# Patient Record
Sex: FEMALE | Race: AMERICAN INDIAN OR ALASKA NATIVE | NOT HISPANIC OR LATINO | URBAN - METROPOLITAN AREA
[De-identification: names, ages, dates, MRNs, and addresses within clinical notes are randomized per-mention and may not be internally consistent; named-entity substitution may affect disease eponyms.]

---

## 2017-01-03 ENCOUNTER — INPATIENT (INPATIENT)
Facility: HOSPITAL | Age: 52
LOS: 7 days | Discharge: HOME | End: 2017-01-11
Attending: SURGERY | Admitting: SURGERY
Payer: COMMERCIAL

## 2017-01-03 VITALS
TEMPERATURE: 98 F | HEIGHT: 62 IN | OXYGEN SATURATION: 100 % | DIASTOLIC BLOOD PRESSURE: 79 MMHG | HEART RATE: 102 BPM | WEIGHT: 134.92 LBS | SYSTOLIC BLOOD PRESSURE: 116 MMHG

## 2017-01-03 LAB
ALBUMIN SERPL ELPH-MCNC: 3.4 G/DL — SIGNIFICANT CHANGE UP (ref 3.3–5)
ALP SERPL-CCNC: 175 U/L — HIGH (ref 40–120)
ALT FLD-CCNC: 24 U/L — SIGNIFICANT CHANGE UP (ref 12–78)
ANION GAP SERPL CALC-SCNC: 10 MMOL/L — SIGNIFICANT CHANGE UP (ref 5–17)
APTT BLD: 36.5 SEC — SIGNIFICANT CHANGE UP (ref 27.5–37.4)
AST SERPL-CCNC: 24 U/L — SIGNIFICANT CHANGE UP (ref 15–37)
BASOPHILS # BLD AUTO: 0.1 K/UL — SIGNIFICANT CHANGE UP (ref 0–0.2)
BASOPHILS NFR BLD AUTO: 0.6 % — SIGNIFICANT CHANGE UP (ref 0–2)
BILIRUB SERPL-MCNC: 0.2 MG/DL — SIGNIFICANT CHANGE UP (ref 0.2–1.2)
BLD GP AB SCN SERPL QL: SIGNIFICANT CHANGE UP
BUN SERPL-MCNC: 19 MG/DL — SIGNIFICANT CHANGE UP (ref 7–23)
CALCIUM SERPL-MCNC: 8.7 MG/DL — SIGNIFICANT CHANGE UP (ref 8.5–10.1)
CHLORIDE SERPL-SCNC: 103 MMOL/L — SIGNIFICANT CHANGE UP (ref 96–108)
CO2 SERPL-SCNC: 27 MMOL/L — SIGNIFICANT CHANGE UP (ref 22–31)
CREAT SERPL-MCNC: 0.69 MG/DL — SIGNIFICANT CHANGE UP (ref 0.5–1.3)
EOSINOPHIL # BLD AUTO: 0.3 K/UL — SIGNIFICANT CHANGE UP (ref 0–0.5)
EOSINOPHIL NFR BLD AUTO: 2.2 % — SIGNIFICANT CHANGE UP (ref 0–6)
GLUCOSE SERPL-MCNC: 112 MG/DL — HIGH (ref 70–99)
HCT VFR BLD CALC: 42.6 % — SIGNIFICANT CHANGE UP (ref 34.5–45)
HGB BLD-MCNC: 14.4 G/DL — SIGNIFICANT CHANGE UP (ref 11.5–15.5)
INR BLD: 1.02 RATIO — SIGNIFICANT CHANGE UP (ref 0.88–1.16)
LYMPHOCYTES # BLD AUTO: 32.5 % — SIGNIFICANT CHANGE UP (ref 13–44)
LYMPHOCYTES # BLD AUTO: 4.4 K/UL — HIGH (ref 1–3.3)
MCHC RBC-ENTMCNC: 27.8 PG — SIGNIFICANT CHANGE UP (ref 27–34)
MCHC RBC-ENTMCNC: 33.9 GM/DL — SIGNIFICANT CHANGE UP (ref 32–36)
MCV RBC AUTO: 82 FL — SIGNIFICANT CHANGE UP (ref 80–100)
MONOCYTES # BLD AUTO: 0.6 K/UL — SIGNIFICANT CHANGE UP (ref 0–0.9)
MONOCYTES NFR BLD AUTO: 4.4 % — SIGNIFICANT CHANGE UP (ref 2–14)
NEUTROPHILS # BLD AUTO: 8.1 K/UL — HIGH (ref 1.8–7.4)
NEUTROPHILS NFR BLD AUTO: 60.3 % — SIGNIFICANT CHANGE UP (ref 43–77)
PLATELET # BLD AUTO: 351 K/UL — SIGNIFICANT CHANGE UP (ref 150–400)
POTASSIUM SERPL-MCNC: 3.4 MMOL/L — LOW (ref 3.5–5.3)
POTASSIUM SERPL-SCNC: 3.4 MMOL/L — LOW (ref 3.5–5.3)
PROT SERPL-MCNC: 8 GM/DL — SIGNIFICANT CHANGE UP (ref 6–8.3)
PROTHROM AB SERPL-ACNC: 11.4 SEC — SIGNIFICANT CHANGE UP (ref 10–13.1)
RBC # BLD: 5.19 M/UL — SIGNIFICANT CHANGE UP (ref 3.8–5.2)
RBC # FLD: 11.4 % — SIGNIFICANT CHANGE UP (ref 11–15)
SODIUM SERPL-SCNC: 140 MMOL/L — SIGNIFICANT CHANGE UP (ref 135–145)
WBC # BLD: 13.5 K/UL — HIGH (ref 3.8–10.5)
WBC # FLD AUTO: 13.5 K/UL — HIGH (ref 3.8–10.5)

## 2017-01-03 PROCEDURE — 99285 EMERGENCY DEPT VISIT HI MDM: CPT

## 2017-01-03 PROCEDURE — 71010: CPT | Mod: 26

## 2017-01-03 RX ORDER — SODIUM CHLORIDE 9 MG/ML
1000 INJECTION INTRAMUSCULAR; INTRAVENOUS; SUBCUTANEOUS ONCE
Qty: 0 | Refills: 0 | Status: COMPLETED | OUTPATIENT
Start: 2017-01-03 | End: 2017-01-03

## 2017-01-03 RX ADMIN — SODIUM CHLORIDE 1000 MILLILITER(S): 9 INJECTION INTRAMUSCULAR; INTRAVENOUS; SUBCUTANEOUS at 17:53

## 2017-01-03 NOTE — ED PROVIDER NOTE - MEDICAL DECISION MAKING DETAILS
Heri Lees: pt was a signout pending CT and admission to dr pedroza's service. d/w dr pedroza and agree pt does not need another CT scan. pt admitted under his service

## 2017-01-03 NOTE — ED ADULT TRIAGE NOTE - CHIEF COMPLAINT QUOTE
lower abdominal pain times 2 weeks. pt also complaining of nausea vomiting and constipation. pt has history of colon cancer states she was sent in by PMD for admission

## 2017-01-03 NOTE — ED PROVIDER NOTE - FAMILY HISTORY
Mother  Still living? No  Family history of leukemia, Age at diagnosis: Age Unknown     Father  Still living? Yes, Estimated age: Age Unknown  Family history of diabetes mellitus, Age at diagnosis: Age Unknown

## 2017-01-04 DIAGNOSIS — E05.90 THYROTOXICOSIS, UNSPECIFIED WITHOUT THYROTOXIC CRISIS OR STORM: ICD-10-CM

## 2017-01-04 DIAGNOSIS — D49.0 NEOPLASM OF UNSPECIFIED BEHAVIOR OF DIGESTIVE SYSTEM: ICD-10-CM

## 2017-01-04 LAB
ALBUMIN SERPL ELPH-MCNC: 2.9 G/DL — LOW (ref 3.3–5)
ALP SERPL-CCNC: 146 U/L — HIGH (ref 40–120)
ALT FLD-CCNC: 24 U/L — SIGNIFICANT CHANGE UP (ref 12–78)
AMYLASE P1 CFR SERPL: 62 U/L — SIGNIFICANT CHANGE UP (ref 25–115)
ANION GAP SERPL CALC-SCNC: 8 MMOL/L — SIGNIFICANT CHANGE UP (ref 5–17)
APPEARANCE UR: CLEAR — SIGNIFICANT CHANGE UP
APTT BLD: 31.9 SEC — SIGNIFICANT CHANGE UP (ref 27.5–37.4)
AST SERPL-CCNC: 20 U/L — SIGNIFICANT CHANGE UP (ref 15–37)
BACTERIA # UR AUTO: ABNORMAL
BASOPHILS # BLD AUTO: 0.1 K/UL — SIGNIFICANT CHANGE UP (ref 0–0.2)
BASOPHILS NFR BLD AUTO: 0.7 % — SIGNIFICANT CHANGE UP (ref 0–2)
BILIRUB SERPL-MCNC: 0.4 MG/DL — SIGNIFICANT CHANGE UP (ref 0.2–1.2)
BILIRUB UR-MCNC: NEGATIVE — SIGNIFICANT CHANGE UP
BLD GP AB SCN SERPL QL: SIGNIFICANT CHANGE UP
BUN SERPL-MCNC: 12 MG/DL — SIGNIFICANT CHANGE UP (ref 7–23)
CALCIUM SERPL-MCNC: 8.2 MG/DL — LOW (ref 8.5–10.1)
CHLORIDE SERPL-SCNC: 108 MMOL/L — SIGNIFICANT CHANGE UP (ref 96–108)
CO2 SERPL-SCNC: 26 MMOL/L — SIGNIFICANT CHANGE UP (ref 22–31)
COLOR SPEC: YELLOW — SIGNIFICANT CHANGE UP
COMMENT - URINE: SIGNIFICANT CHANGE UP
CREAT SERPL-MCNC: 0.58 MG/DL — SIGNIFICANT CHANGE UP (ref 0.5–1.3)
DIFF PNL FLD: NEGATIVE — SIGNIFICANT CHANGE UP
EOSINOPHIL # BLD AUTO: 0.3 K/UL — SIGNIFICANT CHANGE UP (ref 0–0.5)
EOSINOPHIL NFR BLD AUTO: 2.6 % — SIGNIFICANT CHANGE UP (ref 0–6)
EPI CELLS # UR: SIGNIFICANT CHANGE UP
GLUCOSE SERPL-MCNC: 94 MG/DL — SIGNIFICANT CHANGE UP (ref 70–99)
GLUCOSE UR QL: NEGATIVE MG/DL — SIGNIFICANT CHANGE UP
HCG SERPL-ACNC: 5 MIU/ML — SIGNIFICANT CHANGE UP
HCT VFR BLD CALC: 36.3 % — SIGNIFICANT CHANGE UP (ref 34.5–45)
HGB BLD-MCNC: 12.6 G/DL — SIGNIFICANT CHANGE UP (ref 11.5–15.5)
INR BLD: 1.05 RATIO — SIGNIFICANT CHANGE UP (ref 0.88–1.16)
KETONES UR-MCNC: NEGATIVE — SIGNIFICANT CHANGE UP
LEUKOCYTE ESTERASE UR-ACNC: ABNORMAL
LIDOCAIN IGE QN: 204 U/L — SIGNIFICANT CHANGE UP (ref 73–393)
LYMPHOCYTES # BLD AUTO: 29.1 % — SIGNIFICANT CHANGE UP (ref 13–44)
LYMPHOCYTES # BLD AUTO: 3.3 K/UL — SIGNIFICANT CHANGE UP (ref 1–3.3)
MCHC RBC-ENTMCNC: 28.4 PG — SIGNIFICANT CHANGE UP (ref 27–34)
MCHC RBC-ENTMCNC: 34.8 GM/DL — SIGNIFICANT CHANGE UP (ref 32–36)
MCV RBC AUTO: 81.6 FL — SIGNIFICANT CHANGE UP (ref 80–100)
MONOCYTES # BLD AUTO: 0.8 K/UL — SIGNIFICANT CHANGE UP (ref 0–0.9)
MONOCYTES NFR BLD AUTO: 6.8 % — SIGNIFICANT CHANGE UP (ref 2–14)
NEUTROPHILS # BLD AUTO: 6.9 K/UL — SIGNIFICANT CHANGE UP (ref 1.8–7.4)
NEUTROPHILS NFR BLD AUTO: 60.8 % — SIGNIFICANT CHANGE UP (ref 43–77)
NITRITE UR-MCNC: NEGATIVE — SIGNIFICANT CHANGE UP
PH UR: 6 — SIGNIFICANT CHANGE UP (ref 4.8–8)
PLATELET # BLD AUTO: 288 K/UL — SIGNIFICANT CHANGE UP (ref 150–400)
POTASSIUM SERPL-MCNC: 4.2 MMOL/L — SIGNIFICANT CHANGE UP (ref 3.5–5.3)
POTASSIUM SERPL-SCNC: 4.2 MMOL/L — SIGNIFICANT CHANGE UP (ref 3.5–5.3)
PROT SERPL-MCNC: 6.9 GM/DL — SIGNIFICANT CHANGE UP (ref 6–8.3)
PROT UR-MCNC: NEGATIVE MG/DL — SIGNIFICANT CHANGE UP
PROTHROM AB SERPL-ACNC: 11.8 SEC — SIGNIFICANT CHANGE UP (ref 10–13.1)
RBC # BLD: 4.44 M/UL — SIGNIFICANT CHANGE UP (ref 3.8–5.2)
RBC # FLD: 11.3 % — SIGNIFICANT CHANGE UP (ref 11–15)
RBC CASTS # UR COMP ASSIST: SIGNIFICANT CHANGE UP /HPF (ref 0–4)
SODIUM SERPL-SCNC: 142 MMOL/L — SIGNIFICANT CHANGE UP (ref 135–145)
SP GR SPEC: 1.01 — SIGNIFICANT CHANGE UP (ref 1.01–1.02)
T3 SERPL-MCNC: 116 NG/DL — SIGNIFICANT CHANGE UP (ref 80–200)
T4 AB SER-ACNC: 10.6 UG/DL — SIGNIFICANT CHANGE UP (ref 4.6–12)
TSH SERPL-MCNC: 1.82 UU/ML — SIGNIFICANT CHANGE UP (ref 0.36–3.74)
UROBILINOGEN FLD QL: NEGATIVE MG/DL — SIGNIFICANT CHANGE UP
WBC # BLD: 11.4 K/UL — HIGH (ref 3.8–10.5)
WBC # FLD AUTO: 11.4 K/UL — HIGH (ref 3.8–10.5)
WBC UR QL: ABNORMAL

## 2017-01-04 RX ORDER — ONDANSETRON 8 MG/1
4 TABLET, FILM COATED ORAL EVERY 6 HOURS
Qty: 0 | Refills: 0 | Status: DISCONTINUED | OUTPATIENT
Start: 2017-01-04 | End: 2017-01-06

## 2017-01-04 RX ORDER — POTASSIUM CHLORIDE 20 MEQ
40 PACKET (EA) ORAL EVERY 4 HOURS
Qty: 0 | Refills: 0 | Status: COMPLETED | OUTPATIENT
Start: 2017-01-04 | End: 2017-01-04

## 2017-01-04 RX ORDER — HEPARIN SODIUM 5000 [USP'U]/ML
5000 INJECTION INTRAVENOUS; SUBCUTANEOUS EVERY 12 HOURS
Qty: 0 | Refills: 0 | Status: DISCONTINUED | OUTPATIENT
Start: 2017-01-04 | End: 2017-01-06

## 2017-01-04 RX ORDER — PANTOPRAZOLE SODIUM 20 MG/1
40 TABLET, DELAYED RELEASE ORAL
Qty: 0 | Refills: 0 | Status: DISCONTINUED | OUTPATIENT
Start: 2017-01-04 | End: 2017-01-06

## 2017-01-04 RX ORDER — ACETAMINOPHEN 500 MG
650 TABLET ORAL EVERY 6 HOURS
Qty: 0 | Refills: 0 | Status: DISCONTINUED | OUTPATIENT
Start: 2017-01-04 | End: 2017-01-06

## 2017-01-04 RX ORDER — SODIUM CHLORIDE 9 MG/ML
1000 INJECTION, SOLUTION INTRAVENOUS
Qty: 0 | Refills: 0 | Status: DISCONTINUED | OUTPATIENT
Start: 2017-01-04 | End: 2017-01-06

## 2017-01-04 RX ADMIN — Medication 40 MILLIEQUIVALENT(S): at 01:58

## 2017-01-04 RX ADMIN — SODIUM CHLORIDE 75 MILLILITER(S): 9 INJECTION, SOLUTION INTRAVENOUS at 17:13

## 2017-01-04 RX ADMIN — Medication 650 MILLIGRAM(S): at 16:30

## 2017-01-04 RX ADMIN — Medication 650 MILLIGRAM(S): at 17:30

## 2017-01-04 RX ADMIN — SODIUM CHLORIDE 75 MILLILITER(S): 9 INJECTION, SOLUTION INTRAVENOUS at 01:22

## 2017-01-04 RX ADMIN — HEPARIN SODIUM 5000 UNIT(S): 5000 INJECTION INTRAVENOUS; SUBCUTANEOUS at 17:13

## 2017-01-04 RX ADMIN — PANTOPRAZOLE SODIUM 40 MILLIGRAM(S): 20 TABLET, DELAYED RELEASE ORAL at 07:40

## 2017-01-04 RX ADMIN — HEPARIN SODIUM 5000 UNIT(S): 5000 INJECTION INTRAVENOUS; SUBCUTANEOUS at 06:04

## 2017-01-04 RX ADMIN — Medication 40 MILLIEQUIVALENT(S): at 06:03

## 2017-01-04 NOTE — PROGRESS NOTE ADULT - SUBJECTIVE AND OBJECTIVE BOX
Pt seen and examined at bedside for f/u exam. Reports small, soft BM this AM. Tolerated clears. Admits to slight nausea with no associated vomiting. Pt denies abdominal pain, fever, chills, cp, sob.     Vital Signs Last 24 Hrs  T(F): 98.6, Max: 98.6 (01-04 @ 06:00)  HR: 72  BP: 113/69  RR: 16  SpO2: 99%    GENERAL: Alert, NAD  CHEST/LUNG: respirations nonlabored  HEART: S1S2, Regular rate and rhythm   ABDOMEN: + Bowel sounds, soft, Nontender, Nondistended  EXTREMITIES:  no calf tenderness, No edema b/l    LABS:                        12.6   11.4  )-----------( 288      ( 04 Jan 2017 07:19 )             36.3     04 Jan 2017 07:19    142    |  108    |  12     ----------------------------<  94     4.2     |  26     |  0.58     Ca    8.2        04 Jan 2017 07:19    TPro  6.9    /  Alb  2.9    /  TBili  0.4    /  DBili  x      /  AST  20     /  ALT  24     /  AlkPhos  146    04 Jan 2017 07:19    PT/INR - ( 04 Jan 2017 07:19 )   PT: 11.8 sec;   INR: 1.05 ratio         PTT - ( 04 Jan 2017 07:19 )  PTT:31.9 sec    Impression: 51 year old female with pmhx of hyperthyroidism a/w abdominal pain, outpatient CT findings of sigmoid mass r/o neoplasm, improving leukocytosis    Plan:   - continue with clear liquid diet as tolerated  - antiemetics PRN  - continue with pain management PRN  - Methimazole 5mg daily, monitor labs  - DVT ppx  - GI ppx  - f/u AM labs  - Obtain colonoscopy report . F/u pathology for Biopsy.  - will d/w Dr. Armstrong Pt seen and examined at bedside for f/u exam. Reports small, soft BM this AM. Tolerated clears. Admits to slight nausea with no associated vomiting. Pt denies abdominal pain, fever, chills, cp, sob.     Vital Signs Last 24 Hrs  T(F): 98.6, Max: 98.6 (01-04 @ 06:00)  HR: 72  BP: 113/69  RR: 16  SpO2: 99%    GENERAL: Alert, NAD  CHEST/LUNG: respirations nonlabored  HEART: S1S2, Regular rate and rhythm   ABDOMEN: + Bowel sounds, soft, Nontender, Nondistended  EXTREMITIES:  no calf tenderness, No edema b/l    LABS:                        12.6   11.4  )-----------( 288      ( 04 Jan 2017 07:19 )             36.3     04 Jan 2017 07:19    142    |  108    |  12     ----------------------------<  94     4.2     |  26     |  0.58     Ca    8.2        04 Jan 2017 07:19    TPro  6.9    /  Alb  2.9    /  TBili  0.4    /  DBili  x      /  AST  20     /  ALT  24     /  AlkPhos  146    04 Jan 2017 07:19    PT/INR - ( 04 Jan 2017 07:19 )   PT: 11.8 sec;   INR: 1.05 ratio         PTT - ( 04 Jan 2017 07:19 )  PTT:31.9 sec    Impression: 51 year old female with pmhx of hyperthyroidism a/w abdominal pain, outpatient CT findings of sigmoid mass r/o neoplasm, improving leukocytosis    Plan:   - continue with clear liquid diet as tolerated  - antiemetics PRN  - continue with pain management PRN  - Methimazole 5mg daily, monitor labs  - DVT ppx  - GI ppx  - f/u AM labs  - OR planning for Friday  - will d/w Dr. Armstrong

## 2017-01-04 NOTE — H&P ADULT. - ASSESSMENT
51Female with abdominal pain, CT findings of 51Female with abdominal pain, CT findings of sigmoid  mass  r/o neoplasm  Admit patient  to Medical /Surgery unit as per Dr. Armstrong  Continue on IV fluids  NS at 75 ml /hr  Start on Clear diet   Zofran every 6hrs as needed.   Pain meds as prescribed.  Resume Methimazole 5mg daily  GI prophylaxis with Protonix daily , and DVT prophylaxis with Heparin 5000units SQ every 12  F/U GI recommendations - Dr. Rajan  Obtain colonoscopy report . F/u pathology for Biopsy.  Oncology consult if needed  Case to be discussed with Dr. Armstrong- for Surgical intervention and  OR schedule

## 2017-01-04 NOTE — H&P ADULT. - ATTENDING COMMENTS
Lesion sigmoid colon   for colon resection today .  Noted elevation of HCG    pt menopausal for > 1 yr.  tumor related.

## 2017-01-04 NOTE — H&P ADULT. - HISTORY OF PRESENT ILLNESS
51 year old female  c/o abdominal pain associated with nausea. Pt reports chronic constipation and lower abdominal pain especially when having a bowel movement.  Pt recently discharged from [facility]  after GI colonoscopy with biopsy after CT findings 12/22  of Sigmoid masslike thickening concerning for neoplasm, with perisigmoid lymphadenopathy and 2 hepatic lesions concerning for hepatic metastases.  Pt repots  history of  hyperthyroidism on methimazole, denies Hx of HTN, DM, Asthma, PUD, GERD, Diverticulosis or Diverticulitis. No recent fevers, chills,  weigth loss or signs of GI bleeding, anemia or transfusion.   LMP 1 yr ago. ( early menopause), 51 year old female c/o abdominal pain associated with nausea. Pt reports chronic constipation and lower abdominal pain especially when having a bowel movement. Pt recently discharged from [facility]  for outpatient GI-  colonoscopy with biopsy after CT findings 12/22  of Sigmoid masslike thickening concerning for neoplasm, with perisigmoid lymphadenopathy and 2 hepatic lesions concerning for hepatic metastases.   Pt reports history of  hyperthyroidism on methimazole, denies Hx of HTN, DM, Asthma, PUD, GERD, Diverticulosis or Diverticulitis. No recent fevers, chills,  weigth loss or signs of GI bleeding, anemia or transfusion.   LMP 1 yr ago. ( early menopause) Non smoker

## 2017-01-05 LAB
ALBUMIN SERPL ELPH-MCNC: 3.2 G/DL — LOW (ref 3.3–5)
ALP SERPL-CCNC: 158 U/L — HIGH (ref 40–120)
ALT FLD-CCNC: 20 U/L — SIGNIFICANT CHANGE UP (ref 12–78)
AMYLASE P1 CFR SERPL: 53 U/L — SIGNIFICANT CHANGE UP (ref 25–115)
ANION GAP SERPL CALC-SCNC: 9 MMOL/L — SIGNIFICANT CHANGE UP (ref 5–17)
AST SERPL-CCNC: 18 U/L — SIGNIFICANT CHANGE UP (ref 15–37)
BILIRUB DIRECT SERPL-MCNC: 0.09 MG/DL — SIGNIFICANT CHANGE UP (ref 0.05–0.2)
BILIRUB INDIRECT FLD-MCNC: 0.4 MG/DL — SIGNIFICANT CHANGE UP (ref 0.2–1)
BILIRUB SERPL-MCNC: 0.5 MG/DL — SIGNIFICANT CHANGE UP (ref 0.2–1.2)
BUN SERPL-MCNC: 8 MG/DL — SIGNIFICANT CHANGE UP (ref 7–23)
CALCIUM SERPL-MCNC: 8.7 MG/DL — SIGNIFICANT CHANGE UP (ref 8.5–10.1)
CHLORIDE SERPL-SCNC: 105 MMOL/L — SIGNIFICANT CHANGE UP (ref 96–108)
CO2 SERPL-SCNC: 27 MMOL/L — SIGNIFICANT CHANGE UP (ref 22–31)
CREAT SERPL-MCNC: 0.63 MG/DL — SIGNIFICANT CHANGE UP (ref 0.5–1.3)
GLUCOSE SERPL-MCNC: 101 MG/DL — HIGH (ref 70–99)
HCG SERPL-ACNC: 6 MIU/ML — SIGNIFICANT CHANGE UP
HCT VFR BLD CALC: 40.5 % — SIGNIFICANT CHANGE UP (ref 34.5–45)
HGB BLD-MCNC: 14 G/DL — SIGNIFICANT CHANGE UP (ref 11.5–15.5)
LIDOCAIN IGE QN: 166 U/L — SIGNIFICANT CHANGE UP (ref 73–393)
MCHC RBC-ENTMCNC: 27.9 PG — SIGNIFICANT CHANGE UP (ref 27–34)
MCHC RBC-ENTMCNC: 34.6 GM/DL — SIGNIFICANT CHANGE UP (ref 32–36)
MCV RBC AUTO: 80.6 FL — SIGNIFICANT CHANGE UP (ref 80–100)
PLATELET # BLD AUTO: 354 K/UL — SIGNIFICANT CHANGE UP (ref 150–400)
POTASSIUM SERPL-MCNC: 3.6 MMOL/L — SIGNIFICANT CHANGE UP (ref 3.5–5.3)
POTASSIUM SERPL-SCNC: 3.6 MMOL/L — SIGNIFICANT CHANGE UP (ref 3.5–5.3)
PROT SERPL-MCNC: 7.4 GM/DL — SIGNIFICANT CHANGE UP (ref 6–8.3)
RBC # BLD: 5.03 M/UL — SIGNIFICANT CHANGE UP (ref 3.8–5.2)
RBC # FLD: 11.2 % — SIGNIFICANT CHANGE UP (ref 11–15)
SODIUM SERPL-SCNC: 141 MMOL/L — SIGNIFICANT CHANGE UP (ref 135–145)
WBC # BLD: 11.6 K/UL — HIGH (ref 3.8–10.5)
WBC # FLD AUTO: 11.6 K/UL — HIGH (ref 3.8–10.5)

## 2017-01-05 RX ORDER — ERYTHROMYCIN ETHYLSUCCINATE 400 MG
1000 TABLET ORAL ONCE
Qty: 0 | Refills: 0 | Status: COMPLETED | OUTPATIENT
Start: 2017-01-05 | End: 2017-01-05

## 2017-01-05 RX ORDER — NEOMYCIN SULFATE 500 MG/1
1 TABLET ORAL ONCE
Qty: 0 | Refills: 0 | Status: COMPLETED | OUTPATIENT
Start: 2017-01-05 | End: 2017-01-05

## 2017-01-05 RX ORDER — ERYTHROMYCIN ETHYLSUCCINATE 400 MG
1000 TABLET ORAL ONCE
Qty: 0 | Refills: 0 | Status: COMPLETED | OUTPATIENT
Start: 2017-01-06 | End: 2017-01-06

## 2017-01-05 RX ORDER — NEOMYCIN SULFATE 500 MG/1
1 TABLET ORAL ONCE
Qty: 0 | Refills: 0 | Status: COMPLETED | OUTPATIENT
Start: 2017-01-06 | End: 2017-01-06

## 2017-01-05 RX ADMIN — Medication 1000 MILLIGRAM(S): at 17:47

## 2017-01-05 RX ADMIN — HEPARIN SODIUM 5000 UNIT(S): 5000 INJECTION INTRAVENOUS; SUBCUTANEOUS at 06:16

## 2017-01-05 RX ADMIN — Medication 1000 MILLIGRAM(S): at 23:18

## 2017-01-05 RX ADMIN — NEOMYCIN SULFATE 1 GRAM(S): 500 TABLET ORAL at 23:18

## 2017-01-05 RX ADMIN — NEOMYCIN SULFATE 1 GRAM(S): 500 TABLET ORAL at 17:47

## 2017-01-05 RX ADMIN — HEPARIN SODIUM 5000 UNIT(S): 5000 INJECTION INTRAVENOUS; SUBCUTANEOUS at 17:51

## 2017-01-05 RX ADMIN — SODIUM CHLORIDE 75 MILLILITER(S): 9 INJECTION, SOLUTION INTRAVENOUS at 20:20

## 2017-01-05 RX ADMIN — PANTOPRAZOLE SODIUM 40 MILLIGRAM(S): 20 TABLET, DELAYED RELEASE ORAL at 07:23

## 2017-01-05 NOTE — PROGRESS NOTE ADULT - SUBJECTIVE AND OBJECTIVE BOX
Patient seen and examined at bedside in no acute distress. Patient without complaints denies; denies abdominal pain, nausea, vomiting. Admits to flatus/BM. Denies fever, chills, chest pain, sob.     Vital Signs Last 24 Hrs  T(F): 97.6, Max: 98.8 ( @ 12:11)  HR: 74  BP: 108/77  RR: 16  SpO2: 99%    GENERAL: Alert, oriented, NAD  CHEST/LUNG: Clear to auscultation bilaterally, respirations nonlabored  HEART: S1S2, Regular rate and rhythm   ABDOMEN: + Bowel sounds, soft, Nontender, Nondistended  EXTREMITIES:  no calf tenderness, No edema b/l    LABS:                        14.0   11.6  )-----------( 354      ( 2017 07:47 )             40.5     2017 07:47    141    |  105    |  8      ----------------------------<  101    3.6     |  27     |  0.63     Ca    8.7        2017 07:47    TPro  7.4    /  Alb  3.2    /  TBili  0.5    /  DBili  .09    /  AST  18     /  ALT  20     /  AlkPhos  158    2017 07:47    PT/INR - ( 2017 07:19 )   PT: 11.8 sec;   INR: 1.05 ratio         PTT - ( 2017 07:19 )  PTT:31.9 sec    HC    Impression: 51 year old female with pmhx of hyperthyroidism a/w abdominal pain, outpatient CT findings of sigmoid mass r/o neoplasm, improving leukocytosis    Plan:   - OR planning for tomorrow, NPO after midnight, f/u AM labs  - HCG elevated, will d/w with attending  - continue with clear liquid diet as tolerated  - antiemetics PRN  - continue with pain management PRN  - Methimazole 5mg daily, monitor labs  - DVT ppx  - GI ppx  - will d/w Dr. Armstrong

## 2017-01-06 LAB
ANION GAP SERPL CALC-SCNC: 10 MMOL/L — SIGNIFICANT CHANGE UP (ref 5–17)
ANION GAP SERPL CALC-SCNC: 7 MMOL/L — SIGNIFICANT CHANGE UP (ref 5–17)
ANISOCYTOSIS BLD QL: SLIGHT — SIGNIFICANT CHANGE UP
BUN SERPL-MCNC: 11 MG/DL — SIGNIFICANT CHANGE UP (ref 7–23)
BUN SERPL-MCNC: 7 MG/DL — SIGNIFICANT CHANGE UP (ref 7–23)
CALCIUM SERPL-MCNC: 8.5 MG/DL — SIGNIFICANT CHANGE UP (ref 8.5–10.1)
CALCIUM SERPL-MCNC: 8.9 MG/DL — SIGNIFICANT CHANGE UP (ref 8.5–10.1)
CHLORIDE SERPL-SCNC: 103 MMOL/L — SIGNIFICANT CHANGE UP (ref 96–108)
CHLORIDE SERPL-SCNC: 104 MMOL/L — SIGNIFICANT CHANGE UP (ref 96–108)
CO2 SERPL-SCNC: 28 MMOL/L — SIGNIFICANT CHANGE UP (ref 22–31)
CO2 SERPL-SCNC: 30 MMOL/L — SIGNIFICANT CHANGE UP (ref 22–31)
CREAT SERPL-MCNC: 0.64 MG/DL — SIGNIFICANT CHANGE UP (ref 0.5–1.3)
CREAT SERPL-MCNC: 0.74 MG/DL — SIGNIFICANT CHANGE UP (ref 0.5–1.3)
GLUCOSE SERPL-MCNC: 125 MG/DL — HIGH (ref 70–99)
GLUCOSE SERPL-MCNC: 96 MG/DL — SIGNIFICANT CHANGE UP (ref 70–99)
HCT VFR BLD CALC: 37.6 % — SIGNIFICANT CHANGE UP (ref 34.5–45)
HCT VFR BLD CALC: 39 % — SIGNIFICANT CHANGE UP (ref 34.5–45)
HGB BLD-MCNC: 13.2 G/DL — SIGNIFICANT CHANGE UP (ref 11.5–15.5)
HGB BLD-MCNC: 13.6 G/DL — SIGNIFICANT CHANGE UP (ref 11.5–15.5)
LYMPHOCYTES # BLD AUTO: 3 % — LOW (ref 13–44)
MCHC RBC-ENTMCNC: 28.3 PG — SIGNIFICANT CHANGE UP (ref 27–34)
MCHC RBC-ENTMCNC: 28.4 PG — SIGNIFICANT CHANGE UP (ref 27–34)
MCHC RBC-ENTMCNC: 34.8 GM/DL — SIGNIFICANT CHANGE UP (ref 32–36)
MCHC RBC-ENTMCNC: 35 GM/DL — SIGNIFICANT CHANGE UP (ref 32–36)
MCV RBC AUTO: 81.3 FL — SIGNIFICANT CHANGE UP (ref 80–100)
MCV RBC AUTO: 81.3 FL — SIGNIFICANT CHANGE UP (ref 80–100)
MICROCYTES BLD QL: SLIGHT — SIGNIFICANT CHANGE UP
MONOCYTES NFR BLD AUTO: 2 % — SIGNIFICANT CHANGE UP (ref 2–14)
NEUTROPHILS NFR BLD AUTO: 76 % — SIGNIFICANT CHANGE UP (ref 43–77)
NEUTS BAND # BLD: 16 % — HIGH (ref 0–8)
PLAT MORPH BLD: NORMAL — SIGNIFICANT CHANGE UP
PLATELET # BLD AUTO: 283 K/UL — SIGNIFICANT CHANGE UP (ref 150–400)
PLATELET # BLD AUTO: 328 K/UL — SIGNIFICANT CHANGE UP (ref 150–400)
POTASSIUM SERPL-MCNC: 3.8 MMOL/L — SIGNIFICANT CHANGE UP (ref 3.5–5.3)
POTASSIUM SERPL-MCNC: 3.9 MMOL/L — SIGNIFICANT CHANGE UP (ref 3.5–5.3)
POTASSIUM SERPL-SCNC: 3.8 MMOL/L — SIGNIFICANT CHANGE UP (ref 3.5–5.3)
POTASSIUM SERPL-SCNC: 3.9 MMOL/L — SIGNIFICANT CHANGE UP (ref 3.5–5.3)
RBC # BLD: 4.63 M/UL — SIGNIFICANT CHANGE UP (ref 3.8–5.2)
RBC # BLD: 4.79 M/UL — SIGNIFICANT CHANGE UP (ref 3.8–5.2)
RBC # FLD: 11.3 % — SIGNIFICANT CHANGE UP (ref 11–15)
RBC # FLD: 11.3 % — SIGNIFICANT CHANGE UP (ref 11–15)
RBC BLD AUTO: ABNORMAL
SODIUM SERPL-SCNC: 141 MMOL/L — SIGNIFICANT CHANGE UP (ref 135–145)
SODIUM SERPL-SCNC: 141 MMOL/L — SIGNIFICANT CHANGE UP (ref 135–145)
VARIANT LYMPHS # BLD: 3 % — SIGNIFICANT CHANGE UP (ref 0–6)
WBC # BLD: 10.8 K/UL — HIGH (ref 3.8–10.5)
WBC # BLD: 19.2 K/UL — HIGH (ref 3.8–10.5)
WBC # FLD AUTO: 10.8 K/UL — HIGH (ref 3.8–10.5)
WBC # FLD AUTO: 19.2 K/UL — HIGH (ref 3.8–10.5)

## 2017-01-06 PROCEDURE — 88309 TISSUE EXAM BY PATHOLOGIST: CPT | Mod: 26

## 2017-01-06 PROCEDURE — 44140 PARTIAL REMOVAL OF COLON: CPT | Mod: AS,59

## 2017-01-06 PROCEDURE — 88304 TISSUE EXAM BY PATHOLOGIST: CPT | Mod: 26

## 2017-01-06 PROCEDURE — 49320 DIAG LAPARO SEPARATE PROC: CPT | Mod: AS

## 2017-01-06 RX ORDER — FAMOTIDINE 10 MG/ML
20 INJECTION INTRAVENOUS EVERY 12 HOURS
Qty: 0 | Refills: 0 | Status: DISCONTINUED | OUTPATIENT
Start: 2017-01-06 | End: 2017-01-06

## 2017-01-06 RX ORDER — MORPHINE SULFATE 50 MG/1
2 CAPSULE, EXTENDED RELEASE ORAL EVERY 4 HOURS
Qty: 0 | Refills: 0 | Status: DISCONTINUED | OUTPATIENT
Start: 2017-01-06 | End: 2017-01-07

## 2017-01-06 RX ORDER — SODIUM CHLORIDE 9 MG/ML
1000 INJECTION, SOLUTION INTRAVENOUS
Qty: 0 | Refills: 0 | Status: DISCONTINUED | OUTPATIENT
Start: 2017-01-06 | End: 2017-01-06

## 2017-01-06 RX ORDER — ONDANSETRON 8 MG/1
4 TABLET, FILM COATED ORAL EVERY 6 HOURS
Qty: 0 | Refills: 0 | Status: DISCONTINUED | OUTPATIENT
Start: 2017-01-06 | End: 2017-01-11

## 2017-01-06 RX ORDER — SODIUM CHLORIDE 9 MG/ML
1000 INJECTION INTRAMUSCULAR; INTRAVENOUS; SUBCUTANEOUS
Qty: 0 | Refills: 0 | Status: DISCONTINUED | OUTPATIENT
Start: 2017-01-06 | End: 2017-01-09

## 2017-01-06 RX ORDER — FENTANYL CITRATE 50 UG/ML
50 INJECTION INTRAVENOUS
Qty: 0 | Refills: 0 | Status: DISCONTINUED | OUTPATIENT
Start: 2017-01-06 | End: 2017-01-06

## 2017-01-06 RX ORDER — CEFOTETAN DISODIUM 1 G
2 VIAL (EA) INJECTION EVERY 12 HOURS
Qty: 0 | Refills: 0 | Status: COMPLETED | OUTPATIENT
Start: 2017-01-06 | End: 2017-01-07

## 2017-01-06 RX ORDER — PANTOPRAZOLE SODIUM 20 MG/1
40 TABLET, DELAYED RELEASE ORAL
Qty: 0 | Refills: 0 | Status: DISCONTINUED | OUTPATIENT
Start: 2017-01-06 | End: 2017-01-11

## 2017-01-06 RX ORDER — FENTANYL CITRATE 50 UG/ML
25 INJECTION INTRAVENOUS
Qty: 0 | Refills: 0 | Status: DISCONTINUED | OUTPATIENT
Start: 2017-01-06 | End: 2017-01-06

## 2017-01-06 RX ORDER — ACETAMINOPHEN 500 MG
650 TABLET ORAL EVERY 6 HOURS
Qty: 0 | Refills: 0 | Status: DISCONTINUED | OUTPATIENT
Start: 2017-01-06 | End: 2017-01-11

## 2017-01-06 RX ORDER — HYDROMORPHONE HYDROCHLORIDE 2 MG/ML
1 INJECTION INTRAMUSCULAR; INTRAVENOUS; SUBCUTANEOUS ONCE
Qty: 0 | Refills: 0 | Status: DISCONTINUED | OUTPATIENT
Start: 2017-01-06 | End: 2017-01-06

## 2017-01-06 RX ORDER — SENNA PLUS 8.6 MG/1
2 TABLET ORAL AT BEDTIME
Qty: 0 | Refills: 0 | Status: DISCONTINUED | OUTPATIENT
Start: 2017-01-06 | End: 2017-01-11

## 2017-01-06 RX ORDER — HEPARIN SODIUM 5000 [USP'U]/ML
5000 INJECTION INTRAVENOUS; SUBCUTANEOUS EVERY 8 HOURS
Qty: 0 | Refills: 0 | Status: DISCONTINUED | OUTPATIENT
Start: 2017-01-06 | End: 2017-01-11

## 2017-01-06 RX ADMIN — HYDROMORPHONE HYDROCHLORIDE 1 MILLIGRAM(S): 2 INJECTION INTRAMUSCULAR; INTRAVENOUS; SUBCUTANEOUS at 15:20

## 2017-01-06 RX ADMIN — SODIUM CHLORIDE 100 MILLILITER(S): 9 INJECTION INTRAMUSCULAR; INTRAVENOUS; SUBCUTANEOUS at 21:57

## 2017-01-06 RX ADMIN — MORPHINE SULFATE 2 MILLIGRAM(S): 50 CAPSULE, EXTENDED RELEASE ORAL at 22:40

## 2017-01-06 RX ADMIN — SODIUM CHLORIDE 73 MILLILITER(S): 9 INJECTION, SOLUTION INTRAVENOUS at 16:27

## 2017-01-06 RX ADMIN — SENNA PLUS 2 TABLET(S): 8.6 TABLET ORAL at 21:38

## 2017-01-06 RX ADMIN — HYDROMORPHONE HYDROCHLORIDE 1 MILLIGRAM(S): 2 INJECTION INTRAMUSCULAR; INTRAVENOUS; SUBCUTANEOUS at 15:37

## 2017-01-06 RX ADMIN — HEPARIN SODIUM 5000 UNIT(S): 5000 INJECTION INTRAVENOUS; SUBCUTANEOUS at 22:53

## 2017-01-06 RX ADMIN — Medication 1000 MILLIGRAM(S): at 07:53

## 2017-01-06 RX ADMIN — HEPARIN SODIUM 5000 UNIT(S): 5000 INJECTION INTRAVENOUS; SUBCUTANEOUS at 06:48

## 2017-01-06 RX ADMIN — MORPHINE SULFATE 2 MILLIGRAM(S): 50 CAPSULE, EXTENDED RELEASE ORAL at 21:38

## 2017-01-06 RX ADMIN — NEOMYCIN SULFATE 1 GRAM(S): 500 TABLET ORAL at 07:53

## 2017-01-07 LAB
ALBUMIN SERPL ELPH-MCNC: 2.4 G/DL — LOW (ref 3.3–5)
ALP SERPL-CCNC: 124 U/L — HIGH (ref 40–120)
ALT FLD-CCNC: 16 U/L — SIGNIFICANT CHANGE UP (ref 12–78)
ANION GAP SERPL CALC-SCNC: 10 MMOL/L — SIGNIFICANT CHANGE UP (ref 5–17)
AST SERPL-CCNC: 18 U/L — SIGNIFICANT CHANGE UP (ref 15–37)
BASOPHILS # BLD AUTO: 0 K/UL — SIGNIFICANT CHANGE UP (ref 0–0.2)
BASOPHILS NFR BLD AUTO: 0.4 % — SIGNIFICANT CHANGE UP (ref 0–2)
BILIRUB SERPL-MCNC: 0.4 MG/DL — SIGNIFICANT CHANGE UP (ref 0.2–1.2)
BUN SERPL-MCNC: 8 MG/DL — SIGNIFICANT CHANGE UP (ref 7–23)
CALCIUM SERPL-MCNC: 8.1 MG/DL — LOW (ref 8.5–10.1)
CHLORIDE SERPL-SCNC: 104 MMOL/L — SIGNIFICANT CHANGE UP (ref 96–108)
CO2 SERPL-SCNC: 28 MMOL/L — SIGNIFICANT CHANGE UP (ref 22–31)
CREAT SERPL-MCNC: 0.87 MG/DL — SIGNIFICANT CHANGE UP (ref 0.5–1.3)
EOSINOPHIL # BLD AUTO: 0 K/UL — SIGNIFICANT CHANGE UP (ref 0–0.5)
EOSINOPHIL NFR BLD AUTO: 0.3 % — SIGNIFICANT CHANGE UP (ref 0–6)
GLUCOSE SERPL-MCNC: 111 MG/DL — HIGH (ref 70–99)
HCT VFR BLD CALC: 33.2 % — LOW (ref 34.5–45)
HGB BLD-MCNC: 11.8 G/DL — SIGNIFICANT CHANGE UP (ref 11.5–15.5)
LYMPHOCYTES # BLD AUTO: 2.8 K/UL — SIGNIFICANT CHANGE UP (ref 1–3.3)
LYMPHOCYTES # BLD AUTO: 23.9 % — SIGNIFICANT CHANGE UP (ref 13–44)
MAGNESIUM SERPL-MCNC: 2 MG/DL — SIGNIFICANT CHANGE UP (ref 1.8–2.4)
MCHC RBC-ENTMCNC: 28.4 PG — SIGNIFICANT CHANGE UP (ref 27–34)
MCHC RBC-ENTMCNC: 35.6 GM/DL — SIGNIFICANT CHANGE UP (ref 32–36)
MCV RBC AUTO: 79.7 FL — LOW (ref 80–100)
MONOCYTES # BLD AUTO: 0.5 K/UL — SIGNIFICANT CHANGE UP (ref 0–0.9)
MONOCYTES NFR BLD AUTO: 4.4 % — SIGNIFICANT CHANGE UP (ref 2–14)
NEUTROPHILS # BLD AUTO: 8.2 K/UL — HIGH (ref 1.8–7.4)
NEUTROPHILS NFR BLD AUTO: 71 % — SIGNIFICANT CHANGE UP (ref 43–77)
PHOSPHATE SERPL-MCNC: 4.4 MG/DL — SIGNIFICANT CHANGE UP (ref 2.5–4.5)
PLATELET # BLD AUTO: 304 K/UL — SIGNIFICANT CHANGE UP (ref 150–400)
POTASSIUM SERPL-MCNC: 3.8 MMOL/L — SIGNIFICANT CHANGE UP (ref 3.5–5.3)
POTASSIUM SERPL-SCNC: 3.8 MMOL/L — SIGNIFICANT CHANGE UP (ref 3.5–5.3)
PROT SERPL-MCNC: 5.8 GM/DL — LOW (ref 6–8.3)
RBC # BLD: 4.16 M/UL — SIGNIFICANT CHANGE UP (ref 3.8–5.2)
RBC # FLD: 11 % — SIGNIFICANT CHANGE UP (ref 11–15)
SODIUM SERPL-SCNC: 142 MMOL/L — SIGNIFICANT CHANGE UP (ref 135–145)
WBC # BLD: 11.6 K/UL — HIGH (ref 3.8–10.5)
WBC # FLD AUTO: 11.6 K/UL — HIGH (ref 3.8–10.5)

## 2017-01-07 RX ORDER — ACETAMINOPHEN 500 MG
1000 TABLET ORAL ONCE
Qty: 0 | Refills: 0 | Status: COMPLETED | OUTPATIENT
Start: 2017-01-07 | End: 2017-01-07

## 2017-01-07 RX ORDER — MORPHINE SULFATE 50 MG/1
4 CAPSULE, EXTENDED RELEASE ORAL EVERY 4 HOURS
Qty: 0 | Refills: 0 | Status: DISCONTINUED | OUTPATIENT
Start: 2017-01-07 | End: 2017-01-08

## 2017-01-07 RX ADMIN — MORPHINE SULFATE 4 MILLIGRAM(S): 50 CAPSULE, EXTENDED RELEASE ORAL at 16:33

## 2017-01-07 RX ADMIN — HEPARIN SODIUM 5000 UNIT(S): 5000 INJECTION INTRAVENOUS; SUBCUTANEOUS at 14:22

## 2017-01-07 RX ADMIN — Medication 1 TABLET(S): at 12:28

## 2017-01-07 RX ADMIN — PANTOPRAZOLE SODIUM 40 MILLIGRAM(S): 20 TABLET, DELAYED RELEASE ORAL at 07:52

## 2017-01-07 RX ADMIN — Medication 100 GRAM(S): at 01:03

## 2017-01-07 RX ADMIN — MORPHINE SULFATE 4 MILLIGRAM(S): 50 CAPSULE, EXTENDED RELEASE ORAL at 16:48

## 2017-01-07 RX ADMIN — HEPARIN SODIUM 5000 UNIT(S): 5000 INJECTION INTRAVENOUS; SUBCUTANEOUS at 21:55

## 2017-01-07 RX ADMIN — SODIUM CHLORIDE 100 MILLILITER(S): 9 INJECTION INTRAMUSCULAR; INTRAVENOUS; SUBCUTANEOUS at 07:59

## 2017-01-07 RX ADMIN — HEPARIN SODIUM 5000 UNIT(S): 5000 INJECTION INTRAVENOUS; SUBCUTANEOUS at 05:15

## 2017-01-07 RX ADMIN — MORPHINE SULFATE 4 MILLIGRAM(S): 50 CAPSULE, EXTENDED RELEASE ORAL at 22:24

## 2017-01-07 RX ADMIN — Medication 400 MILLIGRAM(S): at 11:01

## 2017-01-07 RX ADMIN — SODIUM CHLORIDE 100 MILLILITER(S): 9 INJECTION INTRAMUSCULAR; INTRAVENOUS; SUBCUTANEOUS at 17:19

## 2017-01-07 RX ADMIN — MORPHINE SULFATE 4 MILLIGRAM(S): 50 CAPSULE, EXTENDED RELEASE ORAL at 21:54

## 2017-01-07 RX ADMIN — SENNA PLUS 2 TABLET(S): 8.6 TABLET ORAL at 21:55

## 2017-01-07 RX ADMIN — Medication 1000 MILLIGRAM(S): at 11:16

## 2017-01-07 NOTE — DIETITIAN INITIAL EVALUATION ADULT. - NS AS NUTRI INTERV MEALS SNACK
Advance p.o. diet when medically feasible -> clear liquid c Ensure clear (200 kcal & 7 g pro/serving) 3x/d -> F Liqq -> Regular as Hayes/Other (specify)

## 2017-01-07 NOTE — DIETITIAN INITIAL EVALUATION ADULT. - PERTINENT LABORATORY DATA
01-07 Na142 mmol/L Glu 111 mg/dL<H> K+ 3.8 mmol/L Cr  0.87 mg/dL BUN 8 mg/dL Phos 4.4 mg/dL Alb 2.4 g/dL<L> PAB n/a

## 2017-01-07 NOTE — DIETITIAN INITIAL EVALUATION ADULT. - OTHER INFO
Pt seen today due to NPO x 4 days. s/p Exp Lap POD # 1. Pt lives home alone, does the food shopping/cooking for herself, denies food allergies. Reports last BM on 1/4.

## 2017-01-07 NOTE — PROGRESS NOTE ADULT - SUBJECTIVE AND OBJECTIVE BOX
Postoperative Day #:1  Patient seen and examined bedside, C/O severe abdominal pain at surgical site and that she has numbness in her right thigh. No flatus/BM.   Denies nausea and vomiting. Denies chest pain, dyspnea, cough.    T(F): 98.8, Max: 100 (01-06 @ 22:50)  HR: 86 (70 - 98)  BP: 93/61 (90/50 - 105/66)  RR: 16 (14 - 20)  SpO2: 97% (96% - 100%)  Wt(kg): --  CAPILLARY BLOOD GLUCOSE  Cha: 960cc/24hrs    PHYSICAL EXAM:  General: NAD, WDWN  Neuro:  Alert & oriented x 3  CV: +S1+S2 regular rate and rhythm  Lung: clear to ausculation bilaterally, respirations nonlabored, good inspiratory effort  Abdomen: ND, hypoactive BS, midline incision with dressing C/D/I, tenderness in LLQ and incision site  : cha indwelling with concentrated urine output  Extremities: no pedal edema or calf tenderness noted, +movement and sensation of RLE     LABS:                        11.8   11.6  )-----------( 304      ( 07 Jan 2017 06:43 )             33.2     07 Jan 2017 06:43    142    |  104    |  8      ----------------------------<  111    3.8     |  28     |  0.87     Ca    8.1        07 Jan 2017 06:43  Phos  4.4       07 Jan 2017 06:43  Mg     2.0       07 Jan 2017 06:43    TPro  5.8    /  Alb  2.4    /  TBili  0.4    /  DBili  x      /  AST  18     /  ALT  16     /  AlkPhos  124    07 Jan 2017 06:43      I&O's Detail        Impression: 51y Female admitted with COLON NEOPLASM  ABDOMINAL PAIN, S/P LAR -POD#1    PMH   Hyperthyroidism      Plan:  -Pain management  - Cont NPO/IVF/IV ABX  - cont cha to gravity  -continue VTE prophylaxis   -OOB to chair, Ambulate if possible  -Encourage incentive spirometry use  -Dressing change tomorrow  -will discuss with surgical attending Postoperative Day #:1  Patient seen and examined bedside, C/O severe abdominal pain at surgical site and that she has numbness in her right thigh. No flatus/BM.   Denies nausea and vomiting. Denies chest pain, dyspnea, cough.    T(F): 98.8, Max: 100 (01-06 @ 22:50)  HR: 86 (70 - 98)  BP: 93/61 (90/50 - 105/66)  RR: 16 (14 - 20)  SpO2: 97% (96% - 100%)  Wt(kg): --  CAPILLARY BLOOD GLUCOSE  Cha: 960cc/24hrs    PHYSICAL EXAM:  General: NAD, WDWN  Neuro:  Alert & oriented x 3  CV: +S1+S2 regular rate and rhythm  Lung: clear to ausculation bilaterally, respirations nonlabored, good inspiratory effort  Abdomen: ND, hypoactive BS, midline incision with dressing C/D/I, tenderness in LLQ and incision site  : cha indwelling with concentrated urine output  Extremities: no pedal edema or calf tenderness noted, +movement and sensation of RLE     LABS:                        11.8   11.6  )-----------( 304      ( 07 Jan 2017 06:43 )             33.2     07 Jan 2017 06:43    142    |  104    |  8      ----------------------------<  111    3.8     |  28     |  0.87     Ca    8.1        07 Jan 2017 06:43  Phos  4.4       07 Jan 2017 06:43  Mg     2.0       07 Jan 2017 06:43    TPro  5.8    /  Alb  2.4    /  TBili  0.4    /  DBili  x      /  AST  18     /  ALT  16     /  AlkPhos  124    07 Jan 2017 06:43      I&O's Detail        Impression: 51y Female admitted with COLON NEOPLASM  ABDOMINAL PAIN, S/P LAR -POD#1    PMH   Hyperthyroidism      Plan:  -Pain management  - Cont NPO/IVF  - cont cha to gravity  -continue VTE prophylaxis   -OOB to chair, Ambulate if possible  -Encourage incentive spirometry use  -Dressing change tomorrow  -will discuss with surgical attending

## 2017-01-08 LAB
ANION GAP SERPL CALC-SCNC: 13 MMOL/L — SIGNIFICANT CHANGE UP (ref 5–17)
BUN SERPL-MCNC: 7 MG/DL — SIGNIFICANT CHANGE UP (ref 7–23)
CALCIUM SERPL-MCNC: 8 MG/DL — LOW (ref 8.5–10.1)
CHLORIDE SERPL-SCNC: 104 MMOL/L — SIGNIFICANT CHANGE UP (ref 96–108)
CO2 SERPL-SCNC: 22 MMOL/L — SIGNIFICANT CHANGE UP (ref 22–31)
CREAT SERPL-MCNC: 0.55 MG/DL — SIGNIFICANT CHANGE UP (ref 0.5–1.3)
GLUCOSE SERPL-MCNC: 66 MG/DL — LOW (ref 70–99)
HCT VFR BLD CALC: 31.6 % — LOW (ref 34.5–45)
HGB BLD-MCNC: 11.2 G/DL — LOW (ref 11.5–15.5)
MAGNESIUM SERPL-MCNC: 2.2 MG/DL — SIGNIFICANT CHANGE UP (ref 1.8–2.4)
MCHC RBC-ENTMCNC: 28.7 PG — SIGNIFICANT CHANGE UP (ref 27–34)
MCHC RBC-ENTMCNC: 35.2 GM/DL — SIGNIFICANT CHANGE UP (ref 32–36)
MCV RBC AUTO: 81.5 FL — SIGNIFICANT CHANGE UP (ref 80–100)
PHOSPHATE SERPL-MCNC: 3.2 MG/DL — SIGNIFICANT CHANGE UP (ref 2.5–4.5)
PLATELET # BLD AUTO: 261 K/UL — SIGNIFICANT CHANGE UP (ref 150–400)
POTASSIUM SERPL-MCNC: 3.8 MMOL/L — SIGNIFICANT CHANGE UP (ref 3.5–5.3)
POTASSIUM SERPL-SCNC: 3.8 MMOL/L — SIGNIFICANT CHANGE UP (ref 3.5–5.3)
RBC # BLD: 3.88 M/UL — SIGNIFICANT CHANGE UP (ref 3.8–5.2)
RBC # FLD: 11.7 % — SIGNIFICANT CHANGE UP (ref 11–15)
SODIUM SERPL-SCNC: 139 MMOL/L — SIGNIFICANT CHANGE UP (ref 135–145)
WBC # BLD: 14.5 K/UL — HIGH (ref 3.8–10.5)
WBC # FLD AUTO: 14.5 K/UL — HIGH (ref 3.8–10.5)

## 2017-01-08 RX ORDER — MORPHINE SULFATE 50 MG/1
2 CAPSULE, EXTENDED RELEASE ORAL EVERY 4 HOURS
Qty: 0 | Refills: 0 | Status: DISCONTINUED | OUTPATIENT
Start: 2017-01-08 | End: 2017-01-09

## 2017-01-08 RX ORDER — ACETAMINOPHEN 500 MG
1000 TABLET ORAL ONCE
Qty: 0 | Refills: 0 | Status: COMPLETED | OUTPATIENT
Start: 2017-01-08 | End: 2017-01-08

## 2017-01-08 RX ADMIN — Medication 1000 MILLIGRAM(S): at 06:29

## 2017-01-08 RX ADMIN — Medication 1 TABLET(S): at 12:00

## 2017-01-08 RX ADMIN — MORPHINE SULFATE 2 MILLIGRAM(S): 50 CAPSULE, EXTENDED RELEASE ORAL at 15:30

## 2017-01-08 RX ADMIN — MORPHINE SULFATE 2 MILLIGRAM(S): 50 CAPSULE, EXTENDED RELEASE ORAL at 15:15

## 2017-01-08 RX ADMIN — Medication 400 MILLIGRAM(S): at 06:10

## 2017-01-08 RX ADMIN — Medication 400 MILLIGRAM(S): at 20:02

## 2017-01-08 RX ADMIN — HEPARIN SODIUM 5000 UNIT(S): 5000 INJECTION INTRAVENOUS; SUBCUTANEOUS at 21:27

## 2017-01-08 RX ADMIN — SENNA PLUS 2 TABLET(S): 8.6 TABLET ORAL at 21:27

## 2017-01-08 RX ADMIN — HEPARIN SODIUM 5000 UNIT(S): 5000 INJECTION INTRAVENOUS; SUBCUTANEOUS at 05:31

## 2017-01-08 RX ADMIN — PANTOPRAZOLE SODIUM 40 MILLIGRAM(S): 20 TABLET, DELAYED RELEASE ORAL at 09:08

## 2017-01-08 RX ADMIN — HEPARIN SODIUM 5000 UNIT(S): 5000 INJECTION INTRAVENOUS; SUBCUTANEOUS at 14:11

## 2017-01-08 RX ADMIN — Medication 1000 MILLIGRAM(S): at 20:20

## 2017-01-08 RX ADMIN — SODIUM CHLORIDE 100 MILLILITER(S): 9 INJECTION INTRAMUSCULAR; INTRAVENOUS; SUBCUTANEOUS at 05:31

## 2017-01-08 NOTE — PROGRESS NOTE ADULT - SUBJECTIVE AND OBJECTIVE BOX
Patient seen and examined at bedside in no distress. Reports significant improvement of abdominal pain; denies nausea/vomiting. Admits to flatus. Denies fever, chills, cp, sob. Pt is OOB to ambulate as tolerated.     Vital Signs Last 24 Hrs  T(F): 99.8, Max: 100 (01-07 @ 17:30)  HR: 101  BP: 110/66  RR: 15  SpO2: 97%    GENERAL: Alert, oriented, NAD  CHEST/LUNG: Clear to auscultation bilaterally, respirations nonlabored  HEART: S1S2, Regular rate and rhythm   ABDOMEN: + Bowel sounds, soft. nondistended. Midline incision site with appropriate tenderness, dressing c/d/i.   : cha in situ draining clear, yellow urine, output: 2200mL/24hrs  EXTREMITIES:  no calf tenderness, no edema b/l    LABS:                        11.8   11.6  )-----------( 304      ( 07 Jan 2017 06:43 )             33.2     07 Jan 2017 06:43    142    |  104    |  8      ----------------------------<  111    3.8     |  28     |  0.87     Ca    8.1        07 Jan 2017 06:43  Phos  4.4       07 Jan 2017 06:43  Mg     2.0       07 Jan 2017 06:43    TPro  5.8    /  Alb  2.4    /  TBili  0.4    /  DBili  x      /  AST  18     /  ALT  16     /  AlkPhos  124    07 Jan 2017 06:43    Impression: 51 year female with pmhx hyperthyroidism POD#2 s/p LAR 2/2 sigmoid mass    Plan:  -f/u AM labs  -possible advance diet to clears today  -pain management PRN  -continue with cha catheter, monitor output  -DVT ppx  -OOB to chair, ambulate as tolerated   -Incentive spirometry  -will discuss with surgical attending

## 2017-01-09 DIAGNOSIS — C78.7 SECONDARY MALIGNANT NEOPLASM OF LIVER AND INTRAHEPATIC BILE DUCT: ICD-10-CM

## 2017-01-09 LAB
ANION GAP SERPL CALC-SCNC: 12 MMOL/L — SIGNIFICANT CHANGE UP (ref 5–17)
APPEARANCE UR: CLEAR — SIGNIFICANT CHANGE UP
BILIRUB UR-MCNC: NEGATIVE — SIGNIFICANT CHANGE UP
BUN SERPL-MCNC: 5 MG/DL — LOW (ref 7–23)
CALCIUM SERPL-MCNC: 8.2 MG/DL — LOW (ref 8.5–10.1)
CHLORIDE SERPL-SCNC: 107 MMOL/L — SIGNIFICANT CHANGE UP (ref 96–108)
CO2 SERPL-SCNC: 25 MMOL/L — SIGNIFICANT CHANGE UP (ref 22–31)
COLOR SPEC: YELLOW — SIGNIFICANT CHANGE UP
CREAT SERPL-MCNC: 0.38 MG/DL — LOW (ref 0.5–1.3)
DIFF PNL FLD: NEGATIVE — SIGNIFICANT CHANGE UP
GLUCOSE SERPL-MCNC: 90 MG/DL — SIGNIFICANT CHANGE UP (ref 70–99)
GLUCOSE UR QL: NEGATIVE MG/DL — SIGNIFICANT CHANGE UP
HCT VFR BLD CALC: 30.3 % — LOW (ref 34.5–45)
HGB BLD-MCNC: 10.8 G/DL — LOW (ref 11.5–15.5)
KETONES UR-MCNC: ABNORMAL
LEUKOCYTE ESTERASE UR-ACNC: NEGATIVE — SIGNIFICANT CHANGE UP
MAGNESIUM SERPL-MCNC: 2.1 MG/DL — SIGNIFICANT CHANGE UP (ref 1.8–2.4)
MCHC RBC-ENTMCNC: 28.1 PG — SIGNIFICANT CHANGE UP (ref 27–34)
MCHC RBC-ENTMCNC: 35.5 GM/DL — SIGNIFICANT CHANGE UP (ref 32–36)
MCV RBC AUTO: 79.1 FL — LOW (ref 80–100)
NITRITE UR-MCNC: NEGATIVE — SIGNIFICANT CHANGE UP
PH UR: 6 — SIGNIFICANT CHANGE UP (ref 4.8–8)
PHOSPHATE SERPL-MCNC: 2.2 MG/DL — LOW (ref 2.5–4.5)
PLATELET # BLD AUTO: 278 K/UL — SIGNIFICANT CHANGE UP (ref 150–400)
POTASSIUM SERPL-MCNC: 3.6 MMOL/L — SIGNIFICANT CHANGE UP (ref 3.5–5.3)
POTASSIUM SERPL-SCNC: 3.6 MMOL/L — SIGNIFICANT CHANGE UP (ref 3.5–5.3)
PROT UR-MCNC: NEGATIVE MG/DL — SIGNIFICANT CHANGE UP
RBC # BLD: 3.83 M/UL — SIGNIFICANT CHANGE UP (ref 3.8–5.2)
RBC # FLD: 10.7 % — LOW (ref 11–15)
SODIUM SERPL-SCNC: 144 MMOL/L — SIGNIFICANT CHANGE UP (ref 135–145)
SP GR SPEC: 1.01 — SIGNIFICANT CHANGE UP (ref 1.01–1.02)
UROBILINOGEN FLD QL: NEGATIVE MG/DL — SIGNIFICANT CHANGE UP
WBC # BLD: 12.2 K/UL — HIGH (ref 3.8–10.5)
WBC # FLD AUTO: 12.2 K/UL — HIGH (ref 3.8–10.5)

## 2017-01-09 RX ORDER — POTASSIUM PHOSPHATE, MONOBASIC POTASSIUM PHOSPHATE, DIBASIC 236; 224 MG/ML; MG/ML
15 INJECTION, SOLUTION INTRAVENOUS ONCE
Qty: 0 | Refills: 0 | Status: COMPLETED | OUTPATIENT
Start: 2017-01-09 | End: 2017-01-09

## 2017-01-09 RX ORDER — DOCUSATE SODIUM 100 MG
100 CAPSULE ORAL THREE TIMES A DAY
Qty: 0 | Refills: 0 | Status: DISCONTINUED | OUTPATIENT
Start: 2017-01-09 | End: 2017-01-11

## 2017-01-09 RX ORDER — SODIUM CHLORIDE 9 MG/ML
1000 INJECTION, SOLUTION INTRAVENOUS
Qty: 0 | Refills: 0 | Status: DISCONTINUED | OUTPATIENT
Start: 2017-01-09 | End: 2017-01-10

## 2017-01-09 RX ORDER — KETOROLAC TROMETHAMINE 30 MG/ML
15 SYRINGE (ML) INJECTION EVERY 6 HOURS
Qty: 0 | Refills: 0 | Status: DISCONTINUED | OUTPATIENT
Start: 2017-01-09 | End: 2017-01-11

## 2017-01-09 RX ADMIN — SODIUM CHLORIDE 100 MILLILITER(S): 9 INJECTION INTRAMUSCULAR; INTRAVENOUS; SUBCUTANEOUS at 07:58

## 2017-01-09 RX ADMIN — Medication 15 MILLIGRAM(S): at 12:00

## 2017-01-09 RX ADMIN — HEPARIN SODIUM 5000 UNIT(S): 5000 INJECTION INTRAVENOUS; SUBCUTANEOUS at 13:37

## 2017-01-09 RX ADMIN — PANTOPRAZOLE SODIUM 40 MILLIGRAM(S): 20 TABLET, DELAYED RELEASE ORAL at 07:58

## 2017-01-09 RX ADMIN — HEPARIN SODIUM 5000 UNIT(S): 5000 INJECTION INTRAVENOUS; SUBCUTANEOUS at 22:13

## 2017-01-09 RX ADMIN — SODIUM CHLORIDE 100 MILLILITER(S): 9 INJECTION, SOLUTION INTRAVENOUS at 09:39

## 2017-01-09 RX ADMIN — SENNA PLUS 2 TABLET(S): 8.6 TABLET ORAL at 22:13

## 2017-01-09 RX ADMIN — Medication 100 MILLIGRAM(S): at 13:37

## 2017-01-09 RX ADMIN — HEPARIN SODIUM 5000 UNIT(S): 5000 INJECTION INTRAVENOUS; SUBCUTANEOUS at 05:47

## 2017-01-09 RX ADMIN — Medication 100 MILLIGRAM(S): at 22:13

## 2017-01-09 RX ADMIN — SODIUM CHLORIDE 100 MILLILITER(S): 9 INJECTION, SOLUTION INTRAVENOUS at 22:13

## 2017-01-09 RX ADMIN — Medication 1 TABLET(S): at 11:13

## 2017-01-09 RX ADMIN — POTASSIUM PHOSPHATE, MONOBASIC POTASSIUM PHOSPHATE, DIBASIC 63.75 MILLIMOLE(S): 236; 224 INJECTION, SOLUTION INTRAVENOUS at 12:39

## 2017-01-09 RX ADMIN — Medication 15 MILLIGRAM(S): at 11:36

## 2017-01-09 NOTE — CONSULT NOTE ADULT - SUBJECTIVE AND OBJECTIVE BOX
REASON FOR CONSULTATION:  Colon Cancer.  Constipation.  Abdominal Pain.  Rectal Bleeding.          Allergies    No Known Allergies    Intolerances        MEDICATIONS  (STANDING):  heparin  Injectable 5000Unit(s) SubCutaneous every 8 hours  multivitamin 1Tablet(s) Oral daily  methimazole 5milliGRAM(s) Oral after breakfast  pantoprazole    Tablet 40milliGRAM(s) Oral before breakfast  senna 2Tablet(s) Oral at bedtime  docusate sodium 100milliGRAM(s) Oral three times a day  dextrose 5% + sodium chloride 0.45%. 1000milliLiter(s) IV Continuous <Continuous>  potassium phosphate IVPB 15milliMole(s) IV Intermittent once    MEDICATIONS  (PRN):  acetaminophen   Tablet 650milliGRAM(s) Oral every 6 hours PRN For Temp greater than 38 C (100.4 F)  ondansetron Injectable 4milliGRAM(s) IV Push every 6 hours PRN Nausea  ketorolac   Injectable 15milliGRAM(s) IV Push every 6 hours PRN Severe Pain (7 - 10)  oxyCODONE  5 mG/acetaminophen 325 mG 2Tablet(s) Oral every 4 hours PRN Moderate Pain (4 - 6)      Vital Signs Last 24 Hrs  T(C): 37.7, Max: 37.7 (01-09 @ 06:05)  T(F): 99.8, Max: 99.8 (01-09 @ 06:05)  HR: 98 (84 - 98)  BP: 122/73 (108/71 - 124/74)  BP(mean): --  RR: 16 (15 - 16)  SpO2: 97% (97% - 99%)    PHYSICAL EXAM:        EYES: EOMI, PERRLA, conjunctiva and sclera clear    NECK: Supple, No JVD, Normal thyroid    CHEST/LUNG: Clear to percussion bilaterally;   HEART: Regular rate and rhythm;   ABDOMEN: Soft, scar of Lap.    LYMPH: No lymphadenopathy noted        LABS:                        10.8   12.2  )-----------( 278      ( 09 Jan 2017 05:49 )             30.3     09 Jan 2017 05:49    144    |  107    |  5      ----------------------------<  90     3.6     |  25     |  0.38     Ca    8.2        09 Jan 2017 05:49  Phos  2.2       09 Jan 2017 05:49  Mg     2.1       09 Jan 2017 05:49              RADIOLOGY & ADDITIONAL STUDIES:    PATHOLOGY:

## 2017-01-09 NOTE — PROGRESS NOTE ADULT - SUBJECTIVE AND OBJECTIVE BOX
INTERVAL HPI/OVERNIGHT EVENTS:    Patient lying comfortably.  Postop pain well tolerated with current pain regimen.  Patient had urinary retention overnight, cha replaced.  Tolerating clear liquid diet with no complaint of nausea/vomiting.  +Flatus/No BM.  No other acute event.        Vital Signs Last 24 Hrs  T(C): 37.7, Max: 37.7 (01-09 @ 06:05)  T(F): 99.8, Max: 99.8 (01-09 @ 06:05)  HR: 98 (84 - 98)  BP: 122/73 (108/71 - 124/74)  BP(mean): --  RR: 16 (15 - 16)  SpO2: 97% (97% - 99%)    MEDICATIONS  (STANDING):  heparin  Injectable 5000Unit(s) SubCutaneous every 8 hours  multivitamin 1Tablet(s) Oral daily  methimazole 5milliGRAM(s) Oral after breakfast  pantoprazole    Tablet 40milliGRAM(s) Oral before breakfast  senna 2Tablet(s) Oral at bedtime  docusate sodium 100milliGRAM(s) Oral three times a day  dextrose 5% + sodium chloride 0.45%. 1000milliLiter(s) IV Continuous <Continuous>  potassium phosphate IVPB 15milliMole(s) IV Intermittent once    MEDICATIONS  (PRN):  acetaminophen   Tablet 650milliGRAM(s) Oral every 6 hours PRN For Temp greater than 38 C (100.4 F)  ondansetron Injectable 4milliGRAM(s) IV Push every 6 hours PRN Nausea  morphine  - Injectable 2milliGRAM(s) IV Push every 4 hours PRN Moderate Pain (4 - 6)  ketorolac   Injectable 15milliGRAM(s) IV Push every 6 hours PRN Severe Pain (7 - 10)      PHYSICAL EXAM:    GENERAL: NAD  HEAD:  Atraumatic, Normocephalic  EYES: EOMI, PERRLA, conjunctiva and sclera clear  CHEST/LUNG: Clear to ausculation, bilaterally   HEART: S1S2  ABDOMEN: wound healing  well c/d/intact staples/sutures, non distended, +BS, soft, min  tenderness postop area, no guarding  EXTREMITIES:  calf soft, non tender       LABS:                        10.8   12.2  )-----------( 278      ( 09 Jan 2017 05:49 )             30.3     09 Jan 2017 05:49    144    |  107    |  5      ----------------------------<  90     3.6     |  25     |  0.38     Ca    8.2        09 Jan 2017 05:49  Phos  2.2       09 Jan 2017 05:49  Mg     2.1       09 Jan 2017 05:49        Impression:    51 F with hyperthyroid c/o sigmoid neoplasm s/p Low Anterior Resection, Urinary Retention, Hypophosphatemia       Plan:  advance to full liquid diet as tolerated   pain management - dc morphine, percocet and toradol prn   keep cha intact for now/cha care,  will dc once ambulation improved   replete lytes prn, will recheck labs in am   will send UA/UC.  trend WBC, monitor for fever   continue medical management/supportive care   PPX Measure:  incentive spirometry, venodynes, dvt/gi ppx, OOB to ambulate as tolerated   discuss with Dr. Armstrong

## 2017-01-10 LAB
ALBUMIN SERPL ELPH-MCNC: 2.2 G/DL — LOW (ref 3.3–5)
ALP SERPL-CCNC: 227 U/L — HIGH (ref 40–120)
ALT FLD-CCNC: 22 U/L — SIGNIFICANT CHANGE UP (ref 12–78)
ANION GAP SERPL CALC-SCNC: 8 MMOL/L — SIGNIFICANT CHANGE UP (ref 5–17)
AST SERPL-CCNC: 25 U/L — SIGNIFICANT CHANGE UP (ref 15–37)
BILIRUB SERPL-MCNC: 0.3 MG/DL — SIGNIFICANT CHANGE UP (ref 0.2–1.2)
BUN SERPL-MCNC: 3 MG/DL — LOW (ref 7–23)
CALCIUM SERPL-MCNC: 8.3 MG/DL — LOW (ref 8.5–10.1)
CHLORIDE SERPL-SCNC: 107 MMOL/L — SIGNIFICANT CHANGE UP (ref 96–108)
CO2 SERPL-SCNC: 30 MMOL/L — SIGNIFICANT CHANGE UP (ref 22–31)
CREAT SERPL-MCNC: 0.46 MG/DL — LOW (ref 0.5–1.3)
CULTURE RESULTS: NO GROWTH — SIGNIFICANT CHANGE UP
GLUCOSE SERPL-MCNC: 114 MG/DL — HIGH (ref 70–99)
HCT VFR BLD CALC: 31 % — LOW (ref 34.5–45)
HGB BLD-MCNC: 11 G/DL — LOW (ref 11.5–15.5)
MAGNESIUM SERPL-MCNC: 2.2 MG/DL — SIGNIFICANT CHANGE UP (ref 1.8–2.4)
MCHC RBC-ENTMCNC: 28.6 PG — SIGNIFICANT CHANGE UP (ref 27–34)
MCHC RBC-ENTMCNC: 35.4 GM/DL — SIGNIFICANT CHANGE UP (ref 32–36)
MCV RBC AUTO: 80.7 FL — SIGNIFICANT CHANGE UP (ref 80–100)
PHOSPHATE SERPL-MCNC: 2.4 MG/DL — LOW (ref 2.5–4.5)
PLATELET # BLD AUTO: 294 K/UL — SIGNIFICANT CHANGE UP (ref 150–400)
POTASSIUM SERPL-MCNC: 3.4 MMOL/L — LOW (ref 3.5–5.3)
POTASSIUM SERPL-SCNC: 3.4 MMOL/L — LOW (ref 3.5–5.3)
PROT SERPL-MCNC: 6.2 GM/DL — SIGNIFICANT CHANGE UP (ref 6–8.3)
RBC # BLD: 3.84 M/UL — SIGNIFICANT CHANGE UP (ref 3.8–5.2)
RBC # FLD: 11.3 % — SIGNIFICANT CHANGE UP (ref 11–15)
SODIUM SERPL-SCNC: 145 MMOL/L — SIGNIFICANT CHANGE UP (ref 135–145)
SPECIMEN SOURCE: SIGNIFICANT CHANGE UP
WBC # BLD: 9.1 K/UL — SIGNIFICANT CHANGE UP (ref 3.8–10.5)
WBC # FLD AUTO: 9.1 K/UL — SIGNIFICANT CHANGE UP (ref 3.8–10.5)

## 2017-01-10 RX ORDER — SODIUM CHLORIDE 9 MG/ML
1000 INJECTION, SOLUTION INTRAVENOUS
Qty: 0 | Refills: 0 | Status: DISCONTINUED | OUTPATIENT
Start: 2017-01-10 | End: 2017-01-11

## 2017-01-10 RX ORDER — POTASSIUM PHOSPHATE, MONOBASIC POTASSIUM PHOSPHATE, DIBASIC 236; 224 MG/ML; MG/ML
15 INJECTION, SOLUTION INTRAVENOUS ONCE
Qty: 0 | Refills: 0 | Status: COMPLETED | OUTPATIENT
Start: 2017-01-10 | End: 2017-01-10

## 2017-01-10 RX ADMIN — PANTOPRAZOLE SODIUM 40 MILLIGRAM(S): 20 TABLET, DELAYED RELEASE ORAL at 07:56

## 2017-01-10 RX ADMIN — Medication 100 MILLIGRAM(S): at 14:47

## 2017-01-10 RX ADMIN — Medication 1 TABLET(S): at 12:06

## 2017-01-10 RX ADMIN — HEPARIN SODIUM 5000 UNIT(S): 5000 INJECTION INTRAVENOUS; SUBCUTANEOUS at 14:47

## 2017-01-10 RX ADMIN — HEPARIN SODIUM 5000 UNIT(S): 5000 INJECTION INTRAVENOUS; SUBCUTANEOUS at 21:28

## 2017-01-10 RX ADMIN — Medication 100 MILLIGRAM(S): at 21:28

## 2017-01-10 RX ADMIN — Medication 100 MILLIGRAM(S): at 06:46

## 2017-01-10 RX ADMIN — SENNA PLUS 2 TABLET(S): 8.6 TABLET ORAL at 21:28

## 2017-01-10 RX ADMIN — POTASSIUM PHOSPHATE, MONOBASIC POTASSIUM PHOSPHATE, DIBASIC 63.75 MILLIMOLE(S): 236; 224 INJECTION, SOLUTION INTRAVENOUS at 09:31

## 2017-01-10 RX ADMIN — SODIUM CHLORIDE 60 MILLILITER(S): 9 INJECTION, SOLUTION INTRAVENOUS at 10:16

## 2017-01-10 RX ADMIN — SODIUM CHLORIDE 100 MILLILITER(S): 9 INJECTION, SOLUTION INTRAVENOUS at 07:57

## 2017-01-10 RX ADMIN — HEPARIN SODIUM 5000 UNIT(S): 5000 INJECTION INTRAVENOUS; SUBCUTANEOUS at 06:46

## 2017-01-10 RX ADMIN — SODIUM CHLORIDE 60 MILLILITER(S): 9 INJECTION, SOLUTION INTRAVENOUS at 21:28

## 2017-01-10 NOTE — PROGRESS NOTE ADULT - SUBJECTIVE AND OBJECTIVE BOX
INTERVAL HISTORY:    Colon Cancer      Allergies    No Known Allergies    Intolerances        MEDICATIONS  (STANDING):  heparin  Injectable 5000Unit(s) SubCutaneous every 8 hours  multivitamin 1Tablet(s) Oral daily  methimazole 5milliGRAM(s) Oral after breakfast  pantoprazole    Tablet 40milliGRAM(s) Oral before breakfast  senna 2Tablet(s) Oral at bedtime  docusate sodium 100milliGRAM(s) Oral three times a day  dextrose 5% + sodium chloride 0.45%. 1000milliLiter(s) IV Continuous <Continuous>    MEDICATIONS  (PRN):  acetaminophen   Tablet 650milliGRAM(s) Oral every 6 hours PRN For Temp greater than 38 C (100.4 F)  ondansetron Injectable 4milliGRAM(s) IV Push every 6 hours PRN Nausea  ketorolac   Injectable 15milliGRAM(s) IV Push every 6 hours PRN Severe Pain (7 - 10)  oxyCODONE  5 mG/acetaminophen 325 mG 2Tablet(s) Oral every 4 hours PRN Moderate Pain (4 - 6)      Vital Signs Last 24 Hrs  T(C): 36.8, Max: 37.2 (01-10 @ 00:05)  T(F): 98.2, Max: 99 (01-10 @ 00:05)  HR: 88 (86 - 98)  BP: 105/68 (105/68 - 115/73)  BP(mean): --  RR: 16 (15 - 16)  SpO2: 94% (94% - 99%)    PHYSICAL EXAM:    GENERAL: NAD,   HEAD:  Atraumatic, Normocephalic  EYES: EOMI, PERRLA, conjunctiva and sclera clear    NECK: Supple, No JVD, Normal thyroid      CHEST/LUNG: Clear to percussion bilaterally; No rales, rhonchi,   HEART: Regular rate and rhythm;   ABDOMEN: Soft, Non tender. scar of surgery  EXTREMITIES:   edema:-  LYMPH: No lymphadenopathy noted        LABS:                        11.0   9.1   )-----------( 294      ( 10 Amrit 2017 07:01 )             31.0     10 Amrit 2017 07:01    145    |  107    |  3      ----------------------------<  114    3.4     |  30     |  0.46     Ca    8.3        10 Amrit 2017 07:01  Phos  2.4       10 Amrit 2017 07:01  Mg     2.2       10 Amrit 2017 07:01    TPro  6.2    /  Alb  2.2    /  TBili  0.3    /  DBili  x      /  AST  25     /  ALT  22     /  AlkPhos  227    10 Amrit 2017 07:01      Urinalysis Basic - ( 2017 11:08 )    Color: Yellow / Appearance: Clear / S.010 / pH: x  Gluc: x / Ketone: Large  / Bili: Negative / Urobili: Negative mg/dL   Blood: x / Protein: Negative mg/dL / Nitrite: Negative   Leuk Esterase: Negative / RBC: x / WBC x   Sq Epi: x / Non Sq Epi: x / Bacteria: x          RADIOLOGY & ADDITIONAL STUDIES:    PATHOLOGY:

## 2017-01-10 NOTE — PROGRESS NOTE ADULT - SUBJECTIVE AND OBJECTIVE BOX
INTERVAL HPI/OVERNIGHT EVENTS:    Patient continues to do well.  Postop pain continues to improve.  Tolerating full diet with no complaint of nausea/vomiting.  +Flatus/No BM.  Cha intact and will DC this am for TOV.  No other acute event overnight.        Vital Signs Last 24 Hrs  T(C): 36.8, Max: 37.2 (-10 @ 00:05)  T(F): 98.2, Max: 99 (01-10 @ 00:05)  HR: 88 (86 - 98)  BP: 105/68 (105/68 - 115/73)  BP(mean): --  RR: 16 (15 - 16)  SpO2: 94% (94% - 99%)    MEDICATIONS  (STANDING):  heparin  Injectable 5000Unit(s) SubCutaneous every 8 hours  multivitamin 1Tablet(s) Oral daily  methimazole 5milliGRAM(s) Oral after breakfast  pantoprazole    Tablet 40milliGRAM(s) Oral before breakfast  senna 2Tablet(s) Oral at bedtime  docusate sodium 100milliGRAM(s) Oral three times a day  dextrose 5% + sodium chloride 0.45%. 1000milliLiter(s) IV Continuous <Continuous>    MEDICATIONS  (PRN):  acetaminophen   Tablet 650milliGRAM(s) Oral every 6 hours PRN For Temp greater than 38 C (100.4 F)  ondansetron Injectable 4milliGRAM(s) IV Push every 6 hours PRN Nausea  ketorolac   Injectable 15milliGRAM(s) IV Push every 6 hours PRN Severe Pain (7 - 10)  oxyCODONE  5 mG/acetaminophen 325 mG 2Tablet(s) Oral every 4 hours PRN Moderate Pain (4 - 6)      PHYSICAL EXAM:    GENERAL: NAD  HEAD:  Atraumatic, Normocephalic  EYES: EOMI, PERRLA, conjunctiva and sclera clear  CHEST/LUNG: Clear to ausculation, bilaterally   HEART: S1S2  ABDOMEN: wound healing well, non distended, +BS, soft, +tenderness on postop area improving,  no guarding  EXTREMITIES:  calf soft, non tender       LABS:                        11.0   9.1   )-----------( 294      ( 10 Amrit 2017 07:01 )             31.0     10 Amrit 2017 07:01    145    |  107    |  3      ----------------------------<  114    3.4     |  30     |  0.46     Ca    8.3        10 Amrit 2017 07:01  Phos  2.4       10 Amrit 2017 07:01  Mg     2.2       10 Amrit 2017 07:01    TPro  6.2    /  Alb  2.2    /  TBili  0.3    /  DBili  x      /  AST  25     /  ALT  22     /  AlkPhos  227    10 Amrit 2017 07:01      Urinalysis Basic - ( 2017 11:08 )    Color: Yellow / Appearance: Clear / S.010 / pH: x  Gluc: x / Ketone: Large  / Bili: Negative / Urobili: Negative mg/dL   Blood: x / Protein: Negative mg/dL / Nitrite: Negative   Leuk Esterase: Negative / RBC: x / WBC x   Sq Epi: x / Non Sq Epi: x / Bacteria: x      Impression:    51 F with hyperthyroid c/o sigmoid neoplasm s/p Low Anterior Resection, Urinary Retention, HypoKalemia, Hypophosphatemia, protein malnutrition      Plan:  advance to LRD as tolerated, supplemented with ensure    will DC PATRICIA cha.   Replete lytes will recheck in am   pain management   continue medical management/supportive care   Hem/Onc follow up, Pathology Pending.    PPX Measure:  incentive spirometry, venodynes, dvt/gi ppx, OOB to ambulate as tolerated   discuss with Dr. Armstrong :  ELLY Planning in am

## 2017-01-11 VITALS
HEART RATE: 60 BPM | SYSTOLIC BLOOD PRESSURE: 105 MMHG | OXYGEN SATURATION: 95 % | TEMPERATURE: 99 F | RESPIRATION RATE: 16 BRPM | DIASTOLIC BLOOD PRESSURE: 70 MMHG

## 2017-01-11 LAB
ALBUMIN SERPL ELPH-MCNC: 2.4 G/DL — LOW (ref 3.3–5)
ALP SERPL-CCNC: 297 U/L — HIGH (ref 40–120)
ALT FLD-CCNC: 44 U/L — SIGNIFICANT CHANGE UP (ref 12–78)
ANION GAP SERPL CALC-SCNC: 10 MMOL/L — SIGNIFICANT CHANGE UP (ref 5–17)
AST SERPL-CCNC: 54 U/L — HIGH (ref 15–37)
BILIRUB DIRECT SERPL-MCNC: 0.12 MG/DL — SIGNIFICANT CHANGE UP (ref 0.05–0.2)
BILIRUB INDIRECT FLD-MCNC: 0.2 MG/DL — SIGNIFICANT CHANGE UP (ref 0.2–1)
BILIRUB SERPL-MCNC: 0.3 MG/DL — SIGNIFICANT CHANGE UP (ref 0.2–1.2)
BUN SERPL-MCNC: 8 MG/DL — SIGNIFICANT CHANGE UP (ref 7–23)
CALCIUM SERPL-MCNC: 8.5 MG/DL — SIGNIFICANT CHANGE UP (ref 8.5–10.1)
CHLORIDE SERPL-SCNC: 105 MMOL/L — SIGNIFICANT CHANGE UP (ref 96–108)
CO2 SERPL-SCNC: 28 MMOL/L — SIGNIFICANT CHANGE UP (ref 22–31)
CREAT SERPL-MCNC: 0.49 MG/DL — LOW (ref 0.5–1.3)
GLUCOSE SERPL-MCNC: 94 MG/DL — SIGNIFICANT CHANGE UP (ref 70–99)
HCT VFR BLD CALC: 32.6 % — LOW (ref 34.5–45)
HGB BLD-MCNC: 11.2 G/DL — LOW (ref 11.5–15.5)
MAGNESIUM SERPL-MCNC: 2.2 MG/DL — SIGNIFICANT CHANGE UP (ref 1.8–2.4)
MCHC RBC-ENTMCNC: 27.9 PG — SIGNIFICANT CHANGE UP (ref 27–34)
MCHC RBC-ENTMCNC: 34.3 GM/DL — SIGNIFICANT CHANGE UP (ref 32–36)
MCV RBC AUTO: 81.1 FL — SIGNIFICANT CHANGE UP (ref 80–100)
PHOSPHATE SERPL-MCNC: 3.8 MG/DL — SIGNIFICANT CHANGE UP (ref 2.5–4.5)
PLATELET # BLD AUTO: 333 K/UL — SIGNIFICANT CHANGE UP (ref 150–400)
POTASSIUM SERPL-MCNC: 3.6 MMOL/L — SIGNIFICANT CHANGE UP (ref 3.5–5.3)
POTASSIUM SERPL-SCNC: 3.6 MMOL/L — SIGNIFICANT CHANGE UP (ref 3.5–5.3)
PROT SERPL-MCNC: 6.7 GM/DL — SIGNIFICANT CHANGE UP (ref 6–8.3)
RBC # BLD: 4.02 M/UL — SIGNIFICANT CHANGE UP (ref 3.8–5.2)
RBC # FLD: 11.2 % — SIGNIFICANT CHANGE UP (ref 11–15)
SODIUM SERPL-SCNC: 143 MMOL/L — SIGNIFICANT CHANGE UP (ref 135–145)
WBC # BLD: 9.7 K/UL — SIGNIFICANT CHANGE UP (ref 3.8–10.5)
WBC # FLD AUTO: 9.7 K/UL — SIGNIFICANT CHANGE UP (ref 3.8–10.5)

## 2017-01-11 RX ADMIN — PANTOPRAZOLE SODIUM 40 MILLIGRAM(S): 20 TABLET, DELAYED RELEASE ORAL at 07:59

## 2017-01-11 RX ADMIN — HEPARIN SODIUM 5000 UNIT(S): 5000 INJECTION INTRAVENOUS; SUBCUTANEOUS at 14:02

## 2017-01-11 RX ADMIN — Medication 1 TABLET(S): at 12:41

## 2017-01-11 RX ADMIN — Medication 100 MILLIGRAM(S): at 14:02

## 2017-01-11 RX ADMIN — HEPARIN SODIUM 5000 UNIT(S): 5000 INJECTION INTRAVENOUS; SUBCUTANEOUS at 05:19

## 2017-01-11 RX ADMIN — Medication 100 MILLIGRAM(S): at 05:20

## 2017-01-11 NOTE — DISCHARGE NOTE ADULT - INSTRUCTIONS
Low residual diet  Ensure 1 can by mouth 3 times a day.  Please drink plenty of fluids to prevent constipation.

## 2017-01-11 NOTE — DISCHARGE NOTE ADULT - HOSPITAL COURSE
51 year old female with PMH hyperthyroidism presented c/o abdominal pain associated with nausea. Pt reports chronic constipation and lower abdominal pain especially when having a bowel movement. Pt recently discharged from [facility]  for outpatient GI- colonoscopy with biopsy after CT findings 12/22 of Sigmoid masslike thickening concerning for neoplasm, with perisigmoid lymphadenopathy and 2 hepatic lesions concerning for hepatic metastases. Patient was taken to the OR for a Lower anterior resection on 1/6. Patients post-op was complicated by urinary retention.  Patient was cleared for discharge on POD5 once tolerating diet, voiding freely, and having bowel movements. Patient was also following by heme/onc.

## 2017-01-11 NOTE — DISCHARGE NOTE ADULT - PLAN OF CARE
management Drink plenty of fluids to prevent constipation and fruit juices without pulp that are high in vitamin C. Rest as needed. Do not lift anything heavier than 10 pounds. You may take motrin or advil for mild pain as needed, in addition to prescribed narcotic medication. Call for any fever over 101, nausea, vomiting, severe pain, no passing of gas or bowel movement. control of thyroid function continue to take medication as prescribed by your PMD

## 2017-01-11 NOTE — DISCHARGE NOTE ADULT - CONDITION (STATED IN TERMS THAT PERMIT A SPECIFIC MEASURABLE COMPARISON WITH CONDITION ON ADMISSION):
At the time of discharge, the patient was hemodynamically stable, was tolerating PO diet, was voiding urine and passing stool, was ambulating, and was comfortable with adequate pain control.

## 2017-01-11 NOTE — DISCHARGE NOTE ADULT - CARE PROVIDER_API CALL
Norma Armstrong), Surgery  [address]  [place] [address]  Phone: [phone]  Fax: [phone]    Aleksey Mcpherson), Medical Oncology  [address]  [place] [address]  Phone: [phone]  Fax: [phone]

## 2017-01-11 NOTE — DISCHARGE NOTE ADULT - NS AS ACTIVITY OBS
Showering allowed/No Heavy lifting/straining/Do not make important decisions/Do not drive or operate machinery

## 2017-01-11 NOTE — PROGRESS NOTE ADULT - SUBJECTIVE AND OBJECTIVE BOX
INTERVAL HISTORY:    S/p Lap and Colon resection.      Allergies    No Known Allergies    Intolerances        MEDICATIONS  (STANDING):  heparin  Injectable 5000Unit(s) SubCutaneous every 8 hours  multivitamin 1Tablet(s) Oral daily  methimazole 5milliGRAM(s) Oral after breakfast  pantoprazole    Tablet 40milliGRAM(s) Oral before breakfast  senna 2Tablet(s) Oral at bedtime  docusate sodium 100milliGRAM(s) Oral three times a day  dextrose 5% + sodium chloride 0.45%. 1000milliLiter(s) IV Continuous <Continuous>    MEDICATIONS  (PRN):  acetaminophen   Tablet 650milliGRAM(s) Oral every 6 hours PRN For Temp greater than 38 C (100.4 F)  ondansetron Injectable 4milliGRAM(s) IV Push every 6 hours PRN Nausea  ketorolac   Injectable 15milliGRAM(s) IV Push every 6 hours PRN Severe Pain (7 - 10)  oxyCODONE  5 mG/acetaminophen 325 mG 2Tablet(s) Oral every 4 hours PRN Moderate Pain (4 - 6)      Vital Signs Last 24 Hrs  T(C): 37.1, Max: 37.3 (01-10 @ 23:44)  T(F): 98.8, Max: 99.2 (01-10 @ 23:44)  HR: 90 (86 - 98)  BP: 115/74 (102/62 - 118/80)  BP(mean): --  RR: 16 (15 - 16)  SpO2: 98% (97% - 98%)      GENERAL: NAD,   HEAD:  Atraumatic, Normocephalic  EYES: EOMI, PERRLA, conjunctiva and sclera clear    NECK: Supple, No JVD, Normal thyroid    CHEST/LUNG: Clear to percussion bilaterally; No rales, rhonchi,   HEART: Regular rate and rhythm;   ABDOMEN: Soft, Nontender. scar of surgery.  EXTREMITIES:   edema:- nil  LYMPH: No lymphadenopathy noted        LABS:                        11.2   9.7   )-----------( 333      ( 2017 07:46 )             32.6     2017 07:46    143    |  105    |  8      ----------------------------<  94     3.6     |  28     |  0.49     Ca    8.5        2017 07:46  Phos  3.8       2017 07:46  Mg     2.2       2017 07:46    TPro  6.7    /  Alb  2.4    /  TBili  0.3    /  DBili  .12    /  AST  54     /  ALT  44     /  AlkPhos  297    2017 07:46      Urinalysis Basic - ( 2017 11:08 )    Color: Yellow / Appearance: Clear / S.010 / pH: x  Gluc: x / Ketone: Large  / Bili: Negative / Urobili: Negative mg/dL   Blood: x / Protein: Negative mg/dL / Nitrite: Negative   Leuk Esterase: Negative / RBC: x / WBC x   Sq Epi: x / Non Sq Epi: x / Bacteria: x          RADIOLOGY & ADDITIONAL STUDIES:    PATHOLOGY:

## 2017-01-11 NOTE — DISCHARGE NOTE ADULT - CARE PROVIDERS DIRECT ADDRESSES
,DirectAddress_Unknown,DirectAddress_Unknown,[email or web address]

## 2017-01-11 NOTE — DISCHARGE NOTE ADULT - MEDICATION SUMMARY - MEDICATIONS TO TAKE
I will START or STAY ON the medications listed below when I get home from the hospital:    Motrin 400 mg oral tablet  -- 1 tab(s) by mouth every 6 hours, As Needed - for mild pain, take with food.  -- Indication: For mild pain    Percocet 5/325 325 mg-5 mg oral tablet  -- 1 tab(s) by mouth every 6 hours MDD:4  -- Caution federal law prohibits the transfer of this drug to any person other  than the person for whom it was prescribed.  May cause drowsiness.  Alcohol may intensify this effect.  Use care when operating dangerous machinery.  This prescription cannot be refilled.  This product contains acetaminophen.  Do not use  with any other product containing acetaminophen to prevent possible liver damage.  Using more of this medication than prescribed may cause serious breathing problems.    -- Indication: For moderate pain    methIMAzole 5 mg oral tablet  -- 1 tab(s) by mouth once a day (after a meal)  -- Indication: For antithyroid medicine    Multiple Vitamins oral tablet  -- 1 tab(s) by mouth once a day  -- Indication: For vitamin

## 2017-01-11 NOTE — DISCHARGE NOTE ADULT - CARE PLAN
Principal Discharge DX:	Colon neoplasm  Goal:	management  Instructions for follow-up, activity and diet:	Drink plenty of fluids to prevent constipation and fruit juices without pulp that are high in vitamin C. Rest as needed. Do not lift anything heavier than 10 pounds. You may take motrin or advil for mild pain as needed, in addition to prescribed narcotic medication. Call for any fever over 101, nausea, vomiting, severe pain, no passing of gas or bowel movement.  Secondary Diagnosis:	Hyperthyroidism  Goal:	control of thyroid function  Instructions for follow-up, activity and diet:	continue to take medication as prescribed by your PMD

## 2017-01-11 NOTE — PROGRESS NOTE ADULT - SUBJECTIVE AND OBJECTIVE BOX
Postoperative Day #: 5 s/p Low Anterior resection    Patient seen and examined bedside resting comfortably.  No complaints offered.   Abdominal pain is well controlled.  Denies nausea and vomiting. Tolerating diet.  +  flatus/BM.   Denies chest pain, dyspnea, cough.    T(F): 98.8, Max: 99.2 (01-10 @ 23:44)  HR: 90 (86 - 98)  BP: 115/74 (102/62 - 118/80)  RR: 16 (15 - 16)  SpO2: 98% (97% - 98%)  Wt(kg): --  CAPILLARY BLOOD GLUCOSE      PHYSICAL EXAM:  General: NAD, WDWN  Neuro:  Alert & oriented x 3  Abdomen: soft, NTND. Normactive BS Staple line clean & dry. No signs of infection.      LABS:                        11.2   9.7   )-----------( 333      ( 11 Jan 2017 07:46 )             32.6     11 Jan 2017 07:46    143    |  105    |  8      ----------------------------<  94     3.6     |  28     |  0.49     Ca    8.5        11 Jan 2017 07:46  Phos  3.8       11 Jan 2017 07:46  Mg     2.2       11 Jan 2017 07:46    TPro  6.7    /  Alb  2.4    /  TBili  0.3    /  DBili  .12    /  AST  54     /  ALT  44     /  AlkPhos  297    11 Jan 2017 07:46        Impression: 51y Female 5 dys s/p LAR.    PMH Hyperthyroidism      Plan:  Per discussion with Dr. Armstrong, via phone, the pt is to be discharged to home today.  To folllow up in Nathaniel Kaiser's office next week.  She will need OP placement of [id number]-q-qarl for chemotherapy.  To also f/u with Dr. Mcpherson as an outpt. {Pt was also discussed with Dr. Mcpherson}    All the patients questions answered.

## 2017-01-11 NOTE — DISCHARGE NOTE ADULT - ADDITIONAL INSTRUCTIONS
Please follow up with Dr. Armstrong on Tuesday of next week. Please call the office to schedule an appointment.   Please follow up with Dr. Mcpherson. Please call the office to schedule an appointment.

## 2017-01-11 NOTE — DISCHARGE NOTE ADULT - NS MD DC FALL RISK RISK
For information on Fall & Injury Prevention, visit [email or web address]

## 2017-01-11 NOTE — DISCHARGE NOTE ADULT - PATIENT PORTAL LINK FT
“You can access the Follow[employer] Patient Portal, offered by [facility], by registering with the following website: [email or web address]”

## 2017-01-13 DIAGNOSIS — D49.0 NEOPLASM OF UNSPECIFIED BEHAVIOR OF DIGESTIVE SYSTEM: ICD-10-CM

## 2017-01-13 DIAGNOSIS — R33.9 RETENTION OF URINE, UNSPECIFIED: ICD-10-CM

## 2017-01-13 DIAGNOSIS — Z80.6 FAMILY HISTORY OF LEUKEMIA: ICD-10-CM

## 2017-01-13 DIAGNOSIS — R59.0 LOCALIZED ENLARGED LYMPH NODES: ICD-10-CM

## 2017-01-13 DIAGNOSIS — E83.39 OTHER DISORDERS OF PHOSPHORUS METABOLISM: ICD-10-CM

## 2017-01-13 DIAGNOSIS — K76.9 LIVER DISEASE, UNSPECIFIED: ICD-10-CM

## 2017-01-13 DIAGNOSIS — E05.90 THYROTOXICOSIS, UNSPECIFIED WITHOUT THYROTOXIC CRISIS OR STORM: ICD-10-CM

## 2017-01-13 DIAGNOSIS — D72.829 ELEVATED WHITE BLOOD CELL COUNT, UNSPECIFIED: ICD-10-CM

## 2017-01-13 DIAGNOSIS — C78.7 SECONDARY MALIGNANT NEOPLASM OF LIVER AND INTRAHEPATIC BILE DUCT: ICD-10-CM

## 2017-01-13 LAB — SURGICAL PATHOLOGY FINAL REPORT - CH: SIGNIFICANT CHANGE UP

## 2017-01-20 PROBLEM — Z00.00 ENCOUNTER FOR PREVENTIVE HEALTH EXAMINATION: Status: ACTIVE | Noted: 2017-01-20

## 2017-01-24 ENCOUNTER — OUTPATIENT (OUTPATIENT)
Dept: OUTPATIENT SERVICES | Facility: HOSPITAL | Age: 52
LOS: 1 days | Discharge: HOME | End: 2017-01-24

## 2017-01-26 ENCOUNTER — OUTPATIENT (OUTPATIENT)
Dept: OUTPATIENT SERVICES | Facility: HOSPITAL | Age: 52
LOS: 1 days | End: 2017-01-26
Payer: MEDICAID

## 2017-01-26 ENCOUNTER — APPOINTMENT (OUTPATIENT)
Dept: CT IMAGING | Facility: IMAGING CENTER | Age: 52
End: 2017-01-26

## 2017-01-26 ENCOUNTER — APPOINTMENT (OUTPATIENT)
Dept: RADIATION ONCOLOGY | Facility: CLINIC | Age: 52
End: 2017-01-26

## 2017-01-26 VITALS
DIASTOLIC BLOOD PRESSURE: 77 MMHG | WEIGHT: 133.71 LBS | SYSTOLIC BLOOD PRESSURE: 112 MMHG | OXYGEN SATURATION: 99 % | HEART RATE: 82 BPM | RESPIRATION RATE: 16 BRPM | BODY MASS INDEX: 22.83 KG/M2 | HEIGHT: 64 IN

## 2017-01-26 DIAGNOSIS — Z86.39 PERSONAL HISTORY OF OTHER ENDOCRINE, NUTRITIONAL AND METABOLIC DISEASE: ICD-10-CM

## 2017-01-26 DIAGNOSIS — C18.7 MALIGNANT NEOPLASM OF SIGMOID COLON: ICD-10-CM

## 2017-01-26 DIAGNOSIS — Z80.6 FAMILY HISTORY OF LEUKEMIA: ICD-10-CM

## 2017-01-26 DIAGNOSIS — C78.7 SECONDARY MALIGNANT NEOPLASM OF LIVER AND INTRAHEPATIC BILE DUCT: ICD-10-CM

## 2017-01-26 PROCEDURE — 71250 CT THORAX DX C-: CPT

## 2017-01-26 RX ORDER — ASCORBIC ACID 500 MG
TABLET ORAL
Refills: 0 | Status: ACTIVE | COMMUNITY

## 2017-01-26 RX ORDER — METHIMAZOLE 5 MG/1
5 TABLET ORAL
Refills: 0 | Status: ACTIVE | COMMUNITY

## 2017-01-26 RX ORDER — MULTIVITAMIN
TABLET ORAL
Refills: 0 | Status: ACTIVE | COMMUNITY

## 2017-01-30 ENCOUNTER — OUTPATIENT (OUTPATIENT)
Dept: OUTPATIENT SERVICES | Facility: HOSPITAL | Age: 52
LOS: 1 days | Discharge: HOME | End: 2017-01-30
Payer: COMMERCIAL

## 2017-01-30 VITALS
WEIGHT: 134.92 LBS | TEMPERATURE: 98 F | SYSTOLIC BLOOD PRESSURE: 102 MMHG | HEART RATE: 87 BPM | DIASTOLIC BLOOD PRESSURE: 67 MMHG | HEIGHT: 64 IN | RESPIRATION RATE: 18 BRPM | OXYGEN SATURATION: 99 %

## 2017-01-30 VITALS — SYSTOLIC BLOOD PRESSURE: 112 MMHG | DIASTOLIC BLOOD PRESSURE: 63 MMHG

## 2017-01-30 DIAGNOSIS — D49.0 NEOPLASM OF UNSPECIFIED BEHAVIOR OF DIGESTIVE SYSTEM: ICD-10-CM

## 2017-01-30 DIAGNOSIS — E05.90 THYROTOXICOSIS, UNSPECIFIED WITHOUT THYROTOXIC CRISIS OR STORM: ICD-10-CM

## 2017-01-30 DIAGNOSIS — Z98.890 OTHER SPECIFIED POSTPROCEDURAL STATES: Chronic | ICD-10-CM

## 2017-01-30 PROCEDURE — 71010: CPT | Mod: 26

## 2017-01-30 RX ORDER — ONDANSETRON 8 MG/1
4 TABLET, FILM COATED ORAL ONCE
Qty: 0 | Refills: 0 | Status: DISCONTINUED | OUTPATIENT
Start: 2017-01-30 | End: 2017-01-30

## 2017-01-30 RX ORDER — FENTANYL CITRATE 50 UG/ML
50 INJECTION INTRAVENOUS
Qty: 0 | Refills: 0 | Status: DISCONTINUED | OUTPATIENT
Start: 2017-01-30 | End: 2017-01-30

## 2017-01-30 RX ORDER — SODIUM CHLORIDE 9 MG/ML
1000 INJECTION, SOLUTION INTRAVENOUS
Qty: 0 | Refills: 0 | Status: DISCONTINUED | OUTPATIENT
Start: 2017-01-30 | End: 2017-01-30

## 2017-01-30 RX ORDER — IBUPROFEN 200 MG
1 TABLET ORAL
Qty: 0 | Refills: 0 | COMMUNITY

## 2017-01-30 RX ADMIN — SODIUM CHLORIDE 75 MILLILITER(S): 9 INJECTION, SOLUTION INTRAVENOUS at 14:34

## 2017-01-30 NOTE — H&P PST ADULT - HISTORY OF PRESENT ILLNESS
Patient is a 51 year old female with PMH hyperthyroidism on methimazole and colon neoplasm s/p exp-lap and lower anterior resection on 1/6/17 who presents today for scheduled insertion of infusaport. Pt is scheduled to begin chemotherapy treatment this week with Dr. Kiran. Pt is without complaints today; denies abdominal pain, n/v, change in bowel habits, fever/chills, cp/sob.

## 2017-01-30 NOTE — H&P PST ADULT - ASSESSMENT
51 year old female PMH colon ca s/p LAR, hyperthyroidism presents today for insertion of infusaport for chemotherapy treatment

## 2017-01-30 NOTE — H&P PST ADULT - RS GEN PE MLT RESP DETAILS PC
good air movement/respirations non-labored/breath sounds equal/no rhonchi/clear to auscultation bilaterally/no wheezes/no rales

## 2017-01-30 NOTE — BRIEF OPERATIVE NOTE - PRE-OP DX
Malignant neoplasm of colon, unspecified part of colon  01/30/2017  rectosgmoid  Active  Norma Armstrong

## 2017-02-01 DIAGNOSIS — C18.9 MALIGNANT NEOPLASM OF COLON, UNSPECIFIED: ICD-10-CM

## 2017-02-01 DIAGNOSIS — E05.90 THYROTOXICOSIS, UNSPECIFIED WITHOUT THYROTOXIC CRISIS OR STORM: ICD-10-CM

## 2017-02-21 ENCOUNTER — TRANSCRIPTION ENCOUNTER (OUTPATIENT)
Age: 52
End: 2017-02-21

## 2017-03-11 ENCOUNTER — EMERGENCY (EMERGENCY)
Facility: HOSPITAL | Age: 52
LOS: 0 days | Discharge: HOME | End: 2017-03-11
Attending: EMERGENCY MEDICINE
Payer: COMMERCIAL

## 2017-03-11 VITALS
TEMPERATURE: 98 F | HEIGHT: 64 IN | DIASTOLIC BLOOD PRESSURE: 73 MMHG | SYSTOLIC BLOOD PRESSURE: 114 MMHG | HEART RATE: 111 BPM | WEIGHT: 141.98 LBS | OXYGEN SATURATION: 98 % | RESPIRATION RATE: 16 BRPM

## 2017-03-11 DIAGNOSIS — K59.00 CONSTIPATION, UNSPECIFIED: ICD-10-CM

## 2017-03-11 DIAGNOSIS — K56.49 OTHER IMPACTION OF INTESTINE: ICD-10-CM

## 2017-03-11 DIAGNOSIS — Z98.890 OTHER SPECIFIED POSTPROCEDURAL STATES: Chronic | ICD-10-CM

## 2017-03-11 PROCEDURE — 99283 EMERGENCY DEPT VISIT LOW MDM: CPT

## 2017-03-11 NOTE — ED PROVIDER NOTE - PHYSICAL EXAMINATION
Gen: Alert, NAD Head: NC, AT, PERRL, EOMI, normal lids/conjunctiva ENT:normal hearing, patent oropharynx without erythema/exudate, uvula midline  kacey:  supple, no tenderness/meningismus/JVD, +Trachea midline Pulm: Bilateral BS, normal resp effort, no wheeze/stridor/retractions CV: RRR, no M/R/G, +dist pulses Abd: soft, NT/ND, +BS, no hepatosplenomegaly Mskel: no edema/erythema/cyanosis Skin: no rash Neuro: AAOx3, no sensory/motor deficits, CN 2-12 intact Rectal: normal tone with hard stool

## 2017-03-11 NOTE — ED ADULT NURSE NOTE - OBJECTIVE STATEMENT
pt states  she have not had a bowel movement in five days . history of colon cancer and currently on chemo.. She is alert and oriented respirations unlabored. Able to move all extremities. Appears to be uncomfortable, facial expression and body position

## 2017-03-11 NOTE — ED PROVIDER NOTE - MEDICAL DECISION MAKING DETAILS
patient presnt with fecal impaction.  VSS.  succesful bedside disimpaction.  subsequent enema with large BM.  patient relived.  watns to d/c.  will f/u.  surgeon is Dr. Armstrong.  Discussed results and outcome of testing with the patient.  Patient advised to please follow up with their primary care doctor within the next 24 hours and return to the Emergency Department for worsening symptoms or any other concerns.  Patient advised that their doctor may call  to follow up on the specific results of the tests performed today in the emergency department.

## 2017-03-11 NOTE — ED ADULT NURSE REASSESSMENT NOTE - NS ED NURSE REASSESS COMMENT FT1
Odilon disimpaction done by Dr Engel states she feels better and looks more comfortable  . Enema explained to pt and Enema given

## 2017-03-11 NOTE — ED ADULT TRIAGE NOTE - CHIEF COMPLAINT QUOTE
pt states  she have not had a bowel movement in five days . history of colon cancer and currently on chemo

## 2017-03-11 NOTE — ED PROVIDER NOTE - OBJECTIVE STATEMENT
Pertinent PMH/PSH/FHx/SHx and Review of Systems contained within:  Patient presents to the ED for contipation and rectal pain.  VSS.  PMH of colon CA.  gets constipation during chemo.  no other complaitns.  Non toxic.  Well appearing. No aggravating or relieving factors. No other pertinent PMH.  No other pertinent PSH.  No other pertinent FHx.  Patient denies EtOH/tobacco/illicit substance use. No fever/chills, No photophobia/eye pain/changes in vision, No ear pain/sore throat/dysphagia, No chest pain/palpitations, no SOB/cough/wheeze/stridor, No abdominal pain, No N/V/D, no dysuria/frequency/discharge, No neck/back pain, no rash, no changes in neurological status/function.

## 2017-04-06 ENCOUNTER — OUTPATIENT (OUTPATIENT)
Dept: OUTPATIENT SERVICES | Facility: HOSPITAL | Age: 52
LOS: 1 days | End: 2017-04-06
Payer: COMMERCIAL

## 2017-04-06 ENCOUNTER — APPOINTMENT (OUTPATIENT)
Dept: CT IMAGING | Facility: IMAGING CENTER | Age: 52
End: 2017-04-06

## 2017-04-06 DIAGNOSIS — E05.90 THYROTOXICOSIS, UNSPECIFIED WITHOUT THYROTOXIC CRISIS OR STORM: ICD-10-CM

## 2017-04-06 DIAGNOSIS — Z98.890 OTHER SPECIFIED POSTPROCEDURAL STATES: Chronic | ICD-10-CM

## 2017-04-06 DIAGNOSIS — C18.7 MALIGNANT NEOPLASM OF SIGMOID COLON: ICD-10-CM

## 2017-04-06 PROCEDURE — 74177 CT ABD & PELVIS W/CONTRAST: CPT

## 2017-04-06 PROCEDURE — 71260 CT THORAX DX C+: CPT

## 2017-05-02 ENCOUNTER — OUTPATIENT (OUTPATIENT)
Dept: OUTPATIENT SERVICES | Facility: HOSPITAL | Age: 52
LOS: 1 days | End: 2017-05-02
Payer: MEDICAID

## 2017-05-02 DIAGNOSIS — C18.7 MALIGNANT NEOPLASM OF SIGMOID COLON: ICD-10-CM

## 2017-05-02 DIAGNOSIS — Z98.890 OTHER SPECIFIED POSTPROCEDURAL STATES: Chronic | ICD-10-CM

## 2017-05-02 DIAGNOSIS — E05.90 THYROTOXICOSIS, UNSPECIFIED WITHOUT THYROTOXIC CRISIS OR STORM: ICD-10-CM

## 2017-05-02 PROCEDURE — 36598 INJ W/FLUOR EVAL CV DEVICE: CPT

## 2017-05-02 PROCEDURE — 36593 DECLOT VASCULAR DEVICE: CPT

## 2017-05-05 DIAGNOSIS — T82.868A THROMBOSIS DUE TO VASCULAR PROSTHETIC DEVICES, IMPLANTS AND GRAFTS, INITIAL ENCOUNTER: ICD-10-CM

## 2017-07-07 ENCOUNTER — EMERGENCY (EMERGENCY)
Facility: HOSPITAL | Age: 52
LOS: 0 days | Discharge: HOME | End: 2017-07-07
Attending: EMERGENCY MEDICINE
Payer: COMMERCIAL

## 2017-07-07 VITALS
RESPIRATION RATE: 19 BRPM | HEART RATE: 76 BPM | TEMPERATURE: 98 F | DIASTOLIC BLOOD PRESSURE: 78 MMHG | OXYGEN SATURATION: 100 % | WEIGHT: 160.06 LBS | SYSTOLIC BLOOD PRESSURE: 108 MMHG | HEIGHT: 64 IN

## 2017-07-07 DIAGNOSIS — Z92.21 PERSONAL HISTORY OF ANTINEOPLASTIC CHEMOTHERAPY: ICD-10-CM

## 2017-07-07 DIAGNOSIS — Z85.038 PERSONAL HISTORY OF OTHER MALIGNANT NEOPLASM OF LARGE INTESTINE: ICD-10-CM

## 2017-07-07 DIAGNOSIS — E05.90 THYROTOXICOSIS, UNSPECIFIED WITHOUT THYROTOXIC CRISIS OR STORM: ICD-10-CM

## 2017-07-07 DIAGNOSIS — Z98.890 OTHER SPECIFIED POSTPROCEDURAL STATES: Chronic | ICD-10-CM

## 2017-07-07 DIAGNOSIS — R58 HEMORRHAGE, NOT ELSEWHERE CLASSIFIED: ICD-10-CM

## 2017-07-07 LAB
HCT VFR BLD CALC: 37.4 % — SIGNIFICANT CHANGE UP (ref 34.5–45)
HGB BLD-MCNC: 12.9 G/DL — SIGNIFICANT CHANGE UP (ref 11.5–15.5)
LYMPHOCYTES # BLD AUTO: 62 % — HIGH (ref 13–44)
MCHC RBC-ENTMCNC: 30.8 PG — SIGNIFICANT CHANGE UP (ref 27–34)
MCHC RBC-ENTMCNC: 34.4 GM/DL — SIGNIFICANT CHANGE UP (ref 32–36)
MCV RBC AUTO: 89.3 FL — SIGNIFICANT CHANGE UP (ref 80–100)
MONOCYTES NFR BLD AUTO: 3 % — SIGNIFICANT CHANGE UP (ref 2–14)
NEUTROPHILS NFR BLD AUTO: 35 % — LOW (ref 43–77)
PLAT MORPH BLD: NORMAL — SIGNIFICANT CHANGE UP
PLATELET # BLD AUTO: 204 K/UL — SIGNIFICANT CHANGE UP (ref 150–400)
RBC # BLD: 4.19 M/UL — SIGNIFICANT CHANGE UP (ref 3.8–5.2)
RBC # FLD: 14.8 % — SIGNIFICANT CHANGE UP (ref 11–15)
RBC BLD AUTO: NORMAL — SIGNIFICANT CHANGE UP
WBC # BLD: 6 K/UL — SIGNIFICANT CHANGE UP (ref 3.8–10.5)
WBC # FLD AUTO: 6 K/UL — SIGNIFICANT CHANGE UP (ref 3.8–10.5)

## 2017-07-07 PROCEDURE — 99284 EMERGENCY DEPT VISIT MOD MDM: CPT | Mod: 25

## 2017-07-07 RX ADMIN — Medication 500 UNIT(S): at 08:31

## 2017-07-07 NOTE — ED ADULT NURSE NOTE - OBJECTIVE STATEMENT
patient received, alert and oriented x4, came here because her port came out and was bleeding, seen and evaluated by md. denies any pain.

## 2017-07-07 NOTE — ED PROVIDER NOTE - MEDICAL DECISION MAKING DETAILS
52 y/o F w bleeding from IV tubing after it disconnected while sleeping; bleeding resolved after tubing was reconnected; asymptomatic; will check cbc 52 y/o F w bleeding from IV tubing after it disconnected while sleeping; bleeding resolved after tubing was reconnected; asymptomatic; will check cbc    hg 12.9; plts 206; tubing flushed w heparin per standard protocol; stable for discharge

## 2017-07-07 NOTE — ED ADULT TRIAGE NOTE - CHIEF COMPLAINT QUOTE
Pt hx of colon ca c/o of bleeding from infusion port ( placed feb/2017).  Pt received chemotherapy infusion at home and is schedule to be disconnected from infusion today at 11am.

## 2017-07-07 NOTE — ED PROVIDER NOTE - OBJECTIVE STATEMENT
Pertinent PMH/PSH/FHx/SHx and Review of Systems contained within:    50 y/o F w hx of colon ca, on chemo, presents w bleeding from infusion site; patient has been receiving chemo agent (unknown name) via port on right side of chest; she woke up today and the noticed bleeding from the IV line after it was disconnected so she came to ED; no fainting/CP/SOB/lightheadedness; reports small amount of bleeding (soaked about 6x6cm area of her sheet); no fever, chills or other complaints     PMH: as above  meds: methimazole  allergies; nkda  sH; denies cig or drug use

## 2017-07-07 NOTE — ED PROVIDER NOTE - PHYSICAL EXAMINATION
Gen: no pallor; well appearing  HEENT: clear oropharynx; EOMI; PERRL ~ 2mm  neck: full cervical ROM  CV: RRR; no m/g/r  chest wall: R port in place, no surrounding erytthema,induration; no bleeding  lungs; CTAB; no w/r/r  abd: soft, nd, nt  ext: wwp; full ROM  neuro: no focal deficits

## 2017-07-17 ENCOUNTER — APPOINTMENT (OUTPATIENT)
Dept: CT IMAGING | Facility: IMAGING CENTER | Age: 52
End: 2017-07-17

## 2017-07-17 ENCOUNTER — OUTPATIENT (OUTPATIENT)
Dept: OUTPATIENT SERVICES | Facility: HOSPITAL | Age: 52
LOS: 1 days | End: 2017-07-17
Payer: MEDICAID

## 2017-07-17 DIAGNOSIS — Z00.8 ENCOUNTER FOR OTHER GENERAL EXAMINATION: ICD-10-CM

## 2017-07-17 DIAGNOSIS — Z98.890 OTHER SPECIFIED POSTPROCEDURAL STATES: Chronic | ICD-10-CM

## 2017-07-17 PROCEDURE — 71260 CT THORAX DX C+: CPT

## 2017-07-17 PROCEDURE — 74178 CT ABD&PLV WO CNTR FLWD CNTR: CPT

## 2017-09-19 ENCOUNTER — APPOINTMENT (OUTPATIENT)
Dept: CT IMAGING | Facility: IMAGING CENTER | Age: 52
End: 2017-09-19
Payer: COMMERCIAL

## 2017-09-19 ENCOUNTER — OUTPATIENT (OUTPATIENT)
Dept: OUTPATIENT SERVICES | Facility: HOSPITAL | Age: 52
LOS: 1 days | End: 2017-09-19
Payer: MEDICAID

## 2017-09-19 DIAGNOSIS — Z00.8 ENCOUNTER FOR OTHER GENERAL EXAMINATION: ICD-10-CM

## 2017-09-19 DIAGNOSIS — Z98.890 OTHER SPECIFIED POSTPROCEDURAL STATES: Chronic | ICD-10-CM

## 2017-09-19 PROCEDURE — 74177 CT ABD & PELVIS W/CONTRAST: CPT | Mod: 26

## 2017-09-19 PROCEDURE — 74177 CT ABD & PELVIS W/CONTRAST: CPT

## 2017-09-19 PROCEDURE — 71260 CT THORAX DX C+: CPT

## 2017-09-19 PROCEDURE — 71260 CT THORAX DX C+: CPT | Mod: 26

## 2017-10-05 ENCOUNTER — APPOINTMENT (OUTPATIENT)
Dept: RADIATION ONCOLOGY | Facility: CLINIC | Age: 52
End: 2017-10-05
Payer: COMMERCIAL

## 2017-10-05 ENCOUNTER — OUTPATIENT (OUTPATIENT)
Dept: OUTPATIENT SERVICES | Facility: HOSPITAL | Age: 52
LOS: 1 days | Discharge: HOME | End: 2017-10-05

## 2017-10-05 DIAGNOSIS — Z98.890 OTHER SPECIFIED POSTPROCEDURAL STATES: Chronic | ICD-10-CM

## 2017-10-05 PROCEDURE — 99215 OFFICE O/P EST HI 40 MIN: CPT | Mod: 25

## 2017-10-05 PROCEDURE — 77262 THER RADIOLOGY TX PLNG INTRM: CPT

## 2017-10-10 PROCEDURE — 77334 RADIATION TREATMENT AID(S): CPT | Mod: 26

## 2017-10-10 PROCEDURE — 77470 SPECIAL RADIATION TREATMENT: CPT | Mod: 26

## 2017-10-10 PROCEDURE — 77290 THER RAD SIMULAJ FIELD CPLX: CPT | Mod: 26

## 2017-10-15 ENCOUNTER — FORM ENCOUNTER (OUTPATIENT)
Age: 52
End: 2017-10-15

## 2017-10-16 ENCOUNTER — APPOINTMENT (OUTPATIENT)
Dept: NUCLEAR MEDICINE | Facility: IMAGING CENTER | Age: 52
End: 2017-10-16
Payer: COMMERCIAL

## 2017-10-16 ENCOUNTER — OUTPATIENT (OUTPATIENT)
Dept: OUTPATIENT SERVICES | Facility: HOSPITAL | Age: 52
LOS: 1 days | End: 2017-10-16
Payer: MEDICAID

## 2017-10-16 DIAGNOSIS — C78.7 SECONDARY MALIGNANT NEOPLASM OF LIVER AND INTRAHEPATIC BILE DUCT: ICD-10-CM

## 2017-10-16 DIAGNOSIS — Z98.890 OTHER SPECIFIED POSTPROCEDURAL STATES: Chronic | ICD-10-CM

## 2017-10-16 PROCEDURE — 78815 PET IMAGE W/CT SKULL-THIGH: CPT | Mod: 26,PS

## 2017-10-16 PROCEDURE — 78815 PET IMAGE W/CT SKULL-THIGH: CPT

## 2017-10-16 PROCEDURE — A9552: CPT

## 2017-10-25 ENCOUNTER — OTHER (OUTPATIENT)
Age: 52
End: 2017-10-25

## 2017-10-26 PROCEDURE — 77334 RADIATION TREATMENT AID(S): CPT | Mod: 26

## 2017-10-26 PROCEDURE — 77300 RADIATION THERAPY DOSE PLAN: CPT | Mod: 26

## 2017-10-26 PROCEDURE — 77295 3-D RADIOTHERAPY PLAN: CPT | Mod: 26

## 2017-10-31 PROCEDURE — 77280 THER RAD SIMULAJ FIELD SMPL: CPT | Mod: 26

## 2017-10-31 PROCEDURE — 77470 SPECIAL RADIATION TREATMENT: CPT | Mod: 26

## 2017-11-01 PROCEDURE — 77331 SPECIAL RADIATION DOSIMETRY: CPT | Mod: 26

## 2017-11-05 ENCOUNTER — FORM ENCOUNTER (OUTPATIENT)
Age: 52
End: 2017-11-05

## 2017-11-06 ENCOUNTER — OUTPATIENT (OUTPATIENT)
Dept: OUTPATIENT SERVICES | Facility: HOSPITAL | Age: 52
LOS: 1 days | End: 2017-11-06
Payer: MEDICAID

## 2017-11-06 ENCOUNTER — APPOINTMENT (OUTPATIENT)
Dept: MRI IMAGING | Facility: IMAGING CENTER | Age: 52
End: 2017-11-06
Payer: MEDICAID

## 2017-11-06 VITALS
DIASTOLIC BLOOD PRESSURE: 89 MMHG | HEART RATE: 93 BPM | RESPIRATION RATE: 16 BRPM | SYSTOLIC BLOOD PRESSURE: 126 MMHG | OXYGEN SATURATION: 99 % | TEMPERATURE: 97.6 F | HEIGHT: 64 IN

## 2017-11-06 DIAGNOSIS — C18.9 MALIGNANT NEOPLASM OF COLON, UNSPECIFIED: ICD-10-CM

## 2017-11-06 DIAGNOSIS — Z98.890 OTHER SPECIFIED POSTPROCEDURAL STATES: Chronic | ICD-10-CM

## 2017-11-06 PROCEDURE — 74183 MRI ABD W/O CNTR FLWD CNTR: CPT

## 2017-11-06 PROCEDURE — 72197 MRI PELVIS W/O & W/DYE: CPT

## 2017-11-06 PROCEDURE — 72197 MRI PELVIS W/O & W/DYE: CPT | Mod: 26

## 2017-11-06 PROCEDURE — 74183 MRI ABD W/O CNTR FLWD CNTR: CPT | Mod: 26

## 2017-11-06 PROCEDURE — A9585: CPT

## 2017-11-07 PROCEDURE — 77427 RADIATION TX MANAGEMENT X5: CPT

## 2017-11-13 VITALS
SYSTOLIC BLOOD PRESSURE: 112 MMHG | WEIGHT: 167.66 LBS | OXYGEN SATURATION: 98 % | HEART RATE: 102 BPM | TEMPERATURE: 98.1 F | RESPIRATION RATE: 16 BRPM | BODY MASS INDEX: 28.62 KG/M2 | HEIGHT: 64 IN | DIASTOLIC BLOOD PRESSURE: 80 MMHG

## 2017-11-14 PROCEDURE — 77427 RADIATION TX MANAGEMENT X5: CPT

## 2017-11-20 VITALS
HEIGHT: 64 IN | SYSTOLIC BLOOD PRESSURE: 137 MMHG | DIASTOLIC BLOOD PRESSURE: 85 MMHG | HEART RATE: 91 BPM | RESPIRATION RATE: 16 BRPM | TEMPERATURE: 98.1 F | WEIGHT: 167.33 LBS | OXYGEN SATURATION: 100 % | BODY MASS INDEX: 28.57 KG/M2

## 2017-11-21 PROCEDURE — 77427 RADIATION TX MANAGEMENT X5: CPT

## 2017-11-27 VITALS
SYSTOLIC BLOOD PRESSURE: 137 MMHG | RESPIRATION RATE: 16 BRPM | WEIGHT: 168.32 LBS | BODY MASS INDEX: 28.89 KG/M2 | DIASTOLIC BLOOD PRESSURE: 83 MMHG | HEART RATE: 94 BPM | OXYGEN SATURATION: 99 %

## 2017-11-29 ENCOUNTER — OTHER (OUTPATIENT)
Age: 52
End: 2017-11-29

## 2017-11-29 ENCOUNTER — LABORATORY RESULT (OUTPATIENT)
Age: 52
End: 2017-11-29

## 2017-11-29 PROCEDURE — 77427 RADIATION TX MANAGEMENT X5: CPT

## 2017-11-30 LAB
APPEARANCE: ABNORMAL
BILIRUBIN URINE: NEGATIVE
BLOOD URINE: NEGATIVE
COLOR: YELLOW
GLUCOSE QUALITATIVE U: NEGATIVE MG/DL
KETONES URINE: NEGATIVE
LEUKOCYTE ESTERASE URINE: NEGATIVE
NITRITE URINE: NEGATIVE
PH URINE: 5
PROTEIN URINE: NEGATIVE MG/DL
SPECIFIC GRAVITY URINE: 1.02
UROBILINOGEN URINE: NEGATIVE MG/DL

## 2017-12-06 PROCEDURE — 77427 RADIATION TX MANAGEMENT X5: CPT

## 2017-12-11 VITALS
BODY MASS INDEX: 28.23 KG/M2 | HEART RATE: 96 BPM | RESPIRATION RATE: 16 BRPM | DIASTOLIC BLOOD PRESSURE: 81 MMHG | HEIGHT: 64 IN | TEMPERATURE: 98.1 F | OXYGEN SATURATION: 100 % | WEIGHT: 165.34 LBS | SYSTOLIC BLOOD PRESSURE: 126 MMHG

## 2017-12-11 PROCEDURE — 77427 RADIATION TX MANAGEMENT X5: CPT

## 2018-01-04 ENCOUNTER — APPOINTMENT (OUTPATIENT)
Dept: CT IMAGING | Facility: IMAGING CENTER | Age: 53
End: 2018-01-04

## 2018-01-22 ENCOUNTER — APPOINTMENT (OUTPATIENT)
Dept: CT IMAGING | Facility: IMAGING CENTER | Age: 53
End: 2018-01-22
Payer: MEDICAID

## 2018-01-22 ENCOUNTER — OUTPATIENT (OUTPATIENT)
Dept: OUTPATIENT SERVICES | Facility: HOSPITAL | Age: 53
LOS: 1 days | End: 2018-01-22
Payer: MEDICAID

## 2018-01-22 DIAGNOSIS — Z00.8 ENCOUNTER FOR OTHER GENERAL EXAMINATION: ICD-10-CM

## 2018-01-22 DIAGNOSIS — Z98.890 OTHER SPECIFIED POSTPROCEDURAL STATES: Chronic | ICD-10-CM

## 2018-01-22 PROCEDURE — 74177 CT ABD & PELVIS W/CONTRAST: CPT

## 2018-01-22 PROCEDURE — 74177 CT ABD & PELVIS W/CONTRAST: CPT | Mod: 26

## 2018-01-22 PROCEDURE — 71260 CT THORAX DX C+: CPT

## 2018-01-22 PROCEDURE — 71260 CT THORAX DX C+: CPT | Mod: 26

## 2018-01-29 ENCOUNTER — APPOINTMENT (OUTPATIENT)
Dept: RADIATION ONCOLOGY | Facility: CLINIC | Age: 53
End: 2018-01-29
Payer: SELF-PAY

## 2018-01-29 VITALS
SYSTOLIC BLOOD PRESSURE: 117 MMHG | HEART RATE: 72 BPM | TEMPERATURE: 98.1 F | WEIGHT: 167.33 LBS | RESPIRATION RATE: 15 BRPM | DIASTOLIC BLOOD PRESSURE: 77 MMHG | OXYGEN SATURATION: 98 % | BODY MASS INDEX: 28.72 KG/M2

## 2018-01-29 DIAGNOSIS — C18.9 MALIGNANT NEOPLASM OF COLON, UNSPECIFIED: ICD-10-CM

## 2018-01-29 PROCEDURE — 99024 POSTOP FOLLOW-UP VISIT: CPT

## 2018-02-26 ENCOUNTER — APPOINTMENT (OUTPATIENT)
Age: 53
End: 2018-02-26
Payer: MEDICAID

## 2018-02-26 PROCEDURE — 36590 REMOVAL TUNNELED CV CATH: CPT

## 2018-02-26 PROCEDURE — 77001 FLUOROGUIDE FOR VEIN DEVICE: CPT

## 2018-03-20 NOTE — ED ADULT NURSE NOTE - TEMPLATE
After Visit Summary   3/20/2018    Shelly Cerda    MRN: [id number]           Patient Information     Date Of Birth          1993        Visit Information        Provider Department      3/20/2018 11:30 AM Jayy Koehler MD [facility]        Today's Diagnoses     Cystine stones (H)    -  1      Care Instructions    Labs today.    Return in two months with a renal US and a completed litholink.    It was a pleasure meeting with you today.  Thank you for allowing me and my team the privilege of caring for you today.  YOU are the reason we are here, and I truly hope we provided you with the excellent service you deserve.  Please let us know if there is anything else we can do for you so that we can be sure you are leaving completely satisfied with your care experience.                  Follow-ups after your visit        Your next 10 appointments already scheduled     May 29, 2018  2:30 PM CDT   US RENAL COMPLETE with UCUS1   [facility] [place] [facility] ([facility])    [address] [place] [address]  [address] [facility] [address] [phone]           Please bring a list of your medicines (including vitamins, minerals and over-the-counter drugs). Also, tell your doctor about any allergies you may have. Wear comfortable clothes and leave your valuables at home.  You do not need to do anything special to prepare for your exam.  Please call the Imaging Department at your exam site with any questions.            May 29, 2018  3:40 PM CDT   (Arrive by 3:25 PM)   Return Visit with Jayy Koehler MD   [facility] ([facility])    [address] [place] [address]  [address] [facility] [address] [phone]              Future tests that were ordered for you today     Open Future Orders        Priority Expected Expires Ordered    Routine UA with micro reflex to  "culture Routine  3/20/2019 3/20/2018    Basic metabolic panel Routine  3/20/2019 3/20/2018    Renal US Routine  3/20/2019 3/20/2018            Who to contact     Please call your clinic at [phone] to:    Ask questions about your health    Make or cancel appointments    Discuss your medicines    Learn about your test results    Speak to your doctor            Additional Information About Your Visit        [employer] Information     [employer] gives you secure access to your electronic health record. If you see a primary care provider, you can also send messages to your care team and make appointments. If you have questions, please call your primary care clinic.  If you do not have a primary care provider, please call [phone] and they will assist you.      [employer] is an electronic gateway that provides easy, online access to your medical records. With [employer], you can request a clinic appointment, read your test results, renew a prescription or communicate with your care team.     To access your existing account, please contact your [facility] or call [phone] for assistance.        Care EveryWhere ID     This is your Care EveryWhere ID. This could be used by other organizations to access your [facility] medical records  [id number]        Your Vitals Were     Pulse Height BMI (Body Mass Index)             104 1.6 m (5' 2.99\") 32.52 kg/m2          Blood Pressure from Last 3 Encounters:   03/20/18 129/77   03/10/18 119/76   03/09/18 101/51    Weight from Last 3 Encounters:   03/20/18 83.2 kg (183 lb 8 oz)   03/08/18 83.8 kg (184 lb 11.9 oz)   02/13/18 83.5 kg (184 lb 1.6 oz)              We Performed the Following     Litholink: Clinic Lab Order        Primary Care Provider Fax #    [facility] [phone] [address]  [facility] [address]        Equal Access to Services     [place] : Festus Davison, toby jacobson, rebekah buitrago " carly montañoroseanna michelelilia ageeaahilton ah. Janet [facility] [phone].    ATENCIÓN: Si mosesla miriam, tiene a mckeon disposición servicios gratuitos de asistencia lingüística. Mi al [phone].    We comply with applicable federal civil rights laws and [place] laws. We do not discriminate on the basis of race, color, national origin, age, disability, sex, sexual orientation, or gender identity.            Thank you!     Thank you for choosing [facility] AND [facility]  for your care. Our goal is always to provide you with excellent care. Hearing back from our patients is one way we can continue to improve our services. Please take a few minutes to complete the written survey that you may receive in the mail after your visit with us. Thank you!             Your Updated Medication List - Protect others around you: Learn how to safely use, store and throw away your medicines at www.disposemymeds.org.          This list is accurate as of 3/20/18 12:03 PM.  Always use your most recent med list.                   Brand Name Dispense Instructions for use Diagnosis    acetaminophen 325 MG tablet    TYLENOL    100 tablet    Take 2 tablets (650 mg) by mouth every 4 hours    Ureterolithiasis       brimonidine 0.2 % ophthalmic solution    ALPHAGAN          JUNEL FE 1.5/30 1.5-30 MG-MCG per tablet   Generic drug:  norethindrone-ethinyl estradiol-iron      TAKE 1 TABLET BY ORAL ROUTE ONCE DAILY        LORazepam 1 MG tablet    ATIVAN     TAKE 2.5 TABLETS BY MOUTH 2 HOURS BEFORE DENTAL APPOINTMENT. BRING 1.5 TABLETS TO APPOINTMENT        potassium citrate 10 MEQ (1080 MG) CR tablet    UROCIT-K    90 tablet    Take 1 tablet (10 mEq) by mouth 3 times daily (with meals)    Ureterolithiasis       Tiopronin 100 MG Tabs tablet    THIOLA    300 tablet    Take 3 tablets (300 mg) by mouth 3 times daily    Calculus of kidney          General

## 2018-04-23 ENCOUNTER — OUTPATIENT (OUTPATIENT)
Dept: OUTPATIENT SERVICES | Facility: HOSPITAL | Age: 53
LOS: 1 days | End: 2018-04-23
Payer: MEDICAID

## 2018-04-23 ENCOUNTER — APPOINTMENT (OUTPATIENT)
Dept: CT IMAGING | Facility: IMAGING CENTER | Age: 53
End: 2018-04-23
Payer: MEDICAID

## 2018-04-23 DIAGNOSIS — C20 MALIGNANT NEOPLASM OF RECTUM: ICD-10-CM

## 2018-04-23 DIAGNOSIS — Z98.890 OTHER SPECIFIED POSTPROCEDURAL STATES: Chronic | ICD-10-CM

## 2018-04-23 DIAGNOSIS — Z00.8 ENCOUNTER FOR OTHER GENERAL EXAMINATION: ICD-10-CM

## 2018-04-23 PROCEDURE — 71260 CT THORAX DX C+: CPT | Mod: 26

## 2018-04-23 PROCEDURE — 74177 CT ABD & PELVIS W/CONTRAST: CPT | Mod: 26

## 2018-04-23 PROCEDURE — 74177 CT ABD & PELVIS W/CONTRAST: CPT

## 2018-04-23 PROCEDURE — 71260 CT THORAX DX C+: CPT

## 2018-05-14 NOTE — ED ADULT NURSE NOTE - LOCATION
[place] ambulatory encounter  URGENT CARE OFFICE VISIT    SUBJECTIVE:  Zulay Hendrix is a 34 year old female who presented requesting evaluation for reevaluation over the last 3-4 days of right chest wall intermittent \"popping\". States that when she abducts her arm and reaches forward sometimes and is not on a consistent basis will feel a \"pop\" in the midaxillary area of the right chest wall does come and go and does seem to go away for periods of time. No injury no rashes no fevers no cough no sneezing. Denies any other issues complaints no numbness tingling or paresthesias to the right upper extremity..    Review of systems:    A review of systems was performed and findings relevant to these symptoms are included in the HPI.     PAST HISTORIES:    ALLERGIES:   Allergen Reactions   • Ibuprofen HIVES and PRURITUS       MEDICATIONS:  Current Outpatient Prescriptions   Medication Sig   • Acetaminophen (TYLENOL PO) Take 4 tablets by mouth as needed. Indications: Pain   • acetaminophen-codeine (TYLENOL/CODEINE #3) 300-30 MG per tablet 1 to 2 tabs every 4 to 6 hours as needed for pain     No current facility-administered medications for this visit.            I have reviewed the past medical history, family history, social history, medications and allergies listed in the medical record as obtained by my nursing staff and support staff and agree with their documentation    OBJECTIVE:  Physical Exam:    Visit Vitals  /86   Pulse 87   Temp 98.1 °F (36.7 °C) (Temporal Artery)   Resp 16   Ht 5' 3\" (1.6 m)   Wt 80.6 kg   SpO2 99%   BMI 31.48 kg/m²        CONSTITUTIONAL: Well-hydrated, well-nourished female who appears to be in no acute distress. Awake, alert and cooperative.  HEENT: TMs are clear bilaterally. External ears without deformities. Hearing is grossly normal. Nose without deformities. There is moist nasal mucosa. Throat is clear with moist mucous membranes.   NECK: No lymphadenopathyThere is full range of  motion. There is no tenderness noted.  LUNGS: Clear to auscultation bilaterally. No wheezes, rales or rhonchi noted.   MUSCULOSKELETAL: Chest wall is nontender to pop patient and for most of the exam patient did abduct her arm focal little bit of a \"pop\" with some slight tenderness to the right chest wall in this area very localized does not interrupt rate breathing  SKIN: Warm, dry, intact without rash or lesion.  NEUROLOGIC: Alert and oriented x3.  PSYCHIATRIC:  Speech and behavior appropriate. Normal mood and affect.        Assessment:  1. Right-sided chest wall pain            PLAN:  No orders of the defined types were placed in this encounter.          FOLLOW-UP:  Primary care as needed    PATIENT INSTRUCTIONS:  Discussed with patient that this is consistent with a right chest wall inflammation or costochondritis. No evidence of acute process at this time physical exam is reassuring. Ice or heat as tolerated stretching and says his were discussed discharge instructions were given recommend Tylenol.    The patient was advised to follow up with primary physician or to recheck with the urgent care clinic sooner if symptoms get worse or if new symptoms appear.    The patient indicated understanding of the diagnosis and agreed with the plan of care.     abdomen

## 2018-05-23 ENCOUNTER — APPOINTMENT (OUTPATIENT)
Dept: RADIATION ONCOLOGY | Facility: CLINIC | Age: 53
End: 2018-05-23
Payer: MEDICAID

## 2018-05-23 VITALS
HEART RATE: 85 BPM | RESPIRATION RATE: 16 BRPM | BODY MASS INDEX: 27.49 KG/M2 | SYSTOLIC BLOOD PRESSURE: 120 MMHG | WEIGHT: 161 LBS | DIASTOLIC BLOOD PRESSURE: 80 MMHG | HEIGHT: 64 IN

## 2018-05-23 PROCEDURE — 99213 OFFICE O/P EST LOW 20 MIN: CPT

## 2018-07-06 ENCOUNTER — APPOINTMENT (OUTPATIENT)
Dept: NUCLEAR MEDICINE | Facility: IMAGING CENTER | Age: 53
End: 2018-07-06
Payer: MEDICAID

## 2018-07-06 ENCOUNTER — OUTPATIENT (OUTPATIENT)
Dept: OUTPATIENT SERVICES | Facility: HOSPITAL | Age: 53
LOS: 1 days | End: 2018-07-06
Payer: MEDICAID

## 2018-07-06 DIAGNOSIS — Z98.890 OTHER SPECIFIED POSTPROCEDURAL STATES: Chronic | ICD-10-CM

## 2018-07-06 DIAGNOSIS — Z00.8 ENCOUNTER FOR OTHER GENERAL EXAMINATION: ICD-10-CM

## 2018-07-06 PROCEDURE — 78815 PET IMAGE W/CT SKULL-THIGH: CPT

## 2018-07-06 PROCEDURE — 78815 PET IMAGE W/CT SKULL-THIGH: CPT | Mod: 26,PS

## 2018-07-06 PROCEDURE — A9552: CPT

## 2018-07-17 PROBLEM — D49.0 NEOPLASM OF UNSPECIFIED BEHAVIOR OF DIGESTIVE SYSTEM: Chronic | Status: ACTIVE | Noted: 2017-01-30

## 2018-07-20 ENCOUNTER — OUTPATIENT (OUTPATIENT)
Dept: OUTPATIENT SERVICES | Facility: HOSPITAL | Age: 53
LOS: 1 days | End: 2018-07-20
Payer: MEDICAID

## 2018-07-20 DIAGNOSIS — Z98.890 OTHER SPECIFIED POSTPROCEDURAL STATES: Chronic | ICD-10-CM

## 2018-07-20 DIAGNOSIS — C20 MALIGNANT NEOPLASM OF RECTUM: ICD-10-CM

## 2018-07-20 PROCEDURE — 77001 FLUOROGUIDE FOR VEIN DEVICE: CPT | Mod: 26

## 2018-07-20 PROCEDURE — 76937 US GUIDE VASCULAR ACCESS: CPT

## 2018-07-20 PROCEDURE — C1788: CPT

## 2018-07-20 PROCEDURE — 36561 INSERT TUNNELED CV CATH: CPT

## 2018-07-20 PROCEDURE — 76937 US GUIDE VASCULAR ACCESS: CPT | Mod: 26

## 2018-07-20 PROCEDURE — 77001 FLUOROGUIDE FOR VEIN DEVICE: CPT

## 2018-07-20 PROCEDURE — C1769: CPT

## 2018-07-25 DIAGNOSIS — C19 MALIGNANT NEOPLASM OF RECTOSIGMOID JUNCTION: ICD-10-CM

## 2018-07-25 DIAGNOSIS — Z45.2 ENCOUNTER FOR ADJUSTMENT AND MANAGEMENT OF VASCULAR ACCESS DEVICE: ICD-10-CM

## 2018-07-27 ENCOUNTER — APPOINTMENT (OUTPATIENT)
Dept: GYNECOLOGIC ONCOLOGY | Facility: HOSPITAL | Age: 53
End: 2018-07-27

## 2018-08-25 ENCOUNTER — INPATIENT (INPATIENT)
Facility: HOSPITAL | Age: 53
LOS: 2 days | Discharge: HOME | End: 2018-08-28
Attending: SURGERY | Admitting: SURGERY
Payer: MEDICAID

## 2018-08-25 VITALS
DIASTOLIC BLOOD PRESSURE: 81 MMHG | HEART RATE: 93 BPM | RESPIRATION RATE: 18 BRPM | OXYGEN SATURATION: 100 % | SYSTOLIC BLOOD PRESSURE: 136 MMHG | TEMPERATURE: 99 F

## 2018-08-25 DIAGNOSIS — K56.609 UNSPECIFIED INTESTINAL OBSTRUCTION, UNSPECIFIED AS TO PARTIAL VERSUS COMPLETE OBSTRUCTION: ICD-10-CM

## 2018-08-25 DIAGNOSIS — Z98.890 OTHER SPECIFIED POSTPROCEDURAL STATES: Chronic | ICD-10-CM

## 2018-08-25 LAB
APPEARANCE UR: SIGNIFICANT CHANGE UP
BACTERIA # UR AUTO: NEGATIVE — SIGNIFICANT CHANGE UP
BASE EXCESS BLDV CALC-SCNC: -0.9 MMOL/L — SIGNIFICANT CHANGE UP
BASE EXCESS BLDV CALC-SCNC: -1.3 MMOL/L — SIGNIFICANT CHANGE UP
BILIRUB UR-MCNC: NEGATIVE — SIGNIFICANT CHANGE UP
BLOOD GAS VENOUS - CREATININE: 0.38 MG/DL — LOW (ref 0.5–1.3)
BLOOD GAS VENOUS - CREATININE: 0.49 MG/DL — LOW (ref 0.5–1.3)
BLOOD UR QL VISUAL: NEGATIVE — SIGNIFICANT CHANGE UP
CHLORIDE BLDV-SCNC: 103 MMOL/L — SIGNIFICANT CHANGE UP (ref 96–108)
CHLORIDE BLDV-SCNC: 104 MMOL/L — SIGNIFICANT CHANGE UP (ref 96–108)
COLOR SPEC: SIGNIFICANT CHANGE UP
GAS PNL BLDV: 134 MMOL/L — LOW (ref 136–146)
GAS PNL BLDV: 135 MMOL/L — LOW (ref 136–146)
GLUCOSE BLDV-MCNC: 101 — HIGH (ref 70–99)
GLUCOSE BLDV-MCNC: 96 — SIGNIFICANT CHANGE UP (ref 70–99)
GLUCOSE UR-MCNC: NEGATIVE — SIGNIFICANT CHANGE UP
HCO3 BLDV-SCNC: 23 MMOL/L — SIGNIFICANT CHANGE UP (ref 20–27)
HCO3 BLDV-SCNC: 23 MMOL/L — SIGNIFICANT CHANGE UP (ref 20–27)
HCT VFR BLDV CALC: 38.5 % — SIGNIFICANT CHANGE UP (ref 34.5–45)
HCT VFR BLDV CALC: 39.7 % — SIGNIFICANT CHANGE UP (ref 34.5–45)
HGB BLDV-MCNC: 12.5 G/DL — SIGNIFICANT CHANGE UP (ref 11.5–15.5)
HGB BLDV-MCNC: 12.9 G/DL — SIGNIFICANT CHANGE UP (ref 11.5–15.5)
KETONES UR-MCNC: NEGATIVE — SIGNIFICANT CHANGE UP
LACTATE BLDV-MCNC: 3.7 MMOL/L — HIGH (ref 0.5–2)
LACTATE BLDV-MCNC: 4 MMOL/L — CRITICAL HIGH (ref 0.5–2)
LEUKOCYTE ESTERASE UR-ACNC: SIGNIFICANT CHANGE UP
NITRITE UR-MCNC: NEGATIVE — SIGNIFICANT CHANGE UP
PCO2 BLDV: 40 MMHG — LOW (ref 41–51)
PCO2 BLDV: 46 MMHG — SIGNIFICANT CHANGE UP (ref 41–51)
PH BLDV: 7.34 PH — SIGNIFICANT CHANGE UP (ref 7.32–7.43)
PH BLDV: 7.38 PH — SIGNIFICANT CHANGE UP (ref 7.32–7.43)
PH UR: 8.5 — HIGH (ref 5–8)
PO2 BLDV: 42 MMHG — HIGH (ref 35–40)
PO2 BLDV: 45 MMHG — HIGH (ref 35–40)
POTASSIUM BLDV-SCNC: 3.9 MMOL/L — SIGNIFICANT CHANGE UP (ref 3.4–4.5)
POTASSIUM BLDV-SCNC: 4 MMOL/L — SIGNIFICANT CHANGE UP (ref 3.4–4.5)
PROT UR-MCNC: SIGNIFICANT CHANGE UP
RBC CASTS # UR COMP ASSIST: SIGNIFICANT CHANGE UP (ref 0–?)
SAO2 % BLDV: 71 % — SIGNIFICANT CHANGE UP (ref 60–85)
SAO2 % BLDV: 77.7 % — SIGNIFICANT CHANGE UP (ref 60–85)
SP GR SPEC: 1.01 — SIGNIFICANT CHANGE UP (ref 1–1.04)
SQUAMOUS # UR AUTO: SIGNIFICANT CHANGE UP
UROBILINOGEN FLD QL: NORMAL — SIGNIFICANT CHANGE UP
WBC UR QL: >50 — HIGH (ref 0–?)

## 2018-08-25 PROCEDURE — 71045 X-RAY EXAM CHEST 1 VIEW: CPT | Mod: 26

## 2018-08-25 PROCEDURE — 74174 CTA ABD&PLVS W/CONTRAST: CPT | Mod: 26

## 2018-08-25 RX ORDER — ONDANSETRON 8 MG/1
4 TABLET, FILM COATED ORAL ONCE
Qty: 0 | Refills: 0 | Status: COMPLETED | OUTPATIENT
Start: 2018-08-25 | End: 2018-08-25

## 2018-08-25 RX ORDER — CEFTRIAXONE 500 MG/1
1 INJECTION, POWDER, FOR SOLUTION INTRAMUSCULAR; INTRAVENOUS ONCE
Qty: 0 | Refills: 0 | Status: COMPLETED | OUTPATIENT
Start: 2018-08-25 | End: 2018-08-25

## 2018-08-25 RX ORDER — SODIUM CHLORIDE 9 MG/ML
1000 INJECTION INTRAMUSCULAR; INTRAVENOUS; SUBCUTANEOUS ONCE
Qty: 0 | Refills: 0 | Status: COMPLETED | OUTPATIENT
Start: 2018-08-25 | End: 2018-08-25

## 2018-08-25 RX ORDER — MORPHINE SULFATE 50 MG/1
4 CAPSULE, EXTENDED RELEASE ORAL ONCE
Qty: 0 | Refills: 0 | Status: DISCONTINUED | OUTPATIENT
Start: 2018-08-25 | End: 2018-08-25

## 2018-08-25 RX ORDER — SODIUM CHLORIDE 9 MG/ML
1000 INJECTION, SOLUTION INTRAVENOUS
Qty: 0 | Refills: 0 | Status: DISCONTINUED | OUTPATIENT
Start: 2018-08-25 | End: 2018-08-27

## 2018-08-25 RX ORDER — HYDROMORPHONE HYDROCHLORIDE 2 MG/ML
1 INJECTION INTRAMUSCULAR; INTRAVENOUS; SUBCUTANEOUS ONCE
Qty: 0 | Refills: 0 | Status: DISCONTINUED | OUTPATIENT
Start: 2018-08-25 | End: 2018-08-25

## 2018-08-25 RX ORDER — TETRACAINE/BENZOCAINE/BUTAMBEN 2%-14%-2%
1 OINTMENT (GRAM) TOPICAL ONCE
Qty: 0 | Refills: 0 | Status: COMPLETED | OUTPATIENT
Start: 2018-08-25 | End: 2018-08-25

## 2018-08-25 RX ORDER — ENOXAPARIN SODIUM 100 MG/ML
40 INJECTION SUBCUTANEOUS DAILY
Qty: 0 | Refills: 0 | Status: DISCONTINUED | OUTPATIENT
Start: 2018-08-25 | End: 2018-08-28

## 2018-08-25 RX ADMIN — Medication 1 SPRAY(S): at 15:39

## 2018-08-25 RX ADMIN — SODIUM CHLORIDE 1000 MILLILITER(S): 9 INJECTION INTRAMUSCULAR; INTRAVENOUS; SUBCUTANEOUS at 14:31

## 2018-08-25 RX ADMIN — SODIUM CHLORIDE 150 MILLILITER(S): 9 INJECTION, SOLUTION INTRAVENOUS at 17:27

## 2018-08-25 RX ADMIN — SODIUM CHLORIDE 1000 MILLILITER(S): 9 INJECTION INTRAMUSCULAR; INTRAVENOUS; SUBCUTANEOUS at 12:03

## 2018-08-25 RX ADMIN — CEFTRIAXONE 100 GRAM(S): 500 INJECTION, POWDER, FOR SOLUTION INTRAMUSCULAR; INTRAVENOUS at 15:36

## 2018-08-25 RX ADMIN — MORPHINE SULFATE 4 MILLIGRAM(S): 50 CAPSULE, EXTENDED RELEASE ORAL at 11:34

## 2018-08-25 RX ADMIN — ONDANSETRON 4 MILLIGRAM(S): 8 TABLET, FILM COATED ORAL at 08:28

## 2018-08-25 RX ADMIN — ENOXAPARIN SODIUM 40 MILLIGRAM(S): 100 INJECTION SUBCUTANEOUS at 17:47

## 2018-08-25 RX ADMIN — MORPHINE SULFATE 4 MILLIGRAM(S): 50 CAPSULE, EXTENDED RELEASE ORAL at 08:27

## 2018-08-25 RX ADMIN — MORPHINE SULFATE 4 MILLIGRAM(S): 50 CAPSULE, EXTENDED RELEASE ORAL at 11:17

## 2018-08-25 RX ADMIN — SODIUM CHLORIDE 1000 MILLILITER(S): 9 INJECTION INTRAMUSCULAR; INTRAVENOUS; SUBCUTANEOUS at 08:28

## 2018-08-25 RX ADMIN — HYDROMORPHONE HYDROCHLORIDE 1 MILLIGRAM(S): 2 INJECTION INTRAMUSCULAR; INTRAVENOUS; SUBCUTANEOUS at 17:48

## 2018-08-25 RX ADMIN — HYDROMORPHONE HYDROCHLORIDE 1 MILLIGRAM(S): 2 INJECTION INTRAMUSCULAR; INTRAVENOUS; SUBCUTANEOUS at 13:50

## 2018-08-25 RX ADMIN — SODIUM CHLORIDE 1000 MILLILITER(S): 9 INJECTION INTRAMUSCULAR; INTRAVENOUS; SUBCUTANEOUS at 09:49

## 2018-08-25 NOTE — CONSULT NOTE ADULT - ASSESSMENT
Patient with h/o colon cancer since 2017, had surgery, FOLFOX Avastin, with very good response, switched to FOLFIRI for allergic reaction, last cycle finished 2 days back, admitted with intense abdominal pain. Had BM yesterday and is passing gas. She has cervical mets now as of PET/CT July 2018.   Presently with obstruction. Has NG tube. Getting meds for nausea vomiting, hydration etc. To be followed by surgery.  Management of progressive metastatic colon cancer will be decided after management of obstruction Patient with h/o colon cancer since early 2017, had surgery, FOLFOX Avastin, with very good response, switched to FOLFIRI for allergic reaction followed by RT. Disease recurrence with mets to cervix, restarted chemo and finished C2, last cycle 2 days back, admitted with intense abdominal pain. Had BM yesterday and is passing gas.   Presently with obstruction. Has NG tube. Getting meds for nausea vomiting, hydration etc. To be followed by surgery.  Management of progressive metastatic colon cancer will be decided after management of obstruction

## 2018-08-25 NOTE — ED PROVIDER NOTE - MEDICAL DECISION MAKING DETAILS
52F cancer on chemo, p/w abd pain, bilious emesis. pt uncomfortable appearing on exam but not peritoneal. concern for obstruction vs less likely appendicitis vs diverticulitis vs other surgical pathology. for labs, ctap, pain and nausea control. reassess.

## 2018-08-25 NOTE — ED ADULT NURSE REASSESSMENT NOTE - NS ED NURSE REASSESS COMMENT FT1
Er pt with Hx. cervical Ca coming with diffuse ABD pain rad. to pelvic area + nausea started over the night, pt AOX 3 last chemo Tx. last wk, pt afebrile, right chest med. port accessed IV fluid and meds for pain given, fluids in progress, pending dispo.                  Bibiana Slater RN

## 2018-08-25 NOTE — ED ADULT TRIAGE NOTE - CHIEF COMPLAINT QUOTE
Pt arrives via EMS c/o abdominal pain (periumbilical) w/ dark bilious nausea/vomiting since yesterday. Pt rpts saw blood in emesis but EMS did not see any when vomiting pta.  Hx Colon and Cervical CA on active Chemo, last Tuesday.  Pt appearing uncomfortable, still nauseous. Took own Zofran RX yesterday w/o improvement.  Vitally stable.

## 2018-08-25 NOTE — ED PROVIDER NOTE - OBJECTIVE STATEMENT
Bianka Santana M.D: 52yoF hx colon ca s/p resection, cervical ca on chemo (last chemo on tuesday) p/w periumbilical abdominal pain constant sharp 8/10 since last night. associated with multiple episodes of emesis initially white and now dark green. +chills. no fevers no diarrhea. passing flatus. last BM yesterday. never had pain like this before

## 2018-08-25 NOTE — CONSULT NOTE ADULT - SUBJECTIVE AND OBJECTIVE BOX
HPI: < from: CT Angio Abdomen and Pelvis w/ Oral Cont and w/ IV Cont (08.25.18 @ 13:19) >   Periumbilical pain with emesis.   Elevated lactate. Metastatic colorectal cancer to the liver, status post   chemoradiation. Cervical cancer (last chemotherapy this week).    < end of copied text >      PAST MEDICAL & SURGICAL HISTORY:  Colon neoplasm: s/p LAR  Hyperthyroidism  H/O exploratory laparotomy: 1/6/17, with LAR    Meds:  enoxaparin Injectable 40 milliGRAM(s) SubCutaneous daily  lactated ringers. 1000 milliLiter(s) IV Continuous <Continuous>  methimazole 5 milliGRAM(s) Oral daily    Labs:  CBC Full  -  ( 25 Aug 2018 08:50 )  WBC Count : 4.05 K/uL  Hemoglobin : 13.1 g/dL  Hematocrit : 40.1 %  Platelet Count - Automated : 233 K/uL  Mean Cell Volume : 83.0 fL  Mean Cell Hemoglobin : 27.1 pg  Mean Cell Hemoglobin Concentration : 32.7 %  Auto Neutrophil # : 3.04 K/uL  Auto Lymphocyte # : 0.86 K/uL  Auto Monocyte # : 0.10 K/uL  Auto Eosinophil # : 0.02 K/uL  Auto Basophil # : 0.01 K/uL  Auto Neutrophil % : 75.1 %  Auto Lymphocyte % : 21.2 %  Auto Monocyte % : 2.5 %  Auto Eosinophil % : 0.5 %  Auto Basophil % : 0.2 %    08-25    137  |  95<L>  |  15  ----------------------------<  105<H>  4.1   |  24  |  0.50    Ca    9.1      25 Aug 2018 08:50    TPro  6.8  /  Alb  3.9  /  TBili  0.4  /  DBili  x   /  AST  15  /  ALT  27  /  AlkPhos  215<H>  08-25      Radiology:     < from: CT Angio Abdomen and Pelvis w/ Oral Cont and w/ IV Cont (08.25.18 @ 13:19) >  Small bowel obstruction with transition point in the right lower   quadrant, likely due to a peritoneal implant, lymph node, or mass in the   region of previously seen hypermetabolism on PET/CT 7/6/2018.     Mild tomoderate right hydroureteronephrosis, increased since PET/CT   7/6/2018, with dilatation of the right ureter to the mid pelvis.    Retroperitoneal and inguinal lymphadenopathy has improved since 7/6/2018.      < end of copied text >          ROS:  No pain, no fever  No lumps in neck or pain  No Sob or chest pain  No palpitations   No abdominal pain. No change in bowel habit. No blood in stools  No weakness in extremities  No leg swelling      Vital Signs Last 24 Hrs  T(C): 36.1 (25 Aug 2018 16:26), Max: 37.1 (25 Aug 2018 07:14)  T(F): 97 (25 Aug 2018 16:26), Max: 98.8 (25 Aug 2018 07:14)  HR: 95 (25 Aug 2018 16:26) (82 - 95)  BP: 123/82 (25 Aug 2018 16:26) (96/54 - 136/81)  BP(mean): --  RR: 18 (25 Aug 2018 16:26) (18 - 18)  SpO2: 98% (25 Aug 2018 16:26) (98% - 100%)    Physical Exam:  Patient comfortable  AXOX3  Neck supple, no LN's  Chest: bilateral breath sounds, no wheeze or rales  CVS: regular heart rate without murmur  Abdomen: soft, BS+, no massess or organomegaly  CNS: no gross deficit  Ext: no edema HPI: Patient with h/o colon cancer, on chemotherapy with FOLFIRI, was disconnected from pump 2 days back, admitted with intense abdominal pain. Had BM yesterday and is passing gas. She has cervical mets now.     PAST MEDICAL & SURGICAL HISTORY:  Colon neoplasm: s/p LAR  Hyperthyroidism  H/O exploratory laparotomy: 1/6/17, with LAR    Meds:  enoxaparin Injectable 40 milliGRAM(s) SubCutaneous daily  lactated ringers. 1000 milliLiter(s) IV Continuous <Continuous>  methimazole 5 milliGRAM(s) Oral daily    Labs:  CBC Full  -  ( 25 Aug 2018 08:50 )  WBC Count : 4.05 K/uL  Hemoglobin : 13.1 g/dL  Hematocrit : 40.1 %  Platelet Count - Automated : 233 K/uL  Mean Cell Volume : 83.0 fL  Mean Cell Hemoglobin : 27.1 pg  Mean Cell Hemoglobin Concentration : 32.7 %  Auto Neutrophil # : 3.04 K/uL  Auto Lymphocyte # : 0.86 K/uL  Auto Monocyte # : 0.10 K/uL  Auto Eosinophil # : 0.02 K/uL  Auto Basophil # : 0.01 K/uL  Auto Neutrophil % : 75.1 %  Auto Lymphocyte % : 21.2 %  Auto Monocyte % : 2.5 %  Auto Eosinophil % : 0.5 %  Auto Basophil % : 0.2 %    08-25    137  |  95<L>  |  15  ----------------------------<  105<H>  4.1   |  24  |  0.50    Ca    9.1      25 Aug 2018 08:50    TPro  6.8  /  Alb  3.9  /  TBili  0.4  /  DBili  x   /  AST  15  /  ALT  27  /  AlkPhos  215<H>  08-25      Radiology:     < from: CT Angio Abdomen and Pelvis w/ Oral Cont and w/ IV Cont (08.25.18 @ 13:19) >  Small bowel obstruction with transition point in the right lower   quadrant, likely due to a peritoneal implant, lymph node, or mass in the   region of previously seen hypermetabolism on PET/CT 7/6/2018.     Mild tomoderate right hydroureteronephrosis, increased since PET/CT   7/6/2018, with dilatation of the right ureter to the mid pelvis.    Retroperitoneal and inguinal lymphadenopathy has improved since 7/6/2018.      < end of copied text >          ROS:  No pain, no fever  No lumps in neck or pain  No Sob or chest pain  No palpitations   Abdominal pain +  No weakness in extremities  No leg swelling      Vital Signs Last 24 Hrs  T(C): 36.1 (25 Aug 2018 16:26), Max: 37.1 (25 Aug 2018 07:14)  T(F): 97 (25 Aug 2018 16:26), Max: 98.8 (25 Aug 2018 07:14)  HR: 95 (25 Aug 2018 16:26) (82 - 95)  BP: 123/82 (25 Aug 2018 16:26) (96/54 - 136/81)  BP(mean): --  RR: 18 (25 Aug 2018 16:26) (18 - 18)  SpO2: 98% (25 Aug 2018 16:26) (98% - 100%)    Physical Exam:    AXOX3  Neck supple, no LN's  Chest: bilateral breath sounds, no wheeze or rales  CVS: regular heart rate without murmur  Abdomen: soft, , no massess or organomegaly  CNS: no gross deficit  Ext: no edema HPI: Patient with h/o colon cancer, on chemotherapy with FOLFIRI, was disconnected from pump 2 days back, admitted with intense abdominal pain and emesis. Had BM yesterday and is passing gas. She has cervical mets now.     PAST MEDICAL & SURGICAL HISTORY:  Colon neoplasm: s/p LAR  Hyperthyroidism  H/O exploratory laparotomy: 1/6/17, with LAR    Meds:  enoxaparin Injectable 40 milliGRAM(s) SubCutaneous daily  lactated ringers. 1000 milliLiter(s) IV Continuous <Continuous>  methimazole 5 milliGRAM(s) Oral daily    Labs:  CBC Full  -  ( 25 Aug 2018 08:50 )  WBC Count : 4.05 K/uL  Hemoglobin : 13.1 g/dL  Hematocrit : 40.1 %  Platelet Count - Automated : 233 K/uL  Mean Cell Volume : 83.0 fL  Mean Cell Hemoglobin : 27.1 pg  Mean Cell Hemoglobin Concentration : 32.7 %  Auto Neutrophil # : 3.04 K/uL  Auto Lymphocyte # : 0.86 K/uL  Auto Monocyte # : 0.10 K/uL  Auto Eosinophil # : 0.02 K/uL  Auto Basophil # : 0.01 K/uL  Auto Neutrophil % : 75.1 %  Auto Lymphocyte % : 21.2 %  Auto Monocyte % : 2.5 %  Auto Eosinophil % : 0.5 %  Auto Basophil % : 0.2 %    08-25    137  |  95<L>  |  15  ----------------------------<  105<H>  4.1   |  24  |  0.50    Ca    9.1      25 Aug 2018 08:50    TPro  6.8  /  Alb  3.9  /  TBili  0.4  /  DBili  x   /  AST  15  /  ALT  27  /  AlkPhos  215<H>  08-25      Radiology:     < from: CT Angio Abdomen and Pelvis w/ Oral Cont and w/ IV Cont (08.25.18 @ 13:19) >  Small bowel obstruction with transition point in the right lower   quadrant, likely due to a peritoneal implant, lymph node, or mass in the   region of previously seen hypermetabolism on PET/CT 7/6/2018.     Mild tomoderate right hydroureteronephrosis, increased since PET/CT   7/6/2018, with dilatation of the right ureter to the mid pelvis.    Retroperitoneal and inguinal lymphadenopathy has improved since 7/6/2018.      < end of copied text >          ROS:  No pain, no fever  No lumps in neck or pain  No Sob or chest pain  No palpitations   Abdominal pain +  No weakness in extremities  No leg swelling      Vital Signs Last 24 Hrs  T(C): 36.1 (25 Aug 2018 16:26), Max: 37.1 (25 Aug 2018 07:14)  T(F): 97 (25 Aug 2018 16:26), Max: 98.8 (25 Aug 2018 07:14)  HR: 95 (25 Aug 2018 16:26) (82 - 95)  BP: 123/82 (25 Aug 2018 16:26) (96/54 - 136/81)  BP(mean): --  RR: 18 (25 Aug 2018 16:26) (18 - 18)  SpO2: 98% (25 Aug 2018 16:26) (98% - 100%)    Physical Exam:    AXOX3  Neck supple, no LN's  Has NG tube  Chest: bilateral breath sounds, no wheeze or rales  CVS: regular heart rate without murmur  Abdomen: soft, , no massess or organomegaly  CNS: no gross deficit  Ext: no edema

## 2018-08-25 NOTE — ED PROVIDER NOTE - PROGRESS NOTE DETAILS
Bianka Santana M.D: pain returned. morphine redosed. lactate 4.0 AFTER fluids. CT changed to CTA to look for ischemia. discussed with radiology to expedite. Bianka Santana M.D: pt with continuing pain, requiring multiple doses of narcotics. CT shows SBO w/ transition point in right pelvis. surgery paged awaiting callback. repeat VG pending. Bianka Santana M.D: case discussed with surgery - will come see pt. NGT placed without significant drainage. awaiting XRAY for tube confirmation. repeat lactate 3.7. surgery updated and coming now to see pt. NGT too deep. will pull out. to be admitted to dr edward.

## 2018-08-25 NOTE — ED ADULT NURSE REASSESSMENT NOTE - NS ED NURSE REASSESS COMMENT FT1
Er pt coming with mid sternal pain and some palpitation started during the night, denies dizziness/SOB at present time, on CM with NSR hr. 44-66b/min lungs clear, resp. equal 18-20b/min IV to right cephalic V. 20g angio cath place, labs sent.  AMD non tender soft to palpation, denies tenderness to epigastric area.    Bibiana Slater  RN

## 2018-08-25 NOTE — ED PROVIDER NOTE - PHYSICAL EXAMINATION
Bianka Santana M.D.:   patient awake alert seen lying on stretcher uncomfortable appearing 2/2 abd pain .   LUNGS CTAB no wheeze no crackle.   CARD RRR no m/r/g.    Abdomen soft diffuely ttp in lower abdomen, worst in LLQ w/ voluntary rebound and guarding ND no CVA tenderness.   EXT WWP no edema no calf tenderness CV 2+DP/PT bilaterally.   neuro A&Ox3 gait normal.    skin warm and dry no rash  HEENT: drymucous membranes, PERRL, EOMI

## 2018-08-26 LAB
BUN SERPL-MCNC: 9 MG/DL — SIGNIFICANT CHANGE UP (ref 7–23)
CALCIUM SERPL-MCNC: 8.5 MG/DL — SIGNIFICANT CHANGE UP (ref 8.4–10.5)
CHLORIDE SERPL-SCNC: 98 MMOL/L — SIGNIFICANT CHANGE UP (ref 98–107)
CO2 SERPL-SCNC: 26 MMOL/L — SIGNIFICANT CHANGE UP (ref 22–31)
CREAT SERPL-MCNC: 0.6 MG/DL — SIGNIFICANT CHANGE UP (ref 0.5–1.3)
GLUCOSE SERPL-MCNC: 91 MG/DL — SIGNIFICANT CHANGE UP (ref 70–99)
HCT VFR BLD CALC: 34.1 % — LOW (ref 34.5–45)
HGB BLD-MCNC: 10.9 G/DL — LOW (ref 11.5–15.5)
LACTATE BLDV-MCNC: 1.7 MMOL/L — SIGNIFICANT CHANGE UP (ref 0.5–2)
MAGNESIUM SERPL-MCNC: 2.2 MG/DL — SIGNIFICANT CHANGE UP (ref 1.6–2.6)
MCHC RBC-ENTMCNC: 26.7 PG — LOW (ref 27–34)
MCHC RBC-ENTMCNC: 32 % — SIGNIFICANT CHANGE UP (ref 32–36)
MCV RBC AUTO: 83.6 FL — SIGNIFICANT CHANGE UP (ref 80–100)
NRBC # FLD: 0 — SIGNIFICANT CHANGE UP
PLATELET # BLD AUTO: 201 K/UL — SIGNIFICANT CHANGE UP (ref 150–400)
PMV BLD: 9.7 FL — SIGNIFICANT CHANGE UP (ref 7–13)
POTASSIUM SERPL-MCNC: 3.7 MMOL/L — SIGNIFICANT CHANGE UP (ref 3.5–5.3)
POTASSIUM SERPL-SCNC: 3.7 MMOL/L — SIGNIFICANT CHANGE UP (ref 3.5–5.3)
RBC # BLD: 4.08 M/UL — SIGNIFICANT CHANGE UP (ref 3.8–5.2)
RBC # FLD: 14.6 % — HIGH (ref 10.3–14.5)
SODIUM SERPL-SCNC: 137 MMOL/L — SIGNIFICANT CHANGE UP (ref 135–145)
WBC # BLD: 2.31 K/UL — LOW (ref 3.8–10.5)
WBC # FLD AUTO: 2.31 K/UL — LOW (ref 3.8–10.5)

## 2018-08-26 PROCEDURE — 71045 X-RAY EXAM CHEST 1 VIEW: CPT | Mod: 26

## 2018-08-26 RX ADMIN — SODIUM CHLORIDE 150 MILLILITER(S): 9 INJECTION, SOLUTION INTRAVENOUS at 11:11

## 2018-08-26 RX ADMIN — ENOXAPARIN SODIUM 40 MILLIGRAM(S): 100 INJECTION SUBCUTANEOUS at 12:24

## 2018-08-26 NOTE — PROGRESS NOTE ADULT - SUBJECTIVE AND OBJECTIVE BOX
INTERVAL/SUBJECTIVE: Pt seen + examined  admitted to surgery, transferred to floor  no acute events overnight  ---------------------------------------------------------------------------------------------   VITALS  T(C): 37.3 (08-26-18 @ 06:00), Max: 37.3 (08-26-18 @ 06:00)  HR: 89 (08-26-18 @ 06:00) (87 - 100)  BP: 114/82 (08-26-18 @ 06:00) (114/82 - 123/85)  RR: 18 (08-26-18 @ 06:00) (18 - 18)  SpO2: 100% (08-26-18 @ 06:00) (98% - 100%)  CAPILLARY BLOOD GLUCOSE          Is/Os    08-25 @ 07:01  -  08-26 @ 07:00  --------------------------------------------------------  IN:    lactated ringers.: 1800 mL  Total IN: 1800 mL    OUT:    Nasoenteral Tube: 700 mL    Voided: 1000 mL  Total OUT: 1700 mL    Total NET: 100 mL          ---------------------------------------------------------------------------------------------   PHYSICAL EXAM: ***  General: NAD  HEENT: NC/AT, EOMI  Resp: Good effort  GI/Abd: Soft, NT/ND, no rebound/guarding, no masses palpated, NGT to suction  ---------------------------------------------------------------------------------------------   MEDICATIONS (STANDING): enoxaparin Injectable 40 milliGRAM(s) SubCutaneous daily  lactated ringers. 1000 milliLiter(s) IV Continuous <Continuous>  methimazole 5 milliGRAM(s) Oral daily  ---------------------------------------------------------------------------------------------   LABS  CBC (08-25 @ 08:50)                              13.1                           4.05    )----------------(  233        75.1  % Neutrophils, 21.2  % Lymphocytes, ANC: 3.04                                40.1      BMP (08-25 @ 08:50)             137     |  95<L>   |  15    		Ca++ --      Ca 9.1                ---------------------------------( 105<H>		Mg --                 4.1     |  24      |  0.50  			Ph --        LFTs (08-25 @ 08:50)      TPro 6.8 / Alb 3.9 / TBili 0.4 / DBili -- / AST 15 / ALT 27 / AlkPhos 215<H>    Coags (08-25 @ 08:50)  aPTT 26.3<L> / INR 0.93 / PT 10.3        VBG (08-25 @ 15:09)     7.34 / 46 / 42<H> / 23 / -0.9 / 71.0%     Lactate: 3.7<H>  VBG (08-25 @ 11:00)     7.38 / 40<L> / 45<H> / 23 / -1.3 / 77.7%     Lactate: 4.0<HH>  ---------------------------------------------------------------------------------------------   ASSESSMENT  52y female h/o colon ca w/ previous colon resection, now w/ sbo    PLAN  - clamp ngt to gravity and monitor residual output  - pain control as appropriate  - monitor gi fxn  - oob, ambulate as tolerated

## 2018-08-26 NOTE — PROGRESS NOTE ADULT - SUBJECTIVE AND OBJECTIVE BOX
HPI: Patient with h/o colon cancer, on chemotherapy with FOLFIRI, was disconnected from pump 2 days back, admitted 8/25/18 with intense abdominal pain and emesis. Had BM yesterday and is passing gas. She has cervical mets now.   Has SBO and is being managed for it        PAST MEDICAL & SURGICAL HISTORY:  Colon neoplasm: s/p LAR  Hyperthyroidism  H/O exploratory laparotomy: 1/6/17, with LAR    Medications:  enoxaparin Injectable 40 milliGRAM(s) SubCutaneous daily  lactated ringers. 1000 milliLiter(s) IV Continuous <Continuous>  methimazole 5 milliGRAM(s) Oral daily    Labs:  CBC Full  -  ( 25 Aug 2018 08:50 )  WBC Count : 4.05 K/uL  Hemoglobin : 13.1 g/dL  Hematocrit : 40.1 %  Platelet Count - Automated : 233 K/uL  Mean Cell Volume : 83.0 fL  Mean Cell Hemoglobin : 27.1 pg  Mean Cell Hemoglobin Concentration : 32.7 %  Auto Neutrophil # : 3.04 K/uL  Auto Lymphocyte # : 0.86 K/uL  Auto Monocyte # : 0.10 K/uL  Auto Eosinophil # : 0.02 K/uL  Auto Basophil # : 0.01 K/uL  Auto Neutrophil % : 75.1 %  Auto Lymphocyte % : 21.2 %  Auto Monocyte % : 2.5 %  Auto Eosinophil % : 0.5 %  Auto Basophil % : 0.2 %    08-25    137  |  95<L>  |  15  ----------------------------<  105<H>  4.1   |  24  |  0.50    Ca    9.1      25 Aug 2018 08:50    TPro  6.8  /  Alb  3.9  /  TBili  0.4  /  DBili  x   /  AST  15  /  ALT  27  /  AlkPhos  215<H>  08-25      Radiology:             ROS:  Patient comfortable without distress  No SOB or chest pain  No palpitation  No abdominal pain, diarrhaea or constipation  No weakness of extremities  No skin changes or swelling of legs    Vital Signs Last 24 Hrs  T(C): 36.8 (26 Aug 2018 10:54), Max: 37.3 (26 Aug 2018 06:00)  T(F): 98.2 (26 Aug 2018 10:54), Max: 99.2 (26 Aug 2018 06:00)  HR: 83 (26 Aug 2018 10:54) (83 - 100)  BP: 118/80 (26 Aug 2018 10:54) (114/82 - 123/85)  BP(mean): --  RR: 18 (26 Aug 2018 10:54) (18 - 18)  SpO2: 100% (26 Aug 2018 10:54) (98% - 100%)    Physical exam:  Patient alert and oriented  No distress  CVS: S1, S2 regular or murmur  Chest: bilateral breath sound without rales  Abdomen: soft, not tender, no organomegaly or masses  No focal neuro deficit  No edema      Assessment and Plan:Height (cm): 162.56 (08-25 @ 21:14)  Weight (kg): 71.7 (08-25 @ 21:14)  BMI (kg/m2): 27.1 (08-25 @ 21:14)  BSA (m2): 1.77 (08-25 @ 21:14) HPI: Patient with h/o colon cancer, on chemotherapy with FOLFIRI, was disconnected from pump 2 days back, admitted 8/25/18 with intense abdominal pain and emesis. Had BM yesterday and is passing gas. She has cervical mets now.   Has SBO and is being managed for it. Says pain is better        PAST MEDICAL & SURGICAL HISTORY:  Colon neoplasm: s/p LAR  Hyperthyroidism  H/O exploratory laparotomy: 1/6/17, with LAR    Medications:  enoxaparin Injectable 40 milliGRAM(s) SubCutaneous daily  lactated ringers. 1000 milliLiter(s) IV Continuous <Continuous>  methimazole 5 milliGRAM(s) Oral daily    Labs:  CBC Full  -  ( 25 Aug 2018 08:50 )  WBC Count : 4.05 K/uL  Hemoglobin : 13.1 g/dL  Hematocrit : 40.1 %  Platelet Count - Automated : 233 K/uL  Mean Cell Volume : 83.0 fL  Mean Cell Hemoglobin : 27.1 pg  Mean Cell Hemoglobin Concentration : 32.7 %  Auto Neutrophil # : 3.04 K/uL  Auto Lymphocyte # : 0.86 K/uL  Auto Monocyte # : 0.10 K/uL  Auto Eosinophil # : 0.02 K/uL  Auto Basophil # : 0.01 K/uL  Auto Neutrophil % : 75.1 %  Auto Lymphocyte % : 21.2 %  Auto Monocyte % : 2.5 %  Auto Eosinophil % : 0.5 %  Auto Basophil % : 0.2 %    08-25    137  |  95<L>  |  15  ----------------------------<  105<H>  4.1   |  24  |  0.50    Ca    9.1      25 Aug 2018 08:50    TPro  6.8  /  Alb  3.9  /  TBili  0.4  /  DBili  x   /  AST  15  /  ALT  27  /  AlkPhos  215<H>  08-25      Radiology:             ROS:  Patient comfortable without distress  No SOB or chest pain  No palpitation  Abdominal pain much less, no vomiting, has NG tube  No weakness of extremities  No skin changes or swelling of legs    Vital Signs Last 24 Hrs  T(C): 36.8 (26 Aug 2018 10:54), Max: 37.3 (26 Aug 2018 06:00)  T(F): 98.2 (26 Aug 2018 10:54), Max: 99.2 (26 Aug 2018 06:00)  HR: 83 (26 Aug 2018 10:54) (83 - 100)  BP: 118/80 (26 Aug 2018 10:54) (114/82 - 123/85)  BP(mean): --  RR: 18 (26 Aug 2018 10:54) (18 - 18)  SpO2: 100% (26 Aug 2018 10:54) (98% - 100%)    Physical exam:  Patient alert and oriented  No distress  CVS: S1, S2 regular or murmur  Chest: bilateral breath sound without rales  Abdomen: soft, no sginificant tenderness  No focal neuro deficit  No edema      Assessment and Plan:Height (cm): 162.56 (08-25 @ 21:14)  Weight (kg): 71.7 (08-25 @ 21:14)  BMI (kg/m2): 27.1 (08-25 @ 21:14)  BSA (m2): 1.77 (08-25 @ 21:14)

## 2018-08-27 ENCOUNTER — APPOINTMENT (OUTPATIENT)
Dept: RADIATION ONCOLOGY | Facility: CLINIC | Age: 53
End: 2018-08-27

## 2018-08-27 LAB
BUN SERPL-MCNC: 10 MG/DL — SIGNIFICANT CHANGE UP (ref 7–23)
CALCIUM SERPL-MCNC: 8.3 MG/DL — LOW (ref 8.4–10.5)
CHLORIDE SERPL-SCNC: 101 MMOL/L — SIGNIFICANT CHANGE UP (ref 98–107)
CO2 SERPL-SCNC: 26 MMOL/L — SIGNIFICANT CHANGE UP (ref 22–31)
CREAT SERPL-MCNC: 0.6 MG/DL — SIGNIFICANT CHANGE UP (ref 0.5–1.3)
GLUCOSE SERPL-MCNC: 89 MG/DL — SIGNIFICANT CHANGE UP (ref 70–99)
HCT VFR BLD CALC: 31.7 % — LOW (ref 34.5–45)
HGB BLD-MCNC: 10.2 G/DL — LOW (ref 11.5–15.5)
MAGNESIUM SERPL-MCNC: 2.3 MG/DL — SIGNIFICANT CHANGE UP (ref 1.6–2.6)
MCHC RBC-ENTMCNC: 26.7 PG — LOW (ref 27–34)
MCHC RBC-ENTMCNC: 32.2 % — SIGNIFICANT CHANGE UP (ref 32–36)
MCV RBC AUTO: 83 FL — SIGNIFICANT CHANGE UP (ref 80–100)
NRBC # FLD: 0 — SIGNIFICANT CHANGE UP
PHOSPHATE SERPL-MCNC: 3.1 MG/DL — SIGNIFICANT CHANGE UP (ref 2.5–4.5)
PLATELET # BLD AUTO: 186 K/UL — SIGNIFICANT CHANGE UP (ref 150–400)
PMV BLD: 9.6 FL — SIGNIFICANT CHANGE UP (ref 7–13)
POTASSIUM SERPL-MCNC: 3.6 MMOL/L — SIGNIFICANT CHANGE UP (ref 3.5–5.3)
POTASSIUM SERPL-SCNC: 3.6 MMOL/L — SIGNIFICANT CHANGE UP (ref 3.5–5.3)
RBC # BLD: 3.82 M/UL — SIGNIFICANT CHANGE UP (ref 3.8–5.2)
RBC # FLD: 14.6 % — HIGH (ref 10.3–14.5)
SODIUM SERPL-SCNC: 139 MMOL/L — SIGNIFICANT CHANGE UP (ref 135–145)
WBC # BLD: 2.86 K/UL — LOW (ref 3.8–10.5)
WBC # FLD AUTO: 2.86 K/UL — LOW (ref 3.8–10.5)

## 2018-08-27 RX ORDER — CHLORHEXIDINE GLUCONATE 213 G/1000ML
1 SOLUTION TOPICAL
Qty: 0 | Refills: 0 | Status: DISCONTINUED | OUTPATIENT
Start: 2018-08-27 | End: 2018-08-28

## 2018-08-27 RX ORDER — DEXTROSE MONOHYDRATE, SODIUM CHLORIDE, AND POTASSIUM CHLORIDE 50; .745; 4.5 G/1000ML; G/1000ML; G/1000ML
1000 INJECTION, SOLUTION INTRAVENOUS
Qty: 0 | Refills: 0 | Status: DISCONTINUED | OUTPATIENT
Start: 2018-08-27 | End: 2018-08-27

## 2018-08-27 RX ORDER — POTASSIUM CHLORIDE 20 MEQ
40 PACKET (EA) ORAL ONCE
Qty: 0 | Refills: 0 | Status: COMPLETED | OUTPATIENT
Start: 2018-08-27 | End: 2018-08-27

## 2018-08-27 RX ADMIN — ENOXAPARIN SODIUM 40 MILLIGRAM(S): 100 INJECTION SUBCUTANEOUS at 11:39

## 2018-08-27 RX ADMIN — Medication 40 MILLIEQUIVALENT(S): at 17:56

## 2018-08-27 RX ADMIN — CHLORHEXIDINE GLUCONATE 1 APPLICATION(S): 213 SOLUTION TOPICAL at 09:16

## 2018-08-27 NOTE — PROGRESS NOTE ADULT - SUBJECTIVE AND OBJECTIVE BOX
GENERAL SURGERY DAILY PROGRESS NOTE:       Subjective:  Pain controlled. Denies N/V. Tolerating diet. Passing flatus, (-)BM.        Objective:    PE:  Gen: resting comfortably in bed, NAD  Resp: breathing comfortably  Abd: soft, NT, ND. No rebound/guarding     Vital Signs Last 24 Hrs  T(C): 36.4 (27 Aug 2018 09:50), Max: 37.5 (26 Aug 2018 22:03)  T(F): 97.6 (27 Aug 2018 09:50), Max: 99.5 (26 Aug 2018 22:03)  HR: 75 (27 Aug 2018 09:50) (72 - 88)  BP: 101/70 (27 Aug 2018 09:50) (101/70 - 128/91)  BP(mean): --  RR: 18 (27 Aug 2018 09:50) (16 - 18)  SpO2: 100% (27 Aug 2018 09:50) (96% - 100%)    I&O's Detail    26 Aug 2018 07:01  -  27 Aug 2018 07:00  --------------------------------------------------------  IN:    lactated ringers.: 3300 mL  Total IN: 3300 mL    OUT:    Nasoenteral Tube: 75 mL    Voided: 1600 mL  Total OUT: 1675 mL    Total NET: 1625 mL      27 Aug 2018 07:01  -  27 Aug 2018 12:57  --------------------------------------------------------  IN:    lactated ringers.: 300 mL    Oral Fluid: 360 mL  Total IN: 660 mL    OUT:    Voided: 400 mL  Total OUT: 400 mL    Total NET: 260 mL          Daily     Daily     MEDICATIONS  (STANDING):  chlorhexidine 4% Liquid 1 Application(s) Topical <User Schedule>  dextrose 5% + sodium chloride 0.45% with potassium chloride 20 mEq/L 1000 milliLiter(s) (50 mL/Hr) IV Continuous <Continuous>  enoxaparin Injectable 40 milliGRAM(s) SubCutaneous daily  methimazole 5 milliGRAM(s) Oral daily    MEDICATIONS  (PRN):      LABS:                        10.2   2.86  )-----------( 186      ( 27 Aug 2018 06:45 )             31.7     08-27    139  |  101  |  10  ----------------------------<  89  3.6   |  26  |  0.60    Ca    8.3<L>      27 Aug 2018 06:45  Phos  3.1     08-27  Mg     2.3     08-27            RADIOLOGY & ADDITIONAL STUDIES: GENERAL SURGERY DAILY PROGRESS NOTE:       Subjective:  Pain controlled. Denies N/V. Tolerating diet. Passing flatus, (-)BM.        Objective:    PE:  Gen: resting comfortably in bed, NAD  Resp: breathing comfortably  Abd: soft, mildly tender in RLQ, ND. No rebound/guarding     Vital Signs Last 24 Hrs  T(C): 36.4 (27 Aug 2018 09:50), Max: 37.5 (26 Aug 2018 22:03)  T(F): 97.6 (27 Aug 2018 09:50), Max: 99.5 (26 Aug 2018 22:03)  HR: 75 (27 Aug 2018 09:50) (72 - 88)  BP: 101/70 (27 Aug 2018 09:50) (101/70 - 128/91)  BP(mean): --  RR: 18 (27 Aug 2018 09:50) (16 - 18)  SpO2: 100% (27 Aug 2018 09:50) (96% - 100%)    I&O's Detail    26 Aug 2018 07:01  -  27 Aug 2018 07:00  --------------------------------------------------------  IN:    lactated ringers.: 3300 mL  Total IN: 3300 mL    OUT:    Nasoenteral Tube: 75 mL    Voided: 1600 mL  Total OUT: 1675 mL    Total NET: 1625 mL      27 Aug 2018 07:01  -  27 Aug 2018 12:57  --------------------------------------------------------  IN:    lactated ringers.: 300 mL    Oral Fluid: 360 mL  Total IN: 660 mL    OUT:    Voided: 400 mL  Total OUT: 400 mL    Total NET: 260 mL          Daily     Daily     MEDICATIONS  (STANDING):  chlorhexidine 4% Liquid 1 Application(s) Topical <User Schedule>  dextrose 5% + sodium chloride 0.45% with potassium chloride 20 mEq/L 1000 milliLiter(s) (50 mL/Hr) IV Continuous <Continuous>  enoxaparin Injectable 40 milliGRAM(s) SubCutaneous daily  methimazole 5 milliGRAM(s) Oral daily    MEDICATIONS  (PRN):      LABS:                        10.2   2.86  )-----------( 186      ( 27 Aug 2018 06:45 )             31.7     08-27    139  |  101  |  10  ----------------------------<  89  3.6   |  26  |  0.60    Ca    8.3<L>      27 Aug 2018 06:45  Phos  3.1     08-27  Mg     2.3     08-27            RADIOLOGY & ADDITIONAL STUDIES:

## 2018-08-28 ENCOUNTER — TRANSCRIPTION ENCOUNTER (OUTPATIENT)
Age: 53
End: 2018-08-28

## 2018-08-28 VITALS
OXYGEN SATURATION: 100 % | RESPIRATION RATE: 18 BRPM | DIASTOLIC BLOOD PRESSURE: 72 MMHG | HEART RATE: 86 BPM | SYSTOLIC BLOOD PRESSURE: 104 MMHG | TEMPERATURE: 98 F

## 2018-08-28 LAB
BUN SERPL-MCNC: 14 MG/DL — SIGNIFICANT CHANGE UP (ref 7–23)
CALCIUM SERPL-MCNC: 8.4 MG/DL — SIGNIFICANT CHANGE UP (ref 8.4–10.5)
CHLORIDE SERPL-SCNC: 102 MMOL/L — SIGNIFICANT CHANGE UP (ref 98–107)
CO2 SERPL-SCNC: 22 MMOL/L — SIGNIFICANT CHANGE UP (ref 22–31)
CREAT SERPL-MCNC: 0.61 MG/DL — SIGNIFICANT CHANGE UP (ref 0.5–1.3)
GLUCOSE SERPL-MCNC: 106 MG/DL — HIGH (ref 70–99)
HCT VFR BLD CALC: 34.6 % — SIGNIFICANT CHANGE UP (ref 34.5–45)
HGB BLD-MCNC: 11.2 G/DL — LOW (ref 11.5–15.5)
MAGNESIUM SERPL-MCNC: 2.1 MG/DL — SIGNIFICANT CHANGE UP (ref 1.6–2.6)
MCHC RBC-ENTMCNC: 27.6 PG — SIGNIFICANT CHANGE UP (ref 27–34)
MCHC RBC-ENTMCNC: 32.4 % — SIGNIFICANT CHANGE UP (ref 32–36)
MCV RBC AUTO: 85.2 FL — SIGNIFICANT CHANGE UP (ref 80–100)
NRBC # FLD: 0 — SIGNIFICANT CHANGE UP
PHOSPHATE SERPL-MCNC: 3.5 MG/DL — SIGNIFICANT CHANGE UP (ref 2.5–4.5)
PLATELET # BLD AUTO: 219 K/UL — SIGNIFICANT CHANGE UP (ref 150–400)
PMV BLD: 9.9 FL — SIGNIFICANT CHANGE UP (ref 7–13)
POTASSIUM SERPL-MCNC: 4.1 MMOL/L — SIGNIFICANT CHANGE UP (ref 3.5–5.3)
POTASSIUM SERPL-SCNC: 4.1 MMOL/L — SIGNIFICANT CHANGE UP (ref 3.5–5.3)
RBC # BLD: 4.06 M/UL — SIGNIFICANT CHANGE UP (ref 3.8–5.2)
RBC # FLD: 14.6 % — HIGH (ref 10.3–14.5)
SODIUM SERPL-SCNC: 139 MMOL/L — SIGNIFICANT CHANGE UP (ref 135–145)
WBC # BLD: 4.29 K/UL — SIGNIFICANT CHANGE UP (ref 3.8–10.5)
WBC # FLD AUTO: 4.29 K/UL — SIGNIFICANT CHANGE UP (ref 3.8–10.5)

## 2018-08-28 RX ADMIN — CHLORHEXIDINE GLUCONATE 1 APPLICATION(S): 213 SOLUTION TOPICAL at 06:25

## 2018-08-28 NOTE — DISCHARGE NOTE ADULT - CARE PLAN
Principal Discharge DX:	SBO (small bowel obstruction)  Goal:	Resolution of symptoms  Assessment and plan of treatment:	You were admitted to [facility] for small bowel obstruction.  When you left the hospital you were pain free and without any signs obstruction (passing gas and having bowel movements).  If you have any abdominal pain, fever, chills, nausea or vomiting, please call Dr. Celeste's office.  further symptoms).  You may slowly resume your pre-hospital physical activity as long as it doesn't cause you pain or dizziness.

## 2018-08-28 NOTE — DISCHARGE NOTE ADULT - CONDITIONS AT DISCHARGE
Alert & oriented. Out of bed ambulating. Tolerating diet without nausea or vomiting. Voiding without difficulty. Vitals stable, afebrile. Understands all discharge instruction & will follow up with the MD. Time allowed for questions. Alert & oriented. Out of bed ambulating. Tolerating diet without nausea or vomiting. Voiding without difficulty. + BM yesterday, + flatus. Vitals stable, afebrile. Understands all discharge instruction & will follow up with the MD. Time allowed for questions. Alert & oriented. Out of bed ambulating. Tolerating diet without nausea or vomiting. Voiding without difficulty. + BM yesterday, + flatus. Vitals stable, afebrile. Right chest mediport flushed as per protocol using sterile technique. Understands all discharge instruction & will follow up with the MD. Time allowed for questions.

## 2018-08-28 NOTE — DISCHARGE NOTE ADULT - HOSPITAL COURSE
52yoF hx colon ca s/p resection, cervical ca on chemo (last chemo on tuesday) p/w periumbilical abdominal pain constant sharp 8/10 since last night. Associated with multiple episodes of emesis initially white and now dark green. +chills. no fevers, no diarrhea. passing flatus. last BM yesterday.    Admission CT abd/pelvis (8/25): Small bowel obstruction with transition point in the right lower quadrant, likely due to a peritoneal implant, lymph node, or mass in the   region of previously seen hypermetabolism on PET/CT 7/6/2018.  Mild to moderate right hydroureteronephrosis, increased since PET/CT 7/6/2018, with dilatation of the right ureter to the mid pelvis. Retroperitoneal and inguinal lymphadenopathy has improved since 7/6/2018.    Patient was admitted to the surgical service, made NPO and started on IV. NGT was placed.     8/26: NGT was clamped due to return of GI function. NGT removed in evening  8/27: Diet advanced as tolerated     Pt currently ambulating, voiding, tolerating a regular diet, without pain. Patient is stable for discharge home to follow up as an outpatient, instructed to call to schedule appointment.

## 2018-08-28 NOTE — PROGRESS NOTE ADULT - SUBJECTIVE AND OBJECTIVE BOX
39 GENERAL SURGERY DAILY PROGRESS NOTE:       Subjective:  Pain controlled. Denies N/V. Tolerating diet. Passing flatus and BM.        Objective:    PE:  Gen: NAD  Resp: airway patent, respirations unlabored, no increased WoB  CVS: RRR  Abd: soft, ND, NT, no rebound or guarding  Ext: no edema, WWP  Neuro: AAOx3, no focal deficits    Vital Signs Last 24 Hrs  T(C): 36.4 (28 Aug 2018 10:28), Max: 36.9 (28 Aug 2018 06:24)  T(F): 97.6 (28 Aug 2018 10:28), Max: 98.5 (28 Aug 2018 06:24)  HR: 86 (28 Aug 2018 10:28) (74 - 86)  BP: 104/72 (28 Aug 2018 10:28) (98/69 - 110/75)  BP(mean): --  RR: 18 (28 Aug 2018 10:28) (16 - 18)  SpO2: 100% (28 Aug 2018 10:28) (97% - 100%)    I&O's Detail    27 Aug 2018 07:01  -  28 Aug 2018 07:00  --------------------------------------------------------  IN:    dextrose 5% + sodium chloride 0.45% with potassium chloride 20 mEq/L: 200 mL    lactated ringers.: 300 mL    Oral Fluid: 960 mL  Total IN: 1460 mL    OUT:    Voided: 750 mL  Total OUT: 750 mL    Total NET: 710 mL      28 Aug 2018 07:01  -  28 Aug 2018 12:21  --------------------------------------------------------  IN:    Oral Fluid: 350 mL  Total IN: 350 mL    OUT:    Voided: 1 mL  Total OUT: 1 mL    Total NET: 349 mL          Daily     Daily     MEDICATIONS  (STANDING):  chlorhexidine 4% Liquid 1 Application(s) Topical <User Schedule>  enoxaparin Injectable 40 milliGRAM(s) SubCutaneous daily  methimazole 5 milliGRAM(s) Oral daily    MEDICATIONS  (PRN):      LABS:                        11.2   4.29  )-----------( 219      ( 28 Aug 2018 07:20 )             34.6     08-28    139  |  102  |  14  ----------------------------<  106<H>  4.1   |  22  |  0.61    Ca    8.4      28 Aug 2018 07:20  Phos  3.5     08-28  Mg     2.1     08-28            RADIOLOGY & ADDITIONAL STUDIES:

## 2018-08-28 NOTE — DISCHARGE NOTE ADULT - PLAN OF CARE
Resolution of symptoms You were admitted to [facility] for small bowel obstruction.  When you left the hospital you were pain free and without any signs obstruction (passing gas and having bowel movements).  If you have any abdominal pain, fever, chills, nausea or vomiting, please call Dr. Celeste's office.  further symptoms).  You may slowly resume your pre-hospital physical activity as long as it doesn't cause you pain or dizziness.

## 2018-08-28 NOTE — DISCHARGE NOTE ADULT - INSTRUCTIONS
Watch for signs of abdominal pain, nausea or vomiting, inability to tolerate food, no signs of flatus (rectal gas) or bowel movement report such symptoms to the MD. Drink 6-8 glasses of fluids daily to promote hydration. No heavy lifting, pulling or pushing heavy objects. Follow up with primary & surgical MD. Watch for signs of abdominal pain, nausea or vomiting, inability to tolerate food, no signs of flatus (rectal gas) or bowel movement report such symptoms to the MD. Drink 6-8 glasses of fluids daily to promote hydration. No heavy lifting, pulling or pushing heavy objects. Follow up with primary & surgical MD. Sarath flushed as per hospital protocol, follow up with oncologist for further chemo treatment & port care.

## 2018-08-28 NOTE — DISCHARGE NOTE ADULT - MEDICATION SUMMARY - MEDICATIONS TO TAKE
I will START or STAY ON the medications listed below when I get home from the hospital:    methIMAzole 5 mg oral tablet  --  by mouth once a day  -- Indication: For home med

## 2018-08-28 NOTE — PROGRESS NOTE ADULT - SUBJECTIVE AND OBJECTIVE BOX
HPI:    PAST MEDICAL & SURGICAL HISTORY:  Colon neoplasm: s/p LAR  Hyperthyroidism  H/O exploratory laparotomy: 1/6/17, with LAR    Medications:  chlorhexidine 4% Liquid 1 Application(s) Topical <User Schedule>  enoxaparin Injectable 40 milliGRAM(s) SubCutaneous daily  methimazole 5 milliGRAM(s) Oral daily    Labs:  CBC Full  -  ( 28 Aug 2018 07:20 )  WBC Count : 4.29 K/uL  Hemoglobin : 11.2 g/dL  Hematocrit : 34.6 %  Platelet Count - Automated : 219 K/uL  Mean Cell Volume : 85.2 fL  Mean Cell Hemoglobin : 27.6 pg  Mean Cell Hemoglobin Concentration : 32.4 %  Auto Neutrophil # : x  Auto Lymphocyte # : x  Auto Monocyte # : x  Auto Eosinophil # : x  Auto Basophil # : x  Auto Neutrophil % : x  Auto Lymphocyte % : x  Auto Monocyte % : x  Auto Eosinophil % : x  Auto Basophil % : x    08-28    139  |  102  |  14  ----------------------------<  106<H>  4.1   |  22  |  0.61    Ca    8.4      28 Aug 2018 07:20  Phos  3.5     08-28  Mg     2.1     08-28        Radiology:             ROS:  Patient comfortable without distress  No SOB or chest pain  No palpitation  No abdominal pain, diarrhaea or constipation  No weakness of extremities  No skin changes or swelling of legs    Vital Signs Last 24 Hrs  T(C): 36.4 (28 Aug 2018 10:28), Max: 36.9 (28 Aug 2018 06:24)  T(F): 97.6 (28 Aug 2018 10:28), Max: 98.5 (28 Aug 2018 06:24)  HR: 86 (28 Aug 2018 10:28) (74 - 86)  BP: 104/72 (28 Aug 2018 10:28) (98/69 - 104/72)  BP(mean): --  RR: 18 (28 Aug 2018 10:28) (16 - 18)  SpO2: 100% (28 Aug 2018 10:28) (97% - 100%)    Physical exam:  Patient alert and oriented  No distress  CVS: S1, S2 regular or murmur  Chest: bilateral breath sound without rales  Abdomen: soft, not tender, no organomegaly or masses  No focal neuro deficit  No edema      Assessment and Plan:

## 2018-08-28 NOTE — DISCHARGE NOTE ADULT - CARE PROVIDER_API CALL
Leonardo Celeste), DieticianNutrition; Surgery; Surgical Critical Care  [address]  [address], [place] [address]  Phone: [phone]  Fax: [phone]

## 2018-08-28 NOTE — PROGRESS NOTE ADULT - ASSESSMENT
52F h/o colon ca w/ previous colon resection, now w/ sbo    PLAN  - pain control  - Regular diet  - monitor GI fxn  - oob, ambulate as tolerated  - dispo: DC home if tolerating diet
52y female h/o colon ca w/ previous colon resection, now w/ sbo    PLAN  - pain control  - monitor GI fxn  - oob, ambulate as tolerated  - CLD -> advance to regular as tolerated
Patient with h/o colon cancer since early 2017, had surgery, FOLFOX Avastin, with very good response, switched to FOLFIRI for allergic reaction followed by RT. Disease recurrence with mets to cervix, restarted chemo and finished C2, last cycle 2 days back, admitted with intense abdominal pain. Had BM yesterday and is passing gas.   Presently with obstruction. Has NG tube. Getting meds for nausea vomiting, hydration etc. To be followed by surgery.  Management of progressive metastatic colon cancer will be decided after management of obstruction  Patient is overall feeling better and pain abdomen has decreased significantly.

## 2018-10-01 ENCOUNTER — OUTPATIENT (OUTPATIENT)
Dept: OUTPATIENT SERVICES | Facility: HOSPITAL | Age: 53
LOS: 1 days | End: 2018-10-01
Payer: MEDICAID

## 2018-10-01 DIAGNOSIS — Z98.890 OTHER SPECIFIED POSTPROCEDURAL STATES: Chronic | ICD-10-CM

## 2018-10-07 ENCOUNTER — EMERGENCY (EMERGENCY)
Facility: HOSPITAL | Age: 53
LOS: 1 days | Discharge: HOME | End: 2018-10-07
Attending: EMERGENCY MEDICINE | Admitting: EMERGENCY MEDICINE
Payer: MEDICAID

## 2018-10-07 VITALS
RESPIRATION RATE: 20 BRPM | OXYGEN SATURATION: 100 % | HEART RATE: 123 BPM | TEMPERATURE: 98 F | SYSTOLIC BLOOD PRESSURE: 112 MMHG | DIASTOLIC BLOOD PRESSURE: 78 MMHG

## 2018-10-07 DIAGNOSIS — Z98.890 OTHER SPECIFIED POSTPROCEDURAL STATES: Chronic | ICD-10-CM

## 2018-10-07 DIAGNOSIS — Z90.49 ACQUIRED ABSENCE OF OTHER SPECIFIED PARTS OF DIGESTIVE TRACT: Chronic | ICD-10-CM

## 2018-10-07 LAB
ALBUMIN SERPL ELPH-MCNC: 3.8 G/DL — SIGNIFICANT CHANGE UP (ref 3.3–5)
ALP SERPL-CCNC: 273 U/L — HIGH (ref 40–120)
ALT FLD-CCNC: 38 U/L — HIGH (ref 4–33)
APPEARANCE UR: CLEAR — SIGNIFICANT CHANGE UP
AST SERPL-CCNC: 19 U/L — SIGNIFICANT CHANGE UP (ref 4–32)
BASE EXCESS BLDV CALC-SCNC: -0.2 MMOL/L — SIGNIFICANT CHANGE UP
BASE EXCESS BLDV CALC-SCNC: 0 MMOL/L — SIGNIFICANT CHANGE UP
BASOPHILS # BLD AUTO: 0.01 K/UL — SIGNIFICANT CHANGE UP (ref 0–0.2)
BASOPHILS NFR BLD AUTO: 0.4 % — SIGNIFICANT CHANGE UP (ref 0–2)
BASOPHILS NFR SPEC: 1.9 % — SIGNIFICANT CHANGE UP (ref 0–2)
BILIRUB SERPL-MCNC: 0.3 MG/DL — SIGNIFICANT CHANGE UP (ref 0.2–1.2)
BILIRUB UR-MCNC: NEGATIVE — SIGNIFICANT CHANGE UP
BLASTS # FLD: 0 % — SIGNIFICANT CHANGE UP (ref 0–0)
BLOOD GAS VENOUS - CREATININE: 0.56 MG/DL — SIGNIFICANT CHANGE UP (ref 0.5–1.3)
BLOOD GAS VENOUS - CREATININE: SIGNIFICANT CHANGE UP MG/DL (ref 0.5–1.3)
BLOOD UR QL VISUAL: NEGATIVE — SIGNIFICANT CHANGE UP
BUN SERPL-MCNC: 19 MG/DL — SIGNIFICANT CHANGE UP (ref 7–23)
CALCIUM SERPL-MCNC: 8.6 MG/DL — SIGNIFICANT CHANGE UP (ref 8.4–10.5)
CHLORIDE BLDV-SCNC: 104 MMOL/L — SIGNIFICANT CHANGE UP (ref 96–108)
CHLORIDE BLDV-SCNC: 107 MMOL/L — SIGNIFICANT CHANGE UP (ref 96–108)
CHLORIDE SERPL-SCNC: 99 MMOL/L — SIGNIFICANT CHANGE UP (ref 98–107)
CO2 SERPL-SCNC: 20 MMOL/L — LOW (ref 22–31)
COLOR SPEC: YELLOW — SIGNIFICANT CHANGE UP
CREAT SERPL-MCNC: 0.66 MG/DL — SIGNIFICANT CHANGE UP (ref 0.5–1.3)
EOSINOPHIL # BLD AUTO: 0.03 K/UL — SIGNIFICANT CHANGE UP (ref 0–0.5)
EOSINOPHIL NFR BLD AUTO: 1.2 % — SIGNIFICANT CHANGE UP (ref 0–6)
EOSINOPHIL NFR FLD: 0 % — SIGNIFICANT CHANGE UP (ref 0–6)
GAS PNL BLDV: 133 MMOL/L — LOW (ref 136–146)
GAS PNL BLDV: 136 MMOL/L — SIGNIFICANT CHANGE UP (ref 136–146)
GIANT PLATELETS BLD QL SMEAR: PRESENT — SIGNIFICANT CHANGE UP
GLUCOSE BLDV-MCNC: 138 — HIGH (ref 70–99)
GLUCOSE BLDV-MCNC: 169 — HIGH (ref 70–99)
GLUCOSE SERPL-MCNC: 165 MG/DL — HIGH (ref 70–99)
GLUCOSE UR-MCNC: NEGATIVE — SIGNIFICANT CHANGE UP
HCO3 BLDV-SCNC: 23 MMOL/L — SIGNIFICANT CHANGE UP (ref 20–27)
HCO3 BLDV-SCNC: 23 MMOL/L — SIGNIFICANT CHANGE UP (ref 20–27)
HCT VFR BLD CALC: 37.5 % — SIGNIFICANT CHANGE UP (ref 34.5–45)
HCT VFR BLDV CALC: 35.5 % — SIGNIFICANT CHANGE UP (ref 34.5–45)
HCT VFR BLDV CALC: 39.3 % — SIGNIFICANT CHANGE UP (ref 34.5–45)
HGB BLD-MCNC: 12.5 G/DL — SIGNIFICANT CHANGE UP (ref 11.5–15.5)
HGB BLDV-MCNC: 11.5 G/DL — SIGNIFICANT CHANGE UP (ref 11.5–15.5)
HGB BLDV-MCNC: 12.8 G/DL — SIGNIFICANT CHANGE UP (ref 11.5–15.5)
IMM GRANULOCYTES # BLD AUTO: 0.02 # — SIGNIFICANT CHANGE UP
IMM GRANULOCYTES NFR BLD AUTO: 0.8 % — SIGNIFICANT CHANGE UP (ref 0–1.5)
KETONES UR-MCNC: NEGATIVE — SIGNIFICANT CHANGE UP
LACTATE BLDV-MCNC: 2.4 MMOL/L — HIGH (ref 0.5–2)
LACTATE BLDV-MCNC: 4.1 MMOL/L — CRITICAL HIGH (ref 0.5–2)
LEUKOCYTE ESTERASE UR-ACNC: SIGNIFICANT CHANGE UP
LYMPHOCYTES # BLD AUTO: 1.27 K/UL — SIGNIFICANT CHANGE UP (ref 1–3.3)
LYMPHOCYTES # BLD AUTO: 51.4 % — HIGH (ref 13–44)
LYMPHOCYTES NFR SPEC AUTO: 35 % — SIGNIFICANT CHANGE UP (ref 13–44)
MACROCYTES BLD QL: SLIGHT — SIGNIFICANT CHANGE UP
MCHC RBC-ENTMCNC: 28.7 PG — SIGNIFICANT CHANGE UP (ref 27–34)
MCHC RBC-ENTMCNC: 33.3 % — SIGNIFICANT CHANGE UP (ref 32–36)
MCV RBC AUTO: 86.2 FL — SIGNIFICANT CHANGE UP (ref 80–100)
METAMYELOCYTES # FLD: 0 % — SIGNIFICANT CHANGE UP (ref 0–1)
MICROCYTES BLD QL: SIGNIFICANT CHANGE UP
MONOCYTES # BLD AUTO: 0.11 K/UL — SIGNIFICANT CHANGE UP (ref 0–0.9)
MONOCYTES NFR BLD AUTO: 4.5 % — SIGNIFICANT CHANGE UP (ref 2–14)
MONOCYTES NFR BLD: 1 % — LOW (ref 2–9)
MYELOCYTES NFR BLD: 0 % — SIGNIFICANT CHANGE UP (ref 0–0)
NEUTROPHIL AB SER-ACNC: 56.3 % — SIGNIFICANT CHANGE UP (ref 43–77)
NEUTROPHILS # BLD AUTO: 1.03 K/UL — LOW (ref 1.8–7.4)
NEUTROPHILS NFR BLD AUTO: 41.7 % — LOW (ref 43–77)
NEUTS BAND # BLD: 0 % — SIGNIFICANT CHANGE UP (ref 0–6)
NITRITE UR-MCNC: NEGATIVE — SIGNIFICANT CHANGE UP
NRBC # BLD: 1 /100WBC — SIGNIFICANT CHANGE UP
NRBC # FLD: 0 — SIGNIFICANT CHANGE UP
OB PNL STL: POSITIVE — SIGNIFICANT CHANGE UP
OTHER - HEMATOLOGY %: 0 — SIGNIFICANT CHANGE UP
PCO2 BLDV: 42 MMHG — SIGNIFICANT CHANGE UP (ref 41–51)
PCO2 BLDV: 44 MMHG — SIGNIFICANT CHANGE UP (ref 41–51)
PH BLDV: 7.37 PH — SIGNIFICANT CHANGE UP (ref 7.32–7.43)
PH BLDV: 7.38 PH — SIGNIFICANT CHANGE UP (ref 7.32–7.43)
PH UR: 6.5 — SIGNIFICANT CHANGE UP (ref 5–8)
PLATELET # BLD AUTO: 176 K/UL — SIGNIFICANT CHANGE UP (ref 150–400)
PLATELET COUNT - ESTIMATE: NORMAL — SIGNIFICANT CHANGE UP
PMV BLD: 9.5 FL — SIGNIFICANT CHANGE UP (ref 7–13)
PO2 BLDV: 29 MMHG — LOW (ref 35–40)
PO2 BLDV: 36 MMHG — SIGNIFICANT CHANGE UP (ref 35–40)
POIKILOCYTOSIS BLD QL AUTO: SLIGHT — SIGNIFICANT CHANGE UP
POTASSIUM BLDV-SCNC: 3.9 MMOL/L — SIGNIFICANT CHANGE UP (ref 3.4–4.5)
POTASSIUM BLDV-SCNC: 4.1 MMOL/L — SIGNIFICANT CHANGE UP (ref 3.4–4.5)
POTASSIUM SERPL-MCNC: 4.2 MMOL/L — SIGNIFICANT CHANGE UP (ref 3.5–5.3)
POTASSIUM SERPL-SCNC: 4.2 MMOL/L — SIGNIFICANT CHANGE UP (ref 3.5–5.3)
PROMYELOCYTES # FLD: 0 % — SIGNIFICANT CHANGE UP (ref 0–0)
PROT SERPL-MCNC: 6.7 G/DL — SIGNIFICANT CHANGE UP (ref 6–8.3)
PROT UR-MCNC: 10 — SIGNIFICANT CHANGE UP
RBC # BLD: 4.35 M/UL — SIGNIFICANT CHANGE UP (ref 3.8–5.2)
RBC # FLD: 17.2 % — HIGH (ref 10.3–14.5)
SAO2 % BLDV: 51.2 % — LOW (ref 60–85)
SAO2 % BLDV: 61.1 % — SIGNIFICANT CHANGE UP (ref 60–85)
SMUDGE CELLS # BLD: PRESENT — SIGNIFICANT CHANGE UP
SODIUM SERPL-SCNC: 137 MMOL/L — SIGNIFICANT CHANGE UP (ref 135–145)
SP GR SPEC: 1.02 — SIGNIFICANT CHANGE UP (ref 1–1.04)
UROBILINOGEN FLD QL: NORMAL — SIGNIFICANT CHANGE UP
VARIANT LYMPHS # BLD: 5.8 % — SIGNIFICANT CHANGE UP
WBC # BLD: 2.47 K/UL — LOW (ref 3.8–10.5)
WBC # FLD AUTO: 2.47 K/UL — LOW (ref 3.8–10.5)
WBC UR QL: SIGNIFICANT CHANGE UP (ref 0–?)

## 2018-10-07 PROCEDURE — 99291 CRITICAL CARE FIRST HOUR: CPT

## 2018-10-07 RX ORDER — MORPHINE SULFATE 50 MG/1
4 CAPSULE, EXTENDED RELEASE ORAL ONCE
Qty: 0 | Refills: 0 | Status: DISCONTINUED | OUTPATIENT
Start: 2018-10-07 | End: 2018-10-07

## 2018-10-07 RX ORDER — VANCOMYCIN HCL 1 G
1000 VIAL (EA) INTRAVENOUS ONCE
Qty: 0 | Refills: 0 | Status: COMPLETED | OUTPATIENT
Start: 2018-10-07 | End: 2018-10-07

## 2018-10-07 RX ORDER — SODIUM CHLORIDE 9 MG/ML
1000 INJECTION INTRAMUSCULAR; INTRAVENOUS; SUBCUTANEOUS ONCE
Qty: 0 | Refills: 0 | Status: COMPLETED | OUTPATIENT
Start: 2018-10-07 | End: 2018-10-07

## 2018-10-07 RX ORDER — PIPERACILLIN AND TAZOBACTAM 4; .5 G/20ML; G/20ML
3.38 INJECTION, POWDER, LYOPHILIZED, FOR SOLUTION INTRAVENOUS ONCE
Qty: 0 | Refills: 0 | Status: COMPLETED | OUTPATIENT
Start: 2018-10-07 | End: 2018-10-07

## 2018-10-07 RX ADMIN — MORPHINE SULFATE 4 MILLIGRAM(S): 50 CAPSULE, EXTENDED RELEASE ORAL at 20:24

## 2018-10-07 RX ADMIN — PIPERACILLIN AND TAZOBACTAM 3.38 GRAM(S): 4; .5 INJECTION, POWDER, LYOPHILIZED, FOR SOLUTION INTRAVENOUS at 22:50

## 2018-10-07 RX ADMIN — PIPERACILLIN AND TAZOBACTAM 200 GRAM(S): 4; .5 INJECTION, POWDER, LYOPHILIZED, FOR SOLUTION INTRAVENOUS at 22:02

## 2018-10-07 RX ADMIN — SODIUM CHLORIDE 1000 MILLILITER(S): 9 INJECTION INTRAMUSCULAR; INTRAVENOUS; SUBCUTANEOUS at 20:27

## 2018-10-07 RX ADMIN — SODIUM CHLORIDE 1000 MILLILITER(S): 9 INJECTION INTRAMUSCULAR; INTRAVENOUS; SUBCUTANEOUS at 21:20

## 2018-10-07 RX ADMIN — Medication 250 MILLIGRAM(S): at 23:01

## 2018-10-07 RX ADMIN — MORPHINE SULFATE 4 MILLIGRAM(S): 50 CAPSULE, EXTENDED RELEASE ORAL at 20:54

## 2018-10-07 NOTE — ED PROVIDER NOTE - PROGRESS NOTE DETAILS
Feels well, pain well controlled, abd soft NTND. CT negative for intraabdominal pathology. UA negative. Aside from mild neutropenia (appears chronic from prior visits), labs normal. Patient pain free, would like to go home. WIll f/u with her PMD/oncologist in 24-48 hrs for rpt eval. Pain likely 2/2 hemorrhoids vs constipation, advised topical lidocaine, sitz bath, bowel regimen. Given strict return precautions. Patient and family at bedside verbalize understanding, comfortable with plan and understands signs/symptoms to prompt immediate return to ED. -Angela, PGY4.

## 2018-10-07 NOTE — ED PROVIDER NOTE - MEDICAL DECISION MAKING DETAILS
EM PGY1 Екатерина Mendoza MD: 54yo female with PMH of colon CA s/p resection, cervical CA on chemo only on Tuesdays, hx of SBO, hyperthyroidism, hemorrhoids who presents with rectal bleeding and pain for 2 days. Pt hemodynamically stable, afebrile. Will obtain rectal temp. On chemo, so immunocompromised with TTP on lower abd and no recent BM-suspicious for SBO, sepsis. Will obtain CBC, CMP, VBG, cultures, CT abd/pelvis. Will start on IVF, abx and pain meds. 54yo c complex PMHx presents with rectal bleeding and pain for 2 days. Pt hemodynamically stable, afebrile. Will obtain rectal temp. On chemo, so immunocompromised with TTP on lower abd and no recent BM-suspicious for SBO, sepsis. Will obtain CBC, CMP, VBG, cultures, CT abd/pelvis. Will start on IVF, abx and pain meds.  Jose admit.

## 2018-10-07 NOTE — ED PROVIDER NOTE - PMH
Cervical cancer    Colon neoplasm  s/p LAR  Hemorrhoid    Hyperthyroidism    SBO (small bowel obstruction)

## 2018-10-07 NOTE — ED ADULT NURSE NOTE - OBJECTIVE STATEMENT
Pt rec'd in 13, PMH of rectal and cervical cancer, on chemo (last chemo last tuesday). Pt c/o bleeding and pain from her hemorrhoid. Pt states the symptoms are only when she strains to have a BM, and pain usually stops after, but now it's persistent. Pt denies fevers/chills. Denies passing clots.

## 2018-10-07 NOTE — ED PROVIDER NOTE - PHYSICAL EXAMINATION
EM PGY1 Екатерина Mendoza MD:   CONSTITUTIONAL: older than stated age female, resting comfortably in no acute distress  HEAD: Normocephalic; atraumatic  EYES: Normal inspection, EOMI  ENMT: External appears normal; normal oropharynx  NECK: Supple; non-tender; no cervical lymphadenopathy  CARD: RRR; no audible murmurs, rubs, or gallops  RESP: No respiratory distress, lungs ctab/l  ABD: Soft, non-distended; TTP in lower abdomen; no rebound or guarding  EXT: No LE pitting edema or calf tenderness; distal pulses intact with good capillary refill  SKIN: Warm, dry, intact  : external hemorrhoids with no fissures or bleeding noted, rectal exam produced brown feces with no nereida blood or masses palpated in rectum  NEURO: aaox3, no gross motor or sensory defects noted

## 2018-10-07 NOTE — ED PROVIDER NOTE - OBJECTIVE STATEMENT
EM PGY1 Екатерина Mendoza MD: 54yo female with PMH of colon CA s/p resection, cervical CA on chemo only on Tuesdays, hx of SBO, hyperthyroidism, hemorrhoids who presents with rectal bleeding and pain for 2 days. Pt states she has burning and pain in her rectum with blood that has not gone away. Pt gets hemorrhoid bleeding and pain intermittently, but is usually resolved. Took Tylenol with no relief (pt cannot take ibuprofen or aspirin due to chemo). No BM since yesterday, normally goes everyday. Hx of SBO after 1 day of no BM in the past. Admits to flatus and lightheadedness. Denies syncope, HA, CP, SOB, abdominal pain, N/V/D, dysuria, rash. 52yo female c colon CA s/p resection, cervical CA on chemo only (Tuesdays), hx of SBO, hyperthyroidism, hemorrhoids who presents with rectal bleeding and pain for 2 days. Pt states she has burning and pain in her rectum with blood that has not gone away. Pt gets hemorrhoid bleeding and pain intermittently, but is usually self resolving. Took Tylenol with no relief (pt cannot take ibuprofen or aspirin due to chemo). No BM since yesterday, normally goes everyday. Hx of SBO after 1 day of no BM in the past. Admits to flatus and lightheadedness. Denies syncope, HA, CP, SOB, abdominal pain, N/V/D, dysuria, rash. Uncomfortable appearing.  No trauma.

## 2018-10-07 NOTE — ED PROVIDER NOTE - NS ED ROS FT
EM PGY1 Екатерина Mendoza MD:   General: denies fever, chills  HENT: denies nasal congestion, sore throat, rhinorrhea  Eyes: denies vision changes  CV: denies chest pain  Resp: denies difficulty breathing, cough  Abdominal: denies nausea, vomiting, diarrhea, abdominal pain, +blood in stool  : denies pain with urination  MSK: denies recent trauma  Neuro: denies headaches, numbness, tingling, dizziness, +lightheadedness.  Skin: denies new rashes

## 2018-10-07 NOTE — ED PROVIDER NOTE - ATTENDING CONTRIBUTION TO CARE
Attending Attestation: Dr. Dick  I have personally performed a history and physical examination of the patient and discussed management with the resident as well as the patient.  I reviewed the resident's note and agree with the documented findings and plan of care.  I have authored and modified critical sections of the Provider Note, including but not limited to HPI, Physical Exam and MDM. 52yo c complex PMHx presents with rectal bleeding and pain for 2 days. Pt hemodynamically stable, afebrile. Will obtain rectal temp. On chemo, so immunocompromised with TTP on lower abd and no recent BM-suspicious for SBO, sepsis. Will obtain CBC, CMP, VBG, cultures, CT abd/pelvis. Will start on IVF, abx and pain meds.  Jose admit.

## 2018-10-08 ENCOUNTER — INPATIENT (INPATIENT)
Facility: HOSPITAL | Age: 53
LOS: 1 days | Discharge: HOME | End: 2018-10-10
Attending: INTERNAL MEDICINE | Admitting: INTERNAL MEDICINE
Payer: MEDICAID

## 2018-10-08 VITALS
HEART RATE: 89 BPM | DIASTOLIC BLOOD PRESSURE: 81 MMHG | RESPIRATION RATE: 19 BRPM | OXYGEN SATURATION: 100 % | TEMPERATURE: 98 F | SYSTOLIC BLOOD PRESSURE: 118 MMHG

## 2018-10-08 VITALS
TEMPERATURE: 98 F | SYSTOLIC BLOOD PRESSURE: 115 MMHG | OXYGEN SATURATION: 100 % | RESPIRATION RATE: 18 BRPM | DIASTOLIC BLOOD PRESSURE: 79 MMHG | HEART RATE: 91 BPM

## 2018-10-08 DIAGNOSIS — Z98.890 OTHER SPECIFIED POSTPROCEDURAL STATES: Chronic | ICD-10-CM

## 2018-10-08 DIAGNOSIS — C18.9 MALIGNANT NEOPLASM OF COLON, UNSPECIFIED: ICD-10-CM

## 2018-10-08 DIAGNOSIS — C53.9 MALIGNANT NEOPLASM OF CERVIX UTERI, UNSPECIFIED: ICD-10-CM

## 2018-10-08 DIAGNOSIS — Z90.49 ACQUIRED ABSENCE OF OTHER SPECIFIED PARTS OF DIGESTIVE TRACT: Chronic | ICD-10-CM

## 2018-10-08 DIAGNOSIS — K62.89 OTHER SPECIFIED DISEASES OF ANUS AND RECTUM: ICD-10-CM

## 2018-10-08 DIAGNOSIS — D70.1 AGRANULOCYTOSIS SECONDARY TO CANCER CHEMOTHERAPY: ICD-10-CM

## 2018-10-08 DIAGNOSIS — Z29.9 ENCOUNTER FOR PROPHYLACTIC MEASURES, UNSPECIFIED: ICD-10-CM

## 2018-10-08 LAB
ALBUMIN SERPL ELPH-MCNC: 3.5 G/DL — SIGNIFICANT CHANGE UP (ref 3.3–5)
ALP SERPL-CCNC: 240 U/L — HIGH (ref 40–120)
ALT FLD-CCNC: 29 U/L — SIGNIFICANT CHANGE UP (ref 4–33)
APTT BLD: 27.8 SEC — SIGNIFICANT CHANGE UP (ref 27.5–37.4)
AST SERPL-CCNC: 13 U/L — SIGNIFICANT CHANGE UP (ref 4–32)
BASOPHILS # BLD AUTO: 0.02 K/UL — SIGNIFICANT CHANGE UP (ref 0–0.2)
BASOPHILS NFR BLD AUTO: 0.6 % — SIGNIFICANT CHANGE UP (ref 0–2)
BILIRUB SERPL-MCNC: 0.3 MG/DL — SIGNIFICANT CHANGE UP (ref 0.2–1.2)
BLD GP AB SCN SERPL QL: NEGATIVE — SIGNIFICANT CHANGE UP
BUN SERPL-MCNC: 12 MG/DL — SIGNIFICANT CHANGE UP (ref 7–23)
CALCIUM SERPL-MCNC: 8.1 MG/DL — LOW (ref 8.4–10.5)
CHLORIDE SERPL-SCNC: 103 MMOL/L — SIGNIFICANT CHANGE UP (ref 98–107)
CO2 SERPL-SCNC: 23 MMOL/L — SIGNIFICANT CHANGE UP (ref 22–31)
CREAT SERPL-MCNC: 0.63 MG/DL — SIGNIFICANT CHANGE UP (ref 0.5–1.3)
EOSINOPHIL # BLD AUTO: 0.04 K/UL — SIGNIFICANT CHANGE UP (ref 0–0.5)
EOSINOPHIL NFR BLD AUTO: 1.2 % — SIGNIFICANT CHANGE UP (ref 0–6)
GLUCOSE SERPL-MCNC: 117 MG/DL — HIGH (ref 70–99)
HCT VFR BLD CALC: 33.3 % — LOW (ref 34.5–45)
HGB BLD-MCNC: 10.9 G/DL — LOW (ref 11.5–15.5)
IMM GRANULOCYTES # BLD AUTO: 0.05 # — SIGNIFICANT CHANGE UP
IMM GRANULOCYTES NFR BLD AUTO: 1.5 % — SIGNIFICANT CHANGE UP (ref 0–1.5)
INR BLD: 1.02 — SIGNIFICANT CHANGE UP (ref 0.88–1.17)
LYMPHOCYTES # BLD AUTO: 1.33 K/UL — SIGNIFICANT CHANGE UP (ref 1–3.3)
LYMPHOCYTES # BLD AUTO: 38.8 % — SIGNIFICANT CHANGE UP (ref 13–44)
MCHC RBC-ENTMCNC: 28.5 PG — SIGNIFICANT CHANGE UP (ref 27–34)
MCHC RBC-ENTMCNC: 32.7 % — SIGNIFICANT CHANGE UP (ref 32–36)
MCV RBC AUTO: 87.2 FL — SIGNIFICANT CHANGE UP (ref 80–100)
MONOCYTES # BLD AUTO: 0.23 K/UL — SIGNIFICANT CHANGE UP (ref 0–0.9)
MONOCYTES NFR BLD AUTO: 6.7 % — SIGNIFICANT CHANGE UP (ref 2–14)
NEUTROPHILS # BLD AUTO: 1.76 K/UL — LOW (ref 1.8–7.4)
NEUTROPHILS NFR BLD AUTO: 51.2 % — SIGNIFICANT CHANGE UP (ref 43–77)
NRBC # FLD: 0 — SIGNIFICANT CHANGE UP
OB PNL STL: POSITIVE — SIGNIFICANT CHANGE UP
PLATELET # BLD AUTO: 142 K/UL — LOW (ref 150–400)
PMV BLD: 9.6 FL — SIGNIFICANT CHANGE UP (ref 7–13)
POTASSIUM SERPL-MCNC: 3.9 MMOL/L — SIGNIFICANT CHANGE UP (ref 3.5–5.3)
POTASSIUM SERPL-SCNC: 3.9 MMOL/L — SIGNIFICANT CHANGE UP (ref 3.5–5.3)
PROT SERPL-MCNC: 6 G/DL — SIGNIFICANT CHANGE UP (ref 6–8.3)
PROTHROM AB SERPL-ACNC: 11.3 SEC — SIGNIFICANT CHANGE UP (ref 9.8–13.1)
RBC # BLD: 3.82 M/UL — SIGNIFICANT CHANGE UP (ref 3.8–5.2)
RBC # FLD: 17.4 % — HIGH (ref 10.3–14.5)
RH IG SCN BLD-IMP: POSITIVE — SIGNIFICANT CHANGE UP
SODIUM SERPL-SCNC: 138 MMOL/L — SIGNIFICANT CHANGE UP (ref 135–145)
SPECIMEN SOURCE: SIGNIFICANT CHANGE UP
SPECIMEN SOURCE: SIGNIFICANT CHANGE UP
WBC # BLD: 3.43 K/UL — LOW (ref 3.8–10.5)
WBC # FLD AUTO: 3.43 K/UL — LOW (ref 3.8–10.5)

## 2018-10-08 PROCEDURE — 74177 CT ABD & PELVIS W/CONTRAST: CPT | Mod: 26

## 2018-10-08 PROCEDURE — 99223 1ST HOSP IP/OBS HIGH 75: CPT

## 2018-10-08 RX ORDER — ONDANSETRON 8 MG/1
4 TABLET, FILM COATED ORAL ONCE
Qty: 0 | Refills: 0 | Status: COMPLETED | OUTPATIENT
Start: 2018-10-08 | End: 2018-10-08

## 2018-10-08 RX ORDER — HYDROCORTISONE 1 %
1 OINTMENT (GRAM) TOPICAL
Qty: 0 | Refills: 0 | Status: DISCONTINUED | OUTPATIENT
Start: 2018-10-08 | End: 2018-10-10

## 2018-10-08 RX ORDER — SODIUM CHLORIDE 9 MG/ML
500 INJECTION INTRAMUSCULAR; INTRAVENOUS; SUBCUTANEOUS ONCE
Qty: 0 | Refills: 0 | Status: COMPLETED | OUTPATIENT
Start: 2018-10-08 | End: 2018-10-08

## 2018-10-08 RX ORDER — DOCUSATE SODIUM 100 MG
100 CAPSULE ORAL THREE TIMES A DAY
Qty: 0 | Refills: 0 | Status: DISCONTINUED | OUTPATIENT
Start: 2018-10-08 | End: 2018-10-10

## 2018-10-08 RX ORDER — LIDOCAINE 4 G/100G
1 CREAM TOPICAL ONCE
Qty: 0 | Refills: 0 | Status: COMPLETED | OUTPATIENT
Start: 2018-10-08 | End: 2018-10-08

## 2018-10-08 RX ORDER — ENOXAPARIN SODIUM 100 MG/ML
40 INJECTION SUBCUTANEOUS DAILY
Qty: 0 | Refills: 0 | Status: DISCONTINUED | OUTPATIENT
Start: 2018-10-08 | End: 2018-10-08

## 2018-10-08 RX ORDER — ACETAMINOPHEN 500 MG
650 TABLET ORAL EVERY 6 HOURS
Qty: 0 | Refills: 0 | Status: DISCONTINUED | OUTPATIENT
Start: 2018-10-08 | End: 2018-10-10

## 2018-10-08 RX ORDER — SODIUM CHLORIDE 9 MG/ML
1000 INJECTION, SOLUTION INTRAVENOUS ONCE
Qty: 0 | Refills: 0 | Status: COMPLETED | OUTPATIENT
Start: 2018-10-08 | End: 2018-10-08

## 2018-10-08 RX ORDER — MORPHINE SULFATE 50 MG/1
4 CAPSULE, EXTENDED RELEASE ORAL EVERY 6 HOURS
Qty: 0 | Refills: 0 | Status: DISCONTINUED | OUTPATIENT
Start: 2018-10-08 | End: 2018-10-10

## 2018-10-08 RX ORDER — MORPHINE SULFATE 50 MG/1
4 CAPSULE, EXTENDED RELEASE ORAL ONCE
Qty: 0 | Refills: 0 | Status: DISCONTINUED | OUTPATIENT
Start: 2018-10-08 | End: 2018-10-08

## 2018-10-08 RX ORDER — POLYETHYLENE GLYCOL 3350 17 G/17G
17 POWDER, FOR SOLUTION ORAL DAILY
Qty: 0 | Refills: 0 | Status: DISCONTINUED | OUTPATIENT
Start: 2018-10-08 | End: 2018-10-10

## 2018-10-08 RX ORDER — INFLUENZA VIRUS VACCINE 15; 15; 15; 15 UG/.5ML; UG/.5ML; UG/.5ML; UG/.5ML
0.5 SUSPENSION INTRAMUSCULAR ONCE
Qty: 0 | Refills: 0 | Status: DISCONTINUED | OUTPATIENT
Start: 2018-10-08 | End: 2018-10-10

## 2018-10-08 RX ORDER — MORPHINE SULFATE 50 MG/1
2 CAPSULE, EXTENDED RELEASE ORAL EVERY 6 HOURS
Qty: 0 | Refills: 0 | Status: DISCONTINUED | OUTPATIENT
Start: 2018-10-08 | End: 2018-10-08

## 2018-10-08 RX ORDER — SENNA PLUS 8.6 MG/1
2 TABLET ORAL AT BEDTIME
Qty: 0 | Refills: 0 | Status: DISCONTINUED | OUTPATIENT
Start: 2018-10-08 | End: 2018-10-10

## 2018-10-08 RX ADMIN — SODIUM CHLORIDE 1000 MILLILITER(S): 9 INJECTION, SOLUTION INTRAVENOUS at 00:41

## 2018-10-08 RX ADMIN — MORPHINE SULFATE 4 MILLIGRAM(S): 50 CAPSULE, EXTENDED RELEASE ORAL at 12:27

## 2018-10-08 RX ADMIN — SODIUM CHLORIDE 1000 MILLILITER(S): 9 INJECTION, SOLUTION INTRAVENOUS at 01:58

## 2018-10-08 RX ADMIN — SENNA PLUS 2 TABLET(S): 8.6 TABLET ORAL at 21:26

## 2018-10-08 RX ADMIN — SODIUM CHLORIDE 500 MILLILITER(S): 9 INJECTION INTRAMUSCULAR; INTRAVENOUS; SUBCUTANEOUS at 16:22

## 2018-10-08 RX ADMIN — Medication 100 MILLIGRAM(S): at 21:27

## 2018-10-08 RX ADMIN — Medication 1000 MILLIGRAM(S): at 00:16

## 2018-10-08 RX ADMIN — MORPHINE SULFATE 4 MILLIGRAM(S): 50 CAPSULE, EXTENDED RELEASE ORAL at 01:38

## 2018-10-08 RX ADMIN — Medication 1 APPLICATION(S): at 21:27

## 2018-10-08 RX ADMIN — ONDANSETRON 4 MILLIGRAM(S): 8 TABLET, FILM COATED ORAL at 12:27

## 2018-10-08 RX ADMIN — MORPHINE SULFATE 4 MILLIGRAM(S): 50 CAPSULE, EXTENDED RELEASE ORAL at 01:06

## 2018-10-08 RX ADMIN — MORPHINE SULFATE 4 MILLIGRAM(S): 50 CAPSULE, EXTENDED RELEASE ORAL at 16:21

## 2018-10-08 RX ADMIN — LIDOCAINE 1 APPLICATION(S): 4 CREAM TOPICAL at 03:00

## 2018-10-08 RX ADMIN — SODIUM CHLORIDE 500 MILLILITER(S): 9 INJECTION INTRAMUSCULAR; INTRAVENOUS; SUBCUTANEOUS at 13:27

## 2018-10-08 NOTE — ED ADULT TRIAGE NOTE - CHIEF COMPLAINT QUOTE
Pt with PMH of cervical cancer, c/o bleeding and painful hemorrhoid. Was seen here yesterday for same and discharged early this morning. Pt returns because the medication she was given is making the pain worse.

## 2018-10-08 NOTE — ED PROVIDER NOTE - FAMILY HISTORY
Father  Still living? Unknown  Family history of leukemia, Age at diagnosis: Age Unknown  Family history of diabetes mellitus, Age at diagnosis: Age Unknown

## 2018-10-08 NOTE — H&P ADULT - ATTENDING COMMENTS
Patient seen and examined, agree with above.  54 y/o female with h/o colorectal cancer s/p resection and radiation therapy in 2017, cervical cancer currently on chemotherapy every other week, hyperthyroidism, admitted for severe rectal pain with scant BRBPR.  Patient was here yesterday for the same complaint, discharged on lidocaine gel, but reports rectal pain worse with the gel. She denies fever/abd pain/chest pain/sob.  In ED, she was given iv morphine with good relief.   PE: nontoxic, lung clear, heart regular, abdomen soft, nt/nd; extrem: no edema  Labs/CT reviewed: CT no acute pathology, Hgb 10.9    Assessment/plan:  # rectal pain: iv morphine prn, hydrocortisone topical, bowel regimen  # H/o cervical cancer: outpt f/u oncology for chemo  # pancytopenia due to cancer/chemo: monitor CBC, neutropenic precaution  # Hyperthyroidism: c/w tapazole  # H/o colorectal cancer: s/p resection/RT, no e/o recurrence on CT  # Hematochezia: h/o hemorrhoids, monitor CBC, no indication for transfusion, keep Hgb>7  # DVT prophylaxis: venodynes

## 2018-10-08 NOTE — H&P ADULT - FAMILY HISTORY
Family history of diabetes mellitus     Mother  Still living? Unknown  Family history of leukemia, Age at diagnosis: Age Unknown

## 2018-10-08 NOTE — H&P ADULT - PROBLEM SELECTOR PLAN 1
Pt with known hemorrhoids. Pt with known hemorrhoids.  pain greatly improved with IV morphine 4 mg x 1.  Continue IV morphine PRN for moderate to severe pain, with tylenol PRN for mild pain, and hydrocortisone cream BID x 7 days. Pt with known hemorrhoids.  pain greatly improved with IV morphine 4 mg x 1.  Continue IV morphine PRN for moderate to severe pain, with tylenol PRN for mild pain, and hydrocortisone cream BID x 7 days.  Upon discharge, can change to oral oxycodone limited supply. Pt with known hemorrhoids.  pain greatly improved with IV morphine 4 mg x 1.  Continue IV morphine PRN for moderate to severe pain, with tylenol PRN for mild pain, and hydrocortisone cream BID x 7 days.  Upon discharge, can change to oral oxycodone limited supply.  Senna, colace, miralax PRN for bowel regimen (pt with chronic constipation).

## 2018-10-08 NOTE — ED PROVIDER NOTE - OBJECTIVE STATEMENT
pmh colon ca s/p resection, cervical ca on ctx, with worsening rectal pain/bleeding. seen last night for same improved after morphine/topical lido however pain returned and worsened after dc from ed, no fever, +flatus, no vomiting

## 2018-10-08 NOTE — ED PROVIDER NOTE - ATTENDING CONTRIBUTION TO CARE
Maria A TARANGO- 54 Y/O F with h/o colon ca last year, resected and treated and now diagnosed with cervical ca on chemo p/w rectal pain and was seen earlier today in the ED and still has intractable l pain despite applying lidocaine. Pt had brown stool with no active bleeding. No nausea, vomiting, vag bleeding. Pt could not tolerate pain and returned to the ED. No fever, chills, dysuria, hematuria    pt is alert, well appearing female, s1s2 normal reg, b/l clear breath sounds, abd soft, nt, nd, normal bowel sounds, no hernia , neuro exam aox3, cn 2-12 intact, no focal deficits, rectal exam - showed brown stool , no active bleeding, skin warm, dry, good turgor    plan to admit for intractable pain, will repeat labs for delta in h/h and possible GI and surgical consult post admission

## 2018-10-08 NOTE — H&P ADULT - PROBLEM SELECTOR PLAN 5
DVT: SubQ lovenox  Diet: Regular    Yaneth Gibbons MD PGY2  Medicine Day Admit  Pager [phone] DVT: SCD's (given guiac positive stool)  Diet: Regular    Yaneth Gibbons MD PGY2  Medicine Day Admit  Pager [phone]

## 2018-10-08 NOTE — H&P ADULT - NSHPPHYSICALEXAM_GEN_ALL_CORE
Constitutional: No acute distress  Eyes: PERRL, EOMI, clear conjunctiva  ENMT: moist mucous membranes, no pharyngeal erythema   Respiratory: lungs CTAB; no wheezing, rales or rhonchi   Cardiovascular: Regular rate and rhythm; no murmurs. No LE edema b/l   Gastrointestinal: Abdomen soft, nontender, nondistened, normal bowel sounds   Rectal: Pt refused exam due to pain   Extremities: unremarkable- no clubbing, cyanosis or edema   Vascular: 2+ peripheral pulses  Skin: No rashes or lesions noted  Psychiatric: A&O x 4, calm

## 2018-10-08 NOTE — H&P ADULT - NSHPLABSRESULTS_GEN_ALL_CORE
10-08    138  |  103  |  12  ----------------------------<  117<H>  3.9   |  23  |  0.63    Ca    8.1<L>      08 Oct 2018 12:40    TPro  6.0  /  Alb  3.5  /  TBili  0.3  /  DBili  x   /  AST  13  /  ALT  29  /  AlkPhos  240<H>  1008    CBC Full  -  ( 08 Oct 2018 12:40 )  WBC Count : 3.43 K/uL  Hemoglobin : 10.9 g/dL  Hematocrit : 33.3 %  Platelet Count - Automated : 142 K/uL  Mean Cell Volume : 87.2 fL  Mean Cell Hemoglobin : 28.5 pg  Mean Cell Hemoglobin Concentration : 32.7 %  Auto Neutrophil # : 1.76 K/uL  Auto Lymphocyte # : 1.33 K/uL  Auto Monocyte # : 0.23 K/uL  Auto Eosinophil # : 0.04 K/uL  Auto Basophil # : 0.02 K/uL  Auto Neutrophil % : 51.2 %  Auto Lymphocyte % : 38.8 %  Auto Monocyte % : 6.7 %  Auto Eosinophil % : 1.2 %  Auto Basophil % : 0.6 %    Urinalysis Basic - ( 07 Oct 2018 22:00 )    Color: YELLOW / Appearance: CLEAR / S.019 / pH: 6.5  Gluc: NEGATIVE / Ketone: NEGATIVE  / Bili: NEGATIVE / Urobili: NORMAL   Blood: NEGATIVE / Protein: 10 / Nitrite: NEGATIVE   Leuk Esterase: TRACE / RBC: x / WBC 5-10   Sq Epi: x / Non Sq Epi: x / Bacteria: x    CT abdomen 10/8/18  PROCEDURE:   CT of the Abdomen and Pelvis was performed with intravenous contrast.   Intravenous contrast: 90 ml Omnipaque 350. 10 ml discarded.  Oral contrast: positive contrast was administered.  Sagittal and coronal reformats were performed.    FINDINGS:    LOWER CHEST: Within normal limits.    LIVER: Within normal limits.  BILE DUCTS: Normal caliber.  GALLBLADDER: Within normal limits.  SPLEEN: Within normal limits.  PANCREAS: Within normal limits.  ADRENALS: Within normal limits.  KIDNEYS/URETERS: Stable mild-to-moderate right hydroureter to the level   of the mid right ureter, unchanged..    BLADDER: Trace stable posterior urinary bladder wall thickening.  REPRODUCTIVE ORGANS: Known cervical mass is not well delineated on this   exam . No adnexal mass..     BOWEL: Rectosigmoid anastomosis.  Amount of stool throughout the colon. No bowel obstruction. Appendix   normal.  PERITONEUM: No ascites.  VESSELS:  Within normal limits.  RETROPERITONEUM: Stable presacral soft tissue infiltration. No   lymphadenopathy.    ABDOMINAL WALL: Within normal limits.  BONES: Within normal limits.    IMPRESSION: No acute abdominal pathology.

## 2018-10-08 NOTE — ED PROVIDER NOTE - MEDICAL DECISION MAKING DETAILS
uncontrolled rectal pain in setting of cancer, no acute findings on workup, will resend labs and admit for further management

## 2018-10-08 NOTE — H&P ADULT - NSHPREVIEWOFSYSTEMS_GEN_ALL_CORE
General:	No fever or chills. +Weight loss.   Skin/Breast: No rashes or lesions	  Ophthalmologic: No blurry vision or vision changes  ENMT: No sore throat or nasal congestion   Respiratory and Thorax: No cough or dyspnea  Cardiovascular: No chest pain, palpitations, or lower extremity swelling  Gastrointestinal: No nausea, vomiting, abdominal pain, or diarrhea. +Chronic constipation. +Bright red blood per rectum, scant.   Genitourinary: No dysuria, hematuria or other complaints. 	  Musculoskeletal: No joint or muscle pains  Neurological: No focal weaknesses

## 2018-10-08 NOTE — H&P ADULT - ASSESSMENT
53 year old female with colorectal cancer s/p resection and radiation therapy, cervical cancer currently on chemotherapy every other week, hyperthyroidism, admitted for severe rectal pain with scant BRBPR.

## 2018-10-08 NOTE — H&P ADULT - PROBLEM SELECTOR PLAN 2
2/2 her ongoing chemotherapy.   Pt not neutropenic at this time.  Continue to monitor.   No signs/symptoms of infection at this time.

## 2018-10-08 NOTE — H&P ADULT - HISTORY OF PRESENT ILLNESS
Patient gaby  53 year old female with rectosigmoid colon cancer s/p resection and radiation therapy last year, cervical cancer on chemotherapy (every other Tuesday), and hyperthyroidism, with recent history of SBO on previous admission in 8/2018, presents with severe rectal pain associated with scant BRBPR. Notably, patient came to ED last night for same symptoms. She had tried tylenol at home yesterday with no relief. She had symptom relief after moprhine and topical lidocaine in the ED and was discharged, however her pain returned at home. Pt states that topical lidocaine made her pain worse. Pt denies fever, chills, nausea, vomiting, chest or abdominal pain. No rectal trauma. Pt has chronic constipation and her PMD gave her stool softeners for this (she cannot remember the name). Her last BM was this morning in the ED. Pt states rectal bleeding is not frequent, only when she strains to have a BM. This morning during her BM, she had approximately 1 teaspoon quantity of bright red blood.    Pt states she has had rectal pain from hemorrhoids for the past year, since she was started on radiation therapy for her colon cancer.     In the ED, pt was afebrile T 98.2, HR 91, /79, satting 100% on room air. CT abdomen/pelvis yesterday evening showed no acute pathology and known rectosigmoid anastomosis. Fecal occult blood positive. Pt received 4 mg IV morphine.

## 2018-10-08 NOTE — H&P ADULT - PROBLEM SELECTOR PLAN 3
Pt actively on chemo, next due for chemotherapy next Tuesday. Pt actively on chemotherapy, next due for chemotherapy next Tuesday.

## 2018-10-09 ENCOUNTER — TRANSCRIPTION ENCOUNTER (OUTPATIENT)
Age: 53
End: 2018-10-09

## 2018-10-09 DIAGNOSIS — E05.90 THYROTOXICOSIS, UNSPECIFIED WITHOUT THYROTOXIC CRISIS OR STORM: ICD-10-CM

## 2018-10-09 LAB
ALBUMIN SERPL ELPH-MCNC: 3.4 G/DL — SIGNIFICANT CHANGE UP (ref 3.3–5)
ALP SERPL-CCNC: 232 U/L — HIGH (ref 40–120)
ALT FLD-CCNC: 27 U/L — SIGNIFICANT CHANGE UP (ref 4–33)
AST SERPL-CCNC: 15 U/L — SIGNIFICANT CHANGE UP (ref 4–32)
BASOPHILS # BLD AUTO: 0.01 K/UL — SIGNIFICANT CHANGE UP (ref 0–0.2)
BASOPHILS NFR BLD AUTO: 0.3 % — SIGNIFICANT CHANGE UP (ref 0–2)
BILIRUB SERPL-MCNC: 0.5 MG/DL — SIGNIFICANT CHANGE UP (ref 0.2–1.2)
BUN SERPL-MCNC: 12 MG/DL — SIGNIFICANT CHANGE UP (ref 7–23)
CALCIUM SERPL-MCNC: 8.3 MG/DL — LOW (ref 8.4–10.5)
CHLORIDE SERPL-SCNC: 103 MMOL/L — SIGNIFICANT CHANGE UP (ref 98–107)
CO2 SERPL-SCNC: 21 MMOL/L — LOW (ref 22–31)
CREAT SERPL-MCNC: 0.7 MG/DL — SIGNIFICANT CHANGE UP (ref 0.5–1.3)
EOSINOPHIL # BLD AUTO: 0.09 K/UL — SIGNIFICANT CHANGE UP (ref 0–0.5)
EOSINOPHIL NFR BLD AUTO: 2.6 % — SIGNIFICANT CHANGE UP (ref 0–6)
GLUCOSE SERPL-MCNC: 107 MG/DL — HIGH (ref 70–99)
HCT VFR BLD CALC: 32.6 % — LOW (ref 34.5–45)
HGB BLD-MCNC: 10.6 G/DL — LOW (ref 11.5–15.5)
IMM GRANULOCYTES # BLD AUTO: 0.01 # — SIGNIFICANT CHANGE UP
IMM GRANULOCYTES NFR BLD AUTO: 0.3 % — SIGNIFICANT CHANGE UP (ref 0–1.5)
LYMPHOCYTES # BLD AUTO: 1.84 K/UL — SIGNIFICANT CHANGE UP (ref 1–3.3)
LYMPHOCYTES # BLD AUTO: 54 % — HIGH (ref 13–44)
MANUAL SMEAR VERIFICATION: SIGNIFICANT CHANGE UP
MCHC RBC-ENTMCNC: 28.1 PG — SIGNIFICANT CHANGE UP (ref 27–34)
MCHC RBC-ENTMCNC: 32.5 % — SIGNIFICANT CHANGE UP (ref 32–36)
MCV RBC AUTO: 86.5 FL — SIGNIFICANT CHANGE UP (ref 80–100)
MONOCYTES # BLD AUTO: 0.15 K/UL — SIGNIFICANT CHANGE UP (ref 0–0.9)
MONOCYTES NFR BLD AUTO: 4.4 % — SIGNIFICANT CHANGE UP (ref 2–14)
NEUTROPHILS # BLD AUTO: 1.31 K/UL — LOW (ref 1.8–7.4)
NEUTROPHILS NFR BLD AUTO: 38.4 % — LOW (ref 43–77)
NRBC # FLD: 0 — SIGNIFICANT CHANGE UP
PLATELET # BLD AUTO: 142 K/UL — LOW (ref 150–400)
PMV BLD: 10 FL — SIGNIFICANT CHANGE UP (ref 7–13)
POTASSIUM SERPL-MCNC: 3.9 MMOL/L — SIGNIFICANT CHANGE UP (ref 3.5–5.3)
POTASSIUM SERPL-SCNC: 3.9 MMOL/L — SIGNIFICANT CHANGE UP (ref 3.5–5.3)
PROT SERPL-MCNC: 5.9 G/DL — LOW (ref 6–8.3)
RBC # BLD: 3.77 M/UL — LOW (ref 3.8–5.2)
RBC # FLD: 17.4 % — HIGH (ref 10.3–14.5)
SODIUM SERPL-SCNC: 140 MMOL/L — SIGNIFICANT CHANGE UP (ref 135–145)
SPECIMEN SOURCE: SIGNIFICANT CHANGE UP
WBC # BLD: 3.41 K/UL — LOW (ref 3.8–10.5)
WBC # FLD AUTO: 3.41 K/UL — LOW (ref 3.8–10.5)

## 2018-10-09 PROCEDURE — 99232 SBSQ HOSP IP/OBS MODERATE 35: CPT

## 2018-10-09 RX ORDER — SENNA PLUS 8.6 MG/1
2 TABLET ORAL
Qty: 0 | Refills: 0 | COMMUNITY
Start: 2018-10-09

## 2018-10-09 RX ORDER — DOCUSATE SODIUM 100 MG
1 CAPSULE ORAL
Qty: 0 | Refills: 0 | COMMUNITY
Start: 2018-10-09

## 2018-10-09 RX ORDER — HYDROCORTISONE 1 %
1 OINTMENT (GRAM) TOPICAL
Qty: 0 | Refills: 0 | COMMUNITY
Start: 2018-10-09

## 2018-10-09 RX ADMIN — Medication 100 MILLIGRAM(S): at 05:48

## 2018-10-09 RX ADMIN — Medication 1 APPLICATION(S): at 11:05

## 2018-10-09 RX ADMIN — Medication 1 APPLICATION(S): at 21:26

## 2018-10-09 RX ADMIN — MORPHINE SULFATE 4 MILLIGRAM(S): 50 CAPSULE, EXTENDED RELEASE ORAL at 11:08

## 2018-10-09 RX ADMIN — SENNA PLUS 2 TABLET(S): 8.6 TABLET ORAL at 21:25

## 2018-10-09 RX ADMIN — Medication 100 MILLIGRAM(S): at 21:25

## 2018-10-09 RX ADMIN — MORPHINE SULFATE 4 MILLIGRAM(S): 50 CAPSULE, EXTENDED RELEASE ORAL at 11:20

## 2018-10-09 RX ADMIN — Medication 100 MILLIGRAM(S): at 13:00

## 2018-10-09 NOTE — DISCHARGE NOTE ADULT - HOSPITAL COURSE
53 year old female with colorectal cancer s/p resection and radiation therapy, cervical cancer currently on chemotherapy every other week, hyperthyroidism, admitted for severe rectal pain with scant BRBPR.     Hospital Course: 53 year old female with colorectal cancer s/p resection and radiation therapy, cervical cancer currently on chemotherapy every other week, hyperthyroidism, admitted for severe rectal pain with scant BRBPR.   CT A/P scan negative for acute pathology.  Hx of known hemorrhoids, has pain mostly when has BM.  Pain control with morphine, tolerated.  Oncology consulted, for outpt PET scan and chemo.  Optimized for discharge.      dispo: to home

## 2018-10-09 NOTE — DISCHARGE NOTE ADULT - PLAN OF CARE
Decreased pain You had a cat scan of abdomen and pelvis during hospital admission, negative scan.  Use stool softeners to avoid constipation. Continue Hydrocortisone cream 2 times daily. Follow up with your oncologist Dr. Kiran for further medical management. You had a CT scan of abdomen and pelvis during hospital admission, negative scan. No infections.  Use stool softeners to avoid constipation. Continue Hydrocortisone cream 2 times daily.  Tucks pads as needed for hemorrhoid pain.  Pain medications as needed - please only take if you have pain.  Please follow up with oncology for further pain management. Follow up with your oncologist Dr. Kiran for further medical management.  Chemotherapy due again next Tuesday.

## 2018-10-09 NOTE — DISCHARGE NOTE ADULT - MEDICATION SUMMARY - MEDICATIONS TO TAKE
I will START or STAY ON the medications listed below when I get home from the hospital:    acetaminophen 325 mg oral tablet  -- 2 tab(s) by mouth every 6 hours, As needed, Mild Pain (1 - 3)  -- Indication: For Rectal pain    oxyCODONE 5 mg oral capsule  -- 1-2 cap(s) by mouth every 4 hours, As Needed MDD:12 tabs  -- Caution federal law prohibits the transfer of this drug to any person other  than the person for whom it was prescribed.  It is very important that you take or use this exactly as directed.  Do not skip doses or discontinue unless directed by your doctor.  May cause drowsiness.  Alcohol may intensify this effect.  Use care when operating dangerous machinery.  This prescription cannot be refilled.  Using more of this medication than prescribed may cause serious breathing problems.    -- Indication: For Rectal pain    methIMAzole 5 mg oral tablet  --  by mouth once a day  -- Indication: For Hyperthyroidism    hydrocortisone 0.5% topical ointment  -- 1 application on skin 2 times a day  -- Indication: For Rectal pain    Tucks 50% topical pad  -- Apply on skin to affected area 4 times a day, As Needed for hemorrhoid pain  -- Check with your doctor before becoming pregnant.  For rectal use only.    -- Indication: For Rectal pain    polyethylene glycol 3350 oral powder for reconstitution  -- 17 gram(s) by mouth once a day, As needed, Constipation  -- Indication: For Constipation    docusate sodium 100 mg oral capsule  -- 1 cap(s) by mouth 3 times a day  -- Indication: For Constipation    senna oral tablet  -- 2 tab(s) by mouth once a day (at bedtime)  -- Indication: For Constipation

## 2018-10-09 NOTE — DISCHARGE NOTE ADULT - CARE PLAN
Principal Discharge DX:	Rectal pain  Goal:	Decreased pain  Assessment and plan of treatment:	You had a cat scan of abdomen and pelvis during hospital admission, negative scan.  Use stool softeners to avoid constipation. Continue Hydrocortisone cream 2 times daily.  Secondary Diagnosis:	Colon cancer  Assessment and plan of treatment:	Follow up with your oncologist Dr. Kiran for further medical management. Principal Discharge DX:	Rectal pain  Goal:	Decreased pain  Assessment and plan of treatment:	You had a CT scan of abdomen and pelvis during hospital admission, negative scan. No infections.  Use stool softeners to avoid constipation. Continue Hydrocortisone cream 2 times daily.  Tucks pads as needed for hemorrhoid pain.  Pain medications as needed - please only take if you have pain.  Please follow up with oncology for further pain management.  Secondary Diagnosis:	Colon cancer  Assessment and plan of treatment:	Follow up with your oncologist Dr. Kiran for further medical management.  Chemotherapy due again next Tuesday.

## 2018-10-09 NOTE — DISCHARGE NOTE ADULT - CARE PROVIDER_API CALL
Hang Kiran), Hematology; Medical Oncology  [address] [facility] [facility]  [address]  [place] [address]  Phone: [phone]  Fax: [phone]

## 2018-10-10 VITALS
RESPIRATION RATE: 16 BRPM | DIASTOLIC BLOOD PRESSURE: 70 MMHG | SYSTOLIC BLOOD PRESSURE: 110 MMHG | OXYGEN SATURATION: 100 % | HEART RATE: 88 BPM

## 2018-10-10 LAB
-  AMIKACIN: SIGNIFICANT CHANGE UP
-  AMPICILLIN/SULBACTAM: SIGNIFICANT CHANGE UP
-  AMPICILLIN: SIGNIFICANT CHANGE UP
-  AZTREONAM: SIGNIFICANT CHANGE UP
-  CEFAZOLIN: SIGNIFICANT CHANGE UP
-  CEFEPIME: SIGNIFICANT CHANGE UP
-  CEFOXITIN: SIGNIFICANT CHANGE UP
-  CEFTAZIDIME: SIGNIFICANT CHANGE UP
-  CEFTRIAXONE: SIGNIFICANT CHANGE UP
-  CIPROFLOXACIN: SIGNIFICANT CHANGE UP
-  ERTAPENEM: SIGNIFICANT CHANGE UP
-  GENTAMICIN: SIGNIFICANT CHANGE UP
-  IMIPENEM: SIGNIFICANT CHANGE UP
-  LEVOFLOXACIN: SIGNIFICANT CHANGE UP
-  MEROPENEM: SIGNIFICANT CHANGE UP
-  NITROFURANTOIN: SIGNIFICANT CHANGE UP
-  PIPERACILLIN/TAZOBACTAM: SIGNIFICANT CHANGE UP
-  TIGECYCLINE: SIGNIFICANT CHANGE UP
-  TOBRAMYCIN: SIGNIFICANT CHANGE UP
-  TRIMETHOPRIM/SULFAMETHOXAZOLE: SIGNIFICANT CHANGE UP
BACTERIA UR CULT: SIGNIFICANT CHANGE UP
METHOD TYPE: SIGNIFICANT CHANGE UP
ORGANISM # SPEC MICROSCOPIC CNT: SIGNIFICANT CHANGE UP
ORGANISM # SPEC MICROSCOPIC CNT: SIGNIFICANT CHANGE UP

## 2018-10-10 PROCEDURE — 99239 HOSP IP/OBS DSCHRG MGMT >30: CPT

## 2018-10-10 RX ORDER — HYDROCORTISONE 1 %
1 OINTMENT (GRAM) TOPICAL
Qty: 1 | Refills: 0 | OUTPATIENT
Start: 2018-10-10

## 2018-10-10 RX ORDER — AER TRAVELER 0.5 G/1
1 SOLUTION RECTAL; TOPICAL
Qty: 2 | Refills: 0 | OUTPATIENT
Start: 2018-10-10

## 2018-10-10 RX ORDER — OXYCODONE HYDROCHLORIDE 5 MG/1
10 TABLET ORAL EVERY 4 HOURS
Qty: 0 | Refills: 0 | Status: DISCONTINUED | OUTPATIENT
Start: 2018-10-10 | End: 2018-10-10

## 2018-10-10 RX ORDER — SENNA PLUS 8.6 MG/1
2 TABLET ORAL
Qty: 60 | Refills: 0 | OUTPATIENT
Start: 2018-10-10 | End: 2018-11-08

## 2018-10-10 RX ORDER — ACETAMINOPHEN 500 MG
2 TABLET ORAL
Qty: 0 | Refills: 0 | COMMUNITY
Start: 2018-10-10

## 2018-10-10 RX ORDER — DOCUSATE SODIUM 100 MG
1 CAPSULE ORAL
Qty: 90 | Refills: 0 | OUTPATIENT
Start: 2018-10-10 | End: 2018-11-08

## 2018-10-10 RX ORDER — OXYCODONE HYDROCHLORIDE 5 MG/1
2 TABLET ORAL
Qty: 84 | Refills: 0 | OUTPATIENT
Start: 2018-10-10 | End: 2018-10-16

## 2018-10-10 RX ORDER — POLYETHYLENE GLYCOL 3350 17 G/17G
17 POWDER, FOR SOLUTION ORAL
Qty: 0 | Refills: 0 | COMMUNITY
Start: 2018-10-10

## 2018-10-10 RX ADMIN — MORPHINE SULFATE 4 MILLIGRAM(S): 50 CAPSULE, EXTENDED RELEASE ORAL at 07:20

## 2018-10-10 RX ADMIN — MORPHINE SULFATE 4 MILLIGRAM(S): 50 CAPSULE, EXTENDED RELEASE ORAL at 07:00

## 2018-10-10 RX ADMIN — Medication 1 APPLICATION(S): at 11:44

## 2018-10-10 RX ADMIN — Medication 100 MILLIGRAM(S): at 05:59

## 2018-10-10 NOTE — ED POST DISCHARGE NOTE - RESULT SUMMARY
UCX : E. Coli > 100,000 prelim no S/R profile as of yet. Patient contact # [phone] discussed with patient she rather wait for final UCX results with S/R profile. Patient on Chemo and would rather wait for correct Abx. Discussed with patient need to return to ED if symptoms don't continue to improve or recur or develops any new or worsening symptoms that are of concern. Discussed with patient will call her when final UCX results available and will ERX Abx.

## 2018-10-10 NOTE — PROGRESS NOTE ADULT - PROBLEM SELECTOR PLAN 2
pancytopenia due to her ongoing chemotherapy.   Pt not neutropenic at this time.  Continue to monitor.   No signs/symptoms of infection at this time.
2/2 her ongoing chemotherapy.   Pt not neutropenic at this time.  Continue to monitor.   No signs/symptoms of infection at this time.

## 2018-10-10 NOTE — CONSULT NOTE ADULT - SUBJECTIVE AND OBJECTIVE BOX
hpi 53 year old woman with h/o rectal ca liver mets complete response to chemo now relapsed in cervical area s/p chemo with rectal pain  pmh sh fh unchanged comp ros rectal pain when has bowel movement  otherwise neg  physical  no distress  vs t 97.6  82 117/68 18 100 sat  lungs clear  cor s1s2  abd soft nontender  ext no edema  psych normal    data  wbc 3410 hgb 10.6 hct 32.6 plt [id number] inr 1.0 ptt 27.8 Cr 0.7  ct stable st infiltration, cervical mass not well seen

## 2018-10-10 NOTE — PROGRESS NOTE ADULT - ASSESSMENT
53 year old female with colorectal cancer s/p resection and radiation therapy, cervical cancer currently on chemotherapy every other week, hyperthyroidism, admitted for severe rectal pain with scant BRBPR.
53 year old female with colorectal cancer s/p resection and radiation therapy, cervical cancer currently on chemotherapy every other week, hyperthyroidism, admitted for severe rectal pain with scant BRBPR.

## 2018-10-10 NOTE — PROGRESS NOTE ADULT - PROBLEM SELECTOR PLAN 1
Pt with known hemorrhoids. However, no external hemorrhoid appreciated at this time. Pt refused digital exam.   pain greatly improved  Continue IV morphine PRN for moderate to severe pain, with tylenol PRN for mild pain, and hydrocortisone cream BID x 7 days.  Senna, colace, miralax PRN for bowel regimen (pt with chronic constipation).
Pt with known hemorrhoids in the setting of RT. However, no external hemorrhoid appreciated at this time. Pt refused digital exam.   pain greatly improved  c/w pain medicine and hydrocortisone cream BID x 7 days.  Senna, colace, miralax PRN for bowel regimen (pt with chronic constipation).

## 2018-10-10 NOTE — CONSULT NOTE ADULT - ASSESSMENT
rectal cancer s/p 3 cycles chemo for relapsed disease in cervic  ct a/p cannot adequately delineate mass  pet/ct as outpatient    rectal pain with hemorrhoids rt effect  local therapy, pain meds

## 2018-10-10 NOTE — PROGRESS NOTE ADULT - SUBJECTIVE AND OBJECTIVE BOX
Patient is a 53y old  Female who presents with a chief complaint of Severe rectal pain (08 Oct 2018 16:03)      SUBJECTIVE / OVERNIGHT EVENTS:  Pt reports rectal pain improved a lot. no bloody BM, no n/v/fever. Pt refused rectal exam     MEDICATIONS  (STANDING):  docusate sodium 100 milliGRAM(s) Oral three times a day  hydrocortisone 0.5% Ointment 1 Application(s) Topical two times a day  influenza   Vaccine 0.5 milliLiter(s) IntraMuscular once  methimazole 5 milliGRAM(s) Oral daily  senna 2 Tablet(s) Oral at bedtime    MEDICATIONS  (PRN):  acetaminophen   Tablet .. 650 milliGRAM(s) Oral every 6 hours PRN Mild Pain (1 - 3)  morphine  - Injectable 4 milliGRAM(s) IV Push every 6 hours PRN moderate to severe pain  polyethylene glycol 3350 17 Gram(s) Oral daily PRN Constipation      T(C): 36.6 (10-09-18 @ 16:23), Max: 36.9 (10-08-18 @ 21:29)  HR: 92 (10-09-18 @ 16:23) (80 - 92)  BP: 104/75 (10-09-18 @ 16:23) (103/62 - 109/71)  RR: 18 (10-09-18 @ 16:23) (18 - 18)  SpO2: 100% (10-09-18 @ 16:23) (97% - 100%)  CAPILLARY BLOOD GLUCOSE        I&O's Summary      PHYSICAL EXAM:  GENERAL: NAD, well-developed  HEAD:  Atraumatic, Normocephalic  EYES: EOMI, PERRLA, conjunctiva and sclera clear  NECK: Supple, No JVD  CHEST/LUNG: Clear to auscultation bilaterally; No wheeze  HEART: s1 s2, regular rhythm and rate   ABDOMEN: Soft, Nontender, Nondistended; Bowel sounds present; NO external hemorrhoid visualized. Pt refused digital exam. A few skin abrasions noted around anus    EXTREMITIES:  2+ Peripheral Pulses, No clubbing, cyanosis, or edema  PSYCH: AAOx3, calm   NEUROLOGY: non-focal  SKIN: No rashes or lesions    LABS:                        10.6   3.41  )-----------( 142      ( 09 Oct 2018 06:12 )             32.6     10-09    140  |  103  |  12  ----------------------------<  107<H>  3.9   |  21<L>  |  0.70    Ca    8.3<L>      09 Oct 2018 06:12    TPro  5.9<L>  /  Alb  3.4  /  TBili  0.5  /  DBili  x   /  AST  15  /  ALT  27  /  AlkPhos  232<H>  10-09    PT/INR - ( 08 Oct 2018 12:40 )   PT: 11.3 SEC;   INR: 1.02          PTT - ( 08 Oct 2018 12:40 )  PTT:27.8 SEC      Urinalysis Basic - ( 07 Oct 2018 22:00 )    Color: YELLOW / Appearance: CLEAR / S.019 / pH: 6.5  Gluc: NEGATIVE / Ketone: NEGATIVE  / Bili: NEGATIVE / Urobili: NORMAL   Blood: NEGATIVE / Protein: 10 / Nitrite: NEGATIVE   Leuk Esterase: TRACE / RBC: x / WBC 5-10   Sq Epi: x / Non Sq Epi: x / Bacteria: x        RADIOLOGY & ADDITIONAL TESTS:    Imaging Personally Reviewed:    Consultant(s) Notes Reviewed:      Care Discussed with Consultants/Other Providers:
Patient is a 53y old  Female who presents with a chief complaint of Severe rectal pain (10 Oct 2018 09:01)      SUBJECTIVE / OVERNIGHT EVENTS:  Pt reports rectal pain being controlled. no fever/chills/bleeding     MEDICATIONS  (STANDING):  docusate sodium 100 milliGRAM(s) Oral three times a day  hydrocortisone 0.5% Ointment 1 Application(s) Topical two times a day  influenza   Vaccine 0.5 milliLiter(s) IntraMuscular once  methimazole 5 milliGRAM(s) Oral daily  senna 2 Tablet(s) Oral at bedtime    MEDICATIONS  (PRN):  acetaminophen   Tablet .. 650 milliGRAM(s) Oral every 6 hours PRN Mild Pain (1 - 3)  oxyCODONE    IR 10 milliGRAM(s) Oral every 4 hours PRN moderate and severe pain  polyethylene glycol 3350 17 Gram(s) Oral daily PRN Constipation      T(C): 36.4 (10-10-18 @ 10:33), Max: 36.6 (10-09-18 @ 16:23)  HR: 88 (10-10-18 @ 13:30) (82 - 98)  BP: 110/70 (10-10-18 @ 13:30) (104/75 - 117/68)  RR: 16 (10-10-18 @ 13:30) (16 - 18)  SpO2: 100% (10-10-18 @ 13:30) (100% - 100%)  CAPILLARY BLOOD GLUCOSE        I&O's Summary      PHYSICAL EXAM:  GENERAL: NAD, well-developed  HEAD:  Atraumatic, Normocephalic  EYES: EOMI, PERRLA, conjunctiva and sclera clear  NECK: Supple, No JVD  CHEST/LUNG: Clear to auscultation bilaterally; No wheeze  HEART: s1 s2, regular rhythm and rate   ABDOMEN: Soft, Nontender, Nondistended; Bowel sounds present  EXTREMITIES:  2+ Peripheral Pulses, No clubbing, cyanosis, or edema  PSYCH: AAOx3, calm   NEUROLOGY: non-focal  SKIN: No rashes or lesions    LABS:                        10.6   3.41  )-----------( 142      ( 09 Oct 2018 06:12 )             32.6     10-09    140  |  103  |  12  ----------------------------<  107<H>  3.9   |  21<L>  |  0.70    Ca    8.3<L>      09 Oct 2018 06:12    TPro  5.9<L>  /  Alb  3.4  /  TBili  0.5  /  DBili  x   /  AST  15  /  ALT  27  /  AlkPhos  232<H>  10-09              RADIOLOGY & ADDITIONAL TESTS:    Imaging Personally Reviewed:    Consultant(s) Notes Reviewed:      Care Discussed with Consultants/Other Providers:

## 2018-10-10 NOTE — PROGRESS NOTE ADULT - PROBLEM SELECTOR PLAN 3
Pt actively on chemotherapy, next due for chemotherapy next Tuesday.
Pt actively on chemotherapy, next due for chemotherapy next Tuesday.

## 2018-10-10 NOTE — PROGRESS NOTE ADULT - PROBLEM SELECTOR PLAN 4
no active issues.  s/p rectosigmoid resection of mass with anastomosis, and radiation completed 12/2017
no active issues.  s/p rectosigmoid resection of mass with anastomosis, and radiation completed 12/2017

## 2018-10-11 ENCOUNTER — APPOINTMENT (OUTPATIENT)
Dept: CT IMAGING | Facility: IMAGING CENTER | Age: 53
End: 2018-10-11

## 2018-10-12 LAB
BACTERIA BLD CULT: SIGNIFICANT CHANGE UP
BACTERIA BLD CULT: SIGNIFICANT CHANGE UP

## 2018-10-12 RX ORDER — CEPHALEXIN 500 MG
1 CAPSULE ORAL
Qty: 14 | Refills: 0 | OUTPATIENT
Start: 2018-10-12 | End: 2018-10-18

## 2018-10-19 DIAGNOSIS — Z71.89 OTHER SPECIFIED COUNSELING: ICD-10-CM

## 2018-10-19 PROBLEM — C53.9 MALIGNANT NEOPLASM OF CERVIX UTERI, UNSPECIFIED: Chronic | Status: ACTIVE | Noted: 2018-10-07

## 2018-10-19 PROBLEM — K56.609 UNSPECIFIED INTESTINAL OBSTRUCTION, UNSPECIFIED AS TO PARTIAL VERSUS COMPLETE OBSTRUCTION: Chronic | Status: ACTIVE | Noted: 2018-10-07

## 2018-10-19 PROBLEM — K64.9 UNSPECIFIED HEMORRHOIDS: Chronic | Status: ACTIVE | Noted: 2018-10-07

## 2018-10-24 ENCOUNTER — OUTPATIENT (OUTPATIENT)
Dept: OUTPATIENT SERVICES | Facility: HOSPITAL | Age: 53
LOS: 1 days | End: 2018-10-24
Payer: MEDICAID

## 2018-10-24 ENCOUNTER — APPOINTMENT (OUTPATIENT)
Dept: NUCLEAR MEDICINE | Facility: IMAGING CENTER | Age: 53
End: 2018-10-24
Payer: MEDICAID

## 2018-10-24 DIAGNOSIS — Z00.8 ENCOUNTER FOR OTHER GENERAL EXAMINATION: ICD-10-CM

## 2018-10-24 DIAGNOSIS — Z90.49 ACQUIRED ABSENCE OF OTHER SPECIFIED PARTS OF DIGESTIVE TRACT: Chronic | ICD-10-CM

## 2018-10-24 DIAGNOSIS — Z98.890 OTHER SPECIFIED POSTPROCEDURAL STATES: Chronic | ICD-10-CM

## 2018-10-24 PROCEDURE — 78815 PET IMAGE W/CT SKULL-THIGH: CPT

## 2018-10-24 PROCEDURE — 78815 PET IMAGE W/CT SKULL-THIGH: CPT | Mod: 26,PS

## 2018-10-24 PROCEDURE — A9552: CPT

## 2019-01-04 NOTE — ED PROVIDER NOTE - NSCAREINITIATED _GEN_ER
Is this a refill or new RX.Refill  RX name and strength:Norco 5-325 mg  Last office visit:12/17/2018  Is this a 30-day or 90-day RX:30-day  Pharmacy name and location:[facility]    Comments:    
Petrona Saha(Attending)

## 2019-02-07 ENCOUNTER — APPOINTMENT (OUTPATIENT)
Dept: NUCLEAR MEDICINE | Facility: IMAGING CENTER | Age: 54
End: 2019-02-07
Payer: MEDICAID

## 2019-02-07 ENCOUNTER — OUTPATIENT (OUTPATIENT)
Dept: OUTPATIENT SERVICES | Facility: HOSPITAL | Age: 54
LOS: 1 days | End: 2019-02-07
Payer: MEDICAID

## 2019-02-07 DIAGNOSIS — Z90.49 ACQUIRED ABSENCE OF OTHER SPECIFIED PARTS OF DIGESTIVE TRACT: Chronic | ICD-10-CM

## 2019-02-07 DIAGNOSIS — Z98.890 OTHER SPECIFIED POSTPROCEDURAL STATES: Chronic | ICD-10-CM

## 2019-02-07 DIAGNOSIS — Z00.8 ENCOUNTER FOR OTHER GENERAL EXAMINATION: ICD-10-CM

## 2019-02-07 PROCEDURE — A9552: CPT

## 2019-02-07 PROCEDURE — 78815 PET IMAGE W/CT SKULL-THIGH: CPT

## 2019-02-07 PROCEDURE — 78815 PET IMAGE W/CT SKULL-THIGH: CPT | Mod: 26,PS

## 2019-02-22 NOTE — H&P PST ADULT - I WAS PHYSICALLY PRESENT FOR THE KEY PORTIONS OF THE EVALUATION AND MANAGEMENT (E/M) SERVICE PROVIDED.  I AGREE WITH THE ABOVE HISTORY, PHYSICAL, AND PLAN WHICH I HAVE REVIEWED AND EDITED WHERE APPROPRIATE
Patient Education     Migraine Headache: Stages and Treatment  A migraine headache tends to progress in stages. Learning these stages can help you better understand what is happening. Then you can learn ways to reduce pain and relieve other symptoms. Methods for relieving your symptoms include self-care and medicines.  Migraine stages  Migraines tend to progress through 4 stages. Many people don't have all stages, and stages may differ with each headache:  · Prodrome. A few hours to a day or so before the headache, you may feel tired, (yawning many times), uneasy, or ryder. You may also feel bloated or crave certain foods.  · Aura. Up to an hour before the headache starts, some migraine sufferers experience aura--flashing lights, blind spots, other vision problems, confusion, difficulty speaking, or other neurologic symptoms.  · Headache. Moderate to severe pain affects one side of the head and then can spread to both sides, often along with nausea. You may be highly sensitive to light, sound, and odors. Vomiting or diarrhea may also happen. This stage lasts 4 to 72 hours.  · Postdrome. After your headache ends, you may feel tired, achy, and \"washed out.\" This may last for a day or so.    Self-care during a migraine  Here is what you can do:  · Use a cold compress. Wrap a thin cloth around a cold pack, a cold can of soda, or a bag of frozen vegetables. Apply this to your temple or other pain site.  · Drink fluids. If nausea makes it hard to drink, try sucking on ice.  · Rest. If possible, lie down. Try not to bend over, as this may increase your pain. Sometimes laying in a dark quiet room can help the migraine from being aggravated.    · Try caffeine. Some people find that drinking fluids with caffeine, such as coffee or tea, helps to lessen migraine pain.  Using medicines  Work with your healthcare provider to find the right medicines for you. Medicines for migraine may relieve pain (analgesics), relieve nausea,  or attack the migraine's root causes (migraine-specific medicines).  Rebound headache  Taking analgesics each day, or even several times a week, may lead to more frequent and severe headaches. These are called rebound headaches. If you think you're having rebound headaches, tell your healthcare provider. He or she can help you safely decrease your medicine. Rebound caffeine withdrawal headaches can also happen.    © 1581-8170 [employer]. [address] [place] [address], [place] [address]. All rights reserved. This information is not intended as a substitute for professional medical care. Always follow your healthcare professional's instructions.           Patient Education     Preventing Migraine Headaches: Triggers  The first step in preventing migraines is to learn what triggers them. You may then be able to control your triggers to avoid or reduce the severity of your migraines.     Know your triggers  Be aware that you may have more than one trigger, and that some triggers may work together. Common migraine triggers include:  · Food and nutrition. Skipping meals or not drinking enough water can trigger headaches. So can certain foods, such as caffeine, monosodium glutamate (MSG), aged cheese, or sausage.  · Alcohol. Red wine and other alcoholic beverages are common migraine triggers.  · Chemicals. Scents, cleaning products, gasoline, glue, perfume, and paint can be triggers. So can tobacco smoke, including secondhand smoke.  · Emotions. Stress can trigger headaches or make them worse once they begin.  · Sleep disruption. Staying up late, sleeping late, and traveling across time zones can disrupt your sleep cycle, triggering headaches.  · Hormones. Many women notice that migraines tend to happen at a certain point in their menstrual cycle. Birth control pills or hormone replacement therapy may also trigger migraines.  · Environment and weather. Air travel, changes in altitude, air pressure changes, hot sun,  or bright or flashing lights can be triggers.    Control your triggers  These are some of the things you can do to try to control triggers:  · Avoid triggers if you can. For example, stay clear of alcohol and foods that trigger your headaches. Use unscented household products. Keep regular sleep habits. Manage stress to help control emotional triggers.  · Change your behavior at times when triggers can't be avoided. For example, make sure to get enough rest and drink plenty of water while you're traveling. Make sure to carry a hat, sunglasses, and your medicines. Be alert for migraine symptoms, so you can treat a migraine early if it happens.  © 8079-1486 [employer]. [address] [place] [address], [place] [address]. All rights reserved. This information is not intended as a substitute for professional medical care. Always follow your healthcare professional's instructions.              Statement Selected

## 2019-04-01 PROCEDURE — G9005: CPT

## 2019-04-07 ENCOUNTER — EMERGENCY (EMERGENCY)
Facility: HOSPITAL | Age: 54
LOS: 1 days | Discharge: HOME | End: 2019-04-07
Attending: EMERGENCY MEDICINE | Admitting: EMERGENCY MEDICINE
Payer: MEDICAID

## 2019-04-07 VITALS
DIASTOLIC BLOOD PRESSURE: 82 MMHG | RESPIRATION RATE: 20 BRPM | SYSTOLIC BLOOD PRESSURE: 116 MMHG | OXYGEN SATURATION: 98 % | HEART RATE: 104 BPM | TEMPERATURE: 98 F

## 2019-04-07 DIAGNOSIS — Z98.890 OTHER SPECIFIED POSTPROCEDURAL STATES: Chronic | ICD-10-CM

## 2019-04-07 DIAGNOSIS — Z90.49 ACQUIRED ABSENCE OF OTHER SPECIFIED PARTS OF DIGESTIVE TRACT: Chronic | ICD-10-CM

## 2019-04-07 LAB
ALBUMIN SERPL ELPH-MCNC: 4.3 G/DL — SIGNIFICANT CHANGE UP (ref 3.3–5)
ALP SERPL-CCNC: 195 U/L — HIGH (ref 40–120)
ALT FLD-CCNC: 26 U/L — SIGNIFICANT CHANGE UP (ref 4–33)
ANION GAP SERPL CALC-SCNC: 14 MMO/L — SIGNIFICANT CHANGE UP (ref 7–14)
APTT BLD: 32.9 SEC — SIGNIFICANT CHANGE UP (ref 27.5–36.3)
AST SERPL-CCNC: 22 U/L — SIGNIFICANT CHANGE UP (ref 4–32)
BASE EXCESS BLDV CALC-SCNC: 2.1 MMOL/L — SIGNIFICANT CHANGE UP
BASOPHILS # BLD AUTO: 0.02 K/UL — SIGNIFICANT CHANGE UP (ref 0–0.2)
BASOPHILS NFR BLD AUTO: 0.2 % — SIGNIFICANT CHANGE UP (ref 0–2)
BILIRUB SERPL-MCNC: 0.3 MG/DL — SIGNIFICANT CHANGE UP (ref 0.2–1.2)
BLOOD GAS VENOUS - CREATININE: < 0.36 MG/DL — LOW (ref 0.5–1.3)
BUN SERPL-MCNC: 11 MG/DL — SIGNIFICANT CHANGE UP (ref 7–23)
CALCIUM SERPL-MCNC: 9.7 MG/DL — SIGNIFICANT CHANGE UP (ref 8.4–10.5)
CHLORIDE BLDV-SCNC: 117 MMOL/L — HIGH (ref 96–108)
CHLORIDE SERPL-SCNC: 100 MMOL/L — SIGNIFICANT CHANGE UP (ref 98–107)
CO2 SERPL-SCNC: 25 MMOL/L — SIGNIFICANT CHANGE UP (ref 22–31)
CREAT SERPL-MCNC: 0.58 MG/DL — SIGNIFICANT CHANGE UP (ref 0.5–1.3)
EOSINOPHIL # BLD AUTO: 0.26 K/UL — SIGNIFICANT CHANGE UP (ref 0–0.5)
EOSINOPHIL NFR BLD AUTO: 2.4 % — SIGNIFICANT CHANGE UP (ref 0–6)
GAS PNL BLDV: 138 MMOL/L — SIGNIFICANT CHANGE UP (ref 136–146)
GLUCOSE BLDV-MCNC: 114 — HIGH (ref 70–99)
GLUCOSE SERPL-MCNC: 109 MG/DL — HIGH (ref 70–99)
HCO3 BLDV-SCNC: 26 MMOL/L — SIGNIFICANT CHANGE UP (ref 20–27)
HCT VFR BLD CALC: 44.6 % — SIGNIFICANT CHANGE UP (ref 34.5–45)
HCT VFR BLDV CALC: 43.2 % — SIGNIFICANT CHANGE UP (ref 34.5–45)
HGB BLD-MCNC: 14.1 G/DL — SIGNIFICANT CHANGE UP (ref 11.5–15.5)
HGB BLDV-MCNC: 14.1 G/DL — SIGNIFICANT CHANGE UP (ref 11.5–15.5)
IMM GRANULOCYTES NFR BLD AUTO: 0.2 % — SIGNIFICANT CHANGE UP (ref 0–1.5)
INR BLD: 1.02 — SIGNIFICANT CHANGE UP (ref 0.88–1.17)
LACTATE BLDV-MCNC: 1.4 MMOL/L — SIGNIFICANT CHANGE UP (ref 0.5–2)
LIDOCAIN IGE QN: 32.3 U/L — SIGNIFICANT CHANGE UP (ref 7–60)
LYMPHOCYTES # BLD AUTO: 2.15 K/UL — SIGNIFICANT CHANGE UP (ref 1–3.3)
LYMPHOCYTES # BLD AUTO: 20.2 % — SIGNIFICANT CHANGE UP (ref 13–44)
MCHC RBC-ENTMCNC: 27.9 PG — SIGNIFICANT CHANGE UP (ref 27–34)
MCHC RBC-ENTMCNC: 31.6 % — LOW (ref 32–36)
MCV RBC AUTO: 88.1 FL — SIGNIFICANT CHANGE UP (ref 80–100)
MONOCYTES # BLD AUTO: 0.77 K/UL — SIGNIFICANT CHANGE UP (ref 0–0.9)
MONOCYTES NFR BLD AUTO: 7.2 % — SIGNIFICANT CHANGE UP (ref 2–14)
NEUTROPHILS # BLD AUTO: 7.43 K/UL — HIGH (ref 1.8–7.4)
NEUTROPHILS NFR BLD AUTO: 69.8 % — SIGNIFICANT CHANGE UP (ref 43–77)
NRBC # FLD: 0 K/UL — SIGNIFICANT CHANGE UP (ref 0–0)
PCO2 BLDV: 39 MMHG — LOW (ref 41–51)
PH BLDV: 7.44 PH — HIGH (ref 7.32–7.43)
PLATELET # BLD AUTO: 337 K/UL — SIGNIFICANT CHANGE UP (ref 150–400)
PMV BLD: 10 FL — SIGNIFICANT CHANGE UP (ref 7–13)
PO2 BLDV: 41 MMHG — HIGH (ref 35–40)
POTASSIUM BLDV-SCNC: 3.6 MMOL/L — SIGNIFICANT CHANGE UP (ref 3.4–4.5)
POTASSIUM SERPL-MCNC: 3.7 MMOL/L — SIGNIFICANT CHANGE UP (ref 3.5–5.3)
POTASSIUM SERPL-SCNC: 3.7 MMOL/L — SIGNIFICANT CHANGE UP (ref 3.5–5.3)
PROT SERPL-MCNC: 7.6 G/DL — SIGNIFICANT CHANGE UP (ref 6–8.3)
PROTHROM AB SERPL-ACNC: 11.6 SEC — SIGNIFICANT CHANGE UP (ref 9.8–13.1)
RBC # BLD: 5.06 M/UL — SIGNIFICANT CHANGE UP (ref 3.8–5.2)
RBC # FLD: 13.5 % — SIGNIFICANT CHANGE UP (ref 10.3–14.5)
SAO2 % BLDV: 73.6 % — SIGNIFICANT CHANGE UP (ref 60–85)
SODIUM SERPL-SCNC: 139 MMOL/L — SIGNIFICANT CHANGE UP (ref 135–145)
WBC # BLD: 10.65 K/UL — HIGH (ref 3.8–10.5)
WBC # FLD AUTO: 10.65 K/UL — HIGH (ref 3.8–10.5)

## 2019-04-07 PROCEDURE — 93971 EXTREMITY STUDY: CPT | Mod: 26

## 2019-04-07 PROCEDURE — 99218: CPT

## 2019-04-07 PROCEDURE — 74177 CT ABD & PELVIS W/CONTRAST: CPT | Mod: 26

## 2019-04-07 RX ORDER — SODIUM CHLORIDE 9 MG/ML
1000 INJECTION, SOLUTION INTRAVENOUS ONCE
Qty: 0 | Refills: 0 | Status: COMPLETED | OUTPATIENT
Start: 2019-04-07 | End: 2019-04-07

## 2019-04-07 RX ORDER — SODIUM CHLORIDE 9 MG/ML
1000 INJECTION INTRAMUSCULAR; INTRAVENOUS; SUBCUTANEOUS
Qty: 0 | Refills: 0 | Status: DISCONTINUED | OUTPATIENT
Start: 2019-04-07 | End: 2019-04-11

## 2019-04-07 RX ORDER — ACETAMINOPHEN 500 MG
1000 TABLET ORAL ONCE
Qty: 0 | Refills: 0 | Status: COMPLETED | OUTPATIENT
Start: 2019-04-07 | End: 2019-04-07

## 2019-04-07 RX ORDER — ENOXAPARIN SODIUM 100 MG/ML
70 INJECTION SUBCUTANEOUS ONCE
Qty: 0 | Refills: 0 | Status: COMPLETED | OUTPATIENT
Start: 2019-04-07 | End: 2019-04-07

## 2019-04-07 RX ORDER — ENOXAPARIN SODIUM 100 MG/ML
70 INJECTION SUBCUTANEOUS
Qty: 0 | Refills: 0 | Status: DISCONTINUED | OUTPATIENT
Start: 2019-04-08 | End: 2019-04-11

## 2019-04-07 RX ADMIN — SODIUM CHLORIDE 1000 MILLILITER(S): 9 INJECTION, SOLUTION INTRAVENOUS at 20:46

## 2019-04-07 RX ADMIN — SODIUM CHLORIDE 1000 MILLILITER(S): 9 INJECTION, SOLUTION INTRAVENOUS at 19:46

## 2019-04-07 RX ADMIN — Medication 1000 MILLIGRAM(S): at 20:00

## 2019-04-07 RX ADMIN — ENOXAPARIN SODIUM 70 MILLIGRAM(S): 100 INJECTION SUBCUTANEOUS at 22:38

## 2019-04-07 RX ADMIN — Medication 400 MILLIGRAM(S): at 19:46

## 2019-04-07 NOTE — ED ADULT NURSE NOTE - NS PRO PASSIVE SMOKE EXP
This EP (Last cln-June 2013)  was referred to us by Dr Joey Sultana for an Open Access Colon Screen; Called and spoke with patient regarding criteria for Open Access; Patient does qualify; Patient is scheduled for an OA Colonoscopy on 6/12/18 @ 10:15 am w/ Dr Kranthi Fish at [facility]. Informed patient that they will need a  the day of the procedure as they will be sedated. Patient voiced understanding; Went over prep information and mailed Prep instructions to the patient.  Thank you for your referral. Camille segura No

## 2019-04-07 NOTE — ED ADULT NURSE REASSESSMENT NOTE - NS ED NURSE REASSESS COMMENT FT1
report received at shift change from CHANELLE Banuelos, pt remains AAOx3, VS as noted, pt in NAD, return from US, taken to CT, pt awaiting results, will continue to monitor pt upon return.

## 2019-04-07 NOTE — ED ADULT NURSE NOTE - OBJECTIVE STATEMENT
Pt w/ hx of colon ca last chemo 2 months ago c/o BLE pain x 4 weeks worse when walking.  Pt reports negative workups for same at  PMD, & Oncologist with no resolution though placed on Oxycodone PO, Pt has stopped taking pain meds d/t constipation: last BM 4 days ago.  Pt denies loss of ROM sensatio nto affected legs, no SOB or chest pain.  Pt p/w NAD breaths even unlabored skin warm dry intact chest wall port noted.  Awaiting MD orders. Pt w/ hx of colon ca last chemo 2 months ago c/o BLE pain x 4 weeks worse when walking.  Pt reports negative workups for same at  PMD, & Oncologist with no resolution though placed on Oxycodone PO, Pt has stopped taking pain meds d/t constipation: last BM 4 days ago.  Pt denies loss of ROM sensatio nto affected legs, no SOB or chest pain.  Pt p/w NAD breaths even unlabored skin warm dry intact chest wall port noted.  Awaiting MD orders.    20 g iV access obtained at . labs and meds as ordered.

## 2019-04-07 NOTE — ED PROVIDER NOTE - ATTENDING CONTRIBUTION TO CARE
53F p/w both legs pain x 4 weeks, has been seen for this, was rx'ed oxycodone but has stopped taking it due to constipation.  Apparently her oncologist says it is likely due to chemotherapy.   H/o rectal CA with liver mets, also with h/o hyperthyroidism on methimazole.  Reports R leg primarily has the pain x 3-4 weeks.  Reports R thigh pain from groin to knee.  No CP/SOB.  o fever.  Unable to walk.  Saw pMD, who thought it was due to chemo, her last dose was 2 months ago; went to H/O and did CT and blood test; which were normal.  not on blood thinners.  Plan rx pain med, check labs, DVT study, CT a/p given RLQ ttp, rx fluids, reassess.   VS:  unremarkable except tachycardia resolving    GEN - mild distress RLE pain, malaise A+O x3   HEAD - NC/AT     ENT - PEERL, EOMI, mucous membranes  dry , no discharge      NECK: Neck supple, non-tender without lymphadenopathy, no masses, no JVD  PULM - CTA b/l,  symmetric breath sounds  COR -  normal heart sounds    ABD - , ND, RLQ ttp, soft,  BACK - no CVA tenderness, nontender spine     EXTREMS - (+)R thigh edema and hives, excoriation, tenderness at R prox thigh, no deformity, warm and well perfused    SKIN - no rash or bruising      NEUROLOGIC - alert, CN 2-12 intact, sensation nl, motor no focal deficit.

## 2019-04-07 NOTE — ED PROVIDER NOTE - OBJECTIVE STATEMENT
53F p/w R leg pain for several weeks. She describes a constant aching pain localized to the R groin, worse w/ certain movements. No redness/swelling/abd pain/n/v/d/cp/dyspnea. No known inciting/aggravating factors. Hx colon and cervical ca, last chemo 2 months ago.

## 2019-04-07 NOTE — ED PROVIDER NOTE - CLINICAL SUMMARY MEDICAL DECISION MAKING FREE TEXT BOX
Jonathan Weil, PGY2 - suspect DVT given focal leg pain in patient w/ hx malignancy, other ddx is referred RLQ pathology, muscle strain. Will obtain US, CT, provide analgesia

## 2019-04-07 NOTE — ED ADULT TRIAGE NOTE - CHIEF COMPLAINT QUOTE
BIBEMS for rt upper leg pain x 3 weeks, pt with hx cervical and colon CA-last chemo 2 months ago, pt A&Ox4, has upper rt chest port accessed last week for blood work by PCP, also had CT scan for same issue with no results, pain is continuing to get worse and having more difficulty ambulating.  Oncologist Dr. Kiran

## 2019-04-07 NOTE — ED ADULT NURSE NOTE - NSIMPLEMENTINTERV_GEN_ALL_ED
Implemented All Fall with Harm Risk Interventions:  [place] to call system. Call bell, personal items and telephone within reach. Instruct patient to call for assistance. Room bathroom lighting operational. Non-slip footwear when patient is off stretcher. Physically safe environment: no spills, clutter or unnecessary equipment. Stretcher in lowest position, wheels locked, appropriate side rails in place. Provide visual cue, wrist band, yellow gown, etc. Monitor gait and stability. Monitor for mental status changes and reorient to person, place, and time. Review medications for side effects contributing to fall risk. Reinforce activity limits and safety measures with patient and family. Provide visual clues: red socks.

## 2019-04-08 VITALS
TEMPERATURE: 98 F | RESPIRATION RATE: 16 BRPM | SYSTOLIC BLOOD PRESSURE: 110 MMHG | HEART RATE: 86 BPM | OXYGEN SATURATION: 97 %

## 2019-04-08 LAB — HBA1C BLD-MCNC: 5.5 % — SIGNIFICANT CHANGE UP (ref 4–5.6)

## 2019-04-08 PROCEDURE — 99217: CPT

## 2019-04-08 RX ORDER — KETOROLAC TROMETHAMINE 30 MG/ML
15 SYRINGE (ML) INJECTION ONCE
Qty: 0 | Refills: 0 | Status: DISCONTINUED | OUTPATIENT
Start: 2019-04-08 | End: 2019-04-08

## 2019-04-08 RX ORDER — ENOXAPARIN SODIUM 100 MG/ML
70 INJECTION SUBCUTANEOUS
Qty: 1 | Refills: 0 | OUTPATIENT
Start: 2019-04-08

## 2019-04-08 RX ORDER — ACETAMINOPHEN 500 MG
1000 TABLET ORAL ONCE
Qty: 0 | Refills: 0 | Status: COMPLETED | OUTPATIENT
Start: 2019-04-08 | End: 2019-04-08

## 2019-04-08 RX ADMIN — ENOXAPARIN SODIUM 70 MILLIGRAM(S): 100 INJECTION SUBCUTANEOUS at 10:10

## 2019-04-08 RX ADMIN — Medication 400 MILLIGRAM(S): at 07:33

## 2019-04-08 RX ADMIN — SODIUM CHLORIDE 125 MILLILITER(S): 9 INJECTION INTRAMUSCULAR; INTRAVENOUS; SUBCUTANEOUS at 00:20

## 2019-04-08 NOTE — PROVIDER CONTACT NOTE (OTHER) - ACTION/TREATMENT ORDERED:
Medication was pick-up from [facility] and given to Pt at the bedside, Pt needs to follow-up with PMD next week. Covering MISSY Roberts was informed.

## 2019-04-08 NOTE — ED CDU PROVIDER INITIAL DAY NOTE - OBJECTIVE STATEMENT
53F pmh colon, cervical CA, finished last yeni 2 months ago, oncologist Dr. Kiran p/w R leg pain for several weeks. She describes a constant aching pain localized to the R groin, worse w/ certain movements. No redness/swelling/abd pain/n/v/d/cp/dyspnea. No known inciting/aggravating factors. denies any headaches, neck pain, cough, f/c/n/v/d, chest pain, sob, abdominal pain, urinary symptoms, recent travel. IN ER, labs wnl, u/s showed RLE dvt, sent to cdu for lovenox/teaching

## 2019-04-08 NOTE — CONSULT NOTE ADULT - ASSESSMENT
Patient with h/o colon cancer since early 2017, had surgery, FOLFOX Avastin, with very good response, switched to FOLFIRI for allergic reaction followed by RT. Disease recurrence with mets to cervix, restarted chemo, last cycle 2 months back.  She was last admitted in 8/2018 with obstruction treated conservatively. Now admitted with right lower abdomen pain, CT and DVT study noted.  She has increased LN on CT abdomen and pelvis compared to 2/2019.  She has new DVT in right thigh.  She is being started on lovenox. she will follow up in office with Dr. Kiran. We will discuss changing Lovenox to oral AC (Eliquis etc).  Will also need management of likely disease progression of colon cancer, as per CT Patient with h/o colon cancer since early 2017, had surgery, FOLFOX Avastin, with very good response, switched to FOLFIRI for allergic reaction followed by RT. Disease recurrence with mets to cervix, restarted chemo, last cycle 2 In January 2019.  She was last admitted in 8/2018 with obstruction treated conservatively. Now admitted with right lower abdomen pain, CT and DVT study noted.  She has increased LN on CT abdomen and pelvis compared to 2/2019.  She has new DVT in right thigh. She likely has hypercoagulability related to her cancer.  She is being started on lovenox. she will follow up in office with Dr. Hester. We will discuss changing Lovenox to oral AC (Eliquis etc).  Evaluate in the office on follow-up with Dr. hester for evidence of disease progression. Her last CEA was stable.

## 2019-04-08 NOTE — ED CDU PROVIDER INITIAL DAY NOTE - PROGRESS NOTE DETAILS
emma wright: pt assesed at bedside, sleeping comfortably, received lovenox, in cdu for lovenox teaching, will reasses

## 2019-04-08 NOTE — CONSULT NOTE ADULT - SUBJECTIVE AND OBJECTIVE BOX
HPI:  Patient with h/o colon cancer since early 2017, had surgery, FOLFOX Avastin, with very good response, switched to FOLFIRI for allergic reaction followed by RT. Disease recurrence with mets to cervix, restarted chemo, last cycle 2 months back.  She was last admitted in 8/2018 with obstruction treated conservatively. Now admitted with right lower abdomen pain, CT and DVT study noted. The pain has been present for at least a week and was increasing, leading her to come to ER    PAST MEDICAL & SURGICAL HISTORY:  SBO (small bowel obstruction)  Hemorrhoid  Cervical cancer  Colon neoplasm: s/p LAR  Hyperthyroidism  S/P colon resection  H/O exploratory laparotomy: 1/6/17, with LAR    Meds:  enoxaparin Injectable 70 milliGRAM(s) SubCutaneous two times a day  methimazole 5 milliGRAM(s) Oral daily  sodium chloride 0.9%. 1000 milliLiter(s) IV Continuous <Continuous>    Labs:  CBC Full  -  ( 07 Apr 2019 19:42 )  WBC Count : 10.65 K/uL  Hemoglobin : 14.1 g/dL  Hematocrit : 44.6 %  Platelet Count - Automated : 337 K/uL  Mean Cell Volume : 88.1 fL  Mean Cell Hemoglobin : 27.9 pg  Mean Cell Hemoglobin Concentration : 31.6 %  Auto Neutrophil # : 7.43 K/uL  Auto Lymphocyte # : 2.15 K/uL  Auto Monocyte # : 0.77 K/uL  Auto Eosinophil # : 0.26 K/uL  Auto Basophil # : 0.02 K/uL  Auto Neutrophil % : 69.8 %  Auto Lymphocyte % : 20.2 %  Auto Monocyte % : 7.2 %  Auto Eosinophil % : 2.4 %  Auto Basophil % : 0.2 %    04-07    139  |  100  |  11  ----------------------------<  109<H>  3.7   |  25  |  0.58    Ca    9.7      07 Apr 2019 19:42    TPro  7.6  /  Alb  4.3  /  TBili  0.3  /  DBili  x   /  AST  22  /  ALT  26  /  AlkPhos  195<H>  04-07      Radiology:     < from: CT Abdomen and Pelvis w/ IV Cont (04.07.19 @ 21:03) >  IMPRESSION:     Normal appendix.  No bowel obstruction.    Nonspecific thickening of the distal sigmoid colon, just proximal to the   rectosigmoid anastomosis.    Interval enlargement of numerous inguinal nodes since prior PET/CT of   2/7/2019.    Asymmetric enlargement of the right upper thigh, which may be related to   DVT visualized on lower extremity duplex performed earlier on the same   day.    Small amount of nonspecific fluid within the lower uterine segment versus   endocervical canal. Further evaluation with nonemergent pelvic sonogram   may be obtained as clinically warranted.    Mild base of the urinary bladder wall thickening. Mild nonspecific   fullness of the right renal collecting system. Correlate with urinalysis   to exclude infection.    < end of copied text >    < from: US Duplex Venous Lower Ext Ltd, Right (04.07.19 @ 20:18) >  Deep venous thrombosis above the knee involving the right femoral vein.      < end of copied text >        ROS:  Pain right lower abdomen on right side, no fever  No lumps in neck or pain  No Sob or chest pain  No palpitations    No change in bowel habit. No blood in stools  No weakness in extremities  She did not notice swelling or LE      Vital Signs Last 24 Hrs  T(C): 36.3 (08 Apr 2019 11:01), Max: 36.9 (08 Apr 2019 00:51)  T(F): 97.3 (08 Apr 2019 11:01), Max: 98.4 (08 Apr 2019 00:51)  HR: 90 (08 Apr 2019 11:01) (76 - 104)  BP: 107/74 (08 Apr 2019 11:01) (105/60 - 127/89)  BP(mean): --  RR: 17 (08 Apr 2019 11:01) (16 - 20)  SpO2: 94% (08 Apr 2019 11:01) (94% - 100%)    Physical Exam:  Patient comfortable  AXOX3  Neck supple, no LN's  Chest: bilateral breath sounds, no wheeze or rales  CVS: regular heart rate without murmur  Abdomen: soft, BS+, no massess or organomegaly, right lower quadrant pain on palpation  CNS: no gross deficit  Right thigh swollen compared to left HPI:  Patient with h/o colon cancer since early 2017, had surgery, FOLFOX Avastin, with very good response, switched to FOLFIRI for allergic reaction followed by RT. Disease recurrence with mets to cervix, restarted chemo, last cycle In January 2019.  She was last admitted in 8/2018 with obstruction treated conservatively. Now admitted with right lower abdomen pain, CT and DVT study noted. The pain has been present for at least a week and was increasing, leading her to come to ER.  There is no personal or family history of thromboembolism prior to this    PAST MEDICAL & SURGICAL HISTORY:  SBO (small bowel obstruction)  Hemorrhoid  Cervical cancer  Colon neoplasm: s/p LAR  Hyperthyroidism  S/P colon resection  H/O exploratory laparotomy: 1/6/17, with LAR    Meds:  enoxaparin Injectable 70 milliGRAM(s) SubCutaneous two times a day  methimazole 5 milliGRAM(s) Oral daily  sodium chloride 0.9%. 1000 milliLiter(s) IV Continuous <Continuous>    Labs:  CBC Full  -  ( 07 Apr 2019 19:42 )  WBC Count : 10.65 K/uL  Hemoglobin : 14.1 g/dL  Hematocrit : 44.6 %  Platelet Count - Automated : 337 K/uL  Mean Cell Volume : 88.1 fL  Mean Cell Hemoglobin : 27.9 pg  Mean Cell Hemoglobin Concentration : 31.6 %  Auto Neutrophil # : 7.43 K/uL  Auto Lymphocyte # : 2.15 K/uL  Auto Monocyte # : 0.77 K/uL  Auto Eosinophil # : 0.26 K/uL  Auto Basophil # : 0.02 K/uL  Auto Neutrophil % : 69.8 %  Auto Lymphocyte % : 20.2 %  Auto Monocyte % : 7.2 %  Auto Eosinophil % : 2.4 %  Auto Basophil % : 0.2 %    04-07    139  |  100  |  11  ----------------------------<  109<H>  3.7   |  25  |  0.58    Ca    9.7      07 Apr 2019 19:42    TPro  7.6  /  Alb  4.3  /  TBili  0.3  /  DBili  x   /  AST  22  /  ALT  26  /  AlkPhos  195<H>  04-07      Radiology:     < from: CT Abdomen and Pelvis w/ IV Cont (04.07.19 @ 21:03) >  IMPRESSION:     Normal appendix.  No bowel obstruction.    Nonspecific thickening of the distal sigmoid colon, just proximal to the   rectosigmoid anastomosis.    Interval enlargement of numerous inguinal nodes since prior PET/CT of   2/7/2019.    Asymmetric enlargement of the right upper thigh, which may be related to   DVT visualized on lower extremity duplex performed earlier on the same   day.    Small amount of nonspecific fluid within the lower uterine segment versus   endocervical canal. Further evaluation with nonemergent pelvic sonogram   may be obtained as clinically warranted.    Mild base of the urinary bladder wall thickening. Mild nonspecific   fullness of the right renal collecting system. Correlate with urinalysis   to exclude infection.    < end of copied text >    < from: US Duplex Venous Lower Ext Ltd, Right (04.07.19 @ 20:18) >  Deep venous thrombosis above the knee involving the right femoral vein.      < end of copied text >        ROS:  Pain right lower abdomen on right side, no fever  No lumps in neck or pain  No Sob or chest pain  No palpitations    No change in bowel habit. No blood in stools  No weakness in extremities  She did not notice swelling or LE      Vital Signs Last 24 Hrs  T(C): 36.3 (08 Apr 2019 11:01), Max: 36.9 (08 Apr 2019 00:51)  T(F): 97.3 (08 Apr 2019 11:01), Max: 98.4 (08 Apr 2019 00:51)  HR: 90 (08 Apr 2019 11:01) (76 - 104)  BP: 107/74 (08 Apr 2019 11:01) (105/60 - 127/89)  BP(mean): --  RR: 17 (08 Apr 2019 11:01) (16 - 20)  SpO2: 94% (08 Apr 2019 11:01) (94% - 100%)    Physical Exam:  Patient comfortable  AXOX3  Neck supple, no LN's  Chest: bilateral breath sounds, no wheeze or rales  CVS: regular heart rate without murmur  Abdomen: soft, BS+, no massess or organomegaly, right lower quadrant pain on palpation  CNS: no gross deficit  Right thigh swollen compared to left

## 2019-04-08 NOTE — ED CDU PROVIDER DISPOSITION NOTE - NSPTACCESSSVCSAPPT_ED_ALL_ED
Left voice message for pt to give the office a call back at [phone]. Attempted to relay negative urine culture results.      Specialty Care/PCP for Routine Care

## 2019-04-08 NOTE — ED ADULT NURSE REASSESSMENT NOTE - NS ED NURSE REASSESS COMMENT FT1
Break coverage RN - pt vitally stable, resting comfortably in stretcher, no acute distress or complaints at this time.  Pt in Virtual CDU.  Safety maintained.  will CTM.

## 2019-04-08 NOTE — ED CDU PROVIDER DISPOSITION NOTE - NSFOLLOWUPINSTRUCTIONS_ED_ALL_ED_FT
Call your primary care doctor and hematologist/oncologist today or tomorrow to schedule follow up appointment for within the next 3-5 days.  Continue taking your medications as prescribed.  Return to this Emergency Department if you experience worsening condition or for any other emergency.

## 2019-04-08 NOTE — ED CDU PROVIDER DISPOSITION NOTE - CLINICAL COURSE
54 yo F with Hx colon cancer s/p resection s/p chemo and RT with mets to cervix, on chemo (last in 1/2019) with recent admission on 8/2018 with obstruction treated conservatively. The pt presented to the ED with right lower abdomen pain with findings of RLE DVT who was sent to the CDU for lovenox injections as well as   She is being started on lovenox. she will follow up in office with Dr. Hester. We will discuss changing Lovenox to oral AC (Eliquis etc).  Evaluate in the office on follow-up with Dr. hester for evidence of disease progression. Her last CEA was stable. 52 yo F with Hx colon cancer s/p resection s/p chemo and RT with mets to cervix, on chemo (last in 1/2019) with recent admission on 8/2018 with obstruction treated conservatively. The pt presented to the ED with right lower abdomen pain with findings of RLE DVT who was sent to the CDU for lovenox injections which will be changed to Eliquis. Heme/onc (Dr. Cotton) evaluated the pt in the ED she will follow up in the office.

## 2019-04-08 NOTE — PROVIDER CONTACT NOTE (OTHER) - ASSESSMENT
Patient is a 53-year-old female, with h/o colon cancer , S/P new DVT in right thigh, new in Lovenox injections, who was referred to ED CM, Spoke to Pt at the bedside regarding d/c plan, Pt was educated by primary RN (Ting) regarding injections, and Pt is comfortable to perform injections. Prescription for 15 day coverage has been sent to [facility], Pt has coverage with $1 co-pay. Patient is a 53-year-old female, with h/o colon cancer, diagnosed with new DVT in right thigh, started on Lovenox injections, who was referred to ED CM, Spoke to Pt at the bedside regarding d/c plan, Pt was educated by primary RN (Ting) regarding injections, and Pt is comfortable to perform injections. Prescription for 15 day coverage has been sent to [facility], Pt has coverage with $1 co-pay.

## 2019-04-08 NOTE — ED ADULT NURSE REASSESSMENT NOTE - NS ED NURSE REASSESS COMMENT FT1
patient received in NAD LS clear slight pain and TTP of right upper thigh. no rudeness noted + distal pedal pulses

## 2019-04-10 ENCOUNTER — EMERGENCY (EMERGENCY)
Facility: HOSPITAL | Age: 54
LOS: 1 days | Discharge: HOME | End: 2019-04-10
Attending: EMERGENCY MEDICINE | Admitting: EMERGENCY MEDICINE
Payer: MEDICAID

## 2019-04-10 VITALS
HEART RATE: 88 BPM | TEMPERATURE: 98 F | SYSTOLIC BLOOD PRESSURE: 151 MMHG | DIASTOLIC BLOOD PRESSURE: 80 MMHG | RESPIRATION RATE: 18 BRPM | OXYGEN SATURATION: 100 %

## 2019-04-10 VITALS
SYSTOLIC BLOOD PRESSURE: 121 MMHG | HEART RATE: 103 BPM | TEMPERATURE: 98 F | DIASTOLIC BLOOD PRESSURE: 95 MMHG | OXYGEN SATURATION: 100 % | RESPIRATION RATE: 18 BRPM

## 2019-04-10 DIAGNOSIS — Z90.49 ACQUIRED ABSENCE OF OTHER SPECIFIED PARTS OF DIGESTIVE TRACT: Chronic | ICD-10-CM

## 2019-04-10 DIAGNOSIS — Z98.890 OTHER SPECIFIED POSTPROCEDURAL STATES: Chronic | ICD-10-CM

## 2019-04-10 LAB
ALBUMIN SERPL ELPH-MCNC: 4.1 G/DL — SIGNIFICANT CHANGE UP (ref 3.3–5)
ALP SERPL-CCNC: 171 U/L — HIGH (ref 40–120)
ALT FLD-CCNC: 18 U/L — SIGNIFICANT CHANGE UP (ref 4–33)
ANION GAP SERPL CALC-SCNC: 18 MMO/L — HIGH (ref 7–14)
AST SERPL-CCNC: 24 U/L — SIGNIFICANT CHANGE UP (ref 4–32)
BASOPHILS # BLD AUTO: 0.02 K/UL — SIGNIFICANT CHANGE UP (ref 0–0.2)
BASOPHILS NFR BLD AUTO: 0.2 % — SIGNIFICANT CHANGE UP (ref 0–2)
BILIRUB SERPL-MCNC: 0.3 MG/DL — SIGNIFICANT CHANGE UP (ref 0.2–1.2)
BUN SERPL-MCNC: 15 MG/DL — SIGNIFICANT CHANGE UP (ref 7–23)
CALCIUM SERPL-MCNC: 9.2 MG/DL — SIGNIFICANT CHANGE UP (ref 8.4–10.5)
CHLORIDE SERPL-SCNC: 101 MMOL/L — SIGNIFICANT CHANGE UP (ref 98–107)
CO2 SERPL-SCNC: 21 MMOL/L — LOW (ref 22–31)
CREAT SERPL-MCNC: 0.62 MG/DL — SIGNIFICANT CHANGE UP (ref 0.5–1.3)
EOSINOPHIL # BLD AUTO: 0.19 K/UL — SIGNIFICANT CHANGE UP (ref 0–0.5)
EOSINOPHIL NFR BLD AUTO: 2 % — SIGNIFICANT CHANGE UP (ref 0–6)
GLUCOSE SERPL-MCNC: 110 MG/DL — HIGH (ref 70–99)
HCT VFR BLD CALC: 44.8 % — SIGNIFICANT CHANGE UP (ref 34.5–45)
HGB BLD-MCNC: 13.8 G/DL — SIGNIFICANT CHANGE UP (ref 11.5–15.5)
IMM GRANULOCYTES NFR BLD AUTO: 0.3 % — SIGNIFICANT CHANGE UP (ref 0–1.5)
LYMPHOCYTES # BLD AUTO: 1.89 K/UL — SIGNIFICANT CHANGE UP (ref 1–3.3)
LYMPHOCYTES # BLD AUTO: 19.9 % — SIGNIFICANT CHANGE UP (ref 13–44)
MCHC RBC-ENTMCNC: 27.1 PG — SIGNIFICANT CHANGE UP (ref 27–34)
MCHC RBC-ENTMCNC: 30.8 % — LOW (ref 32–36)
MCV RBC AUTO: 88 FL — SIGNIFICANT CHANGE UP (ref 80–100)
MONOCYTES # BLD AUTO: 0.7 K/UL — SIGNIFICANT CHANGE UP (ref 0–0.9)
MONOCYTES NFR BLD AUTO: 7.4 % — SIGNIFICANT CHANGE UP (ref 2–14)
NEUTROPHILS # BLD AUTO: 6.67 K/UL — SIGNIFICANT CHANGE UP (ref 1.8–7.4)
NEUTROPHILS NFR BLD AUTO: 70.2 % — SIGNIFICANT CHANGE UP (ref 43–77)
NRBC # FLD: 0 K/UL — SIGNIFICANT CHANGE UP (ref 0–0)
PLATELET # BLD AUTO: 356 K/UL — SIGNIFICANT CHANGE UP (ref 150–400)
PMV BLD: 10.1 FL — SIGNIFICANT CHANGE UP (ref 7–13)
POTASSIUM SERPL-MCNC: 3.7 MMOL/L — SIGNIFICANT CHANGE UP (ref 3.5–5.3)
POTASSIUM SERPL-SCNC: 3.7 MMOL/L — SIGNIFICANT CHANGE UP (ref 3.5–5.3)
PROT SERPL-MCNC: 7.4 G/DL — SIGNIFICANT CHANGE UP (ref 6–8.3)
RBC # BLD: 5.09 M/UL — SIGNIFICANT CHANGE UP (ref 3.8–5.2)
RBC # FLD: 13.6 % — SIGNIFICANT CHANGE UP (ref 10.3–14.5)
SODIUM SERPL-SCNC: 140 MMOL/L — SIGNIFICANT CHANGE UP (ref 135–145)
WBC # BLD: 9.5 K/UL — SIGNIFICANT CHANGE UP (ref 3.8–10.5)
WBC # FLD AUTO: 9.5 K/UL — SIGNIFICANT CHANGE UP (ref 3.8–10.5)

## 2019-04-10 PROCEDURE — 99285 EMERGENCY DEPT VISIT HI MDM: CPT

## 2019-04-10 RX ORDER — MORPHINE SULFATE 50 MG/1
4 CAPSULE, EXTENDED RELEASE ORAL ONCE
Qty: 0 | Refills: 0 | Status: DISCONTINUED | OUTPATIENT
Start: 2019-04-10 | End: 2019-04-10

## 2019-04-10 RX ADMIN — Medication 1 MILLIGRAM(S): at 23:20

## 2019-04-10 RX ADMIN — MORPHINE SULFATE 4 MILLIGRAM(S): 50 CAPSULE, EXTENDED RELEASE ORAL at 23:20

## 2019-04-10 NOTE — ED ADULT TRIAGE NOTE - CHIEF COMPLAINT QUOTE
Pt. w/ hx. colon and cervical CA arrives c/o right groin pain secondary to + clot in groin w/ constipation. Pt. arrives crying stating she is in a lot of pain. Pt. appears uncomfortable in triage.

## 2019-04-10 NOTE — ED ADULT NURSE NOTE - NSIMPLEMENTINTERV_GEN_ALL_ED
Implemented All Universal Safety Interventions:  [place] to call system. Call bell, personal items and telephone within reach. Instruct patient to call for assistance. Room bathroom lighting operational. Non-slip footwear when patient is off stretcher. Physically safe environment: no spills, clutter or unnecessary equipment. Stretcher in lowest position, wheels locked, appropriate side rails in place.

## 2019-04-10 NOTE — ED ADULT NURSE NOTE - OBJECTIVE STATEMENT
52 yo F AAO4 to room 8 c/o R groin pain and constipation. recent d/c from [facility] on Sunday r/t dx DVT, pt on daily lovenox, reports increasing right join pain and no BM in 1 week, abd soft, nontender, nondistended, appears uncomfortable with abdominal guarding, hx colon and cervical Ca with R chest port, VSS, 20G placed in LAC, labs drawn and sent, awaiting MD barnes, will monitor 54 yo F AAO4 to room 8 c/o R groin pain and constipation. recent d/c from [facility] on Sunday r/t dx DVT, pt on daily lovenox, reports increasing right groin pain and no BM in 1 week, abd soft, nontender, nondistended, appears uncomfortable with abdominal guarding, hx colon and cervical Ca with R chest port, VSS, 20G placed in LAC, labs drawn and sent, awaiting MD barnes, will monitor

## 2019-04-11 NOTE — ED PROVIDER NOTE - OBJECTIVE STATEMENT
54 yo f pmh hypothyroidism, cervical ca, colon ca s/p lar, dvt, pw constipation and r leg pain. pt states pain is severe, sharp, localized to upper thigh, 9/10, constant, worse with pressure, better with oxycodone. states she has been constipated for one week, has not had a BM. states she stopped taking oxycodone due to worsening constipation. denies f/c, n/v, cp, sob. denies recent enema use. 54 yo f pmh hypothyroidism, cervical ca, colon ca s/p lar, dvt, pw constipation and r leg pain. pt states pain is severe, sharp, localized to upper thigh, 9/10, constant, worse with pressure, better with oxycodone. states she has been constipated for one week, has not had a BM. states she stopped taking oxycodone due to worsening constipation. denies f/c, n/v, cp, sob. denies recent enema use.    Attendinyo  female presents with pain in the right groin and constipation.  no bowel movement for about 5 days.  feels bloated.  no vomiting.  no nausea.

## 2019-04-11 NOTE — ED PROVIDER NOTE - PROGRESS NOTE DETAILS
K[place]fish: Resident attempted disimpaction w/ some success. Pt wants to go home. Labs grossly wnl. Comfortable for dc, outpt pmd/onc f/u.

## 2019-04-11 NOTE — ED PROVIDER NOTE - NS ED ROS FT
GENERAL: No fever or chills, //             EYES: no change in vision, //             HEENT: no trouble swallowing or speaking, //             CARDIAC: no chest pain, //              PULMONARY: no cough or SOB, //             GI: constipation, //             : No changes in urination,  //            SKIN: no rashes,  //            NEURO: no headache,  //             MSK: r leg pain. ~Jimmie Quiñones DO PGY1

## 2019-04-11 NOTE — ED PROVIDER NOTE - CLINICAL SUMMARY MEDICAL DECISION MAKING FREE TEXT BOX
54 yo f pw pain and constipation. pt states constipation is primary concern for visiting ED. attempt disimpaction, enema. assess labs. low suspicion for obstruction. reassess.

## 2019-04-11 NOTE — ED PROVIDER NOTE - NSFOLLOWUPINSTRUCTIONS_ED_ALL_ED_FT
Continue to take home stool softeners as prescribed.  Follow up with primary doctor within 1-2 days.  Return for persistent fever/vomit, uncontrolled pain.

## 2019-04-11 NOTE — ED PROVIDER NOTE - PHYSICAL EXAMINATION
General: well appearing, interactive, well nourished, no apparent distress  HEENT: EOMI, PERRLA, normal mucosa, normal oropharynx, no lesions on the lips or on oral mucosa, normal external ear  Neck: supple, no lymphadenopathy, full range of motion, no nuchal rigidity  CV: RRR, normal S1 and S2 with no murmur, capillary refill less than two seconds  Resp: lungs CTA b/l, good aeration bilaterally, symmetric chest wall   Abd: non-distended, soft, non-tender  : no CVA tenderness  MSK: full range of motion, no cyanosis, no edema, no clubbing, no immobility  rectal: no hemorrhoids, rectal vault contains hard stool (chaperone: mylene lincoln rn)  Neuro: cranial nerves intact, muscle strength 5/5 in all extremities, normal gait  Skin: no rashes, skin intact

## 2019-04-16 ENCOUNTER — INPATIENT (INPATIENT)
Facility: HOSPITAL | Age: 54
LOS: 25 days | End: 2019-05-12
Attending: SURGERY | Admitting: SURGERY
Payer: MEDICAID

## 2019-04-16 VITALS
HEART RATE: 124 BPM | DIASTOLIC BLOOD PRESSURE: 88 MMHG | RESPIRATION RATE: 20 BRPM | OXYGEN SATURATION: 100 % | SYSTOLIC BLOOD PRESSURE: 113 MMHG | TEMPERATURE: 98 F

## 2019-04-16 DIAGNOSIS — Z98.890 OTHER SPECIFIED POSTPROCEDURAL STATES: Chronic | ICD-10-CM

## 2019-04-16 DIAGNOSIS — Z90.49 ACQUIRED ABSENCE OF OTHER SPECIFIED PARTS OF DIGESTIVE TRACT: Chronic | ICD-10-CM

## 2019-04-16 LAB
ALBUMIN SERPL ELPH-MCNC: 3.8 G/DL — SIGNIFICANT CHANGE UP (ref 3.3–5)
ALP SERPL-CCNC: 202 U/L — HIGH (ref 40–120)
ALT FLD-CCNC: 19 U/L — SIGNIFICANT CHANGE UP (ref 4–33)
AMYLASE P1 CFR SERPL: 49 U/L — SIGNIFICANT CHANGE UP (ref 25–125)
ANION GAP SERPL CALC-SCNC: 19 MMO/L — HIGH (ref 7–14)
APTT BLD: > 200 SEC — CRITICAL HIGH (ref 27.5–36.3)
AST SERPL-CCNC: 18 U/L — SIGNIFICANT CHANGE UP (ref 4–32)
BASE EXCESS BLDV CALC-SCNC: 4.5 MMOL/L — SIGNIFICANT CHANGE UP
BILIRUB SERPL-MCNC: 0.3 MG/DL — SIGNIFICANT CHANGE UP (ref 0.2–1.2)
BLOOD GAS VENOUS - CREATININE: < 0.36 MG/DL — LOW (ref 0.5–1.3)
BUN SERPL-MCNC: 17 MG/DL — SIGNIFICANT CHANGE UP (ref 7–23)
CALCIUM SERPL-MCNC: 9.9 MG/DL — SIGNIFICANT CHANGE UP (ref 8.4–10.5)
CHLORIDE BLDV-SCNC: 102 MMOL/L — SIGNIFICANT CHANGE UP (ref 96–108)
CHLORIDE SERPL-SCNC: 97 MMOL/L — LOW (ref 98–107)
CO2 SERPL-SCNC: 21 MMOL/L — LOW (ref 22–31)
CREAT SERPL-MCNC: 0.55 MG/DL — SIGNIFICANT CHANGE UP (ref 0.5–1.3)
GAS PNL BLDV: 135 MMOL/L — LOW (ref 136–146)
GLUCOSE BLDV-MCNC: 113 — HIGH (ref 70–99)
GLUCOSE SERPL-MCNC: 111 MG/DL — HIGH (ref 70–99)
HCO3 BLDV-SCNC: 27 MMOL/L — SIGNIFICANT CHANGE UP (ref 20–27)
HCT VFR BLD CALC: 47.8 % — HIGH (ref 34.5–45)
HCT VFR BLDV CALC: 46.7 % — HIGH (ref 34.5–45)
HGB BLD-MCNC: 14.5 G/DL — SIGNIFICANT CHANGE UP (ref 11.5–15.5)
HGB BLDV-MCNC: 15.2 G/DL — SIGNIFICANT CHANGE UP (ref 11.5–15.5)
INR BLD: 3.32 — HIGH (ref 0.88–1.17)
LACTATE BLDV-MCNC: 2.1 MMOL/L — HIGH (ref 0.5–2)
LIDOCAIN IGE QN: 20.5 U/L — SIGNIFICANT CHANGE UP (ref 7–60)
MCHC RBC-ENTMCNC: 27.2 PG — SIGNIFICANT CHANGE UP (ref 27–34)
MCHC RBC-ENTMCNC: 30.3 % — LOW (ref 32–36)
MCV RBC AUTO: 89.7 FL — SIGNIFICANT CHANGE UP (ref 80–100)
NRBC # FLD: 0 K/UL — SIGNIFICANT CHANGE UP (ref 0–0)
PCO2 BLDV: 45 MMHG — SIGNIFICANT CHANGE UP (ref 41–51)
PH BLDV: 7.42 PH — SIGNIFICANT CHANGE UP (ref 7.32–7.43)
PLATELET # BLD AUTO: 402 K/UL — HIGH (ref 150–400)
PMV BLD: 10.2 FL — SIGNIFICANT CHANGE UP (ref 7–13)
PO2 BLDV: 39 MMHG — SIGNIFICANT CHANGE UP (ref 35–40)
POTASSIUM BLDV-SCNC: 4 MMOL/L — SIGNIFICANT CHANGE UP (ref 3.4–4.5)
POTASSIUM SERPL-MCNC: 4.4 MMOL/L — SIGNIFICANT CHANGE UP (ref 3.5–5.3)
POTASSIUM SERPL-SCNC: 4.4 MMOL/L — SIGNIFICANT CHANGE UP (ref 3.5–5.3)
PROT SERPL-MCNC: 7.6 G/DL — SIGNIFICANT CHANGE UP (ref 6–8.3)
PROTHROM AB SERPL-ACNC: 38.2 SEC — HIGH (ref 9.8–13.1)
RBC # BLD: 5.33 M/UL — HIGH (ref 3.8–5.2)
RBC # FLD: 13.9 % — SIGNIFICANT CHANGE UP (ref 10.3–14.5)
SAO2 % BLDV: 67.7 % — SIGNIFICANT CHANGE UP (ref 60–85)
SODIUM SERPL-SCNC: 137 MMOL/L — SIGNIFICANT CHANGE UP (ref 135–145)
WBC # BLD: 12.84 K/UL — HIGH (ref 3.8–10.5)
WBC # FLD AUTO: 12.84 K/UL — HIGH (ref 3.8–10.5)

## 2019-04-16 PROCEDURE — 74177 CT ABD & PELVIS W/CONTRAST: CPT | Mod: 26

## 2019-04-16 RX ORDER — SODIUM CHLORIDE 9 MG/ML
1000 INJECTION INTRAMUSCULAR; INTRAVENOUS; SUBCUTANEOUS ONCE
Qty: 0 | Refills: 0 | Status: COMPLETED | OUTPATIENT
Start: 2019-04-16 | End: 2019-04-16

## 2019-04-16 RX ORDER — SODIUM CHLORIDE 9 MG/ML
1000 INJECTION, SOLUTION INTRAVENOUS ONCE
Qty: 0 | Refills: 0 | Status: COMPLETED | OUTPATIENT
Start: 2019-04-16 | End: 2019-04-16

## 2019-04-16 RX ORDER — ONDANSETRON 8 MG/1
4 TABLET, FILM COATED ORAL ONCE
Qty: 0 | Refills: 0 | Status: COMPLETED | OUTPATIENT
Start: 2019-04-16 | End: 2019-04-16

## 2019-04-16 RX ORDER — METOCLOPRAMIDE HCL 10 MG
10 TABLET ORAL ONCE
Qty: 0 | Refills: 0 | Status: COMPLETED | OUTPATIENT
Start: 2019-04-16 | End: 2019-04-16

## 2019-04-16 RX ORDER — FAMOTIDINE 10 MG/ML
20 INJECTION INTRAVENOUS ONCE
Qty: 0 | Refills: 0 | Status: COMPLETED | OUTPATIENT
Start: 2019-04-16 | End: 2019-04-16

## 2019-04-16 RX ADMIN — ONDANSETRON 4 MILLIGRAM(S): 8 TABLET, FILM COATED ORAL at 18:11

## 2019-04-16 RX ADMIN — Medication 10 MILLIGRAM(S): at 23:10

## 2019-04-16 RX ADMIN — Medication 30 MILLILITER(S): at 18:24

## 2019-04-16 RX ADMIN — ONDANSETRON 4 MILLIGRAM(S): 8 TABLET, FILM COATED ORAL at 21:20

## 2019-04-16 RX ADMIN — FAMOTIDINE 20 MILLIGRAM(S): 10 INJECTION INTRAVENOUS at 18:24

## 2019-04-16 RX ADMIN — SODIUM CHLORIDE 1000 MILLILITER(S): 9 INJECTION, SOLUTION INTRAVENOUS at 23:10

## 2019-04-16 RX ADMIN — SODIUM CHLORIDE 1000 MILLILITER(S): 9 INJECTION INTRAMUSCULAR; INTRAVENOUS; SUBCUTANEOUS at 18:01

## 2019-04-16 NOTE — ED PROVIDER NOTE - ATTENDING CONTRIBUTION TO CARE
DR. BLOCH, ATTENDING MD-  I performed a face to face bedside interview with patient regarding history of present illness, review of symptoms and past medical history. I completed an independent physical exam.  I have discussed patient's plan of care with the resident.  Patient examined, chronically ill appearing, HEENT nml heart sounds nml lungs clear, abd tender RLQ. extrem right thigh swollen and tender, no ankle edema. pulses nml. neuero nonfocal

## 2019-04-16 NOTE — ED PROVIDER NOTE - CLINICAL SUMMARY MEDICAL DECISION MAKING FREE TEXT BOX
52yo F pmhx hyperthyroidism, colon ca s/p resection, cervical ca s/p chemo in remission, DVT on AC p/w CC n/v abd pain - concern for viral gastro vs obstruction vs diverticulitis? Will send labs CT abd/pelv, symptomatic relief and reassess.

## 2019-04-16 NOTE — ED PROVIDER NOTE - OBJECTIVE STATEMENT
52yo F pmhx hyperthyroidism, colon ca s/p resection, cervical ca s/p chemo in remission, DVT on AC p/w CC n/v abd pain since 4AM this morning, unable to tolerate PO. Denies recent travel, no sick contacts. Denies fever chills cp sob diarrhea urinary symptoms. Last BM was yesterday and was normal.

## 2019-04-16 NOTE — ED ADULT TRIAGE NOTE - CHIEF COMPLAINT QUOTE
cervical CA pt with frequent SBOs and a known right LE DVT. presents today with abd pain and vomiting. tachy in triage

## 2019-04-17 DIAGNOSIS — K92.2 GASTROINTESTINAL HEMORRHAGE, UNSPECIFIED: ICD-10-CM

## 2019-04-17 LAB
ANION GAP SERPL CALC-SCNC: 14 MMO/L — SIGNIFICANT CHANGE UP (ref 7–14)
APTT BLD: 31.4 SEC — SIGNIFICANT CHANGE UP (ref 27.5–36.3)
BUN SERPL-MCNC: 15 MG/DL — SIGNIFICANT CHANGE UP (ref 7–23)
CALCIUM SERPL-MCNC: 9.1 MG/DL — SIGNIFICANT CHANGE UP (ref 8.4–10.5)
CHLORIDE SERPL-SCNC: 99 MMOL/L — SIGNIFICANT CHANGE UP (ref 98–107)
CO2 SERPL-SCNC: 25 MMOL/L — SIGNIFICANT CHANGE UP (ref 22–31)
CREAT SERPL-MCNC: 0.53 MG/DL — SIGNIFICANT CHANGE UP (ref 0.5–1.3)
GLUCOSE SERPL-MCNC: 122 MG/DL — HIGH (ref 70–99)
HCT VFR BLD CALC: 42.4 % — SIGNIFICANT CHANGE UP (ref 34.5–45)
HGB BLD-MCNC: 13.2 G/DL — SIGNIFICANT CHANGE UP (ref 11.5–15.5)
INR BLD: 1.06 — SIGNIFICANT CHANGE UP (ref 0.88–1.17)
LACTATE SERPL-SCNC: 1.2 MMOL/L — SIGNIFICANT CHANGE UP (ref 0.5–2)
MAGNESIUM SERPL-MCNC: 2.3 MG/DL — SIGNIFICANT CHANGE UP (ref 1.6–2.6)
MCHC RBC-ENTMCNC: 27.2 PG — SIGNIFICANT CHANGE UP (ref 27–34)
MCHC RBC-ENTMCNC: 31.1 % — LOW (ref 32–36)
MCV RBC AUTO: 87.2 FL — SIGNIFICANT CHANGE UP (ref 80–100)
NRBC # FLD: 0 K/UL — SIGNIFICANT CHANGE UP (ref 0–0)
PHOSPHATE SERPL-MCNC: 3.8 MG/DL — SIGNIFICANT CHANGE UP (ref 2.5–4.5)
PLATELET # BLD AUTO: 396 K/UL — SIGNIFICANT CHANGE UP (ref 150–400)
PMV BLD: 10.3 FL — SIGNIFICANT CHANGE UP (ref 7–13)
POTASSIUM SERPL-MCNC: 4.4 MMOL/L — SIGNIFICANT CHANGE UP (ref 3.5–5.3)
POTASSIUM SERPL-SCNC: 4.4 MMOL/L — SIGNIFICANT CHANGE UP (ref 3.5–5.3)
PROTHROM AB SERPL-ACNC: 11.8 SEC — SIGNIFICANT CHANGE UP (ref 9.8–13.1)
RBC # BLD: 4.86 M/UL — SIGNIFICANT CHANGE UP (ref 3.8–5.2)
RBC # FLD: 13.9 % — SIGNIFICANT CHANGE UP (ref 10.3–14.5)
SODIUM SERPL-SCNC: 138 MMOL/L — SIGNIFICANT CHANGE UP (ref 135–145)
WBC # BLD: 12.86 K/UL — HIGH (ref 3.8–10.5)
WBC # FLD AUTO: 12.86 K/UL — HIGH (ref 3.8–10.5)

## 2019-04-17 PROCEDURE — 71045 X-RAY EXAM CHEST 1 VIEW: CPT | Mod: 26

## 2019-04-17 PROCEDURE — 99222 1ST HOSP IP/OBS MODERATE 55: CPT | Mod: GC

## 2019-04-17 RX ORDER — PROCHLORPERAZINE MALEATE 5 MG
1 TABLET ORAL
Qty: 0 | Refills: 0 | COMMUNITY

## 2019-04-17 RX ORDER — INFLUENZA VIRUS VACCINE 15; 15; 15; 15 UG/.5ML; UG/.5ML; UG/.5ML; UG/.5ML
0.5 SUSPENSION INTRAMUSCULAR ONCE
Qty: 0 | Refills: 0 | Status: COMPLETED | OUTPATIENT
Start: 2019-04-17 | End: 2019-04-17

## 2019-04-17 RX ORDER — ENOXAPARIN SODIUM 100 MG/ML
70 INJECTION SUBCUTANEOUS
Qty: 0 | Refills: 0 | Status: DISCONTINUED | OUTPATIENT
Start: 2019-04-17 | End: 2019-04-19

## 2019-04-17 RX ORDER — MORPHINE SULFATE 50 MG/1
4 CAPSULE, EXTENDED RELEASE ORAL ONCE
Qty: 0 | Refills: 0 | Status: DISCONTINUED | OUTPATIENT
Start: 2019-04-17 | End: 2019-04-17

## 2019-04-17 RX ORDER — METOCLOPRAMIDE HCL 10 MG
10 TABLET ORAL EVERY 8 HOURS
Qty: 0 | Refills: 0 | Status: DISCONTINUED | OUTPATIENT
Start: 2019-04-17 | End: 2019-04-19

## 2019-04-17 RX ORDER — OCTREOTIDE ACETATE 200 UG/ML
100 INJECTION, SOLUTION INTRAVENOUS; SUBCUTANEOUS EVERY 8 HOURS
Qty: 0 | Refills: 0 | Status: DISCONTINUED | OUTPATIENT
Start: 2019-04-17 | End: 2019-04-19

## 2019-04-17 RX ORDER — ENOXAPARIN SODIUM 100 MG/ML
40 INJECTION SUBCUTANEOUS DAILY
Qty: 0 | Refills: 0 | Status: DISCONTINUED | OUTPATIENT
Start: 2019-04-17 | End: 2019-04-17

## 2019-04-17 RX ORDER — ACETAMINOPHEN 500 MG
1000 TABLET ORAL ONCE
Qty: 0 | Refills: 0 | Status: COMPLETED | OUTPATIENT
Start: 2019-04-17 | End: 2019-04-17

## 2019-04-17 RX ORDER — SODIUM CHLORIDE 9 MG/ML
1000 INJECTION, SOLUTION INTRAVENOUS
Qty: 0 | Refills: 0 | Status: DISCONTINUED | OUTPATIENT
Start: 2019-04-17 | End: 2019-04-17

## 2019-04-17 RX ORDER — APIXABAN 2.5 MG/1
1 TABLET, FILM COATED ORAL
Qty: 0 | Refills: 0 | COMMUNITY

## 2019-04-17 RX ORDER — METHIMAZOLE 10 MG/1
0 TABLET ORAL
Qty: 0 | Refills: 0 | COMMUNITY

## 2019-04-17 RX ORDER — DEXAMETHASONE 0.5 MG/5ML
4 ELIXIR ORAL EVERY 12 HOURS
Qty: 0 | Refills: 0 | Status: DISCONTINUED | OUTPATIENT
Start: 2019-04-17 | End: 2019-04-19

## 2019-04-17 RX ORDER — DEXTROSE MONOHYDRATE, SODIUM CHLORIDE, AND POTASSIUM CHLORIDE 50; .745; 4.5 G/1000ML; G/1000ML; G/1000ML
1000 INJECTION, SOLUTION INTRAVENOUS
Qty: 0 | Refills: 0 | Status: DISCONTINUED | OUTPATIENT
Start: 2019-04-17 | End: 2019-04-19

## 2019-04-17 RX ADMIN — MORPHINE SULFATE 4 MILLIGRAM(S): 50 CAPSULE, EXTENDED RELEASE ORAL at 00:53

## 2019-04-17 RX ADMIN — OCTREOTIDE ACETATE 100 MICROGRAM(S): 200 INJECTION, SOLUTION INTRAVENOUS; SUBCUTANEOUS at 13:57

## 2019-04-17 RX ADMIN — Medication 4 MILLIGRAM(S): at 05:53

## 2019-04-17 RX ADMIN — SODIUM CHLORIDE 125 MILLILITER(S): 9 INJECTION, SOLUTION INTRAVENOUS at 01:49

## 2019-04-17 RX ADMIN — Medication 400 MILLIGRAM(S): at 12:11

## 2019-04-17 RX ADMIN — Medication 10 MILLIGRAM(S): at 23:32

## 2019-04-17 RX ADMIN — SODIUM CHLORIDE 125 MILLILITER(S): 9 INJECTION, SOLUTION INTRAVENOUS at 10:48

## 2019-04-17 RX ADMIN — ENOXAPARIN SODIUM 70 MILLIGRAM(S): 100 INJECTION SUBCUTANEOUS at 19:03

## 2019-04-17 RX ADMIN — Medication 1000 MILLIGRAM(S): at 12:50

## 2019-04-17 RX ADMIN — Medication 4 MILLIGRAM(S): at 19:03

## 2019-04-17 RX ADMIN — Medication 10 MILLIGRAM(S): at 13:57

## 2019-04-17 RX ADMIN — ENOXAPARIN SODIUM 70 MILLIGRAM(S): 100 INJECTION SUBCUTANEOUS at 05:53

## 2019-04-17 RX ADMIN — Medication 10 MILLIGRAM(S): at 05:53

## 2019-04-17 RX ADMIN — DEXTROSE MONOHYDRATE, SODIUM CHLORIDE, AND POTASSIUM CHLORIDE 125 MILLILITER(S): 50; .745; 4.5 INJECTION, SOLUTION INTRAVENOUS at 19:04

## 2019-04-17 RX ADMIN — OCTREOTIDE ACETATE 100 MICROGRAM(S): 200 INJECTION, SOLUTION INTRAVENOUS; SUBCUTANEOUS at 05:53

## 2019-04-17 NOTE — H&P ADULT - NSHPPHYSICALEXAM_GEN_ALL_CORE
T(C): 36.9 (04-17-19 @ 01:45), Max: 36.9 (04-17-19 @ 01:45)  HR: 109 (04-17-19 @ 01:45) (104 - 124)  BP: 130/87 (04-17-19 @ 01:45) (97/67 - 130/87)  RR: 18 (04-17-19 @ 01:45) (18 - 20)  SpO2: 99% (04-17-19 @ 01:45) (98% - 100%)    Gen: NAD  Neuro: AAOx3  HEENT: normocephalic, atraumatic, no scleral icterus  CV: S1, S2, RRR  Pulm: CTA B/L  Abd: soft, ND, mildly tender RLQ without rebound/guarding, well-healed lower midline incision  Ext: warm, no edema T(C): 36.9 (04-17-19 @ 01:45), Max: 36.9 (04-17-19 @ 01:45)  HR: 109 (04-17-19 @ 01:45) (104 - 124)  BP: 130/87 (04-17-19 @ 01:45) (97/67 - 130/87)  RR: 18 (04-17-19 @ 01:45) (18 - 20)  SpO2: 99% (04-17-19 @ 01:45) (98% - 100%)    Gen: NAD  Neuro: AAOx3  HEENT: normocephalic, atraumatic, no scleral icterus  CV: S1, S2, RRR  Pulm: CTA B/L  Abd: soft, ND, mildly tender RLQ without rebound/guarding, well-healed lower midline incision  Ext: warm

## 2019-04-17 NOTE — H&P ADULT - NSHPLABSRESULTS_GEN_ALL_CORE
LABS                        14.5   12.84 )-----------( 402      ( 16 Apr 2019 18:18 )             47.8     04-16    137  |  97<L>  |  17  ----------------------------<  111<H>  4.4   |  21<L>  |  0.55    Ca    9.9      16 Apr 2019 18:18    TPro  7.6  /  Alb  3.8  /  TBili  0.3  /  DBili  x   /  AST  18  /  ALT  19  /  AlkPhos  202<H>  04-16    PT/INR - ( 17 Apr 2019 01:30 )   PT: 11.8 SEC;   INR: 1.06     PTT - ( 17 Apr 2019 01:30 )  PTT:31.4 SEC    Blood Gas Venous - Lactate: 2.1: Please note updated reference range. mmol/L (04.16.19 @ 18:18)    IMAGING  < from: CT Abdomen and Pelvis w/ IV Cont (04.16.19 @ 21:50) >    IMPRESSION:     High-grade small bowel obstruction with transition point within the right   lower abdomen/pelvis. Mild right hydroureteronephrosis to the level of   the right pelvic sidewall near the transition point.    Stable thickening of the sigmoid colon and rectum proximal to the rectal   anastomosis and presacral soft tissue thickening and compared to PET/CT   2/7/2019, which may reflect posttreatment change.    Bilateral external iliac and inguinal lymphadenopathy unchanged since   4/7/2019 and new since 2/7/2019.    Nonspecific thickening of the posterior left urinary bladder wall and   bladder trigone, which may reflect posttreatment change, however consider   urology follow-up to exclude underlying pathology.    < end of copied text > LABS                        14.5   12.84 )-----------( 402      ( 16 Apr 2019 18:18 )             47.8     04-16    137  |  97<L>  |  17  ----------------------------<  111<H>  4.4   |  21<L>  |  0.55    Ca    9.9      16 Apr 2019 18:18    TPro  7.6  /  Alb  3.8  /  TBili  0.3  /  DBili  x   /  AST  18  /  ALT  19  /  AlkPhos  202<H>  04-16    PT/INR - ( 17 Apr 2019 01:30 )   PT: 11.8 SEC;   INR: 1.06     PTT - ( 17 Apr 2019 01:30 )  PTT:31.4 SEC    Blood Gas Venous - Lactate: 2.1: Please note updated reference range. mmol/L (04.16.19 @ 18:18)    IMAGING  < from: US Duplex Venous Lower Ext Ltd, Right (04.07.19 @ 20:18) >    IMPRESSION:     Deep venous thrombosis above the knee involving the right femoral vein.    < end of copied text >    < from: CT Abdomen and Pelvis w/ IV Cont (04.16.19 @ 21:50) >    IMPRESSION:     High-grade small bowel obstruction with transition point within the right   lower abdomen/pelvis. Mild right hydroureteronephrosis to the level of   the right pelvic sidewall near the transition point.    Stable thickening of the sigmoid colon and rectum proximal to the rectal   anastomosis and presacral soft tissue thickening and compared to PET/CT   2/7/2019, which may reflect posttreatment change.    Bilateral external iliac and inguinal lymphadenopathy unchanged since   4/7/2019 and new since 2/7/2019.    Nonspecific thickening of the posterior left urinary bladder wall and   bladder trigone, which may reflect posttreatment change, however consider   urology follow-up to exclude underlying pathology.    < end of copied text >

## 2019-04-17 NOTE — H&P ADULT - NSICDXPASTMEDICALHX_GEN_ALL_CORE_FT
PAST MEDICAL HISTORY:  Cervical cancer     Colon neoplasm s/p LAR    DVT, lower extremity RLE, dx'd April 2019    Hemorrhoid     Hyperthyroidism     SBO (small bowel obstruction)

## 2019-04-17 NOTE — H&P ADULT - HISTORY OF PRESENT ILLNESS
53F w/hx of colorectal CA s/p LAR (2017, Dr. Armstrong) with mets to liver and cervix, s/p chemotherapy (FOLFOX, then FOLFIRI, last dose Jan 2019) and radiation, p/w 53F w/hx of colorectal CA s/p LAR (2017, Dr. Armstrong) with mets to liver and cervix, s/p chemotherapy (FOLFOX, then FOLFIRI, last dose Jan 2019) and radiation, p/w 1-day h/o R-sided abdominal pain associated with nausea and several episodes of nausea. She denies associated fever or chills, dysuria, CP, SOB. Last flatus and last BM 1 day PTA. In the ED, she was tachycardic to 120s, normotensive, afebrile. WBC 12.84, lactate 2.1. CT A/P demonstrates high-grade SBO with transition point in right hemiabdomen. The patient was treated at [facility] in Summer 2018 for SBO, which was managed nonoperatively.    Of note, patient was recently diagnosed with DVT of RLE for which she is taking Eliquis. She is followed by Dr. Kiran of heme/onc. Most recent PET CT in Feb 2019 showed no recurrence/spread of disease. Last chemo January 2019.

## 2019-04-17 NOTE — H&P ADULT - ATTENDING COMMENTS
I saw and examined the patient and agree with the above note.    Small Bowel Obstruction due to possible metastatic disease  -NPO/IVF  -Resuscitation and trend labs and urine output   -Pain control via PRN IV meds  -Small bowel follow through  ---After initial CT scan, oral contrast should be administered through the NGT(then clamped for 2 hours) and an abd xray should be obtained after 4-6 hours to evaluate if contrast passes obstruction and is visualized within the  If no passage into the colon at this time, an abd x ray should be ordered 24 hours from when the contrast was administered.  If oral contrast was given at the initial CT scan, obtain xrays at the same intervals without administering more oral contrast.  -Malignant small bowel obstruction protocol  -Operative indications include prolonged SBO, increasing pain/generalized peritonitis and/or concerning repeat imaging.    I spent 55min reviewing data, images and documentation as well as in care coordination.  Greater than 50% of my time was spent in discussion regarding pre- and post-operative care as well as risk and benefits of operative intervention.    Bonifacio hWite MD   Acute and Critical Care Surgery  (Cell: [phone])  Email: [email or web address]    The Acute Care Surgery (B Team) Attending Group Practice:  Dr. Leonardo Celeste, Dr. Imtiaz Skinner, Dr. Callie Finch, Dr. Bonifacio White    Urgent issues - spectra [phone] or [phone]  Nonurgent issues - [phone]  Patient appointments or after hours - [phone]

## 2019-04-17 NOTE — H&P ADULT - ASSESSMENT
53F w/hx of colorectal CA s/p LAR with mets to liver and cervix, s/p chemotherapy and radiation, now presenting with SBO.    - admit to surgery, Dr. White  - NPO/IVF  - NGT to LCWS (placed in ED with minimal output)  - strict I/O  - serial abdominal exams, no standing pain medication  - unclear if adhesive vs. 2/2 recurrent/peritoneal disease (no progression on recent PET in Feb 2019, however new external iliac and inguinal LAD on most recent scans); will initiate malignant SBO protocol with Reglan, Octreotide, Decadron  - SB follow through in AM  - TLovenox for recently diagnosed DVT; heme/onc follow up in AM  - d/w Dr. Christopher Cedeño, R4  [id number]

## 2019-04-17 NOTE — ED ADULT NURSE NOTE - OBJECTIVE STATEMENT
pt. received in TR-B from day team . pt. w/ hx. cervical ca , Pt. Vitals as noted, and in no acute distress. Port accessed by Day rn on 4/16. Pt. + SBO .

## 2019-04-18 LAB
ANION GAP SERPL CALC-SCNC: 13 MMO/L — SIGNIFICANT CHANGE UP (ref 7–14)
BUN SERPL-MCNC: 15 MG/DL — SIGNIFICANT CHANGE UP (ref 7–23)
CALCIUM SERPL-MCNC: 9.1 MG/DL — SIGNIFICANT CHANGE UP (ref 8.4–10.5)
CHLORIDE SERPL-SCNC: 96 MMOL/L — LOW (ref 98–107)
CO2 SERPL-SCNC: 26 MMOL/L — SIGNIFICANT CHANGE UP (ref 22–31)
CREAT SERPL-MCNC: 0.51 MG/DL — SIGNIFICANT CHANGE UP (ref 0.5–1.3)
GLUCOSE SERPL-MCNC: 111 MG/DL — HIGH (ref 70–99)
HCT VFR BLD CALC: 39.5 % — SIGNIFICANT CHANGE UP (ref 34.5–45)
HGB BLD-MCNC: 12.3 G/DL — SIGNIFICANT CHANGE UP (ref 11.5–15.5)
MAGNESIUM SERPL-MCNC: 2.3 MG/DL — SIGNIFICANT CHANGE UP (ref 1.6–2.6)
MCHC RBC-ENTMCNC: 27.4 PG — SIGNIFICANT CHANGE UP (ref 27–34)
MCHC RBC-ENTMCNC: 31.1 % — LOW (ref 32–36)
MCV RBC AUTO: 88 FL — SIGNIFICANT CHANGE UP (ref 80–100)
NRBC # FLD: 0 K/UL — SIGNIFICANT CHANGE UP (ref 0–0)
PHOSPHATE SERPL-MCNC: 3.9 MG/DL — SIGNIFICANT CHANGE UP (ref 2.5–4.5)
PLATELET # BLD AUTO: 370 K/UL — SIGNIFICANT CHANGE UP (ref 150–400)
PMV BLD: 10.5 FL — SIGNIFICANT CHANGE UP (ref 7–13)
POTASSIUM SERPL-MCNC: 3.9 MMOL/L — SIGNIFICANT CHANGE UP (ref 3.5–5.3)
POTASSIUM SERPL-SCNC: 3.9 MMOL/L — SIGNIFICANT CHANGE UP (ref 3.5–5.3)
RBC # BLD: 4.49 M/UL — SIGNIFICANT CHANGE UP (ref 3.8–5.2)
RBC # FLD: 13.9 % — SIGNIFICANT CHANGE UP (ref 10.3–14.5)
SODIUM SERPL-SCNC: 135 MMOL/L — SIGNIFICANT CHANGE UP (ref 135–145)
WBC # BLD: 9.53 K/UL — SIGNIFICANT CHANGE UP (ref 3.8–10.5)
WBC # FLD AUTO: 9.53 K/UL — SIGNIFICANT CHANGE UP (ref 3.8–10.5)

## 2019-04-18 PROCEDURE — 99222 1ST HOSP IP/OBS MODERATE 55: CPT

## 2019-04-18 PROCEDURE — 99231 SBSQ HOSP IP/OBS SF/LOW 25: CPT

## 2019-04-18 RX ORDER — ACETAMINOPHEN 500 MG
975 TABLET ORAL ONCE
Qty: 0 | Refills: 0 | Status: COMPLETED | OUTPATIENT
Start: 2019-04-18 | End: 2019-04-18

## 2019-04-18 RX ORDER — ACETAMINOPHEN 500 MG
1000 TABLET ORAL ONCE
Qty: 0 | Refills: 0 | Status: COMPLETED | OUTPATIENT
Start: 2019-04-18 | End: 2019-04-18

## 2019-04-18 RX ORDER — IBUPROFEN 200 MG
400 TABLET ORAL ONCE
Qty: 0 | Refills: 0 | Status: DISCONTINUED | OUTPATIENT
Start: 2019-04-18 | End: 2019-04-18

## 2019-04-18 RX ORDER — IBUPROFEN 200 MG
600 TABLET ORAL ONCE
Qty: 0 | Refills: 0 | Status: COMPLETED | OUTPATIENT
Start: 2019-04-18 | End: 2019-04-18

## 2019-04-18 RX ORDER — ACETAMINOPHEN 500 MG
650 TABLET ORAL EVERY 6 HOURS
Qty: 0 | Refills: 0 | Status: DISCONTINUED | OUTPATIENT
Start: 2019-04-18 | End: 2019-04-18

## 2019-04-18 RX ADMIN — ENOXAPARIN SODIUM 70 MILLIGRAM(S): 100 INJECTION SUBCUTANEOUS at 08:52

## 2019-04-18 RX ADMIN — OCTREOTIDE ACETATE 100 MICROGRAM(S): 200 INJECTION, SOLUTION INTRAVENOUS; SUBCUTANEOUS at 18:49

## 2019-04-18 RX ADMIN — DEXTROSE MONOHYDRATE, SODIUM CHLORIDE, AND POTASSIUM CHLORIDE 75 MILLILITER(S): 50; .745; 4.5 INJECTION, SOLUTION INTRAVENOUS at 14:43

## 2019-04-18 RX ADMIN — Medication 600 MILLIGRAM(S): at 15:40

## 2019-04-18 RX ADMIN — ENOXAPARIN SODIUM 70 MILLIGRAM(S): 100 INJECTION SUBCUTANEOUS at 18:49

## 2019-04-18 RX ADMIN — Medication 10 MILLIGRAM(S): at 07:10

## 2019-04-18 RX ADMIN — Medication 400 MILLIGRAM(S): at 00:24

## 2019-04-18 RX ADMIN — Medication 600 MILLIGRAM(S): at 23:42

## 2019-04-18 RX ADMIN — OCTREOTIDE ACETATE 100 MICROGRAM(S): 200 INJECTION, SOLUTION INTRAVENOUS; SUBCUTANEOUS at 02:11

## 2019-04-18 RX ADMIN — DEXTROSE MONOHYDRATE, SODIUM CHLORIDE, AND POTASSIUM CHLORIDE 125 MILLILITER(S): 50; .745; 4.5 INJECTION, SOLUTION INTRAVENOUS at 10:36

## 2019-04-18 RX ADMIN — Medication 1000 MILLIGRAM(S): at 01:10

## 2019-04-18 RX ADMIN — Medication 4 MILLIGRAM(S): at 18:49

## 2019-04-18 RX ADMIN — Medication 975 MILLIGRAM(S): at 19:50

## 2019-04-18 RX ADMIN — Medication 4 MILLIGRAM(S): at 07:10

## 2019-04-18 RX ADMIN — Medication 600 MILLIGRAM(S): at 14:42

## 2019-04-18 RX ADMIN — Medication 1000 MILLIGRAM(S): at 09:26

## 2019-04-18 RX ADMIN — OCTREOTIDE ACETATE 100 MICROGRAM(S): 200 INJECTION, SOLUTION INTRAVENOUS; SUBCUTANEOUS at 10:00

## 2019-04-18 RX ADMIN — Medication 975 MILLIGRAM(S): at 18:49

## 2019-04-18 RX ADMIN — Medication 400 MILLIGRAM(S): at 08:51

## 2019-04-18 RX ADMIN — Medication 10 MILLIGRAM(S): at 22:39

## 2019-04-18 RX ADMIN — Medication 10 MILLIGRAM(S): at 13:31

## 2019-04-18 NOTE — PROGRESS NOTE ADULT - ATTENDING COMMENTS
I have personally interviewed and examined this patient, reviewed pertinent labs and imaging, and discussed the case with colleagues, residents, and physician assistants on B Team rounds.            The Acute Care Surgery (B Team) Attending Group Practice:  Dr. Myles Prieto, Dr. Leonardo Celeste, Dr. Imtiaz Skinner, Dr. Callie Finch, Dr. Bonifacio White    urgent issues - spectra [phone] or [phone]  nonurgent issues - [phone]  patient appointments or afterhours - [phone]

## 2019-04-18 NOTE — PROGRESS NOTE ADULT - SUBJECTIVE AND OBJECTIVE BOX
Morning Surgical Progress Note  Patient is a 53y old  Female who presents with a chief complaint of SBO (17 Apr 2019 01:42)      SUBJECTIVE: Patient seen and examined at bedside with surgical team, patient reports right leg pain, she denies abdominal pain. She is passing gas but denies bowel movement.     Vital Signs Last 24 Hrs  T(C): 36.7 (18 Apr 2019 06:58), Max: 36.7 (17 Apr 2019 10:15)  T(F): 98.1 (18 Apr 2019 06:58), Max: 98.1 (17 Apr 2019 10:15)  HR: 83 (18 Apr 2019 06:58) (77 - 96)  BP: 114/65 (18 Apr 2019 06:58) (113/73 - 129/84)  BP(mean): --  RR: 16 (18 Apr 2019 06:58) (16 - 18)  SpO2: 97% (18 Apr 2019 06:58) (95% - 99%)I&O's Detail    17 Apr 2019 07:01  -  18 Apr 2019 07:00  --------------------------------------------------------  IN:  Total IN: 0 mL    OUT:    Voided: 200 mL  Total OUT: 200 mL    Total NET: -200 mL      MEDICATIONS  (STANDING):  dexamethasone  Injectable 4 milliGRAM(s) IV Push every 12 hours  dextrose 5% + sodium chloride 0.45% with potassium chloride 20 mEq/L 1000 milliLiter(s) (125 mL/Hr) IV Continuous <Continuous>  enoxaparin Injectable 70 milliGRAM(s) SubCutaneous two times a day  influenza   Vaccine 0.5 milliLiter(s) IntraMuscular once  metoclopramide Injectable 10 milliGRAM(s) IV Push every 8 hours  octreotide  Injectable 100 MICROGram(s) SubCutaneous every 8 hours    MEDICATIONS  (PRN):      Physical Exam  Constitutional: A&Ox3, NAD  Respiratory: unlabored  Gastrointestinal: NGT with minimal output, soft non-tender, non-distended  Extremity: B/L LE non-tender with no edema.     LABS:                        12.3   9.53  )-----------( 370      ( 18 Apr 2019 06:30 )             39.5     04-18    135  |  96<L>  |  15  ----------------------------<  111<H>  3.9   |  26  |  0.51    Ca    9.1      18 Apr 2019 06:30  Phos  3.9     04-18  Mg     2.3     04-18    TPro  7.6  /  Alb  3.8  /  TBili  0.3  /  DBili  x   /  AST  18  /  ALT  19  /  AlkPhos  202<H>  04-16    PT/INR - ( 17 Apr 2019 01:30 )   PT: 11.8 SEC;   INR: 1.06          PTT - ( 17 Apr 2019 01:30 )  PTT:31.4 SEC  LIVER FUNCTIONS - ( 16 Apr 2019 18:18 )  Alb: 3.8 g/dL / Pro: 7.6 g/dL / ALK PHOS: 202 u/L / ALT: 19 u/L / AST: 18 u/L / GGT: x

## 2019-04-18 NOTE — CONSULT NOTE ADULT - SUBJECTIVE AND OBJECTIVE BOX
hpi  53 year old woman with rectal cancer had met disease NINA after chemo last year had regional recurrence vagina retreated with chemo with response.  Off of therapy developed right thigh DVT and now abd pain unclear etiology followed by SBO  pmh sh fh unchanged comp ros passing flatus , bm 2 days ago  physical  NG in place  vs 98.1 83 114/65 16 97 sat  lungs clear  cor s1s2  abd soft mild diffusely tender  ext trace edema rle tender thigh  psych nl    data wbc 9530 hgb 12.3 hct 39 plt [id number]  cr 0.5   cxr ng in stomach  ct a/p with contrast  IMPRESSION:     High-grade small bowel obstruction with transition point within the right   lower abdomen/pelvis. Mild right hydroureteronephrosis to the level of   the right pelvic sidewall near the transition point.    Stable thickening of the sigmoid colon and rectum proximal to the rectal   anastomosis and presacral soft tissue thickening and compared to PET/CT   2/7/2019, which may reflect posttreatment change.    Bilateral external iliac and inguinal lymphadenopathy unchanged since   4/7/2019 and new since 2/7/2019.    Nonspecific thickening of the posterior left urinary bladder wall and   bladder trigone, which may reflect posttreatment change, however consider   urology follow-up to exclude underlying pathology.    These findings were discussed with Dr. Muhammad at 4/16/2019 9:50 PM by Dr. Castellanos with read back confirmation.

## 2019-04-18 NOTE — CONSULT NOTE ADULT - ASSESSMENT
Jennifer Lynn is a 53 y.o. woman with history of metastatic colorectal CA s/p LAR, currently admitted for SBO, also with a right femoral vein DVT. Vascular surgery consulted due to ongoing R leg pain despite therapeutic anticoagulation.     Plan:  - Will discuss with attending    Conner Sargent, PGY2  [id number] Jennifer Lynn is a 53 y.o. woman with history of metastatic colorectal CA s/p LAR, currently admitted for SBO, also with a right femoral vein DVT. Vascular surgery consulted due to ongoing R leg pain despite therapeutic anticoagulation. Patient having pain, but no sign of phlegmasia.     Plan:  - Agree with anticoagulation  - Encourage OOB and exercise  - Compression of leg and thigh with stocking and ACE wrap may help with pain  - No indication for thrombolysis at this time  - Plan discussed with Dr. Nury Ponce, vascular surgery fellow, on behalf of Dr. Mona Pedro.     Conner Sargent, PGY2  [id number]

## 2019-04-18 NOTE — CONSULT NOTE ADULT - SUBJECTIVE AND OBJECTIVE BOX
Vascular Surgery Consult    Consulting attending: Dr. Pedro      HPI: Jennifer Lynn is a 53 y.o. woman with history of colorectal CA s/p LAR (2017, Dr. Armstrong) with mets to liver and cervix, s/p chemotherapy (FOLFOX, then FOLFIRI, last dose Jan 2019) and radiation, who was admitted on 4/17 after presenting to the ED with 1 day of  R-sided abdominal pain associated, nausea, and vomiting. The patient was found to have a SBO, which is now resolved.     Patient also with a right femoral vein DVT. Vascular surgery consulted due to ongoing R leg pain despite therapeutic anticoagulation.     PAST MEDICAL HISTORY:  DVT, lower extremity  SBO (small bowel obstruction)  Hemorrhoid  Cervical cancer  Colon neoplasm  Hyperthyroidism      PAST SURGICAL HISTORY:  S/P colon resection  H/O exploratory laparotomy  No significant past surgical history      MEDICATIONS:  acetaminophen   Tablet .. 975 milliGRAM(s) Oral once PRN  dexamethasone  Injectable 4 milliGRAM(s) IV Push every 12 hours  dextrose 5% + sodium chloride 0.45% with potassium chloride 20 mEq/L 1000 milliLiter(s) IV Continuous <Continuous>  enoxaparin Injectable 70 milliGRAM(s) SubCutaneous two times a day  influenza   Vaccine 0.5 milliLiter(s) IntraMuscular once  metoclopramide Injectable 10 milliGRAM(s) IV Push every 8 hours  octreotide  Injectable 100 MICROGram(s) SubCutaneous every 8 hours      ALLERGIES:  No Known Allergies      VITALS & I/Os:  Vital Signs Last 24 Hrs  T(C): 36.4 (18 Apr 2019 13:23), Max: 36.8 (18 Apr 2019 10:00)  T(F): 97.6 (18 Apr 2019 13:23), Max: 98.2 (18 Apr 2019 10:00)  HR: 80 (18 Apr 2019 13:23) (77 - 96)  BP: 123/83 (18 Apr 2019 13:23) (114/65 - 129/84)  BP(mean): --  RR: 18 (18 Apr 2019 13:23) (16 - 18)  SpO2: 98% (18 Apr 2019 13:23) (95% - 99%)      I&O's Summary  17 Apr 2019 07:01  -  18 Apr 2019 07:00  --------------------------------------------------------  IN: 0 mL / OUT: 500 mL / NET: -500 mL    18 Apr 2019 07:01  -  18 Apr 2019 16:04  --------------------------------------------------------  IN: 500 mL / OUT: 200 mL / NET: 300 mL      PHYSICAL EXAM:  General: No acute distress  Respiratory: Nonlabored  Cardiovascular: RRR  Abdominal: Soft, nondistended, nontender. No rebound or guarding. No organomegaly, no palpable mass.  Extremities: Warm  Vascular:  - RUE:  - LUE:  - RLE:  - LLE:       LABS:                        12.3   9.53  )-----------( 370      ( 18 Apr 2019 06:30 )             39.5     04-18    135  |  96<L>  |  15  ----------------------------<  111<H>  3.9   |  26  |  0.51    Ca    9.1      18 Apr 2019 06:30  Phos  3.9     04-18  Mg     2.3     04-18    TPro  7.6  /  Alb  3.8  /  TBili  0.3  /  DBili  x   /  AST  18  /  ALT  19  /  AlkPhos  202<H>  04-16    Lactate:    PT/INR - ( 17 Apr 2019 01:30 )   PT: 11.8 SEC;   INR: 1.06       PTT - ( 17 Apr 2019 01:30 )  PTT:31.4 SEC      IMAGING:  US Duplex Venous Lower Ext Ltd, Right (04.07.19 @ 20:18)   There is thrombus involving the right proximal, mid and distal femoral   vein, which is noncompressible.    There is normal compressibility of the right common femoral and popliteal   veins. No calf vein thrombosis is detected.    The contralateral common femoral vein is patent.    Relative loss of phasicity involving the spectral waveforms within the   right popliteal vein.    IMPRESSION:     Deep venous thrombosis above the knee involving the right femoral vein.    CT Abdomen and Pelvis w/ IV Cont (04.16.19 @ 21:50)     LOWER CHEST: Within normal limits.    LIVER: Within normal limits.  BILE DUCTS: Normal caliber.  GALLBLADDER: Within normal limits.  SPLEEN: Within normal limits.  PANCREAS: Within normal limits.  ADRENALS: Within normal limits.  KIDNEYS/URETERS: Kidneys symmetrically enhance. Mild right   hydroureteronephrosis to the level of the right pelvic sidewall.  BLADDER: Nonspecific thickening of the posterior left urinary bladder   wall and bladder trigone.  REPRODUCTIVE ORGANS: Heterogeneity of the cervix and lower uterine   segment with questionable fluid in the endocervical canal and/or upper   vagina.    BOWEL/PERITONEUM: Multiple dilated loops of small bowel with transition   point to collapsed bowel in the right lower abdomen/pelvis (series 2,   image 77). No altered bowel wall enhancement, submucosal edema or   pneumatosis to indicate presence of ischemia. Mild mesenteric edema.   Residual contrast in the colon from prior examination. Thickening of the   sigmoid colonand rectum just proximal to the rectal anastomosis. Stable   thickening of the presacral soft tissues. Appendix is normal.    VESSELS:  Within normal limits. Patent mesenteric vessels.  RETROPERITONEUM: Stable mildly enlarged bilateral external iliacand   para-aortic lymph nodes.  ABDOMINAL WALL: Ventral abdominal scar. Stable bilateral inguinal   lymphadenopathy. Subcutaneous edema of the proximal right thigh.  BONES: Degenerative changes.    IMPRESSION:     High-grade small bowel obstruction with transition point within the right   lower abdomen/pelvis. Mild right hydroureteronephrosis to the level of   the right pelvic sidewall near the transition point.    Stable thickening of the sigmoid colon and rectum proximal to the rectal   anastomosis and presacral soft tissue thickening and compared to PET/CT   2/7/2019, which may reflect posttreatment change.    Bilateral external iliac and inguinal lymphadenopathy unchanged since   4/7/2019 and new since 2/7/2019.    Nonspecific thickening of the posterior left urinary bladder wall and   bladder trigone, which may reflect posttreatment change, however consider   urology follow-up to exclude underlying pathology. Vascular Surgery Consult    Consulting attending: Dr. Pedro      HPI: Jennifer Lynn is a 53 y.o. woman with history of colorectal CA s/p LAR (2017, Dr. Armstrong) with mets to liver and cervix, s/p chemotherapy (FOLFOX, then FOLFIRI, last dose Jan 2019) and radiation, who was admitted on 4/17 after presenting to the ED with 1 day of  R-sided abdominal pain associated, nausea, and vomiting. The patient was found to have a SBO, which is now resolved.     Patient also with a right femoral vein DVT. Vascular surgery consulted due to ongoing R leg pain despite therapeutic anticoagulation.     PAST MEDICAL HISTORY:  DVT, lower extremity  SBO (small bowel obstruction)  Hemorrhoid  Cervical cancer  Colon neoplasm  Hyperthyroidism      PAST SURGICAL HISTORY:  S/P colon resection  H/O exploratory laparotomy  No significant past surgical history      MEDICATIONS:  acetaminophen   Tablet .. 975 milliGRAM(s) Oral once PRN  dexamethasone  Injectable 4 milliGRAM(s) IV Push every 12 hours  dextrose 5% + sodium chloride 0.45% with potassium chloride 20 mEq/L 1000 milliLiter(s) IV Continuous <Continuous>  enoxaparin Injectable 70 milliGRAM(s) SubCutaneous two times a day  influenza   Vaccine 0.5 milliLiter(s) IntraMuscular once  metoclopramide Injectable 10 milliGRAM(s) IV Push every 8 hours  octreotide  Injectable 100 MICROGram(s) SubCutaneous every 8 hours      ALLERGIES:  No Known Allergies      VITALS & I/Os:  Vital Signs Last 24 Hrs  T(C): 36.4 (18 Apr 2019 13:23), Max: 36.8 (18 Apr 2019 10:00)  T(F): 97.6 (18 Apr 2019 13:23), Max: 98.2 (18 Apr 2019 10:00)  HR: 80 (18 Apr 2019 13:23) (77 - 96)  BP: 123/83 (18 Apr 2019 13:23) (114/65 - 129/84)  BP(mean): --  RR: 18 (18 Apr 2019 13:23) (16 - 18)  SpO2: 98% (18 Apr 2019 13:23) (95% - 99%)      I&O's Summary  17 Apr 2019 07:01  -  18 Apr 2019 07:00  --------------------------------------------------------  IN: 0 mL / OUT: 500 mL / NET: -500 mL    18 Apr 2019 07:01  -  18 Apr 2019 16:04  --------------------------------------------------------  IN: 500 mL / OUT: 200 mL / NET: 300 mL      PHYSICAL EXAM:  General: No acute distress  Respiratory: Nonlabored  Cardiovascular: RRR  Abdominal: Soft, nondistended, nontender. No rebound or guarding. No organomegaly, no palpable mass.  Extremities: Warm, no significant RLE swelling, no skin changes to RLE  Vascular:  - RLE: DP + PT + Pop + Fem pulse  - LLE: DP + PT + Pop + Fem pulse      LABS:                        12.3   9.53  )-----------( 370      ( 18 Apr 2019 06:30 )             39.5     04-18    135  |  96<L>  |  15  ----------------------------<  111<H>  3.9   |  26  |  0.51    Ca    9.1      18 Apr 2019 06:30  Phos  3.9     04-18  Mg     2.3     04-18    TPro  7.6  /  Alb  3.8  /  TBili  0.3  /  DBili  x   /  AST  18  /  ALT  19  /  AlkPhos  202<H>  04-16    Lactate:    PT/INR - ( 17 Apr 2019 01:30 )   PT: 11.8 SEC;   INR: 1.06       PTT - ( 17 Apr 2019 01:30 )  PTT:31.4 SEC      IMAGING:  US Duplex Venous Lower Ext Ltd, Right (04.07.19 @ 20:18)   There is thrombus involving the right proximal, mid and distal femoral   vein, which is noncompressible.    There is normal compressibility of the right common femoral and popliteal   veins. No calf vein thrombosis is detected.    The contralateral common femoral vein is patent.    Relative loss of phasicity involving the spectral waveforms within the   right popliteal vein.    IMPRESSION:     Deep venous thrombosis above the knee involving the right femoral vein.    CT Abdomen and Pelvis w/ IV Cont (04.16.19 @ 21:50)     LOWER CHEST: Within normal limits.    LIVER: Within normal limits.  BILE DUCTS: Normal caliber.  GALLBLADDER: Within normal limits.  SPLEEN: Within normal limits.  PANCREAS: Within normal limits.  ADRENALS: Within normal limits.  KIDNEYS/URETERS: Kidneys symmetrically enhance. Mild right   hydroureteronephrosis to the level of the right pelvic sidewall.  BLADDER: Nonspecific thickening of the posterior left urinary bladder   wall and bladder trigone.  REPRODUCTIVE ORGANS: Heterogeneity of the cervix and lower uterine   segment with questionable fluid in the endocervical canal and/or upper   vagina.    BOWEL/PERITONEUM: Multiple dilated loops of small bowel with transition   point to collapsed bowel in the right lower abdomen/pelvis (series 2,   image 77). No altered bowel wall enhancement, submucosal edema or   pneumatosis to indicate presence of ischemia. Mild mesenteric edema.   Residual contrast in the colon from prior examination. Thickening of the   sigmoid colonand rectum just proximal to the rectal anastomosis. Stable   thickening of the presacral soft tissues. Appendix is normal.    VESSELS:  Within normal limits. Patent mesenteric vessels.  RETROPERITONEUM: Stable mildly enlarged bilateral external iliacand   para-aortic lymph nodes.  ABDOMINAL WALL: Ventral abdominal scar. Stable bilateral inguinal   lymphadenopathy. Subcutaneous edema of the proximal right thigh.  BONES: Degenerative changes.    IMPRESSION:     High-grade small bowel obstruction with transition point within the right   lower abdomen/pelvis. Mild right hydroureteronephrosis to the level of   the right pelvic sidewall near the transition point.    Stable thickening of the sigmoid colon and rectum proximal to the rectal   anastomosis and presacral soft tissue thickening and compared to PET/CT   2/7/2019, which may reflect posttreatment change.    Bilateral external iliac and inguinal lymphadenopathy unchanged since   4/7/2019 and new since 2/7/2019.    Nonspecific thickening of the posterior left urinary bladder wall and   bladder trigone, which may reflect posttreatment change, however consider   urology follow-up to exclude underlying pathology.

## 2019-04-18 NOTE — PROGRESS NOTE ADULT - ASSESSMENT
53F w/hx of colorectal CA s/p LAR with mets to liver and cervix, s/p chemotherapy and radiation, now presenting with SBO. Patient being decompressed with NGT now passing flatus.    - NPO/IVF  - NGT clamped until 10am- check residual  - strict I/O  - serial abdominal exams, no standing pain medication  - malignant SBO protocol with Reglan, Octreotide, Decadron  - TLovenox for recently diagnosed DVT, takes Eliquis 5mg BID at home.   - d/w B-Team Surgery

## 2019-04-18 NOTE — CONSULT NOTE ADULT - ASSESSMENT
recurrent met rectal cancer regional recurrence s/p chemo  now off chemo developed DVT and sbo unclear if has active disease in abd/pelvis  consider resuming chemo as outpatient    SBO being treated with NG   given recurrent nature of gi complaints including severe constipation, would have GI evaluate for recommendations

## 2019-04-19 ENCOUNTER — TRANSCRIPTION ENCOUNTER (OUTPATIENT)
Age: 54
End: 2019-04-19

## 2019-04-19 LAB
ANION GAP SERPL CALC-SCNC: 13 MMO/L — SIGNIFICANT CHANGE UP (ref 7–14)
BUN SERPL-MCNC: 12 MG/DL — SIGNIFICANT CHANGE UP (ref 7–23)
CALCIUM SERPL-MCNC: 9 MG/DL — SIGNIFICANT CHANGE UP (ref 8.4–10.5)
CHLORIDE SERPL-SCNC: 100 MMOL/L — SIGNIFICANT CHANGE UP (ref 98–107)
CO2 SERPL-SCNC: 25 MMOL/L — SIGNIFICANT CHANGE UP (ref 22–31)
CREAT SERPL-MCNC: 0.54 MG/DL — SIGNIFICANT CHANGE UP (ref 0.5–1.3)
GLUCOSE SERPL-MCNC: 140 MG/DL — HIGH (ref 70–99)
HCT VFR BLD CALC: 37.2 % — SIGNIFICANT CHANGE UP (ref 34.5–45)
HGB BLD-MCNC: 11.8 G/DL — SIGNIFICANT CHANGE UP (ref 11.5–15.5)
MAGNESIUM SERPL-MCNC: 2.1 MG/DL — SIGNIFICANT CHANGE UP (ref 1.6–2.6)
MCHC RBC-ENTMCNC: 27.4 PG — SIGNIFICANT CHANGE UP (ref 27–34)
MCHC RBC-ENTMCNC: 31.7 % — LOW (ref 32–36)
MCV RBC AUTO: 86.3 FL — SIGNIFICANT CHANGE UP (ref 80–100)
NRBC # FLD: 0 K/UL — SIGNIFICANT CHANGE UP (ref 0–0)
PHOSPHATE SERPL-MCNC: 3.7 MG/DL — SIGNIFICANT CHANGE UP (ref 2.5–4.5)
PLATELET # BLD AUTO: 335 K/UL — SIGNIFICANT CHANGE UP (ref 150–400)
PMV BLD: 10.5 FL — SIGNIFICANT CHANGE UP (ref 7–13)
POTASSIUM SERPL-MCNC: 4.1 MMOL/L — SIGNIFICANT CHANGE UP (ref 3.5–5.3)
POTASSIUM SERPL-SCNC: 4.1 MMOL/L — SIGNIFICANT CHANGE UP (ref 3.5–5.3)
RBC # BLD: 4.31 M/UL — SIGNIFICANT CHANGE UP (ref 3.8–5.2)
RBC # FLD: 13.6 % — SIGNIFICANT CHANGE UP (ref 10.3–14.5)
SODIUM SERPL-SCNC: 138 MMOL/L — SIGNIFICANT CHANGE UP (ref 135–145)
WBC # BLD: 9.3 K/UL — SIGNIFICANT CHANGE UP (ref 3.8–10.5)
WBC # FLD AUTO: 9.3 K/UL — SIGNIFICANT CHANGE UP (ref 3.8–10.5)

## 2019-04-19 PROCEDURE — 71045 X-RAY EXAM CHEST 1 VIEW: CPT | Mod: 26

## 2019-04-19 RX ORDER — POLYETHYLENE GLYCOL 3350 17 G/17G
17 POWDER, FOR SOLUTION ORAL DAILY
Qty: 0 | Refills: 0 | Status: DISCONTINUED | OUTPATIENT
Start: 2019-04-19 | End: 2019-04-19

## 2019-04-19 RX ORDER — ONDANSETRON 8 MG/1
4 TABLET, FILM COATED ORAL ONCE
Qty: 0 | Refills: 0 | Status: COMPLETED | OUTPATIENT
Start: 2019-04-19 | End: 2019-04-19

## 2019-04-19 RX ORDER — ACETAMINOPHEN 500 MG
975 TABLET ORAL ONCE
Qty: 0 | Refills: 0 | Status: COMPLETED | OUTPATIENT
Start: 2019-04-19 | End: 2019-04-19

## 2019-04-19 RX ORDER — POLYETHYLENE GLYCOL 3350 17 G/17G
17 POWDER, FOR SOLUTION ORAL DAILY
Qty: 0 | Refills: 0 | Status: DISCONTINUED | OUTPATIENT
Start: 2019-04-19 | End: 2019-04-21

## 2019-04-19 RX ORDER — OXYCODONE AND ACETAMINOPHEN 5; 325 MG/1; MG/1
2 TABLET ORAL EVERY 6 HOURS
Qty: 0 | Refills: 0 | Status: DISCONTINUED | OUTPATIENT
Start: 2019-04-19 | End: 2019-04-19

## 2019-04-19 RX ORDER — ACETAMINOPHEN 500 MG
975 TABLET ORAL EVERY 6 HOURS
Qty: 0 | Refills: 0 | Status: DISCONTINUED | OUTPATIENT
Start: 2019-04-19 | End: 2019-04-21

## 2019-04-19 RX ORDER — SENNA PLUS 8.6 MG/1
2 TABLET ORAL AT BEDTIME
Qty: 0 | Refills: 0 | Status: DISCONTINUED | OUTPATIENT
Start: 2019-04-19 | End: 2019-04-21

## 2019-04-19 RX ORDER — SOD SULF/SODIUM/NAHCO3/KCL/PEG
4000 SOLUTION, RECONSTITUTED, ORAL ORAL ONCE
Qty: 0 | Refills: 0 | Status: COMPLETED | OUTPATIENT
Start: 2019-04-19 | End: 2019-04-19

## 2019-04-19 RX ORDER — DOCUSATE SODIUM 100 MG
100 CAPSULE ORAL
Qty: 0 | Refills: 0 | Status: DISCONTINUED | OUTPATIENT
Start: 2019-04-19 | End: 2019-04-21

## 2019-04-19 RX ORDER — OXYCODONE AND ACETAMINOPHEN 5; 325 MG/1; MG/1
1 TABLET ORAL EVERY 6 HOURS
Qty: 0 | Refills: 0 | Status: DISCONTINUED | OUTPATIENT
Start: 2019-04-19 | End: 2019-04-19

## 2019-04-19 RX ORDER — APIXABAN 2.5 MG/1
5 TABLET, FILM COATED ORAL EVERY 12 HOURS
Qty: 0 | Refills: 0 | Status: DISCONTINUED | OUTPATIENT
Start: 2019-04-19 | End: 2019-04-21

## 2019-04-19 RX ADMIN — OXYCODONE AND ACETAMINOPHEN 2 TABLET(S): 5; 325 TABLET ORAL at 16:45

## 2019-04-19 RX ADMIN — Medication 10 MILLIGRAM(S): at 10:30

## 2019-04-19 RX ADMIN — ONDANSETRON 4 MILLIGRAM(S): 8 TABLET, FILM COATED ORAL at 23:50

## 2019-04-19 RX ADMIN — OXYCODONE AND ACETAMINOPHEN 2 TABLET(S): 5; 325 TABLET ORAL at 09:00

## 2019-04-19 RX ADMIN — Medication 600 MILLIGRAM(S): at 00:12

## 2019-04-19 RX ADMIN — DEXTROSE MONOHYDRATE, SODIUM CHLORIDE, AND POTASSIUM CHLORIDE 75 MILLILITER(S): 50; .745; 4.5 INJECTION, SOLUTION INTRAVENOUS at 02:50

## 2019-04-19 RX ADMIN — OCTREOTIDE ACETATE 100 MICROGRAM(S): 200 INJECTION, SOLUTION INTRAVENOUS; SUBCUTANEOUS at 02:50

## 2019-04-19 RX ADMIN — APIXABAN 5 MILLIGRAM(S): 2.5 TABLET, FILM COATED ORAL at 19:36

## 2019-04-19 RX ADMIN — Medication 975 MILLIGRAM(S): at 04:44

## 2019-04-19 RX ADMIN — ENOXAPARIN SODIUM 70 MILLIGRAM(S): 100 INJECTION SUBCUTANEOUS at 07:41

## 2019-04-19 RX ADMIN — Medication 4000 MILLILITER(S): at 20:56

## 2019-04-19 RX ADMIN — OXYCODONE AND ACETAMINOPHEN 2 TABLET(S): 5; 325 TABLET ORAL at 15:45

## 2019-04-19 RX ADMIN — OXYCODONE AND ACETAMINOPHEN 2 TABLET(S): 5; 325 TABLET ORAL at 11:00

## 2019-04-19 RX ADMIN — POLYETHYLENE GLYCOL 3350 17 GRAM(S): 17 POWDER, FOR SOLUTION ORAL at 19:36

## 2019-04-19 RX ADMIN — Medication 100 MILLIGRAM(S): at 19:35

## 2019-04-19 RX ADMIN — Medication 975 MILLIGRAM(S): at 05:15

## 2019-04-19 RX ADMIN — Medication 300 UNIT(S): at 13:41

## 2019-04-19 NOTE — DISCHARGE NOTE NURSING/CASE MANAGEMENT/SOCIAL WORK - NSDCDPATPORTLINK_GEN_ALL_CORE
You can access the [facility] Portal, offered by [facility], by registering with the following website: [email or web address]

## 2019-04-19 NOTE — PROVIDER CONTACT NOTE (OTHER) - ASSESSMENT
Patient is A + O x 4, anxious. Family member at bedside. + Bowel sounds, abdomen is soft non-tender. Patient encouraged to increase po fluids and ambulate to facilitate BM. Patient declined.

## 2019-04-19 NOTE — DISCHARGE NOTE PROVIDER - CARE PROVIDERS DIRECT ADDRESSES
,DirectAddress_Unknown ,[email or web address]

## 2019-04-19 NOTE — DISCHARGE NOTE PROVIDER - NSDCFUADDINST_GEN_ALL_CORE_FT
DIET: Return to your usual diet.  NOTIFY YOUR SURGEON IF:  any fever (over 100.4 F) or chills, persistent nausea/vomiting, persistent diarrhea, or if your pain is not controlled on your discharge pain medications.  FOLLOW-UP: Please follow up with your primary care physician in one week regarding your hospitalization

## 2019-04-19 NOTE — DISCHARGE NOTE PROVIDER - CARE PROVIDER_API CALL
Bonifacio White)  Surgery; Surgical Critical Care  [address] [address] [place] [address]  Phone: [phone]  Fax: [phone]  Follow Up Time: 1 week Callie Finch (MD)  Surgery  [address] [facility] [address] [facility], [address] [id number]  Phone: [phone]  Fax: [phone]  Follow Up Time: 1 week

## 2019-04-19 NOTE — PROGRESS NOTE ADULT - SUBJECTIVE AND OBJECTIVE BOX
Morning Surgical Progress Note  Patient is a 53y old  Female who presents with a chief complaint of SBO (17 Apr 2019 01:42)    SUBJECTIVE: Patient seen and examined at bedside with surgical team, patient still endorsing severe right leg pain, she denies abdominal pain. +/-    Vital Signs (24 Hrs):  T(C): 36.3 (04-19-19 @ 07:34), Max: 36.8 (04-18-19 @ 10:00)  HR: 69 (04-19-19 @ 07:34) (69 - 81)  BP: 127/81 (04-19-19 @ 07:34) (117/62 - 127/81)  RR: 16 (04-19-19 @ 07:34) (16 - 18)  SpO2: 99% (04-19-19 @ 07:34) (95% - 99%)  Wt(kg): --  Daily     Daily     I&O's Summary    18 Apr 2019 07:01  -  19 Apr 2019 07:00  --------------------------------------------------------  IN: 1400 mL / OUT: 600 mL / NET: 800 mL    19 Apr 2019 07:01  -  19 Apr 2019 08:39  --------------------------------------------------------  IN: 0 mL / OUT: 200 mL / NET: -200 mL        Physical Exam  Constitutional: A&Ox3, NAD  Respiratory: unlabored  Gastrointestinal: NGT with minimal output, soft non-tender, non-distended  Extremity: B/L LE with no edema. R thigh erythematous and tender    LABS:                                      11.8   9.30  )-----------( 335      ( 19 Apr 2019 06:00 )             37.2       04-19    138  |  100  |  12  ----------------------------<  140<H>  4.1   |  25  |  0.54    Ca    9.0      19 Apr 2019 06:00  Phos  3.7     04-19  Mg     2.1     04-19

## 2019-04-19 NOTE — PROGRESS NOTE ADULT - ASSESSMENT
53F w/hx of colorectal CA s/p LAR with mets to liver and cervix, s/p chemotherapy and radiation, now presenting with SBO. Passing flatus.    - Possible discharge home today with outpt vascular follow up for DVT management  - Reg diet  - strict I/O  - SBO resolving  - TLovenox for recently diagnosed DVT, takes Eliquis 5mg BID at home.     [id number]  B Team Surgery

## 2019-04-19 NOTE — DISCHARGE NOTE PROVIDER - HOSPITAL COURSE
53F w/hx of colorectal CA s/p LAR (2017, Dr. Armstrong) with mets to liver and cervix, s/p chemotherapy (FOLFOX, then FOLFIRI, last dose Jan 2019) and radiation, p/w 1-day h/o R-sided abdominal pain associated with nausea and several episodes of nausea. She denies associated fever or chills, dysuria, CP, SOB. Last flatus and last BM 1 day PTA. In the ED, she was tachycardic to 120s, normotensive, afebrile. WBC 12.84, lactate 2.1. CT A/P demonstrates high-grade SBO with transition point in right nisha abdomen. The patient was treated at [facility] in Summer 2018 for SBO which was managed nonoperatively.        Of note, patient was recently diagnosed with DVT of RLE for which she is taking Eliquis. She is followed by Dr. Kiran of heme/onc. Most recent PET CT in Feb 2019 showed no recurrence/spread of disease. Last chemo January 2019.         Malignant protocol initiated and patient was treated with Decadron and Octreotide. NGT was placed for bowel rest.        Bowel function returned and patient was slowly advanced from clears to regular diet.        Patient treated with therapeutic Lovenox for right leg DVT, still complaining of right leg pain.     Patient seen by Vascular Surgeon while in the hospital and they recommended to continue    anticoagulation, leg and thigh compression and wrap with Ace bandage.        Patient will be discharged home on her home dose of Eliquis.        Patient is stable for discharge home will follow up with her Primary Doctor and her Oncologist. 53F w/hx of colorectal CA s/p LAR (2017, Dr. Armstrong) with mets to liver and cervix, s/p chemotherapy (FOLFOX, then FOLFIRI, last dose Jan 2019) and radiation, p/w 1-day h/o R-sided abdominal pain associated with nausea and several episodes of nausea. She denies associated fever or chills, dysuria, CP, SOB. Last flatus and last BM 1 day PTA. In the ED, she was tachycardic to 120s, normotensive, afebrile. WBC 12.84, lactate 2.1. CT A/P demonstrates high-grade SBO with transition point in right nisha abdomen. The patient was treated at [facility] in Summer 2018 for SBO which was managed nonoperatively.        Of note, patient was recently diagnosed with DVT of RLE for which she is taking Eliquis. She is followed by Dr. Kiran of heme/onc. Most recent PET CT in Feb 2019 showed no recurrence/spread of disease. Last chemo January 2019.         Malignant protocol initiated and patient was treated with Decadron and Octreotide. NGT was placed for bowel rest.        Bowel function returned and patient was slowly advanced from clears to regular diet.        Patient treated with therapeutic Lovenox for right leg DVT, still complaining of right leg pain.         Patient seen by Vascular Surgeon while in the hospital and they recommended to continue    anticoagulation, leg and thigh compression and wrap with Ace bandage. Patient is not a candidate for lysis given her active cancer,     so even if her thrombus has progressed there is no additional therapeutic intervention available, so no real utility in repeat duplex or ctv.     pain control - start home oxycodone        Patient is stable for discharge home will follow up with her Primary Doctor and her Oncologist. 53F w/hx of colorectal CA s/p LAR (2017, Dr. Armstrong) with mets to liver and cervix, s/p chemotherapy (FOLFOX, then FOLFIRI, last dose Jan 2019) and radiation, p/w 1-day h/o R-sided abdominal pain associated with nausea and several episodes of nausea. She denies associated fever or chills, dysuria, CP, SOB. Last flatus and last BM 1 day PTA. In the ED, she was tachycardic to 120s, normotensive, afebrile. WBC 12.84, lactate 2.1. CT A/P demonstrates high-grade SBO with transition point in right nisha abdomen. The patient was treated at [facility] in Summer 2018 for SBO which was managed nonoperatively.        Of note, patient was recently diagnosed with DVT of RLE for which she is taking Eliquis. She is followed by Dr. Kiran of heme/onc. Most recent PET CT in Feb 2019 showed no recurrence/spread of disease. Last chemo January 2019.         Malignant protocol initiated and patient was treated with Decadron and Octreotide. NGT was placed for bowel rest.        Bowel function returned and patient was slowly advanced from clears to regular diet.        Patient treated with therapeutic Lovenox for right leg DVT, still complaining of right leg pain.         Patient seen by Vascular Surgeon while in the hospital and they recommended to continue    anticoagulation, leg and thigh compression and wrap with Ace bandage. Patient is not a candidate for lysis given her active cancer,     so even if her thrombus has progressed there is no additional therapeutic intervention available, so no real utility in repeat duplex or ctv.     pain control - start home oxycodone        Patient is stable for discharge home will follow up with her Primary Doctor, her Oncologist and Dr Finch.

## 2019-04-19 NOTE — DISCHARGE NOTE PROVIDER - PROVIDER TOKENS
PROVIDER:[TOKEN:[[id number]],FOLLOWUP:[1 week]] PROVIDER:[TOKEN:[[id number]],FOLLOWUP:[1 week]]

## 2019-04-19 NOTE — PROVIDER CONTACT NOTE (OTHER) - SITUATION
Patient is having severe, cramping abdominal pains. No BM for several days, suppository given in AM followed by miralax in evening.

## 2019-04-19 NOTE — PROVIDER CONTACT NOTE (OTHER) - ACTION/TREATMENT ORDERED:
Dr. Martínez made aware, currently at bedside. Plan to treat with stronger laxative if no BM by 2100. Safety maintained.

## 2019-04-20 LAB — HCG SERPL-ACNC: 5.11 MIU/ML — SIGNIFICANT CHANGE UP

## 2019-04-20 PROCEDURE — 74018 RADEX ABDOMEN 1 VIEW: CPT | Mod: 26

## 2019-04-20 PROCEDURE — 99231 SBSQ HOSP IP/OBS SF/LOW 25: CPT

## 2019-04-20 RX ORDER — PANTOPRAZOLE SODIUM 20 MG/1
40 TABLET, DELAYED RELEASE ORAL
Qty: 0 | Refills: 0 | Status: DISCONTINUED | OUTPATIENT
Start: 2019-04-20 | End: 2019-04-21

## 2019-04-20 RX ORDER — ACETAMINOPHEN 500 MG
1000 TABLET ORAL ONCE
Qty: 0 | Refills: 0 | Status: COMPLETED | OUTPATIENT
Start: 2019-04-20 | End: 2019-04-20

## 2019-04-20 RX ORDER — SODIUM CHLORIDE 9 MG/ML
1000 INJECTION, SOLUTION INTRAVENOUS
Qty: 0 | Refills: 0 | Status: DISCONTINUED | OUTPATIENT
Start: 2019-04-20 | End: 2019-04-22

## 2019-04-20 RX ORDER — ONDANSETRON 8 MG/1
4 TABLET, FILM COATED ORAL ONCE
Qty: 0 | Refills: 0 | Status: COMPLETED | OUTPATIENT
Start: 2019-04-20 | End: 2019-04-20

## 2019-04-20 RX ADMIN — ONDANSETRON 4 MILLIGRAM(S): 8 TABLET, FILM COATED ORAL at 09:04

## 2019-04-20 RX ADMIN — SODIUM CHLORIDE 125 MILLILITER(S): 9 INJECTION, SOLUTION INTRAVENOUS at 21:55

## 2019-04-20 RX ADMIN — Medication 400 MILLIGRAM(S): at 21:54

## 2019-04-20 RX ADMIN — Medication 1000 MILLIGRAM(S): at 22:05

## 2019-04-20 RX ADMIN — SODIUM CHLORIDE 125 MILLILITER(S): 9 INJECTION, SOLUTION INTRAVENOUS at 09:04

## 2019-04-20 RX ADMIN — SODIUM CHLORIDE 100 MILLILITER(S): 9 INJECTION, SOLUTION INTRAVENOUS at 09:12

## 2019-04-20 RX ADMIN — APIXABAN 5 MILLIGRAM(S): 2.5 TABLET, FILM COATED ORAL at 12:55

## 2019-04-20 RX ADMIN — Medication 975 MILLIGRAM(S): at 16:11

## 2019-04-20 RX ADMIN — Medication 975 MILLIGRAM(S): at 16:41

## 2019-04-20 RX ADMIN — ONDANSETRON 4 MILLIGRAM(S): 8 TABLET, FILM COATED ORAL at 14:29

## 2019-04-20 NOTE — PROGRESS NOTE ADULT - SUBJECTIVE AND OBJECTIVE BOX
HPI:  Patient with h/o colon cancer since early 2017, had surgery, FOLFOX Avastin, with very good response, switched to FOLFIRI for allergic reaction followed by RT. Disease recurrence with mets to cervix, restarted chemo, last cycle In January 2019.  She was last admitted in 8/2018 with obstruction treated conservatively. Had Rt DVT 4/2019  Admitted again with SBO    PAST MEDICAL & SURGICAL HISTORY:  DVT, lower extremity: RLE, dx&#x27;d April 2019  SBO (small bowel obstruction)  Hemorrhoid  Cervical cancer  Colon neoplasm: s/p LAR  Hyperthyroidism  S/P colon resection  H/O exploratory laparotomy: 1/6/17, with LAR    Meds:  acetaminophen   Tablet .. 975 milliGRAM(s) Oral every 6 hours PRN Mild Pain (1 - 3), Moderate Pain (4 - 6), Severe Pain (7 - 10)  apixaban 5 milliGRAM(s) Oral every 12 hours  docusate sodium 100 milliGRAM(s) Oral two times a day  lactated ringers. 1000 milliLiter(s) IV Continuous <Continuous>  methimazole 5 milliGRAM(s) Oral daily  pantoprazole    Tablet 40 milliGRAM(s) Oral before breakfast  polyethylene glycol 3350 17 Gram(s) Oral daily  senna 2 Tablet(s) Oral at bedtime    Labs:  CBC Full  -  ( 19 Apr 2019 06:00 )  WBC Count : 9.30 K/uL  Hemoglobin : 11.8 g/dL  Hematocrit : 37.2 %  Platelet Count - Automated : 335 K/uL  Mean Cell Volume : 86.3 fL  Mean Cell Hemoglobin : 27.4 pg  Mean Cell Hemoglobin Concentration : 31.7 %  Auto Neutrophil # : x  Auto Lymphocyte # : x  Auto Monocyte # : x  Auto Eosinophil # : x  Auto Basophil # : x  Auto Neutrophil % : x  Auto Lymphocyte % : x  Auto Monocyte % : x  Auto Eosinophil % : x  Auto Basophil % : x    04-19    138  |  100  |  12  ----------------------------<  140<H>  4.1   |  25  |  0.54    Ca    9.0      19 Apr 2019 06:00  Phos  3.7     04-19  Mg     2.1     04-19        Radiology:     < from: CT Abdomen and Pelvis w/ IV Cont (04.16.19 @ 21:50) >  MPRESSION:     High-grade small bowel obstruction with transition point within the right   lower abdomen/pelvis. Mild right hydroureteronephrosis to the level of   the right pelvic sidewall near the transition point.    Stable thickening of the sigmoid colon and rectum proximal to the rectal   anastomosis and presacral soft tissue thickening and compared to PET/CT   2/7/2019, which may reflect posttreatment change.    Bilateral external iliac and inguinal lymphadenopathy unchanged since   4/7/2019 and new since 2/7/2019.    Nonspecific thickening of the posterior left urinary bladder wall and   bladder trigone, which may reflect posttreatment change, however consider   urology follow-up to exclude underlying pathology.    < end of copied text >          ROS:  No  fever  No lumps in neck or pain  No Sob or chest pain  No palpitations   No abdominal pain. Had emesis, no gas or BM yet  No weakness in extremities  No leg swelling      Vital Signs Last 24 Hrs  T(C): 36.9 (20 Apr 2019 17:43), Max: 36.9 (20 Apr 2019 09:08)  T(F): 98.4 (20 Apr 2019 17:43), Max: 98.4 (20 Apr 2019 09:08)  HR: 87 (20 Apr 2019 17:43) (87 - 107)  BP: 121/83 (20 Apr 2019 17:43) (106/79 - 131/94)  BP(mean): --  RR: 18 (20 Apr 2019 17:43) (18 - 20)  SpO2: 100% (20 Apr 2019 17:43) (96% - 100%)    Physical Exam:    AXO  Neck supple, no LN's  Chest: bilateral breath sounds, no wheeze or rales  CVS: regular heart rate without murmur  Abdomen: soft, BS+, no massess or organomegaly  CNS: no gross deficit  Ext: no edema

## 2019-04-20 NOTE — PROVIDER CONTACT NOTE (OTHER) - SITUATION
Pt had 2 episodes of green emesis; c/o pain to RLE r/t +dvt; Pt not passing gas and unable to tolerate PO meds

## 2019-04-20 NOTE — PROGRESS NOTE ADULT - ASSESSMENT
Patient with h/o colon cancer since early 2017, had surgery, FOLFOX Avastin, with very good response, switched to FOLFIRI for allergic reaction followed by RT. Disease recurrence with mets to cervix, restarted chemo, last cycle In January 2019.  She was last admitted in 8/2018 with obstruction treated conservatively. Had Rt DVT 4/2019  Admitted again with SBO. Will follow surgical plan for obstruction.  Will re-evaluate as out patient if she has disease progression needing resumption of chemo

## 2019-04-20 NOTE — PROGRESS NOTE ADULT - ATTENDING COMMENTS
I have personally interviewed and examined this patient, reviewed pertinent labs and imaging, and discussed the case with colleagues, residents, and physician assistants on B Team rounds.    Plan  still with nausea, denies flatus  sbft with gastrograffin  clear liquid diet      The Acute Care Surgery (B Team) Attending Group Practice:  Dr. Myles Prieto, Dr. Leonardo Celeste, Dr. Imtiaz Skinner, Dr. Callie Finch, Dr. Bonifacio White    urgent issues - spectra [phone] or [phone]  nonurgent issues - [phone]  patient appointments or afterhours - [phone]

## 2019-04-20 NOTE — PROGRESS NOTE ADULT - ASSESSMENT
53 F with SBO. Patient had been having GI function. However, last night, patient had an episode of emesis, with no GI function. Will obtain an AXR. Patient may have recurrent SBO, if symptoms are persistent.  -F/u AXR. If there are signs of re-obstruction, will stop Colace, senna, and Miralax.  -Will back her down to clears and restart IV fluids  -Will give Protonix for reflux symptoms  PAZ Driscoll  [id number]

## 2019-04-20 NOTE — PROGRESS NOTE ADULT - SUBJECTIVE AND OBJECTIVE BOX
Team B Surgery Progress Note    Patient had an episode of emesis. Denies abdominal pain. Denies passing flatus and having bowel movements. Patient also complains of reflux.    Tmax: 36.9 (04-20-19 @ 09:08)  HR: 107  BP: 106/79  RR: 20  SpO2: 96%    Output Summary    04-19-19  -  04-20-19  --------------------------------------------------------  OUT:    Voided: 450 mL  Total OUT: 450 mL    Gen: NAD  Lungs: Non-labored breathing  Heart: S1, S2  Abdomen: Soft, ND, NTP  Extremities: No deformities

## 2019-04-21 LAB
ANION GAP SERPL CALC-SCNC: 15 MMO/L — HIGH (ref 7–14)
BUN SERPL-MCNC: 18 MG/DL — SIGNIFICANT CHANGE UP (ref 7–23)
CALCIUM SERPL-MCNC: 8.8 MG/DL — SIGNIFICANT CHANGE UP (ref 8.4–10.5)
CHLORIDE SERPL-SCNC: 96 MMOL/L — LOW (ref 98–107)
CO2 SERPL-SCNC: 26 MMOL/L — SIGNIFICANT CHANGE UP (ref 22–31)
CREAT SERPL-MCNC: 0.49 MG/DL — LOW (ref 0.5–1.3)
GLUCOSE SERPL-MCNC: 133 MG/DL — HIGH (ref 70–99)
HCT VFR BLD CALC: 45.2 % — HIGH (ref 34.5–45)
HGB BLD-MCNC: 14.1 G/DL — SIGNIFICANT CHANGE UP (ref 11.5–15.5)
MAGNESIUM SERPL-MCNC: 2.3 MG/DL — SIGNIFICANT CHANGE UP (ref 1.6–2.6)
MCHC RBC-ENTMCNC: 26.7 PG — LOW (ref 27–34)
MCHC RBC-ENTMCNC: 31.2 % — LOW (ref 32–36)
MCV RBC AUTO: 85.6 FL — SIGNIFICANT CHANGE UP (ref 80–100)
NRBC # FLD: 0.02 K/UL — SIGNIFICANT CHANGE UP (ref 0–0)
PHOSPHATE SERPL-MCNC: 3.6 MG/DL — SIGNIFICANT CHANGE UP (ref 2.5–4.5)
PLATELET # BLD AUTO: 434 K/UL — HIGH (ref 150–400)
PMV BLD: 9.9 FL — SIGNIFICANT CHANGE UP (ref 7–13)
POTASSIUM SERPL-MCNC: 3.5 MMOL/L — SIGNIFICANT CHANGE UP (ref 3.5–5.3)
POTASSIUM SERPL-SCNC: 3.5 MMOL/L — SIGNIFICANT CHANGE UP (ref 3.5–5.3)
RBC # BLD: 5.28 M/UL — HIGH (ref 3.8–5.2)
RBC # FLD: 14.1 % — SIGNIFICANT CHANGE UP (ref 10.3–14.5)
SODIUM SERPL-SCNC: 137 MMOL/L — SIGNIFICANT CHANGE UP (ref 135–145)
WBC # BLD: 14.2 K/UL — HIGH (ref 3.8–10.5)
WBC # FLD AUTO: 14.2 K/UL — HIGH (ref 3.8–10.5)

## 2019-04-21 PROCEDURE — 74018 RADEX ABDOMEN 1 VIEW: CPT | Mod: 26

## 2019-04-21 PROCEDURE — 99231 SBSQ HOSP IP/OBS SF/LOW 25: CPT

## 2019-04-21 PROCEDURE — 74177 CT ABD & PELVIS W/CONTRAST: CPT | Mod: 26

## 2019-04-21 RX ORDER — PANTOPRAZOLE SODIUM 20 MG/1
40 TABLET, DELAYED RELEASE ORAL DAILY
Qty: 0 | Refills: 0 | Status: DISCONTINUED | OUTPATIENT
Start: 2019-04-21 | End: 2019-05-12

## 2019-04-21 RX ORDER — ACETAMINOPHEN 500 MG
1000 TABLET ORAL ONCE
Qty: 0 | Refills: 0 | Status: COMPLETED | OUTPATIENT
Start: 2019-04-21 | End: 2019-04-21

## 2019-04-21 RX ORDER — ENOXAPARIN SODIUM 100 MG/ML
70 INJECTION SUBCUTANEOUS
Qty: 0 | Refills: 0 | Status: DISCONTINUED | OUTPATIENT
Start: 2019-04-21 | End: 2019-05-02

## 2019-04-21 RX ORDER — ACETAMINOPHEN 500 MG
1000 TABLET ORAL ONCE
Qty: 0 | Refills: 0 | Status: DISCONTINUED | OUTPATIENT
Start: 2019-04-21 | End: 2019-04-21

## 2019-04-21 RX ORDER — ACETAMINOPHEN 500 MG
1000 TABLET ORAL ONCE
Qty: 0 | Refills: 0 | Status: COMPLETED | OUTPATIENT
Start: 2019-04-21 | End: 2019-04-22

## 2019-04-21 RX ORDER — SODIUM CHLORIDE 9 MG/ML
1000 INJECTION INTRAMUSCULAR; INTRAVENOUS; SUBCUTANEOUS ONCE
Qty: 0 | Refills: 0 | Status: COMPLETED | OUTPATIENT
Start: 2019-04-21 | End: 2019-04-21

## 2019-04-21 RX ORDER — ONDANSETRON 8 MG/1
4 TABLET, FILM COATED ORAL ONCE
Qty: 0 | Refills: 0 | Status: COMPLETED | OUTPATIENT
Start: 2019-04-21 | End: 2019-04-21

## 2019-04-21 RX ADMIN — SODIUM CHLORIDE 125 MILLILITER(S): 9 INJECTION, SOLUTION INTRAVENOUS at 08:13

## 2019-04-21 RX ADMIN — SODIUM CHLORIDE 125 MILLILITER(S): 9 INJECTION, SOLUTION INTRAVENOUS at 03:28

## 2019-04-21 RX ADMIN — Medication 1000 MILLIGRAM(S): at 18:00

## 2019-04-21 RX ADMIN — PANTOPRAZOLE SODIUM 40 MILLIGRAM(S): 20 TABLET, DELAYED RELEASE ORAL at 08:13

## 2019-04-21 RX ADMIN — Medication 1000 MILLIGRAM(S): at 12:00

## 2019-04-21 RX ADMIN — APIXABAN 5 MILLIGRAM(S): 2.5 TABLET, FILM COATED ORAL at 12:39

## 2019-04-21 RX ADMIN — Medication 400 MILLIGRAM(S): at 17:31

## 2019-04-21 RX ADMIN — Medication 400 MILLIGRAM(S): at 11:28

## 2019-04-21 RX ADMIN — APIXABAN 5 MILLIGRAM(S): 2.5 TABLET, FILM COATED ORAL at 00:32

## 2019-04-21 RX ADMIN — ONDANSETRON 4 MILLIGRAM(S): 8 TABLET, FILM COATED ORAL at 03:27

## 2019-04-21 RX ADMIN — SODIUM CHLORIDE 500 MILLILITER(S): 9 INJECTION INTRAMUSCULAR; INTRAVENOUS; SUBCUTANEOUS at 10:15

## 2019-04-21 NOTE — PROGRESS NOTE ADULT - ASSESSMENT
Patient with h/o colon cancer since early 2017, had surgery, FOLFOX Avastin, with very good response, switched to FOLFIRI for allergic reaction followed by RT. Disease recurrence with mets to cervix, restarted chemo, last cycle In January 2019.  She was last admitted in 8/2018 with obstruction treated conservatively. Had Rt DVT 4/2019  Admitted again with SBO. So far it is not resolving. CT noted. Patient NPO.  Will follow surgical plan for obstruction.  Will re-evaluate as out patient if she has disease progression needing resum

## 2019-04-21 NOTE — PROGRESS NOTE ADULT - ATTENDING COMMENTS
I have personally interviewed and examined this patient, reviewed pertinent labs and imaging, and discussed the case with colleagues, residents, and physician assistants on B Team rounds.    Plan  npo, replace ngt if continued emesis  repeat ct scan with po and iv contrast      The Acute Care Surgery (B Team) Attending Group Practice:  Dr. Myles Prieto, Dr. Leonardo Celeste, Dr. Imtiaz Skinner, Dr. Callie Finch, Dr. Bonifacio White    urgent issues - spectra [phone] or [phone]  nonurgent issues - [phone]  patient appointments or afterhours - [phone]

## 2019-04-21 NOTE — PROGRESS NOTE ADULT - SUBJECTIVE AND OBJECTIVE BOX
Team B Surgery Progress Note    Patient multiple episode of emesis O/N. Denies abdominal pain, reports left thigh pain. Denies passing flatus and having bowel movements.    Vital Signs (24 Hrs):  T(C): 36.8 (04-21-19 @ 06:02), Max: 36.9 (04-20-19 @ 09:08)  HR: 88 (04-21-19 @ 06:02) (87 - 107)  BP: 110/82 (04-21-19 @ 06:02) (103/72 - 121/83)  RR: 18 (04-21-19 @ 06:02) (18 - 20)  SpO2: 99% (04-21-19 @ 06:02) (95% - 100%)  Wt(kg): --  Daily     Daily     I&O's Summary    20 Apr 2019 07:01  -  21 Apr 2019 07:00  --------------------------------------------------------  IN: 1125 mL / OUT: 1150 mL / NET: -25 mL      Gen: NAD  Lungs: Non-labored breathing  Heart: S1, S2  Abdomen: Soft, ND, NTP  Extremities: No deformities                          14.1   14.20 )-----------( 434      ( 21 Apr 2019 05:30 )             45.2       04-21    137  |  96<L>  |  18  ----------------------------<  133<H>  3.5   |  26  |  0.49<L>    Ca    8.8      21 Apr 2019 05:30  Phos  3.6     04-21  Mg     2.3     04-21

## 2019-04-21 NOTE — PROVIDER CONTACT NOTE (OTHER) - ASSESSMENT
Pt is currently nauseous. Had episodes of vomiting and gagging overnight with light brown colored emesis.

## 2019-04-21 NOTE — PROGRESS NOTE ADULT - SUBJECTIVE AND OBJECTIVE BOX
HPI:  53F w/hx of colorectal CA s/p LAR (2017, Dr. Armstrong) with mets to liver and cervix, s/p chemotherapy (FOLFOX, then FOLFIRI, last dose Jan 2019) and radiation, p/w 1-day h/o R-sided abdominal pain associated with nausea and several episodes of nausea. She denies associated fever or chills, dysuria, CP, SOB. Last flatus and last BM 1 day PTA. In the ED, she was tachycardic to 120s, normotensive, afebrile. WBC 12.84, lactate 2.1. CT A/P demonstrates high-grade SBO with transition point in right hemiabdomen. The patient was treated at [facility] in Summer 2018 for SBO, which was managed nonoperatively.    Of note, patient was recently diagnosed with DVT of RLE for which she is taking Eliquis. She is followed by Dr. Kiran of heme/onc. Most recent PET CT in Feb 2019 showed no recurrence/spread of disease. Last chemo January 2019. (17 Apr 2019 01:42)    PAST MEDICAL & SURGICAL HISTORY:  DVT, lower extremity: RLE, dx&#x27;d April 2019  SBO (small bowel obstruction)  Hemorrhoid  Cervical cancer  Colon neoplasm: s/p LAR  Hyperthyroidism  S/P colon resection  H/O exploratory laparotomy: 1/6/17, with LAR    Medications:  acetaminophen  IVPB .. 1000 milliGRAM(s) IV Intermittent once  acetaminophen  IVPB .. 1000 milliGRAM(s) IV Intermittent once  apixaban 5 milliGRAM(s) Oral every 12 hours  lactated ringers. 1000 milliLiter(s) IV Continuous <Continuous>  methimazole 5 milliGRAM(s) Oral daily  pantoprazole  Injectable 40 milliGRAM(s) IV Push daily    Labs:  CBC Full  -  ( 21 Apr 2019 05:30 )  WBC Count : 14.20 K/uL  Hemoglobin : 14.1 g/dL  Hematocrit : 45.2 %  Platelet Count - Automated : 434 K/uL  Mean Cell Volume : 85.6 fL  Mean Cell Hemoglobin : 26.7 pg  Mean Cell Hemoglobin Concentration : 31.2 %  Auto Neutrophil # : x  Auto Lymphocyte # : x  Auto Monocyte # : x  Auto Eosinophil # : x  Auto Basophil # : x  Auto Neutrophil % : x  Auto Lymphocyte % : x  Auto Monocyte % : x  Auto Eosinophil % : x  Auto Basophil % : x    04-21    137  |  96<L>  |  18  ----------------------------<  133<H>  3.5   |  26  |  0.49<L>    Ca    8.8      21 Apr 2019 05:30  Phos  3.6     04-21  Mg     2.3     04-21        Radiology:             ROS:  Patient comfortable without distress  No SOB or chest pain  No palpitation  No abdominal pain, diarrhaea or constipation  No weakness of extremities  No skin changes or swelling of legs    Vital Signs Last 24 Hrs  T(C): 36.4 (21 Apr 2019 14:26), Max: 37.2 (21 Apr 2019 10:37)  T(F): 97.6 (21 Apr 2019 14:26), Max: 98.9 (21 Apr 2019 10:37)  HR: 86 (21 Apr 2019 14:26) (86 - 105)  BP: 116/88 (21 Apr 2019 14:26) (103/72 - 129/87)  BP(mean): --  RR: 18 (21 Apr 2019 14:26) (17 - 18)  SpO2: 100% (21 Apr 2019 14:26) (95% - 100%)    Physical exam:  Patient alert and oriented  No distress  CVS: S1, S2 regular or murmur  Chest: bilateral breath sound without rales  Abdomen: soft, not tender, no organomegaly or masses  No focal neuro deficit  No edema      Assessment and Plan: Patient with h/o colon cancer since early 2017, had surgery, FOLFOX Avastin, with very good response, switched to FOLFIRI for allergic reaction followed by RT. Disease recurrence with mets to cervix, restarted chemo, last cycle In January 2019.  She was last admitted in 8/2018 with obstruction treated conservatively. Had Rt DVT 4/2019  Admitted again with SBO    PAST MEDICAL & SURGICAL HISTORY:  DVT, lower extremity: RLE, dx&#x27;d April 2019  SBO (small bowel obstruction)  Hemorrhoid  Cervical cancer  Colon neoplasm: s/p LAR  Hyperthyroidism  S/P colon resection  H/O exploratory laparotomy: 1/6/17, with LAR    Medications:  acetaminophen  IVPB .. 1000 milliGRAM(s) IV Intermittent once  acetaminophen  IVPB .. 1000 milliGRAM(s) IV Intermittent once  apixaban 5 milliGRAM(s) Oral every 12 hours  lactated ringers. 1000 milliLiter(s) IV Continuous <Continuous>  methimazole 5 milliGRAM(s) Oral daily  pantoprazole  Injectable 40 milliGRAM(s) IV Push daily    Labs:  CBC Full  -  ( 21 Apr 2019 05:30 )  WBC Count : 14.20 K/uL  Hemoglobin : 14.1 g/dL  Hematocrit : 45.2 %  Platelet Count - Automated : 434 K/uL  Mean Cell Volume : 85.6 fL  Mean Cell Hemoglobin : 26.7 pg  Mean Cell Hemoglobin Concentration : 31.2 %  Auto Neutrophil # : x  Auto Lymphocyte # : x  Auto Monocyte # : x  Auto Eosinophil # : x  Auto Basophil # : x  Auto Neutrophil % : x  Auto Lymphocyte % : x  Auto Monocyte % : x  Auto Eosinophil % : x  Auto Basophil % : x    04-21    137  |  96<L>  |  18  ----------------------------<  133<H>  3.5   |  26  |  0.49<L>    Ca    8.8      21 Apr 2019 05:30  Phos  3.6     04-21  Mg     2.3     04-21        Radiology:     < from: CT Abdomen and Pelvis w/ Oral Cont and w/ IV Cont (04.21.19 @ 16:31) >  IMPRESSION: Small bowel obstruction with transition point in the right   hemipelvis as on prior abdominal CTs. Interval increase in small bowel   distention. No pneumatosis or interloop ascites.    < end of copied text >          ROS:  No SOB or chest pain  No palpitation  Oral contrast stayed without vomiting, at preseent NPO, no flatus or BM  No weakness of extremities  No skin changes or swelling of legs    Vital Signs Last 24 Hrs  T(C): 36.4 (21 Apr 2019 14:26), Max: 37.2 (21 Apr 2019 10:37)  T(F): 97.6 (21 Apr 2019 14:26), Max: 98.9 (21 Apr 2019 10:37)  HR: 86 (21 Apr 2019 14:26) (86 - 105)  BP: 116/88 (21 Apr 2019 14:26) (103/72 - 129/87)  BP(mean): --  RR: 18 (21 Apr 2019 14:26) (17 - 18)  SpO2: 100% (21 Apr 2019 14:26) (95% - 100%)    Physical exam:  Patient alert and oriented  CVS: S1, S2 regular or murmur  Chest: bilateral breath sound without rales  Abdomen: soft,  distension  No focal neuro deficit  No edema      Assessment and Plan:

## 2019-04-21 NOTE — PROGRESS NOTE ADULT - ASSESSMENT
53 F with SBO. Patient had been having GI function. However, last night, patient had an episode of emesis, with no GI function. AXR demonstrating passage of contrast through to rectum, consistent with partial SBO.    -Stop Colace, senna, and Miralax.  -NPO on IV fluids  -Protonix for reflux symptoms  -Enema this morning  -Monitor WBC  -Meds changed to IV    B Team  [id number]

## 2019-04-21 NOTE — PROVIDER CONTACT NOTE (OTHER) - SITUATION
For elza Lynn, Jennifer Lorenzana For pt Jennifer Lynn Rm [id number]. Pt is currently naus    . Ordered for CT of abd with oral and IV contrast. Pt is not able to hold down PO very well, c/o that it makes her vomit

## 2019-04-22 LAB
ANION GAP SERPL CALC-SCNC: 15 MMO/L — HIGH (ref 7–14)
BUN SERPL-MCNC: 19 MG/DL — SIGNIFICANT CHANGE UP (ref 7–23)
CALCIUM SERPL-MCNC: 8.3 MG/DL — LOW (ref 8.4–10.5)
CHLORIDE SERPL-SCNC: 100 MMOL/L — SIGNIFICANT CHANGE UP (ref 98–107)
CO2 SERPL-SCNC: 24 MMOL/L — SIGNIFICANT CHANGE UP (ref 22–31)
CREAT SERPL-MCNC: 0.5 MG/DL — SIGNIFICANT CHANGE UP (ref 0.5–1.3)
GLUCOSE SERPL-MCNC: 93 MG/DL — SIGNIFICANT CHANGE UP (ref 70–99)
HCT VFR BLD CALC: 39.9 % — SIGNIFICANT CHANGE UP (ref 34.5–45)
HGB BLD-MCNC: 12.7 G/DL — SIGNIFICANT CHANGE UP (ref 11.5–15.5)
MAGNESIUM SERPL-MCNC: 2.1 MG/DL — SIGNIFICANT CHANGE UP (ref 1.6–2.6)
MCHC RBC-ENTMCNC: 27.3 PG — SIGNIFICANT CHANGE UP (ref 27–34)
MCHC RBC-ENTMCNC: 31.8 % — LOW (ref 32–36)
MCV RBC AUTO: 85.8 FL — SIGNIFICANT CHANGE UP (ref 80–100)
NRBC # FLD: 0 K/UL — SIGNIFICANT CHANGE UP (ref 0–0)
PHOSPHATE SERPL-MCNC: 3.4 MG/DL — SIGNIFICANT CHANGE UP (ref 2.5–4.5)
PLATELET # BLD AUTO: 388 K/UL — SIGNIFICANT CHANGE UP (ref 150–400)
PMV BLD: 9.9 FL — SIGNIFICANT CHANGE UP (ref 7–13)
POTASSIUM SERPL-MCNC: 3.6 MMOL/L — SIGNIFICANT CHANGE UP (ref 3.5–5.3)
POTASSIUM SERPL-SCNC: 3.6 MMOL/L — SIGNIFICANT CHANGE UP (ref 3.5–5.3)
RBC # BLD: 4.65 M/UL — SIGNIFICANT CHANGE UP (ref 3.8–5.2)
RBC # FLD: 14.2 % — SIGNIFICANT CHANGE UP (ref 10.3–14.5)
SODIUM SERPL-SCNC: 139 MMOL/L — SIGNIFICANT CHANGE UP (ref 135–145)
WBC # BLD: 12.5 K/UL — HIGH (ref 3.8–10.5)
WBC # FLD AUTO: 12.5 K/UL — HIGH (ref 3.8–10.5)

## 2019-04-22 PROCEDURE — 99231 SBSQ HOSP IP/OBS SF/LOW 25: CPT

## 2019-04-22 PROCEDURE — 74018 RADEX ABDOMEN 1 VIEW: CPT | Mod: 26

## 2019-04-22 RX ORDER — SODIUM CHLORIDE 9 MG/ML
1000 INJECTION, SOLUTION INTRAVENOUS
Qty: 0 | Refills: 0 | Status: DISCONTINUED | OUTPATIENT
Start: 2019-04-22 | End: 2019-04-24

## 2019-04-22 RX ORDER — METOCLOPRAMIDE HCL 10 MG
10 TABLET ORAL EVERY 6 HOURS
Qty: 0 | Refills: 0 | Status: DISCONTINUED | OUTPATIENT
Start: 2019-04-22 | End: 2019-04-25

## 2019-04-22 RX ORDER — DEXAMETHASONE 0.5 MG/5ML
10 ELIXIR ORAL EVERY 8 HOURS
Qty: 0 | Refills: 0 | Status: DISCONTINUED | OUTPATIENT
Start: 2019-04-22 | End: 2019-04-25

## 2019-04-22 RX ORDER — OCTREOTIDE ACETATE 200 UG/ML
100 INJECTION, SOLUTION INTRAVENOUS; SUBCUTANEOUS THREE TIMES A DAY
Qty: 0 | Refills: 0 | Status: DISCONTINUED | OUTPATIENT
Start: 2019-04-22 | End: 2019-04-25

## 2019-04-22 RX ORDER — ACETAMINOPHEN 500 MG
1000 TABLET ORAL ONCE
Qty: 0 | Refills: 0 | Status: COMPLETED | OUTPATIENT
Start: 2019-04-22 | End: 2019-04-22

## 2019-04-22 RX ORDER — DEXAMETHASONE 0.5 MG/5ML
10 ELIXIR ORAL EVERY 8 HOURS
Qty: 0 | Refills: 0 | Status: DISCONTINUED | OUTPATIENT
Start: 2019-04-22 | End: 2019-04-22

## 2019-04-22 RX ORDER — KETOROLAC TROMETHAMINE 30 MG/ML
15 SYRINGE (ML) INJECTION ONCE
Qty: 0 | Refills: 0 | Status: DISCONTINUED | OUTPATIENT
Start: 2019-04-22 | End: 2019-04-22

## 2019-04-22 RX ADMIN — Medication 1000 MILLIGRAM(S): at 00:53

## 2019-04-22 RX ADMIN — Medication 1000 MILLIGRAM(S): at 10:58

## 2019-04-22 RX ADMIN — Medication 10 MILLIGRAM(S): at 17:12

## 2019-04-22 RX ADMIN — Medication 102 MILLIGRAM(S): at 14:31

## 2019-04-22 RX ADMIN — PANTOPRAZOLE SODIUM 40 MILLIGRAM(S): 20 TABLET, DELAYED RELEASE ORAL at 11:49

## 2019-04-22 RX ADMIN — ENOXAPARIN SODIUM 70 MILLIGRAM(S): 100 INJECTION SUBCUTANEOUS at 06:27

## 2019-04-22 RX ADMIN — Medication 400 MILLIGRAM(S): at 00:20

## 2019-04-22 RX ADMIN — Medication 10 MILLIGRAM(S): at 11:49

## 2019-04-22 RX ADMIN — Medication 400 MILLIGRAM(S): at 10:20

## 2019-04-22 RX ADMIN — ENOXAPARIN SODIUM 70 MILLIGRAM(S): 100 INJECTION SUBCUTANEOUS at 17:13

## 2019-04-22 RX ADMIN — Medication 15 MILLIGRAM(S): at 15:51

## 2019-04-22 RX ADMIN — SODIUM CHLORIDE 100 MILLILITER(S): 9 INJECTION, SOLUTION INTRAVENOUS at 08:36

## 2019-04-22 RX ADMIN — OCTREOTIDE ACETATE 100 MICROGRAM(S): 200 INJECTION, SOLUTION INTRAVENOUS; SUBCUTANEOUS at 14:31

## 2019-04-22 RX ADMIN — Medication 10 MILLIGRAM(S): at 23:36

## 2019-04-22 RX ADMIN — OCTREOTIDE ACETATE 100 MICROGRAM(S): 200 INJECTION, SOLUTION INTRAVENOUS; SUBCUTANEOUS at 21:39

## 2019-04-22 RX ADMIN — Medication 1000 MILLIGRAM(S): at 23:00

## 2019-04-22 RX ADMIN — Medication 400 MILLIGRAM(S): at 22:18

## 2019-04-22 RX ADMIN — Medication 102 MILLIGRAM(S): at 21:40

## 2019-04-22 NOTE — PROGRESS NOTE ADULT - SUBJECTIVE AND OBJECTIVE BOX
Morning Surgical Progress Note  Patient is a 53y old  Female who presents with a chief complaint of SBO (21 Apr 2019 17:21)      SUBJECTIVE: Patient seen and examined at bedside with surgical team, patient complains she has not had a bowel movement in a long time and has nausea with no vomiting, denies flatus    Vital Signs Last 24 Hrs  T(C): 36.8 (22 Apr 2019 06:30), Max: 37.2 (21 Apr 2019 10:37)  T(F): 98.2 (22 Apr 2019 06:30), Max: 98.9 (21 Apr 2019 10:37)  HR: 91 (22 Apr 2019 06:30) (83 - 92)  BP: 124/81 (22 Apr 2019 06:30) (115/84 - 135/84)  BP(mean): --  RR: 18 (22 Apr 2019 06:30) (17 - 18)  SpO2: 98% (22 Apr 2019 06:30) (94% - 100%)I&O's Detail    21 Apr 2019 07:01  -  22 Apr 2019 07:00  --------------------------------------------------------  IN:    IV PiggyBack: 1100 mL    lactated ringers.: 2250 mL    Oral Fluid: 300 mL  Total IN: 3650 mL    OUT:    Voided: 600 mL  Total OUT: 600 mL    Total NET: 3050 mL      MEDICATIONS  (STANDING):  dexamethasone  IVPB 10 milliGRAM(s) IV Intermittent every 8 hours  dextrose 5% + sodium chloride 0.45% 1000 milliLiter(s) (100 mL/Hr) IV Continuous <Continuous>  enoxaparin Injectable 70 milliGRAM(s) SubCutaneous two times a day  methimazole 5 milliGRAM(s) Oral daily  metoclopramide Injectable 10 milliGRAM(s) IV Push every 6 hours  octreotide  Injectable 100 MICROGram(s) IV Push three times a day  pantoprazole  Injectable 40 milliGRAM(s) IV Push daily    MEDICATIONS  (PRN):  acetaminophen  IVPB .. 1000 milliGRAM(s) IV Intermittent once PRN Mild Pain (1 - 3), Moderate Pain (4 - 6), Severe Pain (7 - 10)      Physical Exam  Constitutional: A&Ox3, NAD  Gastrointestinal: soft nontender distended  Ext: RLE, thigh edematous and tender, lower leg nonedematous    LABS:                        12.7   12.50 )-----------( 388      ( 22 Apr 2019 06:40 )             39.9     04-22    139  |  100  |  19  ----------------------------<  93  3.6   |  24  |  0.50    Ca    8.3<L>      22 Apr 2019 06:40  Phos  3.4     04-22  Mg     2.1     04-22

## 2019-04-22 NOTE — PROGRESS NOTE ADULT - ATTENDING COMMENTS
I have personally interviewed and examined this patient, reviewed pertinent labs and imaging, and discussed the case with colleagues, residents, and physician assistants on B Team rounds.  Reviewed repeat CT scan with radiology attending. Some concern for an infiltrative process in RLQ near area of transition point, also involving the ureter. This is concerning for a malignant sbo, although not definitive. Discussed with family - will try malignant sbo protocol with serial exams, may consider exploratory surgery in a few days if no resolution. Discussed that if worsening pain, might need to operate sooner. Patient with less nausea and vomiting now than over the weekend. c/o RLE pain from known dvt.   Exam  NAD  abdomen distended, nontender          The Acute Care Surgery (B Team) Attending Group Practice:  Dr. Myles Prieto, Dr. Leonardo Celeste, Dr. Imtiaz Skinner, Dr. Callie Finch, Dr. Bonifacio White    urgent issues - spectra [phone] or [phone]  nonurgent issues - [phone]  patient appointments or afterhours - [phone] I have personally interviewed and examined this patient, reviewed pertinent labs and imaging, and discussed the case with colleagues, residents, and physician assistants on B Team rounds.  Reviewed repeat CT scan with radiology attending. Some concern for an infiltrative process in RLQ near area of transition point, also involving the ureter. This is concerning for a malignant sbo, although not definitive. Discussed with family - will try malignant sbo protocol with serial exams, may consider exploratory surgery in a few days if no resolution. Discussed that if worsening pain, might need to operate sooner. Patient with less nausea and vomiting now than over the weekend. c/o RLE pain from known dvt.   Exam  NAD  abdomen distended, nontender    Plan  malignant sbo protocol  serial exams  may need to consider parenteral nutrition if no progress on enteral feeding by next week      The Acute Care Surgery (B Team) Attending Group Practice:  Dr. Myles Prieto, Dr. Leonardo Celeste, Dr. Imtiaz Skinner, Dr. Callie Finch, Dr. Bonifacio White    urgent issues - spectra [phone] or [phone]  nonurgent issues - [phone]  patient appointments or afterhours - [phone]

## 2019-04-22 NOTE — PROGRESS NOTE ADULT - ASSESSMENT
53 F with SBO. Patient had been having GI function. However, last night, patient had an episode of emesis, with no GI function. AXR demonstrating passage of contrast through to rectum, consistent with partial SBO.    -NPO on IV fluids change to MIVF  - Protonix for reflux symptoms  - Start octreotide, decadron and reglan for possible malignant sbo  - Obtain kub to monitor progression of contrast  - Ct to be reviewed by attending and radiology  - Pain control of RLE 2/2 DVT  - oob  - d/w b team

## 2019-04-22 NOTE — PROVIDER CONTACT NOTE (OTHER) - ACTION/TREATMENT ORDERED:
B team Heidi made aware of pt vomit x1. No further action required at this time as per Heidi TRUJILLO team/

## 2019-04-23 LAB
ANION GAP SERPL CALC-SCNC: 13 MMO/L — SIGNIFICANT CHANGE UP (ref 7–14)
BUN SERPL-MCNC: 14 MG/DL — SIGNIFICANT CHANGE UP (ref 7–23)
CALCIUM SERPL-MCNC: 8.7 MG/DL — SIGNIFICANT CHANGE UP (ref 8.4–10.5)
CHLORIDE SERPL-SCNC: 99 MMOL/L — SIGNIFICANT CHANGE UP (ref 98–107)
CO2 SERPL-SCNC: 24 MMOL/L — SIGNIFICANT CHANGE UP (ref 22–31)
CREAT SERPL-MCNC: 0.51 MG/DL — SIGNIFICANT CHANGE UP (ref 0.5–1.3)
GLUCOSE SERPL-MCNC: 180 MG/DL — HIGH (ref 70–99)
HCT VFR BLD CALC: 37.5 % — SIGNIFICANT CHANGE UP (ref 34.5–45)
HGB BLD-MCNC: 12.1 G/DL — SIGNIFICANT CHANGE UP (ref 11.5–15.5)
MAGNESIUM SERPL-MCNC: 2.1 MG/DL — SIGNIFICANT CHANGE UP (ref 1.6–2.6)
MCHC RBC-ENTMCNC: 27.4 PG — SIGNIFICANT CHANGE UP (ref 27–34)
MCHC RBC-ENTMCNC: 32.3 % — SIGNIFICANT CHANGE UP (ref 32–36)
MCV RBC AUTO: 84.8 FL — SIGNIFICANT CHANGE UP (ref 80–100)
NRBC # FLD: 0 K/UL — SIGNIFICANT CHANGE UP (ref 0–0)
PHOSPHATE SERPL-MCNC: 3.6 MG/DL — SIGNIFICANT CHANGE UP (ref 2.5–4.5)
PLATELET # BLD AUTO: 371 K/UL — SIGNIFICANT CHANGE UP (ref 150–400)
PMV BLD: 10.4 FL — SIGNIFICANT CHANGE UP (ref 7–13)
POTASSIUM SERPL-MCNC: 4.1 MMOL/L — SIGNIFICANT CHANGE UP (ref 3.5–5.3)
POTASSIUM SERPL-SCNC: 4.1 MMOL/L — SIGNIFICANT CHANGE UP (ref 3.5–5.3)
RBC # BLD: 4.42 M/UL — SIGNIFICANT CHANGE UP (ref 3.8–5.2)
RBC # FLD: 13.6 % — SIGNIFICANT CHANGE UP (ref 10.3–14.5)
SODIUM SERPL-SCNC: 136 MMOL/L — SIGNIFICANT CHANGE UP (ref 135–145)
WBC # BLD: 7.8 K/UL — SIGNIFICANT CHANGE UP (ref 3.8–10.5)
WBC # FLD AUTO: 7.8 K/UL — SIGNIFICANT CHANGE UP (ref 3.8–10.5)

## 2019-04-23 PROCEDURE — 99231 SBSQ HOSP IP/OBS SF/LOW 25: CPT | Mod: GC

## 2019-04-23 RX ORDER — ACETAMINOPHEN 500 MG
1000 TABLET ORAL ONCE
Qty: 0 | Refills: 0 | Status: COMPLETED | OUTPATIENT
Start: 2019-04-24 | End: 2019-04-24

## 2019-04-23 RX ORDER — ACETAMINOPHEN 500 MG
1000 TABLET ORAL ONCE
Qty: 0 | Refills: 0 | Status: COMPLETED | OUTPATIENT
Start: 2019-04-23 | End: 2019-04-23

## 2019-04-23 RX ORDER — IBUPROFEN 200 MG
600 TABLET ORAL ONCE
Qty: 0 | Refills: 0 | Status: COMPLETED | OUTPATIENT
Start: 2019-04-23 | End: 2019-04-23

## 2019-04-23 RX ORDER — OXYCODONE HYDROCHLORIDE 5 MG/1
5 TABLET ORAL ONCE
Qty: 0 | Refills: 0 | Status: DISCONTINUED | OUTPATIENT
Start: 2019-04-23 | End: 2019-04-23

## 2019-04-23 RX ORDER — LANOLIN ALCOHOL/MO/W.PET/CERES
3 CREAM (GRAM) TOPICAL AT BEDTIME
Qty: 0 | Refills: 0 | Status: DISCONTINUED | OUTPATIENT
Start: 2019-04-23 | End: 2019-05-02

## 2019-04-23 RX ADMIN — OCTREOTIDE ACETATE 100 MICROGRAM(S): 200 INJECTION, SOLUTION INTRAVENOUS; SUBCUTANEOUS at 22:19

## 2019-04-23 RX ADMIN — SODIUM CHLORIDE 100 MILLILITER(S): 9 INJECTION, SOLUTION INTRAVENOUS at 02:58

## 2019-04-23 RX ADMIN — Medication 10 MILLIGRAM(S): at 18:07

## 2019-04-23 RX ADMIN — OCTREOTIDE ACETATE 100 MICROGRAM(S): 200 INJECTION, SOLUTION INTRAVENOUS; SUBCUTANEOUS at 13:37

## 2019-04-23 RX ADMIN — Medication 10 MILLIGRAM(S): at 05:02

## 2019-04-23 RX ADMIN — Medication 10 MILLIGRAM(S): at 11:31

## 2019-04-23 RX ADMIN — Medication 102 MILLIGRAM(S): at 22:19

## 2019-04-23 RX ADMIN — Medication 400 MILLIGRAM(S): at 19:44

## 2019-04-23 RX ADMIN — Medication 600 MILLIGRAM(S): at 23:28

## 2019-04-23 RX ADMIN — ENOXAPARIN SODIUM 70 MILLIGRAM(S): 100 INJECTION SUBCUTANEOUS at 18:07

## 2019-04-23 RX ADMIN — Medication 400 MILLIGRAM(S): at 13:15

## 2019-04-23 RX ADMIN — PANTOPRAZOLE SODIUM 40 MILLIGRAM(S): 20 TABLET, DELAYED RELEASE ORAL at 11:31

## 2019-04-23 RX ADMIN — Medication 1000 MILLIGRAM(S): at 08:35

## 2019-04-23 RX ADMIN — OCTREOTIDE ACETATE 100 MICROGRAM(S): 200 INJECTION, SOLUTION INTRAVENOUS; SUBCUTANEOUS at 05:02

## 2019-04-23 RX ADMIN — Medication 102 MILLIGRAM(S): at 13:37

## 2019-04-23 RX ADMIN — Medication 1000 MILLIGRAM(S): at 13:35

## 2019-04-23 RX ADMIN — Medication 3 MILLIGRAM(S): at 23:27

## 2019-04-23 RX ADMIN — ENOXAPARIN SODIUM 70 MILLIGRAM(S): 100 INJECTION SUBCUTANEOUS at 05:02

## 2019-04-23 RX ADMIN — Medication 400 MILLIGRAM(S): at 07:37

## 2019-04-23 RX ADMIN — SODIUM CHLORIDE 100 MILLILITER(S): 9 INJECTION, SOLUTION INTRAVENOUS at 09:49

## 2019-04-23 RX ADMIN — Medication 102 MILLIGRAM(S): at 05:02

## 2019-04-23 NOTE — PROGRESS NOTE ADULT - SUBJECTIVE AND OBJECTIVE BOX
Morning Surgical Progress Note  Patient is a 53y old  Female who presents with a chief complaint of SBO (21 Apr 2019 17:21)      SUBJECTIVE: Patient seen and examined at bedside with surgical team.  overnight, she started passing small amounts of flatus, denies bowel movements.  nausea improving.  She still endorses leg/thigh pain where known DVT was seen.  OOB/Ambulating.     Vital Signs Last 24 Hrs  T(C): 36.5 (23 Apr 2019 09:45), Max: 36.8 (22 Apr 2019 14:06)  T(F): 97.7 (23 Apr 2019 09:45), Max: 98.3 (22 Apr 2019 14:06)  HR: 68 (23 Apr 2019 09:45) (68 - 85)  BP: 137/90 (23 Apr 2019 09:45) (128/87 - 137/90)  BP(mean): --  RR: 17 (23 Apr 2019 09:45) (17 - 18)  SpO2: 96% (23 Apr 2019 09:45) (95% - 100%)    04-22-19 @ 07:01  -  04-23-19 @ 07:00  --------------------------------------------------------  IN: 0 mL / OUT: 1535 mL / NET: -1535 mL          Physical Exam  Constitutional: A&Ox3, NAD  Gastrointestinal: soft mildly tender, nondistended  Ext: RLE, thigh edematous and tender, lower leg nonedematous    LABS:  CBC (04-23 @ 07:04)                              12.1                           7.80    )----------------(  371        --    % Neutrophils, --    % Lymphocytes, ANC: --                                  37.5                CBC (04-22 @ 06:40)                              12.7                           12.50<H>  )----------------(  388        --    % Neutrophils, --    % Lymphocytes, ANC: --                                  39.9                  BMP (04-23 @ 07:04)             136     |  99      |  14    		Ca++ --      Ca 8.7                ---------------------------------( 180<H>		Mg 2.1                4.1     |  24      |  0.51  			Ph 3.6     BMP (04-22 @ 06:40)             139     |  100     |  19    		Ca++ --      Ca 8.3<L>             ---------------------------------( 93    		Mg 2.1                3.6     |  24      |  0.50  			Ph 3.4

## 2019-04-23 NOTE — PROGRESS NOTE ADULT - ASSESSMENT
53 F with SBO. AXR demonstrating passage of contrast through to rectum, consistent with partial SBO.    - Passing flatus, advanced to CLD  - Serial abdominal exams, check GIF  - Protonix for reflux symptoms  - Pain control of RLE 2/2 DVT  - oob as tolerated    B-surgery   [id number] 53 F with SBO likely 2/2 malignant etiology. AXR demonstrating passage of contrast through to rectum, consistent with partial SBO.    - Passing flatus, advanced to CLD  - Serial abdominal exams, check GIF  - Protonix for reflux symptoms  - Pain control of RLE 2/2 DVT  - oob as tolerated    B-surgery   [id number]

## 2019-04-23 NOTE — PROGRESS NOTE ADULT - ASSESSMENT
Patient with h/o colon cancer since early 2017, had surgery, FOLFOX Avastin, with very good response, switched to FOLFIRI for allergic reaction followed by RT. Disease recurrence with mets to cervix, restarted chemo, last cycle In January 2019.  She was last admitted in 8/2018 with obstruction treated conservatively. Had Rt DVT 4/2019  Admitted again with SBO. So far it is not resolving. CT noted. Patient started on malignant obstruction protocol and will hopefully improve and not need surgical intervention  She will be re-evaluated for need if any of systemic rx for her malignancy once obstruction has been managed

## 2019-04-23 NOTE — PROGRESS NOTE ADULT - ATTENDING COMMENTS
I saw and examined the patient and agree with the above note.    Malignant small bowel obstruction  -continue medical mgmt   -clears, advance as tolerated  -discussed possible operative mgmt in future if she continues to be obstipated or continuously uncomfortable    Bonifacio White MD (Cell: [phone])  Acute and Critical Care Surgery    The Acute Care Surgery (B Team) Attending Group Practice:  Dr. Leonardo Celeste, Dr. Imtiaz Skinner, Dr. Callie Finch, Dr. Bonifacio White    Urgent issues - spectra [phone] or [phone]  Nonurgent issues - [phone]  Patient appointments or after hours - [phone] I saw and examined the patient and agree with the above note.    Malignant small bowel obstruction  -continue medical mgmt   -clears, advance as tolerated  -discussed possible operative mgmt in future if she continues to be obstipated or continuously uncomfortable    DVT  -therapeutic lovenox  -monitor edema  -keep extremity elevated when not ambulating     Bonifacio White MD (Cell: [phone])  Acute and Critical Care Surgery    The Acute Care Surgery (B Team) Attending Group Practice:  Dr. Leonardo Celeste, Dr. Imtiaz Skinner, Dr. Callie Finch, Dr. Bonifacio White    Urgent issues - spectra [phone] or [phone]  Nonurgent issues - [phone]  Patient appointments or after hours - [phone]

## 2019-04-23 NOTE — PROGRESS NOTE ADULT - SUBJECTIVE AND OBJECTIVE BOX
Patient with h/o colon cancer since early 2017, had surgery, FOLFOX Avastin, with very good response, switched to FOLFIRI for allergic reaction followed by RT. Disease recurrence with mets to cervix, restarted chemo, last cycle In January 2019.  She was last admitted in 8/2018 with obstruction treated conservatively. Had Rt DVT 4/2019  Admitted again with SBO    PAST MEDICAL & SURGICAL HISTORY:  DVT, lower extremity: RLE, dx&#x27;d April 2019  SBO (small bowel obstruction)  Hemorrhoid  Cervical cancer  Colon neoplasm: s/p LAR  Hyperthyroidism  S/P colon resection  H/O exploratory laparotomy: 1/6/17, with LAR    Medications:  acetaminophen  IVPB .. 1000 milliGRAM(s) IV Intermittent once  dexamethasone  IVPB 10 milliGRAM(s) IV Intermittent every 8 hours  dextrose 5% + sodium chloride 0.45% 1000 milliLiter(s) IV Continuous <Continuous>  enoxaparin Injectable 70 milliGRAM(s) SubCutaneous two times a day  methimazole 5 milliGRAM(s) Oral daily  metoclopramide Injectable 10 milliGRAM(s) IV Push every 6 hours  octreotide  Injectable 100 MICROGram(s) IV Push three times a day  pantoprazole  Injectable 40 milliGRAM(s) IV Push daily    Labs:  CBC Full  -  ( 23 Apr 2019 07:04 )  WBC Count : 7.80 K/uL  Hemoglobin : 12.1 g/dL  Hematocrit : 37.5 %  Platelet Count - Automated : 371 K/uL  Mean Cell Volume : 84.8 fL  Mean Cell Hemoglobin : 27.4 pg  Mean Cell Hemoglobin Concentration : 32.3 %  Auto Neutrophil # : x  Auto Lymphocyte # : x  Auto Monocyte # : x  Auto Eosinophil # : x  Auto Basophil # : x  Auto Neutrophil % : x  Auto Lymphocyte % : x  Auto Monocyte % : x  Auto Eosinophil % : x  Auto Basophil % : x    04-23    136  |  99  |  14  ----------------------------<  180<H>  4.1   |  24  |  0.51    Ca    8.7      23 Apr 2019 07:04  Phos  3.6     04-23  Mg     2.1     04-23        Radiology:     < from: CT Abdomen and Pelvis w/ Oral Cont and w/ IV Cont (04.21.19 @ 16:31) >  MPRESSION: Small bowel obstruction with transition point in the right   hemipelvis as on prior abdominal CTs. Interval increase in small bowel   distention. No pneumatosis or interloop ascites.      < end of copied text >          ROS:  Patient comfortable without distress  No SOB or chest pain  No palpitation  No abdominal pain, passed little flatus yesterday, no BM, taking liquids, no vomting  No weakness of extremities  No skin changes or swelling of legs    Vital Signs Last 24 Hrs  T(C): 36.8 (23 Apr 2019 13:10), Max: 36.8 (23 Apr 2019 13:10)  T(F): 98.3 (23 Apr 2019 13:10), Max: 98.3 (23 Apr 2019 13:10)  HR: 73 (23 Apr 2019 13:10) (68 - 83)  BP: 137/90 (23 Apr 2019 13:10) (128/87 - 137/90)  BP(mean): --  RR: 17 (23 Apr 2019 13:10) (17 - 18)  SpO2: 97% (23 Apr 2019 13:10) (95% - 99%)    Physical exam:  Patient alert and oriented  More comfortable today  CVS: S1, S2 regular or murmur  Chest: bilateral breath sound without rales  Abdomen: soft, not tender,   No focal neuro deficit  No edema      Assessment and Plan:

## 2019-04-24 DIAGNOSIS — I82.409 ACUTE EMBOLISM AND THROMBOSIS OF UNSPECIFIED DEEP VEINS OF UNSPECIFIED LOWER EXTREMITY: ICD-10-CM

## 2019-04-24 DIAGNOSIS — K56.609 UNSPECIFIED INTESTINAL OBSTRUCTION, UNSPECIFIED AS TO PARTIAL VERSUS COMPLETE OBSTRUCTION: ICD-10-CM

## 2019-04-24 DIAGNOSIS — R52 PAIN, UNSPECIFIED: ICD-10-CM

## 2019-04-24 DIAGNOSIS — Z51.5 ENCOUNTER FOR PALLIATIVE CARE: ICD-10-CM

## 2019-04-24 LAB
ANION GAP SERPL CALC-SCNC: 13 MMO/L — SIGNIFICANT CHANGE UP (ref 7–14)
BUN SERPL-MCNC: 9 MG/DL — SIGNIFICANT CHANGE UP (ref 7–23)
CALCIUM SERPL-MCNC: 8.5 MG/DL — SIGNIFICANT CHANGE UP (ref 8.4–10.5)
CHLORIDE SERPL-SCNC: 97 MMOL/L — LOW (ref 98–107)
CO2 SERPL-SCNC: 23 MMOL/L — SIGNIFICANT CHANGE UP (ref 22–31)
CREAT SERPL-MCNC: 0.51 MG/DL — SIGNIFICANT CHANGE UP (ref 0.5–1.3)
GLUCOSE SERPL-MCNC: 153 MG/DL — HIGH (ref 70–99)
HCT VFR BLD CALC: 38.3 % — SIGNIFICANT CHANGE UP (ref 34.5–45)
HGB BLD-MCNC: 12.2 G/DL — SIGNIFICANT CHANGE UP (ref 11.5–15.5)
MAGNESIUM SERPL-MCNC: 2.1 MG/DL — SIGNIFICANT CHANGE UP (ref 1.6–2.6)
MCHC RBC-ENTMCNC: 27.3 PG — SIGNIFICANT CHANGE UP (ref 27–34)
MCHC RBC-ENTMCNC: 31.9 % — LOW (ref 32–36)
MCV RBC AUTO: 85.7 FL — SIGNIFICANT CHANGE UP (ref 80–100)
NRBC # FLD: 0 K/UL — SIGNIFICANT CHANGE UP (ref 0–0)
PHOSPHATE SERPL-MCNC: 3 MG/DL — SIGNIFICANT CHANGE UP (ref 2.5–4.5)
PLATELET # BLD AUTO: 401 K/UL — HIGH (ref 150–400)
PMV BLD: 10.2 FL — SIGNIFICANT CHANGE UP (ref 7–13)
POTASSIUM SERPL-MCNC: 3.8 MMOL/L — SIGNIFICANT CHANGE UP (ref 3.5–5.3)
POTASSIUM SERPL-SCNC: 3.8 MMOL/L — SIGNIFICANT CHANGE UP (ref 3.5–5.3)
RBC # BLD: 4.47 M/UL — SIGNIFICANT CHANGE UP (ref 3.8–5.2)
RBC # FLD: 13.6 % — SIGNIFICANT CHANGE UP (ref 10.3–14.5)
SODIUM SERPL-SCNC: 133 MMOL/L — LOW (ref 135–145)
WBC # BLD: 10.72 K/UL — HIGH (ref 3.8–10.5)
WBC # FLD AUTO: 10.72 K/UL — HIGH (ref 3.8–10.5)

## 2019-04-24 PROCEDURE — 99231 SBSQ HOSP IP/OBS SF/LOW 25: CPT | Mod: GC

## 2019-04-24 PROCEDURE — 99223 1ST HOSP IP/OBS HIGH 75: CPT | Mod: GC

## 2019-04-24 RX ORDER — ACETAMINOPHEN 500 MG
975 TABLET ORAL EVERY 6 HOURS
Qty: 0 | Refills: 0 | Status: DISCONTINUED | OUTPATIENT
Start: 2019-04-24 | End: 2019-04-24

## 2019-04-24 RX ORDER — IBUPROFEN 200 MG
200 TABLET ORAL EVERY 6 HOURS
Qty: 0 | Refills: 0 | Status: DISCONTINUED | OUTPATIENT
Start: 2019-04-24 | End: 2019-04-24

## 2019-04-24 RX ORDER — SENNA PLUS 8.6 MG/1
2 TABLET ORAL AT BEDTIME
Qty: 0 | Refills: 0 | Status: DISCONTINUED | OUTPATIENT
Start: 2019-04-24 | End: 2019-04-25

## 2019-04-24 RX ORDER — OXYCODONE HYDROCHLORIDE 5 MG/1
10 TABLET ORAL EVERY 6 HOURS
Qty: 0 | Refills: 0 | Status: DISCONTINUED | OUTPATIENT
Start: 2019-04-24 | End: 2019-04-25

## 2019-04-24 RX ORDER — POLYETHYLENE GLYCOL 3350 17 G/17G
17 POWDER, FOR SOLUTION ORAL DAILY
Qty: 0 | Refills: 0 | Status: DISCONTINUED | OUTPATIENT
Start: 2019-04-24 | End: 2019-04-25

## 2019-04-24 RX ORDER — ACETAMINOPHEN 500 MG
650 TABLET ORAL EVERY 6 HOURS
Qty: 0 | Refills: 0 | Status: DISCONTINUED | OUTPATIENT
Start: 2019-04-24 | End: 2019-04-27

## 2019-04-24 RX ADMIN — Medication 400 MILLIGRAM(S): at 02:21

## 2019-04-24 RX ADMIN — OXYCODONE HYDROCHLORIDE 10 MILLIGRAM(S): 5 TABLET ORAL at 23:50

## 2019-04-24 RX ADMIN — Medication 102 MILLIGRAM(S): at 14:27

## 2019-04-24 RX ADMIN — Medication 10 MILLIGRAM(S): at 00:00

## 2019-04-24 RX ADMIN — ENOXAPARIN SODIUM 70 MILLIGRAM(S): 100 INJECTION SUBCUTANEOUS at 17:51

## 2019-04-24 RX ADMIN — Medication 200 MILLIGRAM(S): at 13:34

## 2019-04-24 RX ADMIN — Medication 650 MILLIGRAM(S): at 18:50

## 2019-04-24 RX ADMIN — Medication 600 MILLIGRAM(S): at 00:24

## 2019-04-24 RX ADMIN — Medication 102 MILLIGRAM(S): at 06:09

## 2019-04-24 RX ADMIN — Medication 650 MILLIGRAM(S): at 09:33

## 2019-04-24 RX ADMIN — Medication 650 MILLIGRAM(S): at 10:30

## 2019-04-24 RX ADMIN — Medication 1000 MILLIGRAM(S): at 03:15

## 2019-04-24 RX ADMIN — Medication 10 MILLIGRAM(S): at 13:35

## 2019-04-24 RX ADMIN — Medication 3 MILLIGRAM(S): at 22:35

## 2019-04-24 RX ADMIN — ENOXAPARIN SODIUM 70 MILLIGRAM(S): 100 INJECTION SUBCUTANEOUS at 06:08

## 2019-04-24 RX ADMIN — Medication 10 MILLIGRAM(S): at 06:08

## 2019-04-24 RX ADMIN — PANTOPRAZOLE SODIUM 40 MILLIGRAM(S): 20 TABLET, DELAYED RELEASE ORAL at 13:35

## 2019-04-24 RX ADMIN — Medication 10 MILLIGRAM(S): at 18:08

## 2019-04-24 RX ADMIN — OCTREOTIDE ACETATE 100 MICROGRAM(S): 200 INJECTION, SOLUTION INTRAVENOUS; SUBCUTANEOUS at 06:08

## 2019-04-24 RX ADMIN — OXYCODONE HYDROCHLORIDE 10 MILLIGRAM(S): 5 TABLET ORAL at 22:35

## 2019-04-24 RX ADMIN — OCTREOTIDE ACETATE 100 MICROGRAM(S): 200 INJECTION, SOLUTION INTRAVENOUS; SUBCUTANEOUS at 13:35

## 2019-04-24 RX ADMIN — Medication 102 MILLIGRAM(S): at 22:36

## 2019-04-24 RX ADMIN — SENNA PLUS 2 TABLET(S): 8.6 TABLET ORAL at 22:36

## 2019-04-24 RX ADMIN — OCTREOTIDE ACETATE 100 MICROGRAM(S): 200 INJECTION, SOLUTION INTRAVENOUS; SUBCUTANEOUS at 22:36

## 2019-04-24 RX ADMIN — Medication 200 MILLIGRAM(S): at 14:26

## 2019-04-24 RX ADMIN — Medication 650 MILLIGRAM(S): at 17:51

## 2019-04-24 NOTE — CONSULT NOTE ADULT - PROBLEM SELECTOR RECOMMENDATION 9
patient is on lovenox 70mg BID  patient has pain in R thigh patient is on Lovenox 70mg BID  patient has pain in R thigh

## 2019-04-24 NOTE — PROGRESS NOTE ADULT - SUBJECTIVE AND OBJECTIVE BOX
GENERAL SURGERY NOTE    Consulting Service: B Team (pager [phone])    No acute events. Patient continues to report pain of proximal RLE, unchanged - denies any paresthesias. Tolerating CLD overnight, no N/V.      MEDICATIONS  (STANDING):  dexamethasone  IVPB 10 milliGRAM(s) IV Intermittent every 8 hours  enoxaparin Injectable 70 milliGRAM(s) SubCutaneous two times a day  methimazole 5 milliGRAM(s) Oral daily  metoclopramide Injectable 10 milliGRAM(s) IV Push every 6 hours  octreotide  Injectable 100 MICROGram(s) IV Push three times a day  pantoprazole  Injectable 40 milliGRAM(s) IV Push daily    MEDICATIONS  (PRN):  acetaminophen   Tablet .. 975 milliGRAM(s) Oral every 6 hours PRN Mild Pain (1 - 3), Moderate Pain (4 - 6), Severe Pain (7 - 10)  melatonin 3 milliGRAM(s) Oral at bedtime PRN Insomnia    Diet, Regular (04-24-19 @ 08:44)      VITALS & I/Os:  Vital Signs Last 24 Hrs  T(C): 36.9 (24 Apr 2019 06:05), Max: 36.9 (23 Apr 2019 17:08)  T(F): 98.4 (24 Apr 2019 06:05), Max: 98.4 (23 Apr 2019 17:08)  HR: 90 (24 Apr 2019 06:05) (68 - 90)  BP: 143/86 (24 Apr 2019 06:05) (124/76 - 143/86)  BP(mean): --  RR: 18 (24 Apr 2019 06:05) (16 - 18)  SpO2: 99% (24 Apr 2019 06:05) (96% - 99%)  CAPILLARY BLOOD GLUCOSE            04-23 @ 07:01  -  04-24 @ 07:00  --------------------------------------------------------  IN:  Total IN: 0 mL    OUT:    Voided: 1950 mL  Total OUT: 1950 mL    Total NET: -1950 mL          GEN: NAD, alert and oriented x 3  HEENT: WNL  CHEST: Symmetrical chest rise, breath sounds CTAB  HEART: RRR, non-muffled heart sounds  ABD: Soft, non-tender, non-distended  EXT/VASC: Proximal LLE is diffusely edematous, no erythema, tender to deep palpation. Compartments are soft. Warm, cap refill < 2 sec, motor/sensory intact      LABS:                        12.2   10.72 )-----------( 401      ( 24 Apr 2019 06:30 )             38.3     04-24    133<L>  |  97<L>  |  9   ----------------------------<  153<H>  3.8   |  23  |  0.51    Ca    8.5      24 Apr 2019 06:30  Phos  3.0     04-24  Mg     2.1     04-24

## 2019-04-24 NOTE — CONSULT NOTE ADULT - ASSESSMENT
53 year old female pmh colorectal ca s/p lower anterior resection, with mets to liver and cervix, also with DVT RLE. Palliative care consulted for pain.

## 2019-04-24 NOTE — PROGRESS NOTE ADULT - ATTENDING COMMENTS
I saw and examined the patient and agree with the above note.    Malignant small bowel obstruction  -continue medical mgmt   -clears, advance to regular  -discussed possible operative mgmt in future if she continues to be obstipated or continuously uncomfortable    DVT  -therapeutic lovenox  -monitor edema  -keep extremity elevated when not ambulating   -no use to repeat ultrasound at the current time - will need a repeat anam later.     Bonifacio White MD (Cell: [phone])  Acute and Critical Care Surgery    The Acute Care Surgery (B Team) Attending Group Practice:  Dr. Leonardo Celeste, Dr. Imtiaz Skinner, Dr. Callie Finch, Dr. Bonifacio White    Urgent issues - spectra [phone] or [phone]  Nonurgent issues - [phone]  Patient appointments or after hours - [phone] .

## 2019-04-24 NOTE — CONSULT NOTE ADULT - PROBLEM SELECTOR RECOMMENDATION 4
Oxy IR 10 q 6 prn recommended for pain, with bowel regimen Oxy IR 10 q 6 prn recommended for pain, with bowel regimen  would not recommend ibuprofen or nsaids in setting of anticoagulation and high dose steroid use. Oxy IR 10 q 6 prn recommended for pain, with bowel regimen  would not recommend ibuprofen or NSAIDs in setting of anticoagulation and high dose steroid use.

## 2019-04-24 NOTE — PROGRESS NOTE ADULT - ASSESSMENT
53y female with recurrent SBO, likely malignant, passing flatus and tolerating a CLD. Abdomen exam is benign.

## 2019-04-24 NOTE — PROGRESS NOTE ADULT - ASSESSMENT
53 F with recurrent colon cancer now presents with a malignant SBO. Patient passed flatus, and denies nausea or vomiting. SBO may be resolving.  -Will advance to regular diet  -As for R thigh pain, likely from known RLE DVT. Repeating an ultrasound is unlikely to change the management, as the patient is already on anticoagulation. Once the patient is tolerating a diet, we will switch her anticoagulation to her Eliquis. Will try standing Tylenol and low dose Motrin to help with pain control.  PAZ Driscoll  [phone]

## 2019-04-24 NOTE — CONSULT NOTE ADULT - PROBLEM SELECTOR RECOMMENDATION 2
patient might require depo injection of octreotide.  recommend changing medications to oral to see if patient is able to tolerage.   Last imaging on 4/21 showed Small bowel obstruction with transition point in the right hemipelvis with Interval increase in small bowel   distention.    unclear if surgical intervention would provide benefit patient might require depo injection of octreotide.  recommend changing medications to oral to see if patient is able to tolerate.   Last imaging on 4/21 showed Small bowel obstruction with transition point in the right hemipelvis with Interval increase in small bowel   distention.    unclear if surgical intervention would provide benefit

## 2019-04-24 NOTE — CONSULT NOTE ADULT - SUBJECTIVE AND OBJECTIVE BOX
HPI:  53F w/hx of colorectal CA s/p LAR (2017, Dr. Armstrong) with mets to liver and cervix, s/p chemotherapy (FOLFOX, then FOLFIRI, last dose Jan 2019) and radiation, p/w 1-day h/o R-sided abdominal pain associated with nausea and several episodes of nausea. She denies associated fever or chills, dysuria, CP, SOB. Last flatus and last BM 1 day PTA. In the ED, she was tachycardic to 120s, normotensive, afebrile. WBC 12.84, lactate 2.1. CT A/P demonstrates high-grade SBO with transition point in right hemiabdomen. The patient was treated at [facility] in Summer 2018 for SBO, which was managed nonoperatively.    Of note, patient was recently diagnosed with DVT of RLE for which she is taking Eliquis. She is followed by Dr. Kiran of heme/onc. Most recent PET CT in Feb 2019 showed no recurrence/spread of disease. Last chemo January 2019. (17 Apr 2019 01:42)    Patient states pain in R thigh is 10/10, interferes with sleep, and has been worsening.  Patient was taking oxycodone at home however it was stopped due to SBO.  Patient lives at home by herself, however has 2 daughters who help her.  P      PERTINENT PM/SXH:   DVT, lower extremity  SBO (small bowel obstruction)  Hemorrhoid  Cervical cancer  Colon neoplasm  Hyperthyroidism    S/P colon resection  H/O exploratory laparotomy  No significant past surgical history    FAMILY HISTORY:  Family history of diabetes mellitus  Family history of leukemia    ITEMS NOT CHECKED ARE NOT PRESENT    SOCIAL HISTORY:   Significant other/partner:  [ ]  Children:  [ ]  Episcopalian/Spirituality:  Substance hx:  [ ]   Tobacco hx:  [ ]   Alcohol hx: [ ]   Home Opioid hx:  [ ] I-Stop Reference No:  Living Situation: [ ]Home  [ ]Long term care  [ ]Rehab [ ]Other    ADVANCE DIRECTIVES:    DNR  MOLST  [ ]  Living Will  [ ]   DECISION MAKER(s):  [ ] Health Care Proxy(s)  [ ] Surrogate(s)  [ ] Guardian           Name(s): Phone Number(s):    BASELINE (I)ADL(s) (prior to admission):  [place]: [ ]Total  [ ] Moderate [ ]Dependent    Allergies    No Known Allergies    Intolerances    MEDICATIONS  (STANDING):  acetaminophen   Tablet .. 650 milliGRAM(s) Oral every 6 hours  dexamethasone  IVPB 10 milliGRAM(s) IV Intermittent every 8 hours  enoxaparin Injectable 70 milliGRAM(s) SubCutaneous two times a day  ibuprofen  Tablet. 200 milliGRAM(s) Oral every 6 hours  methimazole 5 milliGRAM(s) Oral daily  metoclopramide Injectable 10 milliGRAM(s) IV Push every 6 hours  octreotide  Injectable 100 MICROGram(s) IV Push three times a day  pantoprazole  Injectable 40 milliGRAM(s) IV Push daily    MEDICATIONS  (PRN):  melatonin 3 milliGRAM(s) Oral at bedtime PRN Insomnia    PRESENT SYMPTOMS: [ ]Unable to obtain due to poor mentation   Source if other than patient:  [ ]Family   [ ]Team     Pain (Impact on QOL):    Location -         Minimal acceptable level (0-10 scale):                    Aggrevating factors -  Quality -  Radiation -  Severity (0-10 scale) -    Timing -    PAIN AD Score:     [email or web address] (press ctrl +  left click to view)    Dyspnea:                           [ ]Mild [ ]Moderate [ ]Severe  Anxiety:                             [ ]Mild [ ]Moderate [ ]Severe  Fatigue:                             [ ]Mild [ ]Moderate [ ]Severe  Nausea:                             [ ]Mild [ ]Moderate [ ]Severe  Loss of appetite:              [ ]Mild [ ]Moderate [ ]Severe  Constipation:                    [ ]Mild [ ]Moderate [ ]Severe    Other Symptoms:  [ ]All other review of systems negative     Karnofsky Performance Score/Palliative Performance Status Version 2:         %  PHYSICAL EXAM:  Vital Signs Last 24 Hrs  T(C): 36.3 (24 Apr 2019 13:25), Max: 36.9 (23 Apr 2019 17:08)  T(F): 97.4 (24 Apr 2019 13:25), Max: 98.4 (23 Apr 2019 17:08)  HR: 72 (24 Apr 2019 13:25) (66 - 90)  BP: 143/95 (24 Apr 2019 13:25) (124/76 - 143/95)  BP(mean): --  RR: 18 (24 Apr 2019 13:25) (16 - 18)  SpO2: 99% (24 Apr 2019 13:25) (96% - 100%) I&O's Summary    23 Apr 2019 07:01  -  24 Apr 2019 07:00  --------------------------------------------------------  IN: 0 mL / OUT: 1950 mL / NET: -1950 mL    24 Apr 2019 07:01  -  24 Apr 2019 14:04  --------------------------------------------------------  IN: 200 mL / OUT: 0 mL / NET: 200 mL    GENERAL:  [ ]Alert  [ ]Oriented x   [ ]Lethargic  [ ]Cachexia  [ ]Unarousable  [ ]Verbal  [ ]Non-Verbal  Behavioral:   [ ] Anxiety  [ ] Delirium [ ] Agitation [ ] Other  HEENT:  [ ]Normal   [ ]Dry mouth   [ ]ET Tube/Trach  [ ]Oral lesions  PULMONARY:   [ ]Clear [ ]Tachypnea  [ ]Audible excessive secretions   [ ]Rhonchi        [ ]Right [ ]Left [ ]Bilateral  [ ]Crackles        [ ]Right [ ]Left [ ]Bilateral  [ ]Wheezing     [ ]Right [ ]Left [ ]Bilateral  CARDIOVASCULAR:    [ ]Regular [ ]Irregular [ ]Tachy  [ ]Steve [ ]Murmur [ ]Other  GASTROINTESTINAL:  [ ]Soft  [ ]Distended   [ ]+BS  [ ]Non tender [ ]Tender  [ ]PEG [ ]OGT/ NGT  Last BM:   GENITOURINARY:  [ ]Normal [ ] Incontinent   [ ]Oliguria/Anuria   [ ]Manriquez  MUSCULOSKELETAL:   [ ]Normal   [ ]Weakness  [ ]Bed/Wheelchair bound [ ]Edema  NEUROLOGIC:   [ ]No focal deficits  [ ] Cognitive impairment  [ ] Dysphagia [ ]Dysarthria [ ] Paresis [ ]Other   SKIN:   [ ]Normal   [ ]Pressure ulcer(s)  [ ]Rash    CRITICAL CARE:  [ ] Shock Present  [ ]Septic [ ]Cardiogenic [ ]Neurologic [ ]Hypovolemic  [ ]  Vasopressors [ ]  Inotropes   [ ] Respiratory failure present  [ ] Acute  [ ] Chronic [ ] Hypoxic  [ ] Hypercarbic [ ] Other  [ ] Other organ failure     LABS:                        12.2   10.72 )-----------( 401      ( 24 Apr 2019 06:30 )             38.3   04-24    133<L>  |  97<L>  |  9   ----------------------------<  153<H>  3.8   |  23  |  0.51    Ca    8.5      24 Apr 2019 06:30  Phos  3.0     04-24  Mg     2.1     04-24          RADIOLOGY & ADDITIONAL STUDIES:    PROTEIN CALORIE MALNUTRITION:   [ ] PPSV2 < or = to 30% [ ] significant weight loss  [ ] poor nutritional intake [ ] catabolic state [ ] anasarca     Albumin, Serum: 3.8 g/dL (04-16-19 @ 18:18)  Artificial Nutrition [ ]     REFERRALS:   [ ]Chaplaincy  [ ] Hospice  [ ]Child Life  [ ]Social Work  [ ]Case management [ ]Holistic Therapy   Goals of Care Discussion Document: HPI:  53F w/hx of colorectal CA s/p LAR (2017, Dr. Armstrong) with mets to liver and cervix, s/p chemotherapy (FOLFOX, then FOLFIRI, last dose Jan 2019) and radiation, p/w 1-day h/o R-sided abdominal pain associated with nausea and several episodes of nausea. She denies associated fever or chills, dysuria, CP, SOB. Last flatus and last BM 1 day PTA. In the ED, she was tachycardic to 120s, normotensive, afebrile. WBC 12.84, lactate 2.1. CT A/P demonstrates high-grade SBO with transition point in right hemiabdomen. The patient was treated at [facility] in Summer 2018 for SBO, which was managed nonoperatively.    Of note, patient was recently diagnosed with DVT of RLE for which she is taking Eliquis. She is followed by Dr. Kiran of heme/onc. Most recent PET CT in Feb 2019 showed no recurrence/spread of disease. Last chemo January 2019. (17 Apr 2019 01:42)    Patient states pain in R thigh is 10/10, interferes with sleep, and has been worsening.  Patient was taking oxycodone at home however it was stopped due to SBO.  Patient lives at home by herself, however has 2 daughters who help her.  Patient reports no bm in several weeks. She states she is intermittently passing gas. She was started on clear liquid diet today.     PERTINENT PM/SXH:   DVT, lower extremity  SBO (small bowel obstruction)  Hemorrhoid  Cervical cancer  Colon neoplasm  Hyperthyroidism    S/P colon resection  H/O exploratory laparotomy  No significant past surgical history    FAMILY HISTORY:  Family history of diabetes mellitus  Family history of leukemia    ITEMS NOT CHECKED ARE NOT PRESENT    SOCIAL HISTORY:   Significant other/partner:  [ ]  Children: patient has 2 daughters  [ ]  Sabianist/Spirituality:  Substance hx:  [ ]   Tobacco hx:  [ ]   Alcohol hx: [ ]   Home Opioid hx:  [ x] I-Stop Reference No: [id number]  Living Situation: [x ]Home  [ ]Long term care  [ ]Rehab [ ]Other    ADVANCE DIRECTIVES:    DNR  MOLST  [ ]  Living Will  [ ]   DECISION MAKER(s):  [x ] Health Care Proxy(s)  [ ] Surrogate(s)  [ ] Guardian           Name(s): Phone Number(s):  daughter Corinne Mejai [phone], brother Titus Lynn [phone]    BASELINE (I)ADL(s) (prior to admission):  [place]: [ x]Total  [ ] Moderate [ ]Dependent    Allergies    No Known Allergies    Intolerances    MEDICATIONS  (STANDING):  acetaminophen   Tablet .. 650 milliGRAM(s) Oral every 6 hours  dexamethasone  IVPB 10 milliGRAM(s) IV Intermittent every 8 hours  enoxaparin Injectable 70 milliGRAM(s) SubCutaneous two times a day  ibuprofen  Tablet. 200 milliGRAM(s) Oral every 6 hours  methimazole 5 milliGRAM(s) Oral daily  metoclopramide Injectable 10 milliGRAM(s) IV Push every 6 hours  octreotide  Injectable 100 MICROGram(s) IV Push three times a day  pantoprazole  Injectable 40 milliGRAM(s) IV Push daily    MEDICATIONS  (PRN):  melatonin 3 milliGRAM(s) Oral at bedtime PRN Insomnia    PRESENT SYMPTOMS: [ ]Unable to obtain due to poor mentation   Source if other than patient:  [ ]Family   [ ]Team     Pain (Impact on QOL):    Location -     R thigh    Minimal acceptable level (0-10 scale):                    Aggrevating factors -  Quality -  Radiation -  Severity (0-10 scale) -  10/10  Timing -    PAIN AD Score:     [email or web address]/[email or web address] (press ctrl +  left click to view)    Dyspnea:                           [ ]Mild [ ]Moderate [ ]Severe  Anxiety:                             [ ]Mild [ ]Moderate [ ]Severe  Fatigue:                             [ ]Mild [ ]Moderate [ ]Severe  Nausea:                             [ ]Mild [ ]Moderate [ ]Severe  Loss of appetite:              [ ]Mild [ ]Moderate [ ]Severe  Constipation:                    [ ]Mild [ ]Moderate [ x]Severe    Other Symptoms:  [ ]All other review of systems negative     Karnofsky Performance Score/Palliative Performance Status Version 2:         %  PHYSICAL EXAM:  Vital Signs Last 24 Hrs  T(C): 36.3 (24 Apr 2019 13:25), Max: 36.9 (23 Apr 2019 17:08)  T(F): 97.4 (24 Apr 2019 13:25), Max: 98.4 (23 Apr 2019 17:08)  HR: 72 (24 Apr 2019 13:25) (66 - 90)  BP: 143/95 (24 Apr 2019 13:25) (124/76 - 143/95)  BP(mean): --  RR: 18 (24 Apr 2019 13:25) (16 - 18)  SpO2: 99% (24 Apr 2019 13:25) (96% - 100%) I&O's Summary    23 Apr 2019 07:01  -  24 Apr 2019 07:00  --------------------------------------------------------  IN: 0 mL / OUT: 1950 mL / NET: -1950 mL    24 Apr 2019 07:01  -  24 Apr 2019 14:04  --------------------------------------------------------  IN: 200 mL / OUT: 0 mL / NET: 200 mL    GENERAL:  [x ]Alert  [ x]Oriented x   [ ]Lethargic  [ ]Cachexia  [ ]Unarousable  [ x]Verbal  [ ]Non-Verbal  Behavioral:   [ ] Anxiety  [ ] Delirium [ ] Agitation [ ] Other  HEENT:  [ x]Normal   [ ]Dry mouth   [ ]ET Tube/Trach  [ ]Oral lesions  PULMONARY:   [x ]Clear [ ]Tachypnea  [ ]Audible excessive secretions   [ ]Rhonchi        [ ]Right [ ]Left [ ]Bilateral  [ ]Crackles        [ ]Right [ ]Left [ ]Bilateral  [ ]Wheezing     [ ]Right [ ]Left [ ]Bilateral  CARDIOVASCULAR:    [x ]Regular [ ]Irregular [ ]Tachy  [ ]Steve [ ]Murmur [ ]Other  GASTROINTESTINAL:  [x ]Soft  [x]Distended   [x ]+BS  [ ]Non tender [ ]Tender  [ ]PEG [ ]OGT/ NGT  Last BM:   GENITOURINARY:  [x ]Normal [ ] Incontinent   [ ]Oliguria/Anuria   [ ]Manriquez  MUSCULOSKELETAL:   [ ]Normal   [ ]Weakness  [ ]Bed/Wheelchair bound [ x]Edema R thigh  NEUROLOGIC:   [x ]No focal deficits  [ ] Cognitive impairment  [ ] Dysphagia [ ]Dysarthria [ ] Paresis [ ]Other   SKIN:   [x ]Normal   [ ]Pressure ulcer(s)  [ ]Rash    CRITICAL CARE:  [ ] Shock Present  [ ]Septic [ ]Cardiogenic [ ]Neurologic [ ]Hypovolemic  [ ]  Vasopressors [ ]  Inotropes   [ ] Respiratory failure present  [ ] Acute  [ ] Chronic [ ] Hypoxic  [ ] Hypercarbic [ ] Other  [ ] Other organ failure     LABS:                        12.2   10.72 )-----------( 401      ( 24 Apr 2019 06:30 )             38.3   04-24    133<L>  |  97<L>  |  9   ----------------------------<  153<H>  3.8   |  23  |  0.51    Ca    8.5      24 Apr 2019 06:30  Phos  3.0     04-24  Mg     2.1     04-24          RADIOLOGY & ADDITIONAL STUDIES:  CT a/p 4/21: IMPRESSION: Small bowel obstruction with transition point in the right   hemipelvis as on prior abdominal CTs. Interval increase in small bowel   distention. No pneumatosis or interloop ascites.    PROTEIN CALORIE MALNUTRITION:   [ ] PPSV2 < or = to 30% [ ] significant weight loss  [ ] poor nutritional intake [ ] catabolic state [ ] anasarca     Albumin, Serum: 3.8 g/dL (04-16-19 @ 18:18)  Artificial Nutrition [ ]     REFERRALS:   [ ]Chaplaincy  [ ] Hospice  [ ]Child Life  [ ]Social Work  [ ]Case management [ ]Holistic Therapy   Goals of Care Discussion Document: HPI:  53F w/hx of colorectal CA s/p LAR (2017, Dr. Armstrong) with mets to liver and cervix, s/p chemotherapy (FOLFOX, then FOLFIRI, last dose Jan 2019) and radiation, p/w 1-day h/o R-sided abdominal pain associated with nausea and several episodes of nausea. She denies associated fever or chills, dysuria, CP, SOB. Last flatus and last BM 1 day PTA. In the ED, she was tachycardic to 120s, normotensive, afebrile. WBC 12.84, lactate 2.1. CT A/P demonstrates high-grade SBO with transition point in right hemiabdomen. The patient was treated at [facility] in Summer 2018 for SBO, which was managed nonoperatively.    Of note, patient was recently diagnosed with DVT of RLE for which she is taking Eliquis. She is followed by Dr. Kiran of heme/onc. Most recent PET CT in Feb 2019 showed no recurrence/spread of disease. Last chemo January 2019. (17 Apr 2019 01:42)    Patient states pain in R thigh is 10/10, interferes with sleep, and has been worsening.  Patient was taking oxycodone at home however it was stopped due to SBO.  Patient lives at home by herself, however has 2 daughters who help her.  Patient reports no bm in several weeks. She states she is intermittently passing gas. She was started on clear liquid diet today.   Patient sees Dr. Kiran from gyn oncology.     PERTINENT PM/SXH:   DVT, lower extremity  SBO (small bowel obstruction)  Hemorrhoid  Cervical cancer  Colon neoplasm  Hyperthyroidism    S/P colon resection  H/O exploratory laparotomy  No significant past surgical history    FAMILY HISTORY:  Family history of diabetes mellitus  Family history of leukemia    ITEMS NOT CHECKED ARE NOT PRESENT    SOCIAL HISTORY:   Significant other/partner:  [ ]  Children: patient has 2 daughters  [ ]  Orthodoxy/Spirituality:  Substance hx:  [ ]   Tobacco hx:  [ ]   Alcohol hx: [ ]   Home Opioid hx:  [ x] I-Stop Reference No: [id number]  Living Situation: [x ]Home  [ ]Long term care  [ ]Rehab [ ]Other    ADVANCE DIRECTIVES:    DNR  MOLST  [ ]  Living Will  [ ]   DECISION MAKER(s):  [x ] Health Care Proxy(s)  [ ] Surrogate(s)  [ ] Guardian           Name(s): Phone Number(s):  daughter Corinne Mejia [phone], brother Titus Lynn [phone]    BASELINE (I)ADL(s) (prior to admission):  [place]: [ x]Total  [ ] Moderate [ ]Dependent    Allergies    No Known Allergies    Intolerances    MEDICATIONS  (STANDING):  acetaminophen   Tablet .. 650 milliGRAM(s) Oral every 6 hours  dexamethasone  IVPB 10 milliGRAM(s) IV Intermittent every 8 hours  enoxaparin Injectable 70 milliGRAM(s) SubCutaneous two times a day  ibuprofen  Tablet. 200 milliGRAM(s) Oral every 6 hours  methimazole 5 milliGRAM(s) Oral daily  metoclopramide Injectable 10 milliGRAM(s) IV Push every 6 hours  octreotide  Injectable 100 MICROGram(s) IV Push three times a day  pantoprazole  Injectable 40 milliGRAM(s) IV Push daily    MEDICATIONS  (PRN):  melatonin 3 milliGRAM(s) Oral at bedtime PRN Insomnia    PRESENT SYMPTOMS: [ ]Unable to obtain due to poor mentation   Source if other than patient:  [ ]Family   [ ]Team     Pain (Impact on QOL):    Location -     R thigh    Minimal acceptable level (0-10 scale):                    Aggrevating factors -  Quality -  Radiation -  Severity (0-10 scale) -  10/10  Timing -    PAIN AD Score:     [email or web address] (press ctrl +  left click to view)    Dyspnea:                           [ ]Mild [ ]Moderate [ ]Severe  Anxiety:                             [ ]Mild [ ]Moderate [ ]Severe  Fatigue:                             [ ]Mild [ ]Moderate [ ]Severe  Nausea:                             [ ]Mild [ ]Moderate [ ]Severe  Loss of appetite:              [ ]Mild [ ]Moderate [ ]Severe  Constipation:                    [ ]Mild [ ]Moderate [ x]Severe    Other Symptoms:  [ ]All other review of systems negative     Karnofsky Performance Score/Palliative Performance Status Version 2:         %  PHYSICAL EXAM:  Vital Signs Last 24 Hrs  T(C): 36.3 (24 Apr 2019 13:25), Max: 36.9 (23 Apr 2019 17:08)  T(F): 97.4 (24 Apr 2019 13:25), Max: 98.4 (23 Apr 2019 17:08)  HR: 72 (24 Apr 2019 13:25) (66 - 90)  BP: 143/95 (24 Apr 2019 13:25) (124/76 - 143/95)  BP(mean): --  RR: 18 (24 Apr 2019 13:25) (16 - 18)  SpO2: 99% (24 Apr 2019 13:25) (96% - 100%) I&O's Summary    23 Apr 2019 07:01  -  24 Apr 2019 07:00  --------------------------------------------------------  IN: 0 mL / OUT: 1950 mL / NET: -1950 mL    24 Apr 2019 07:01  -  24 Apr 2019 14:04  --------------------------------------------------------  IN: 200 mL / OUT: 0 mL / NET: 200 mL    GENERAL:  [x ]Alert  [ x]Oriented x   [ ]Lethargic  [ ]Cachexia  [ ]Unarousable  [ x]Verbal  [ ]Non-Verbal  Behavioral:   [ ] Anxiety  [ ] Delirium [ ] Agitation [ ] Other  HEENT:  [ x]Normal   [ ]Dry mouth   [ ]ET Tube/Trach  [ ]Oral lesions  PULMONARY:   [x ]Clear [ ]Tachypnea  [ ]Audible excessive secretions   [ ]Rhonchi        [ ]Right [ ]Left [ ]Bilateral  [ ]Crackles        [ ]Right [ ]Left [ ]Bilateral  [ ]Wheezing     [ ]Right [ ]Left [ ]Bilateral  CARDIOVASCULAR:    [x ]Regular [ ]Irregular [ ]Tachy  [ ]Steve [ ]Murmur [ ]Other  GASTROINTESTINAL:  [x ]Soft  [x]Distended   [x ]+BS  [ ]Non tender [ ]Tender  [ ]PEG [ ]OGT/ NGT  Last BM:   GENITOURINARY:  [x ]Normal [ ] Incontinent   [ ]Oliguria/Anuria   [ ]Manriquez  MUSCULOSKELETAL:   [ ]Normal   [ ]Weakness  [ ]Bed/Wheelchair bound [ x]Edema R thigh  NEUROLOGIC:   [x ]No focal deficits  [ ] Cognitive impairment  [ ] Dysphagia [ ]Dysarthria [ ] Paresis [ ]Other   SKIN:   [x ]Normal   [ ]Pressure ulcer(s)  [ ]Rash    CRITICAL CARE:  [ ] Shock Present  [ ]Septic [ ]Cardiogenic [ ]Neurologic [ ]Hypovolemic  [ ]  Vasopressors [ ]  Inotropes   [ ] Respiratory failure present  [ ] Acute  [ ] Chronic [ ] Hypoxic  [ ] Hypercarbic [ ] Other  [ ] Other organ failure     LABS:                        12.2   10.72 )-----------( 401      ( 24 Apr 2019 06:30 )             38.3   04-24    133<L>  |  97<L>  |  9   ----------------------------<  153<H>  3.8   |  23  |  0.51    Ca    8.5      24 Apr 2019 06:30  Phos  3.0     04-24  Mg     2.1     04-24          RADIOLOGY & ADDITIONAL STUDIES:  CT a/p 4/21: IMPRESSION: Small bowel obstruction with transition point in the right   hemipelvis as on prior abdominal CTs. Interval increase in small bowel   distention. No pneumatosis or interloop ascites.    PROTEIN CALORIE MALNUTRITION:   [ ] PPSV2 < or = to 30% [ ] significant weight loss  [ ] poor nutritional intake [ ] catabolic state [ ] anasarca     Albumin, Serum: 3.8 g/dL (04-16-19 @ 18:18)  Artificial Nutrition [ ]     REFERRALS:   [ ]Chaplaincy  [ ] Hospice  [ ]Child Life  [ ]Social Work  [ ]Case management [ ]Holistic Therapy   Goals of Care Discussion Document: HPI:  53F w/hx of colorectal CA s/p LAR (2017, Dr. Armstrong) with mets to liver and cervix, s/p chemotherapy (FOLFOX, then FOLFIRI, last dose Jan 2019) and radiation, p/w 1-day h/o R-sided abdominal pain associated with nausea and several episodes of nausea. She denies associated fever or chills, dysuria, CP, SOB. Last flatus and last BM 1 day PTA. In the ED, she was tachycardic to 120s, normotensive, afebrile. WBC 12.84, lactate 2.1. CT A/P demonstrates high-grade SBO with transition point in right hemiabdomen. The patient was treated at [facility] in Summer 2018 for SBO, which was managed nonoperatively.    Of note, patient was recently diagnosed with DVT of RLE for which she is taking Eliquis. She is followed by Dr. Kiran of heme/onc. Most recent PET CT in Feb 2019 showed no recurrence/spread of disease. Last chemo January 2019. (17 Apr 2019 01:42)    Patient states pain in R thigh is 10/10, interferes with sleep, and has been worsening.  Patient was taking oxycodone at home however it was stopped due to SBO.  Patient lives at home by herself, however has 2 daughters who help her.  Patient reports no bm in several weeks. She states she is intermittently passing gas. She was started on clear liquid diet today.   Patient sees Dr. Kiran from gyn oncology.  Patient has an aide 3 hours a day 3 days a week, and receives transportation from access a ride.     PERTINENT PM/SXH:   DVT, lower extremity  SBO (small bowel obstruction)  Hemorrhoid  Cervical cancer  Colon neoplasm  Hyperthyroidism    S/P colon resection  H/O exploratory laparotomy  No significant past surgical history    FAMILY HISTORY:  Family history of diabetes mellitus  Family history of leukemia    ITEMS NOT CHECKED ARE NOT PRESENT    SOCIAL HISTORY:   Significant other/partner:  [ ]  Children: patient has 2 daughters  [ ]  Uatsdin/Spirituality:  Substance hx:  [ ]   Tobacco hx:  [ ]   Alcohol hx: [ ]   Home Opioid hx:  [ x] I-Stop Reference No: [id number]  Living Situation: [x ]Home  [ ]Long term care  [ ]Rehab [ ]Other    ADVANCE DIRECTIVES:    DNR  MOLST  [ ]  Living Will  [ ]   DECISION MAKER(s):  [x ] Health Care Proxy(s)  [ ] Surrogate(s)  [ ] Guardian           Name(s): Phone Number(s):  daughter Corinne Mejia [phone], brother Titus Lynn [phone]    BASELINE (I)ADL(s) (prior to admission):  [place]: [ x]Total  [ ] Moderate [ ]Dependent    Allergies    No Known Allergies    Intolerances    MEDICATIONS  (STANDING):  acetaminophen   Tablet .. 650 milliGRAM(s) Oral every 6 hours  dexamethasone  IVPB 10 milliGRAM(s) IV Intermittent every 8 hours  enoxaparin Injectable 70 milliGRAM(s) SubCutaneous two times a day  ibuprofen  Tablet. 200 milliGRAM(s) Oral every 6 hours  methimazole 5 milliGRAM(s) Oral daily  metoclopramide Injectable 10 milliGRAM(s) IV Push every 6 hours  octreotide  Injectable 100 MICROGram(s) IV Push three times a day  pantoprazole  Injectable 40 milliGRAM(s) IV Push daily    MEDICATIONS  (PRN):  melatonin 3 milliGRAM(s) Oral at bedtime PRN Insomnia    PRESENT SYMPTOMS: [ ]Unable to obtain due to poor mentation   Source if other than patient:  [ ]Family   [ ]Team     Pain (Impact on QOL):    Location -     R thigh    Minimal acceptable level (0-10 scale):                    Aggrevating factors -  Quality -  Radiation -  Severity (0-10 scale) -  10/10  Timing -    PAIN AD Score:     [email or web address] (press ctrl +  left click to view)    Dyspnea:                           [ ]Mild [ ]Moderate [ ]Severe  Anxiety:                             [ ]Mild [ ]Moderate [ ]Severe  Fatigue:                             [ ]Mild [ ]Moderate [ ]Severe  Nausea:                             [ ]Mild [ ]Moderate [ ]Severe  Loss of appetite:              [ ]Mild [ ]Moderate [ ]Severe  Constipation:                    [ ]Mild [ ]Moderate [ x]Severe    Other Symptoms:  [ ]All other review of systems negative     Karnofsky Performance Score/Palliative Performance Status Version 2:         %  PHYSICAL EXAM:  Vital Signs Last 24 Hrs  T(C): 36.3 (24 Apr 2019 13:25), Max: 36.9 (23 Apr 2019 17:08)  T(F): 97.4 (24 Apr 2019 13:25), Max: 98.4 (23 Apr 2019 17:08)  HR: 72 (24 Apr 2019 13:25) (66 - 90)  BP: 143/95 (24 Apr 2019 13:25) (124/76 - 143/95)  BP(mean): --  RR: 18 (24 Apr 2019 13:25) (16 - 18)  SpO2: 99% (24 Apr 2019 13:25) (96% - 100%) I&O's Summary    23 Apr 2019 07:01  -  24 Apr 2019 07:00  --------------------------------------------------------  IN: 0 mL / OUT: 1950 mL / NET: -1950 mL    24 Apr 2019 07:01  -  24 Apr 2019 14:04  --------------------------------------------------------  IN: 200 mL / OUT: 0 mL / NET: 200 mL    GENERAL:  [x ]Alert  [ x]Oriented x   [ ]Lethargic  [ ]Cachexia  [ ]Unarousable  [ x]Verbal  [ ]Non-Verbal  Behavioral:   [ ] Anxiety  [ ] Delirium [ ] Agitation [ ] Other  HEENT:  [ x]Normal   [ ]Dry mouth   [ ]ET Tube/Trach  [ ]Oral lesions  PULMONARY:   [x ]Clear [ ]Tachypnea  [ ]Audible excessive secretions   [ ]Rhonchi        [ ]Right [ ]Left [ ]Bilateral  [ ]Crackles        [ ]Right [ ]Left [ ]Bilateral  [ ]Wheezing     [ ]Right [ ]Left [ ]Bilateral  CARDIOVASCULAR:    [x ]Regular [ ]Irregular [ ]Tachy  [ ]Steve [ ]Murmur [ ]Other  GASTROINTESTINAL:  [x ]Soft  [x]Distended   [x ]+BS  [ ]Non tender [ ]Tender  [ ]PEG [ ]OGT/ NGT  Last BM:   GENITOURINARY:  [x ]Normal [ ] Incontinent   [ ]Oliguria/Anuria   [ ]Manriquez  MUSCULOSKELETAL:   [ ]Normal   [ ]Weakness  [ ]Bed/Wheelchair bound [ x]Edema R thigh  NEUROLOGIC:   [x ]No focal deficits  [ ] Cognitive impairment  [ ] Dysphagia [ ]Dysarthria [ ] Paresis [ ]Other   SKIN:   [x ]Normal   [ ]Pressure ulcer(s)  [ ]Rash    CRITICAL CARE:  [ ] Shock Present  [ ]Septic [ ]Cardiogenic [ ]Neurologic [ ]Hypovolemic  [ ]  Vasopressors [ ]  Inotropes   [ ] Respiratory failure present  [ ] Acute  [ ] Chronic [ ] Hypoxic  [ ] Hypercarbic [ ] Other  [ ] Other organ failure     LABS:                        12.2   10.72 )-----------( 401      ( 24 Apr 2019 06:30 )             38.3   04-24    133<L>  |  97<L>  |  9   ----------------------------<  153<H>  3.8   |  23  |  0.51    Ca    8.5      24 Apr 2019 06:30  Phos  3.0     04-24  Mg     2.1     04-24          RADIOLOGY & ADDITIONAL STUDIES:  CT a/p 4/21: IMPRESSION: Small bowel obstruction with transition point in the right   hemipelvis as on prior abdominal CTs. Interval increase in small bowel   distention. No pneumatosis or interloop ascites.    PROTEIN CALORIE MALNUTRITION:   [ ] PPSV2 < or = to 30% [ ] significant weight loss  [ ] poor nutritional intake [ ] catabolic state [ ] anasarca     Albumin, Serum: 3.8 g/dL (04-16-19 @ 18:18)  Artificial Nutrition [ ]     REFERRALS:   [ ]Chaplaincy  [ ] Hospice  [ ]Child Life  [ ]Social Work  [ ]Case management [ ]Holistic Therapy   Goals of Care Discussion Document: HPI:  53F w/hx of colorectal CA s/p LAR (2017, Dr. Armstrong) with mets to liver and cervix, s/p chemotherapy (FOLFOX, then FOLFIRI, last dose Jan 2019) and radiation, p/w 1-day h/o R-sided abdominal pain associated with nausea and several episodes of nausea. She denies associated fever or chills, dysuria, CP, SOB. Last flatus and last BM 1 day PTA. In the ED, she was tachycardic to 120s, normotensive, afebrile. WBC 12.84, lactate 2.1. CT A/P demonstrates high-grade SBO with transition point in right hemiabdomen. The patient was treated at [facility] in Summer 2018 for SBO, which was managed nonoperatively.    Of note, patient was recently diagnosed with DVT of RLE for which she is taking Eliquis. She is followed by Dr. Kiran of heme/onc. Most recent PET CT in Feb 2019 showed no recurrence/spread of disease. Last chemo January 2019. (17 Apr 2019 01:42)    Patient states pain in R thigh is 10/10, interferes with sleep, and has been worsening.  Patient was taking oxycodone at home however it was stopped due to SBO.  Patient lives at home by herself, however has 2 daughters who help her.  Patient reports no bm in several weeks. She states she is intermittently passing gas. She was started on clear liquid diet today.   Patient sees Dr. Kiran from gyn oncology.  Patient has an aide 3 hours a day 3 days a week, and receives transportation from access a ride.     PERTINENT PM/SXH:   DVT, lower extremity  SBO (small bowel obstruction)  Hemorrhoid  Cervical cancer  Colon neoplasm  Hyperthyroidism    S/P colon resection  H/O exploratory laparotomy  No significant past surgical history    FAMILY HISTORY:  Family history of diabetes mellitus  Family history of leukemia    ITEMS NOT CHECKED ARE NOT PRESENT    SOCIAL HISTORY:   Significant other/partner:  [ ]  Children: patient has 2 daughters  [ ]  [employer]/Spirituality:  Substance hx:  [ ]   Tobacco hx:  [ ]   Alcohol hx: [ ]   Home Opioid hx:  [ x] I-Stop Reference No: [id number]  Living Situation: [x ]Home  [ ]Long term care  [ ]Rehab [ ]Other    ADVANCE DIRECTIVES:    DNR  MOLST  [ ]  Living Will  [ ]   DECISION MAKER(s):  [x ] Health Care Proxy(s)  [ ] Surrogate(s)  [ ] Guardian           Name(s): Phone Number(s):  daughter Corinne Mejia [phone], brother Titus Lynn [phone]    BASELINE (I)ADL(s) (prior to admission):  [place]: [ x]Total  [ ] Moderate [ ]Dependent    Allergies    No Known Allergies    Intolerances    MEDICATIONS  (STANDING):  acetaminophen   Tablet .. 650 milliGRAM(s) Oral every 6 hours  dexamethasone  IVPB 10 milliGRAM(s) IV Intermittent every 8 hours  enoxaparin Injectable 70 milliGRAM(s) SubCutaneous two times a day  ibuprofen  Tablet. 200 milliGRAM(s) Oral every 6 hours  methimazole 5 milliGRAM(s) Oral daily  metoclopramide Injectable 10 milliGRAM(s) IV Push every 6 hours  octreotide  Injectable 100 MICROGram(s) IV Push three times a day  pantoprazole  Injectable 40 milliGRAM(s) IV Push daily    MEDICATIONS  (PRN):  melatonin 3 milliGRAM(s) Oral at bedtime PRN Insomnia    PRESENT SYMPTOMS: [ ]Unable to obtain due to poor mentation   Source if other than patient:  [ ]Family   [ ]Team     Pain (Impact on QOL):    Location -     R thigh    Minimal acceptable level (0-10 scale):                    Aggravating factors - moving  Quality - pressure like  Radiation - none  Severity (0-10 scale) -  10/10  Timing -     PAIN AD Score:     http://geriatrictoolkit.[email or web address]/painad.pdf (press ctrl +  left click to view)    Dyspnea:                           [ ]Mild [ ]Moderate [ ]Severe  Anxiety:                             [ ]Mild [ ]Moderate [ ]Severe  Fatigue:                             [ ]Mild [ ]Moderate [ ]Severe  Nausea:                             [ ]Mild [ ]Moderate [ ]Severe  Loss of appetite:              [ ]Mild [ ]Moderate [ ]Severe  Constipation:                    [ ]Mild [ ]Moderate [ x]Severe    Other Symptoms:  [x ]All other review of systems negative     Karnofsky Performance Score/Palliative Performance Status Version 2: 50 %    PHYSICAL EXAM:  Vital Signs Last 24 Hrs  T(C): 36.3 (24 Apr 2019 13:25), Max: 36.9 (23 Apr 2019 17:08)  T(F): 97.4 (24 Apr 2019 13:25), Max: 98.4 (23 Apr 2019 17:08)  HR: 72 (24 Apr 2019 13:25) (66 - 90)  BP: 143/95 (24 Apr 2019 13:25) (124/76 - 143/95)  BP(mean): --  RR: 18 (24 Apr 2019 13:25) (16 - 18)  SpO2: 99% (24 Apr 2019 13:25) (96% - 100%) I&O's Summary    23 Apr 2019 07:01  -  24 Apr 2019 07:00  --------------------------------------------------------  IN: 0 mL / OUT: 1950 mL / NET: -1950 mL    24 Apr 2019 07:01  -  24 Apr 2019 14:04  --------------------------------------------------------  IN: 200 mL / OUT: 0 mL / NET: 200 mL    GENERAL:  [x ]Alert  [ x]Oriented x   [ ]Lethargic  [ ]Cachexia  [ ]Unarousable  [ x]Verbal  [ ]Non-Verbal  Behavioral:   [ ] Anxiety  [ ] Delirium [ ] Agitation [ ] Other  HEENT:  [ x]Normal   [ ]Dry mouth   [ ]ET Tube/Trach  [ ]Oral lesions  PULMONARY:   [x ]Clear [ ]Tachypnea  [ ]Audible excessive secretions   [ ]Rhonchi        [ ]Right [ ]Left [ ]Bilateral  [ ]Crackles        [ ]Right [ ]Left [ ]Bilateral  [ ]Wheezing     [ ]Right [ ]Left [ ]Bilateral  CARDIOVASCULAR:    [x ]Regular [ ]Irregular [ ]Tachy  [ ]Steve [ ]Murmur [ ]Other  GASTROINTESTINAL:  [x ]Soft  [x]Distended   [x ]+BS  [ ]Non tender [ ]Tender  [ ]PEG [ ]OGT/ NGT  Last BM:   GENITOURINARY:  [x ]Normal [ ] Incontinent   [ ]Oliguria/Anuria   [ ]Manriquez  MUSCULOSKELETAL:   [ ]Normal   [ ]Weakness  [ ]Bed/Wheelchair bound [ x]Edema R thigh  NEUROLOGIC:   [x ]No focal deficits  [ ] Cognitive impairment  [ ] Dysphagia [ ]Dysarthria [ ] Paresis [ ]Other   SKIN:   [x ]Normal   [ ]Pressure ulcer(s)  [ ]Rash    CRITICAL CARE:  [ ] Shock Present  [ ]Septic [ ]Cardiogenic [ ]Neurologic [ ]Hypovolemic  [ ]  Vasopressors [ ]  Inotropes   [ ] Respiratory failure present  [ ] Acute  [ ] Chronic [ ] Hypoxic  [ ] Hypercarbic [ ] Other  [ ] Other organ failure     LABS:                        12.2   10.72 )-----------( 401      ( 24 Apr 2019 06:30 )             38.3   04-24    133<L>  |  97<L>  |  9   ----------------------------<  153<H>  3.8   |  23  |  0.51    Ca    8.5      24 Apr 2019 06:30  Phos  3.0     04-24  Mg     2.1     04-24    RADIOLOGY & ADDITIONAL STUDIES:  CT a/p 4/21: IMPRESSION: Small bowel obstruction with transition point in the right   hemipelvis as on prior abdominal CTs. Interval increase in small bowel   distention. No pneumatosis or interloop ascites.    PROTEIN CALORIE MALNUTRITION:   [ ] PPSV2 < or = to 30% [ ] significant weight loss  [ ] poor nutritional intake [ ] catabolic state [ ] anasarca     Albumin, Serum: 3.8 g/dL (04-16-19 @ 18:18)  Artificial Nutrition [ ]     REFERRALS:   [ ]Chaplaincy  [ ] Hospice  [ ]Child Life  [ ]Social Work  [ ]Case management [ ]Holistic Therapy   Goals of Care Discussion Document:

## 2019-04-24 NOTE — PROGRESS NOTE ADULT - SUBJECTIVE AND OBJECTIVE BOX
Team B Surgery Progress Note    Patient passed flatus yesterday. Denies nausea or vomiting. Denies abdominal pain. Complains of persistent pain in her R leg.    Tmax: 36.9 (04-23-19 @ 17:08)  HR: 90  BP: 143/86  RR: 18  SpO2: 99%    Gen: NAD  Lungs: Non-labored breathing  Heart: S1, S2  Abdomen: Soft, ND, NTP  Extremities: R thigh tenderness    04-24-19    WBC: 10.72  Hgb: 12.2  Hct: 38.3  Plt: 401    Na: 133  K: 3.8  Cl: 97  HCO3: 23  BUN: 9  Cr: 0.51  Glu: 153    Ca: 8.5

## 2019-04-25 LAB
ANION GAP SERPL CALC-SCNC: 13 MMO/L — SIGNIFICANT CHANGE UP (ref 7–14)
BUN SERPL-MCNC: 15 MG/DL — SIGNIFICANT CHANGE UP (ref 7–23)
CALCIUM SERPL-MCNC: 8.5 MG/DL — SIGNIFICANT CHANGE UP (ref 8.4–10.5)
CHLORIDE SERPL-SCNC: 97 MMOL/L — LOW (ref 98–107)
CO2 SERPL-SCNC: 25 MMOL/L — SIGNIFICANT CHANGE UP (ref 22–31)
CREAT SERPL-MCNC: 0.55 MG/DL — SIGNIFICANT CHANGE UP (ref 0.5–1.3)
GLUCOSE SERPL-MCNC: 126 MG/DL — HIGH (ref 70–99)
HCT VFR BLD CALC: 40.1 % — SIGNIFICANT CHANGE UP (ref 34.5–45)
HGB BLD-MCNC: 12.7 G/DL — SIGNIFICANT CHANGE UP (ref 11.5–15.5)
MAGNESIUM SERPL-MCNC: 2.1 MG/DL — SIGNIFICANT CHANGE UP (ref 1.6–2.6)
MCHC RBC-ENTMCNC: 27.4 PG — SIGNIFICANT CHANGE UP (ref 27–34)
MCHC RBC-ENTMCNC: 31.7 % — LOW (ref 32–36)
MCV RBC AUTO: 86.6 FL — SIGNIFICANT CHANGE UP (ref 80–100)
NRBC # FLD: 0 K/UL — SIGNIFICANT CHANGE UP (ref 0–0)
PHOSPHATE SERPL-MCNC: 3.6 MG/DL — SIGNIFICANT CHANGE UP (ref 2.5–4.5)
PLATELET # BLD AUTO: 400 K/UL — SIGNIFICANT CHANGE UP (ref 150–400)
PMV BLD: 10.2 FL — SIGNIFICANT CHANGE UP (ref 7–13)
POTASSIUM SERPL-MCNC: 4 MMOL/L — SIGNIFICANT CHANGE UP (ref 3.5–5.3)
POTASSIUM SERPL-SCNC: 4 MMOL/L — SIGNIFICANT CHANGE UP (ref 3.5–5.3)
RBC # BLD: 4.63 M/UL — SIGNIFICANT CHANGE UP (ref 3.8–5.2)
RBC # FLD: 13.8 % — SIGNIFICANT CHANGE UP (ref 10.3–14.5)
SODIUM SERPL-SCNC: 135 MMOL/L — SIGNIFICANT CHANGE UP (ref 135–145)
WBC # BLD: 12.32 K/UL — HIGH (ref 3.8–10.5)
WBC # FLD AUTO: 12.32 K/UL — HIGH (ref 3.8–10.5)

## 2019-04-25 PROCEDURE — 99231 SBSQ HOSP IP/OBS SF/LOW 25: CPT | Mod: GC

## 2019-04-25 PROCEDURE — 74018 RADEX ABDOMEN 1 VIEW: CPT | Mod: 26

## 2019-04-25 PROCEDURE — 99233 SBSQ HOSP IP/OBS HIGH 50: CPT | Mod: GC

## 2019-04-25 RX ORDER — ACETAMINOPHEN 500 MG
975 TABLET ORAL ONCE
Qty: 0 | Refills: 0 | Status: COMPLETED | OUTPATIENT
Start: 2019-04-25 | End: 2019-04-25

## 2019-04-25 RX ORDER — DEXAMETHASONE 0.5 MG/5ML
10 ELIXIR ORAL EVERY 8 HOURS
Qty: 0 | Refills: 0 | Status: DISCONTINUED | OUTPATIENT
Start: 2019-04-25 | End: 2019-04-26

## 2019-04-25 RX ORDER — OXYCODONE HYDROCHLORIDE 5 MG/1
15 TABLET ORAL EVERY 6 HOURS
Qty: 0 | Refills: 0 | Status: DISCONTINUED | OUTPATIENT
Start: 2019-04-25 | End: 2019-04-25

## 2019-04-25 RX ORDER — GABAPENTIN 400 MG/1
100 CAPSULE ORAL THREE TIMES A DAY
Qty: 0 | Refills: 0 | Status: DISCONTINUED | OUTPATIENT
Start: 2019-04-25 | End: 2019-05-12

## 2019-04-25 RX ORDER — OCTREOTIDE ACETATE 200 UG/ML
100 INJECTION, SOLUTION INTRAVENOUS; SUBCUTANEOUS THREE TIMES A DAY
Qty: 0 | Refills: 0 | Status: DISCONTINUED | OUTPATIENT
Start: 2019-04-25 | End: 2019-04-26

## 2019-04-25 RX ORDER — METOCLOPRAMIDE HCL 10 MG
10 TABLET ORAL EVERY 6 HOURS
Qty: 0 | Refills: 0 | Status: DISCONTINUED | OUTPATIENT
Start: 2019-04-25 | End: 2019-04-26

## 2019-04-25 RX ORDER — LACTULOSE 10 G/15ML
20 SOLUTION ORAL DAILY
Qty: 0 | Refills: 0 | Status: DISCONTINUED | OUTPATIENT
Start: 2019-04-25 | End: 2019-04-25

## 2019-04-25 RX ORDER — DEXAMETHASONE 0.5 MG/5ML
10 ELIXIR ORAL EVERY 8 HOURS
Qty: 0 | Refills: 0 | Status: DISCONTINUED | OUTPATIENT
Start: 2019-04-25 | End: 2019-04-25

## 2019-04-25 RX ORDER — OXYCODONE HYDROCHLORIDE 5 MG/1
15 TABLET ORAL EVERY 6 HOURS
Qty: 0 | Refills: 0 | Status: DISCONTINUED | OUTPATIENT
Start: 2019-04-25 | End: 2019-04-27

## 2019-04-25 RX ADMIN — OCTREOTIDE ACETATE 100 MICROGRAM(S): 200 INJECTION, SOLUTION INTRAVENOUS; SUBCUTANEOUS at 05:16

## 2019-04-25 RX ADMIN — OXYCODONE HYDROCHLORIDE 10 MILLIGRAM(S): 5 TABLET ORAL at 08:38

## 2019-04-25 RX ADMIN — Medication 650 MILLIGRAM(S): at 06:40

## 2019-04-25 RX ADMIN — Medication 650 MILLIGRAM(S): at 01:20

## 2019-04-25 RX ADMIN — Medication 10 MILLIGRAM(S): at 16:28

## 2019-04-25 RX ADMIN — PANTOPRAZOLE SODIUM 40 MILLIGRAM(S): 20 TABLET, DELAYED RELEASE ORAL at 13:34

## 2019-04-25 RX ADMIN — Medication 102 MILLIGRAM(S): at 21:08

## 2019-04-25 RX ADMIN — Medication 650 MILLIGRAM(S): at 18:45

## 2019-04-25 RX ADMIN — Medication 10 MILLIGRAM(S): at 05:16

## 2019-04-25 RX ADMIN — Medication 650 MILLIGRAM(S): at 11:34

## 2019-04-25 RX ADMIN — ENOXAPARIN SODIUM 70 MILLIGRAM(S): 100 INJECTION SUBCUTANEOUS at 18:46

## 2019-04-25 RX ADMIN — Medication 3 MILLIGRAM(S): at 21:06

## 2019-04-25 RX ADMIN — GABAPENTIN 100 MILLIGRAM(S): 400 CAPSULE ORAL at 21:06

## 2019-04-25 RX ADMIN — OXYCODONE HYDROCHLORIDE 15 MILLIGRAM(S): 5 TABLET ORAL at 16:10

## 2019-04-25 RX ADMIN — Medication 10 MILLIGRAM(S): at 00:26

## 2019-04-25 RX ADMIN — OXYCODONE HYDROCHLORIDE 15 MILLIGRAM(S): 5 TABLET ORAL at 15:10

## 2019-04-25 RX ADMIN — GABAPENTIN 100 MILLIGRAM(S): 400 CAPSULE ORAL at 13:34

## 2019-04-25 RX ADMIN — Medication 102 MILLIGRAM(S): at 07:41

## 2019-04-25 RX ADMIN — Medication 650 MILLIGRAM(S): at 00:26

## 2019-04-25 RX ADMIN — Medication 650 MILLIGRAM(S): at 12:18

## 2019-04-25 RX ADMIN — OXYCODONE HYDROCHLORIDE 10 MILLIGRAM(S): 5 TABLET ORAL at 07:41

## 2019-04-25 RX ADMIN — ENOXAPARIN SODIUM 70 MILLIGRAM(S): 100 INJECTION SUBCUTANEOUS at 05:16

## 2019-04-25 RX ADMIN — OCTREOTIDE ACETATE 100 MICROGRAM(S): 200 INJECTION, SOLUTION INTRAVENOUS; SUBCUTANEOUS at 21:07

## 2019-04-25 RX ADMIN — Medication 650 MILLIGRAM(S): at 05:15

## 2019-04-25 NOTE — PROGRESS NOTE ADULT - ASSESSMENT
Jennifer Lynn is a 53 y.o. woman with history of metastatic colorectal CA s/p LAR, currently admitted for SBO, also with a right femoral vein DVT. Vascular surgery consulted due to ongoing R leg pain despite therapeutic anticoagulation. Patient having pain, but no sign of phlegmasia.     Plan:  - Agree with anticoagulation  - Encourage OOB and exercise  - Compression of leg and thigh with stocking and ACE wrap may help with pain  - No indication for thrombolysis at this time; because of malignancy, not a candidate  - May repeat duplex to look for DVT prorogation; if there is prorogation, may require change in A/c; would suggest hematology consultation    Conner Sargent, PGY2  [id number]

## 2019-04-25 NOTE — PROGRESS NOTE ADULT - ASSESSMENT
53y female with recurrent SBO, likely malignant, passing flatus and tolerating diet.      - Continue regular diet, serial abdominal exams  - discontinue malignant SBO protocol meds (decadron/octreotide/reglan), continue to monitor GIF  - F/U Palliative recommendations  - Continue Therapeutic lovenox for DVT anticoagulation  - Pain control: oxy 10mg q6h prn  - Bowel regimen    B surgery  [id number] 53y female with recurrent SBO, likely malignant, passing flatus and tolerating diet.      - Continue regular diet, serial abdominal exams  - discontinue malignant SBO protocol meds (decadron/octreotide/reglan), continue to monitor GIF  - F/U Palliative recommendations  - Continue Therapeutic lovenox for DVT anticoagulation  - Pain control: oxy 10mg q6h prn  - reconsulted vascular for RLE pain, as per vacular resident will start gabapentin and will f/u with attending  - Bowel regimen    B surgery  [id number]

## 2019-04-25 NOTE — PROVIDER CONTACT NOTE (OTHER) - SITUATION
Patient is tachycardic with rectal pain and dry heaving. No BM in days, surgery B aware, dulcolax suppository administered shortly before incident.

## 2019-04-25 NOTE — PROGRESS NOTE ADULT - PROBLEM SELECTOR PLAN 2
atient might require depo injection of octreotide.  recommend changing medications to oral to see if patient is able to tolerate.   Last imaging on 4/21 showed Small bowel obstruction with transition point in the right hemipelvis with Interval increase in small bowel   distention.    unclear if surgical intervention would provide benefit.   patient passing gas and is tolerating diet Patient might require depo injection of octreotide.  Recommend changing medications to oral to see if patient is able to tolerate.   Last imaging on 4/21 showed Small bowel obstruction with transition point in the right hemipelvis with Interval increase in small bowel   distention.    unclear if surgical intervention would provide benefit.   patient passing gas and is tolerating diet  Plan is for conservative management for now.

## 2019-04-25 NOTE — PROGRESS NOTE ADULT - SUBJECTIVE AND OBJECTIVE BOX
INTERVAL HPI/OVERNIGHT EVENTS:  Patient states oxycodone helped her pain somewhat however did not bring her pain to an acceptable level.  Pain ranges from 5-10/10.   No BM yet however patient is passing gas and tolerating diet.     DNR on chart:   Allergies    No Known Allergies    Intolerances    MEDICATIONS  (STANDING):  acetaminophen   Tablet .. 650 milliGRAM(s) Oral every 6 hours  enoxaparin Injectable 70 milliGRAM(s) SubCutaneous two times a day  gabapentin 100 milliGRAM(s) Oral three times a day  methimazole 5 milliGRAM(s) Oral daily  pantoprazole  Injectable 40 milliGRAM(s) IV Push daily  senna 2 Tablet(s) Oral at bedtime    MEDICATIONS  (PRN):  lactulose Syrup 20 Gram(s) Oral daily PRN constipation  melatonin 3 milliGRAM(s) Oral at bedtime PRN Insomnia  oxyCODONE    IR 15 milliGRAM(s) Oral every 6 hours PRN Severe Pain (7 - 10)  polyethylene glycol 3350 17 Gram(s) Oral daily PRN Constipation      ITEMS UNCHECKED ARE NOT PRESENT    PRESENT SYMPTOMS: [ ]Unable to obtain due to poor mentation   Source if other than patient:  [ ]Family   [ ]Team     Pain (Impact on QOL):    Location: right thigh  Minimal acceptable level (0-10 scale):          4  Aggravating factors: ambulation  Quality:  Radiation:  Severity (0-10 scale):  5-10/10   Timing:    Dyspnea:                           [ ]Mild [ ]Moderate [ ]Severe  Anxiety:                             [ ]Mild [ ]Moderate [ ]Severe  Fatigue:                             [ ]Mild [ ]Moderate [ ]Severe  Nausea:                             [ ]Mild [ ]Moderate [ ]Severe  Loss of appetite:              [ ]Mild [ ]Moderate [ ]Severe  Constipation:                    [ ]Mild [ ]Moderate [ x]Severe    PAIN AD Score:	  http://geriatrictoolkit.[place].[facility]/cog/painad.pdf (Ctrl + left click to view)    Other Symptoms:  [ ]All other review of systems negative     Karnofsky Performance Score/Palliative Performance Status Version 2:      40-50   %    http://palliative.info/resource_material/PPSv2.pdf  PHYSICAL EXAM:  Vital Signs Last 24 Hrs  T(C): 36.5 (25 Apr 2019 05:12), Max: 36.7 (24 Apr 2019 17:17)  T(F): 97.7 (25 Apr 2019 05:12), Max: 98.1 (24 Apr 2019 17:17)  HR: 76 (25 Apr 2019 05:12) (72 - 85)  BP: 139/88 (25 Apr 2019 05:12) (131/86 - 145/96)  BP(mean): --  RR: 16 (25 Apr 2019 05:12) (16 - 18)  SpO2: 99% (25 Apr 2019 05:12) (98% - 99%) I&O's Summary    24 Apr 2019 07:01  -  25 Apr 2019 07:00  --------------------------------------------------------  IN: 1500 mL / OUT: 400 mL / NET: 1100 mL    25 Apr 2019 07:01  -  25 Apr 2019 12:29  --------------------------------------------------------  IN: 200 mL / OUT: 0 mL / NET: 200 mL     GENERAL:  [ x]Alert  [x ]Oriented x  3 [ ]Lethargic  [ ]Cachexia  [ ]Unarousable  [ ]Verbal  [ ]Non-Verbal    Behavioral:   [ ] Anxiety  [ ] Delirium [ ] Agitation [ ] Other    HEENT:  [ x]Normal   [ ]Dry mouth   [ ]ET Tube/Trach  [ ]Oral lesions    PULMONARY:   [x ]Clear [ ]Tachypnea  [ ]Audible excessive secretions   [ ]Rhonchi        [ ]Right [ ]Left [ ]Bilateral  [ ]Crackles        [ ]Right [ ]Left [ ]Bilateral  [ ]Wheezing     [ ]Right [ ]Left [ ]Bilateral    CARDIOVASCULAR:    [ x]Regular [ ]Irregular [ ]Tachy  [ ]Steve [ ]Murmur [ ]Other    GASTROINTESTINAL:  [x ]Soft  [ ]Distended   [ ]+BS  [ ]Non tender [ ]Tender  [ ]PEG [ ]OGT/ NGT   Last BM:    GENITOURINARY:  [ x]Normal [ ] Incontinent   [ ]Oliguria/Anuria   [ ]Manriquez    MUSCULOSKELETAL:   [ ]Normal   [ ]Weakness  [ ]Bed/Wheelchair bound [x ]Edema right thigh    NEUROLOGIC:   [x ]No focal deficits  [ ] Cognitive impairment  [ ] Dysphagia [ ]Dysarthria [ ] Paresis [ ]Other     SKIN:   [x]Normal   [ ]Pressure ulcer(s)  [ ]Rash    CRITICAL CARE:  [ ] Shock Present  [ ]Septic [ ]Cardiogenic [ ]Neurologic [ ]Hypovolemic  [ ]  Vasopressors [ ]  Inotropes   [ ] Respiratory failure present  [ ] Acute  [ ] Chronic [ ] Hypoxic  [ ] Hypercarbic [ ] Other  [ ] Other organ failure     LABS:                        12.7   12.32 )-----------( 400      ( 25 Apr 2019 05:30 )             40.1   04-25    135  |  97<L>  |  15  ----------------------------<  126<H>  4.0   |  25  |  0.55    Ca    8.5      25 Apr 2019 05:30  Phos  3.6     04-25  Mg     2.1     04-25          RADIOLOGY & ADDITIONAL STUDIES:    Protein Calorie Malnutrition Present: [ ] yes [ ] no  [ ] PPSV2 < or = 30%  [ ] significant weight loss [ ] poor nutritional intake [ ] anasarca [ ] catabolic state Albumin, Serum: 3.8 g/dL (04-16-19 @ 18:18)  Artificial Nutrition [ ]     REFERRALS:   [ ]Chaplaincy  [ ] Hospice  [ ]Child Life  [ ]Social Work  [ ]Case management [ ]Holistic Therapy   Goals of Care Document: INTERVAL HPI/OVERNIGHT EVENTS:  Patient states oxycodone helped her pain somewhat however did not bring her pain to an acceptable level.  Pain ranges from 5-10/10.   No BM yet however patient is passing gas and tolerating diet.     DNR on chart:   Allergies    No Known Allergies    Intolerances    MEDICATIONS  (STANDING):  acetaminophen   Tablet .. 650 milliGRAM(s) Oral every 6 hours  enoxaparin Injectable 70 milliGRAM(s) SubCutaneous two times a day  gabapentin 100 milliGRAM(s) Oral three times a day  methimazole 5 milliGRAM(s) Oral daily  pantoprazole  Injectable 40 milliGRAM(s) IV Push daily  senna 2 Tablet(s) Oral at bedtime    MEDICATIONS  (PRN):  lactulose Syrup 20 Gram(s) Oral daily PRN constipation  melatonin 3 milliGRAM(s) Oral at bedtime PRN Insomnia  oxyCODONE    IR 15 milliGRAM(s) Oral every 6 hours PRN Severe Pain (7 - 10)  polyethylene glycol 3350 17 Gram(s) Oral daily PRN Constipation    ITEMS UNCHECKED ARE NOT PRESENT    PRESENT SYMPTOMS: [ ]Unable to obtain due to poor mentation   Source if other than patient:  [ ]Family   [ ]Team     Pain (Impact on QOL):    Location: right thigh  Minimal acceptable level (0-10 scale):          4  Aggravating factors: ambulation  Quality:  Radiation:  Severity (0-10 scale):  5-10/10   Timing:    Dyspnea:                           [ ]Mild [ ]Moderate [ ]Severe  Anxiety:                             [ ]Mild [ ]Moderate [ ]Severe  Fatigue:                             [ ]Mild [ ]Moderate [ ]Severe  Nausea:                             [ ]Mild [ ]Moderate [ ]Severe  Loss of appetite:              [ ]Mild [ ]Moderate [ ]Severe  Constipation:                    [ ]Mild [ ]Moderate [ x]Severe    PAIN AD Score:	  http://geriatrictoolkit.[place].[facility]/cog/painad.pdf (Ctrl + left click to view)    Other Symptoms:  [x ]All other review of systems negative     Karnofsky Performance Score/Palliative Performance Status Version 2:      40-50   %    http://palliative.info/resource_material/PPSv2.pdf    PHYSICAL EXAM:  Vital Signs Last 24 Hrs  T(C): 36.5 (25 Apr 2019 05:12), Max: 36.7 (24 Apr 2019 17:17)  T(F): 97.7 (25 Apr 2019 05:12), Max: 98.1 (24 Apr 2019 17:17)  HR: 76 (25 Apr 2019 05:12) (72 - 85)  BP: 139/88 (25 Apr 2019 05:12) (131/86 - 145/96)  BP(mean): --  RR: 16 (25 Apr 2019 05:12) (16 - 18)  SpO2: 99% (25 Apr 2019 05:12) (98% - 99%) I&O's Summary    24 Apr 2019 07:01  -  25 Apr 2019 07:00  --------------------------------------------------------  IN: 1500 mL / OUT: 400 mL / NET: 1100 mL    25 Apr 2019 07:01  -  25 Apr 2019 12:29  --------------------------------------------------------  IN: 200 mL / OUT: 0 mL / NET: 200 mL    GENERAL:  [ x]Alert  [x ]Oriented x  3 [ ]Lethargic  [ ]Cachexia  [ ]Unarousable  [ x]Verbal  [ ]Non-Verbal    Behavioral:   [ ] Anxiety  [ ] Delirium [ ] Agitation [ ] Other    HEENT:  [ x]Normal   [ ]Dry mouth   [ ]ET Tube/Trach  [ ]Oral lesions    PULMONARY:   [x ]Clear [ ]Tachypnea  [ ]Audible excessive secretions   [ ]Rhonchi        [ ]Right [ ]Left [ ]Bilateral  [ ]Crackles        [ ]Right [ ]Left [ ]Bilateral  [ ]Wheezing     [ ]Right [ ]Left [ ]Bilateral    CARDIOVASCULAR:    [ x]Regular [ ]Irregular [ ]Tachy  [ ]Steve [ ]Murmur [ ]Other    GASTROINTESTINAL:  [x ]Soft  [ ]Distended   [ ]+BS  [ ]Non tender [ ]Tender  [ ]PEG [ ]OGT/ NGT   Last BM:     GENITOURINARY:  [ x]Normal [ ] Incontinent   [ ]Oliguria/Anuria   [ ]Manriquez    MUSCULOSKELETAL:   [ ]Normal   [ ]Weakness  [ ]Bed/Wheelchair bound [x ]Edema right thigh    NEUROLOGIC:   [x ]No focal deficits  [ ] Cognitive impairment  [ ] Dysphagia [ ]Dysarthria [ ] Paresis [ ]Other     SKIN:   [x]Normal   [ ]Pressure ulcer(s)  [ ]Rash    CRITICAL CARE:  [ ] Shock Present  [ ]Septic [ ]Cardiogenic [ ]Neurologic [ ]Hypovolemic  [ ]  Vasopressors [ ]  Inotropes   [ ] Respiratory failure present  [ ] Acute  [ ] Chronic [ ] Hypoxic  [ ] Hypercarbic [ ] Other  [ ] Other organ failure     LABS:                        12.7   12.32 )-----------( 400      ( 25 Apr 2019 05:30 )             40.1   04-25    135  |  97<L>  |  15  ----------------------------<  126<H>  4.0   |  25  |  0.55    Ca    8.5      25 Apr 2019 05:30  Phos  3.6     04-25  Mg     2.1     04-25    RADIOLOGY & ADDITIONAL STUDIES: reviewed.     Protein Calorie Malnutrition Present: [ ] yes [ ] no  [ ] PPSV2 < or = 30%  [ ] significant weight loss [ ] poor nutritional intake [ ] anasarca [ ] catabolic state Albumin, Serum: 3.8 g/dL (04-16-19 @ 18:18)  Artificial Nutrition [ ]     REFERRALS:   [ ]Chaplaincy  [ ] Hospice  [ ]Child Life  [ ]Social Work  [ ]Case management [ ]Holistic Therapy   Goals of Care Document:

## 2019-04-25 NOTE — PROGRESS NOTE ADULT - PROBLEM SELECTOR PLAN 1
patient is on lovenox 70mg BID  patient has pain in R thigh. patient is on Lovenox 70mg BID  patient has pain in R thigh.

## 2019-04-25 NOTE — PROVIDER CONTACT NOTE (OTHER) - ASSESSMENT
Patient is A + O x 4, tachycardic, all other vitals stable as per flow. Patient placed in bed and positioned for comfort. + Bowel sounds with + flatus as per patient. Affect is frequently anxious. Xray of abdomen had been completed earlier. Patient then ambulated ad serge to bathroom in attempt to have BM.

## 2019-04-25 NOTE — PROGRESS NOTE ADULT - PROBLEM SELECTOR PLAN 3
oxycodone IR 10mg x 2 in past 24 hours however without acceptable relief.  Will increase to oxy IR 15mg q 6 prn  continue bowel regimen  added lactulose prn oxycodone IR 10mg x 2 in past 24 hours however without acceptable relief.    Would recommend to increase oxy to IR 15mg q 6 prn  Continue bowel regimen  Added lactulose prn

## 2019-04-25 NOTE — PROGRESS NOTE ADULT - SUBJECTIVE AND OBJECTIVE BOX
GENERAL SURGERY NOTE    Consulting Service: B Team (pager [phone])    No acute events. Patient endorses more flatus, no n/v with regular diet.  She still has pain at site of known/previous DVT.       Vital Signs Last 24 Hrs  T(C): 36.5 (25 Apr 2019 05:12), Max: 36.7 (24 Apr 2019 17:17)  T(F): 97.7 (25 Apr 2019 05:12), Max: 98.1 (24 Apr 2019 17:17)  HR: 76 (25 Apr 2019 05:12) (66 - 85)  BP: 139/88 (25 Apr 2019 05:12) (131/86 - 145/96)  BP(mean): --  RR: 16 (25 Apr 2019 05:12) (16 - 18)  SpO2: 99% (25 Apr 2019 05:12) (98% - 100%)    04-24-19 @ 07:01  -  04-25-19 @ 07:00  --------------------------------------------------------  IN: 1500 mL / OUT: 400 mL / NET: 1100 mL    04-25-19 @ 07:01  -  04-25-19 @ 10:38  --------------------------------------------------------  IN: 200 mL / OUT: 0 mL / NET: 200 mL      GEN: NAD, alert and oriented x 3  HEENT: WNL  CHEST: Symmetrical chest rise, breath sounds CTAB  ABD: Soft, non-tender, non-distended  EXT/VASC: Proximal LLE is diffusely edematous, no erythema, tender to deep palpation.    LABS:          CBC (04-25 @ 05:30)                              12.7                           12.32<H>  )----------------(  400        --    % Neutrophils, --    % Lymphocytes, ANC: --                                  40.1                CBC (04-24 @ 06:30)                              12.2                           10.72<H>  )----------------(  401<H>     --    % Neutrophils, --    % Lymphocytes, ANC: --                                  38.3                  BMP (04-25 @ 05:30)             135     |  97<L>   |  15    		Ca++ --      Ca 8.5                ---------------------------------( 126<H>		Mg 2.1                4.0     |  25      |  0.55  			Ph 3.6     BMP (04-24 @ 06:30)             133<L>  |  97<L>   |  9     		Ca++ --      Ca 8.5                ---------------------------------( 153<H>		Mg 2.1                3.8     |  23      |  0.51  			Ph 3.0

## 2019-04-25 NOTE — PROVIDER CONTACT NOTE (OTHER) - ACTION/TREATMENT ORDERED:
MISSY Gordon made aware and attempted to disimpact patient with no success. Patient then ambulated to bathroom several times to attempt BM. Currently in bed sleeping. MISSY Gordon aware of abdominal xray.

## 2019-04-25 NOTE — PROGRESS NOTE ADULT - ATTENDING COMMENTS
Pt seen with Fellow.  Agree with above plan.  Pt tolerating oral diet, passing gas.  Will increase oxycodone as pt indicates that pain is not well managed with the 10mg as above.

## 2019-04-25 NOTE — PROGRESS NOTE ADULT - SUBJECTIVE AND OBJECTIVE BOX
C Team - Vascular Surgery Consult - Daily Progress Note    SUBJECTIVE:  Patient now having GI function and tolerating PO.   Continues to complain of pain in her right upper thigh. Denies any pain in her right leg of foot.   The patient states that she is only able to walk as far as the bathroom before the pain starts.  When she sits down, the pain improves, but is not completely resolved.   The patient states that she also has been when sitting.     OBJECTIVE:     ** VITAL SIGNS / I&O's **    T(C): 36.5 (04-25-19 @ 05:12), Max: 36.7 (04-24-19 @ 17:17)  T(F): 97.7 (04-25-19 @ 05:12), Max: 98.1 (04-24-19 @ 17:17)  HR: 76 (04-25-19 @ 05:12) (72 - 85)  BP: 139/88 (04-25-19 @ 05:12) (131/86 - 145/96)  RR: 16 (04-25-19 @ 05:12) (16 - 18)  SpO2: 99% (04-25-19 @ 05:12) (98% - 99%)      24 Apr 2019 07:01  -  25 Apr 2019 07:00  --------------------------------------------------------  IN:    IV PiggyBack: 50 mL    Oral Fluid: 1450 mL  Total IN: 1500 mL    OUT:    Voided: 400 mL  Total OUT: 400 mL    Total NET: 1100 mL      25 Apr 2019 07:01  -  25 Apr 2019 12:56  --------------------------------------------------------  IN:    Oral Fluid: 200 mL  Total IN: 200 mL    OUT:  Total OUT: 0 mL    Total NET: 200 mL      ** PHYSICAL EXAM **    -- CONSTITUTIONAL: AOx3. NAD.   -- CARDIOVASCULAR: normotensive, regular rate   -- RESPIRATORY: breathing comfortably   -- ABDOMEN: soft, nontender, nondistended   -- EXTREMITIES:   -- VASCULAR:            RLE: DP + PT + Pop + Fem pulse           LLE: DP + PT + Pop + Fem pulse  -- NEUROLOGICAL: motor and sensation symmetric     ** LABS **                 12.7   12.32  )----------(  400       ( 25 Apr 2019 05:30 )               40.1      135    |  97     |  15     ----------------------------<  126        ( 25 Apr 2019 05:30 )  4.0     |  25     |  0.55     Ca    8.5        ( 25 Apr 2019 05:30 )  Phos  3.6       ( 25 Apr 2019 05:30 )  Mg     2.1       ( 25 Apr 2019 05:30 )      **RADS**  US Duplex Venous Lower Ext Ltd, Right (04.07.19 @ 20:18)   There is thrombus involving the right proximal, mid and distal femoral   vein, which is noncompressible.    There is normal compressibility of the right common femoral and popliteal   veins. No calf vein thrombosis is detected.    The contralateral common femoral vein is patent.    Relative loss of phasicity involving the spectral waveforms within the   right popliteal vein.    IMPRESSION:     Deep venous thrombosis above the knee involving the right femoral vein.    CT Abdomen and Pelvis w/ IV Cont (04.16.19 @ 21:50)     LOWER CHEST: Within normal limits.    LIVER: Within normal limits.  BILE DUCTS: Normal caliber.  GALLBLADDER: Within normal limits.  SPLEEN: Within normal limits.  PANCREAS: Within normal limits.  ADRENALS: Within normal limits.  KIDNEYS/URETERS: Kidneys symmetrically enhance. Mild right   hydroureteronephrosis to the level of the right pelvic sidewall.  BLADDER: Nonspecific thickening of the posterior left urinary bladder   wall and bladder trigone.  REPRODUCTIVE ORGANS: Heterogeneity of the cervix and lower uterine   segment with questionable fluid in the endocervical canal and/or upper   vagina.    BOWEL/PERITONEUM: Multiple dilated loops of small bowel with transition   point to collapsed bowel in the right lower abdomen/pelvis (series 2,   image 77). No altered bowel wall enhancement, submucosal edema or   pneumatosis to indicate presence of ischemia. Mild mesenteric edema.   Residual contrast in the colon from prior examination. Thickening of the   sigmoid colonand rectum just proximal to the rectal anastomosis. Stable   thickening of the presacral soft tissues. Appendix is normal.    VESSELS:  Within normal limits. Patent mesenteric vessels.  RETROPERITONEUM: Stable mildly enlarged bilateral external iliacand   para-aortic lymph nodes.  ABDOMINAL WALL: Ventral abdominal scar. Stable bilateral inguinal   lymphadenopathy. Subcutaneous edema of the proximal right thigh.  BONES: Degenerative changes.    IMPRESSION:     High-grade small bowel obstruction with transition point within the right   lower abdomen/pelvis. Mild right hydroureteronephrosis to the level of   the right pelvic sidewall near the transition point.    Stable thickening of the sigmoid colon and rectum proximal to the rectal   anastomosis and presacral soft tissue thickening and compared to PET/CT   2/7/2019, which may reflect posttreatment change.    Bilateral external iliac and inguinal lymphadenopathy unchanged since   4/7/2019 and new since 2/7/2019.    Nonspecific thickening of the posterior left urinary bladder wall and   bladder trigone, which may reflect posttreatment change, however consider   urology follow-up to exclude underlying pathology.

## 2019-04-25 NOTE — PROGRESS NOTE ADULT - ATTENDING COMMENTS
I saw and examined the patient and agree with the above note.    Malignant small bowel obstruction  -continue medical mgmt   -was mildly uncomfortable today, will need to back off on PO intake  -enemas as needed  -discussed possible operative mgmt in future if she continues to be obstipated or continuously uncomfortable    DVT  -therapeutic lovenox  -monitor edema  -keep extremity elevated when not ambulating   -vascular surg recs appreciated    Bonifacoi White MD (Cell: [phone])  Acute and Critical Care Surgery    The Acute Care Surgery (B Team) Attending Group Practice:  Dr. Leonardo Celeste, Dr. Imtiaz Skinner, Dr. Callie Finch, Dr. Bonifacio White    Urgent issues - spectra [phone] or [phone]  Nonurgent issues - [phone]  Patient appointments or after hours - [phone] .

## 2019-04-26 LAB
HCT VFR BLD CALC: 44.9 % — SIGNIFICANT CHANGE UP (ref 34.5–45)
HGB BLD-MCNC: 13.9 G/DL — SIGNIFICANT CHANGE UP (ref 11.5–15.5)
MCHC RBC-ENTMCNC: 27.5 PG — SIGNIFICANT CHANGE UP (ref 27–34)
MCHC RBC-ENTMCNC: 31 % — LOW (ref 32–36)
MCV RBC AUTO: 88.9 FL — SIGNIFICANT CHANGE UP (ref 80–100)
NRBC # FLD: 0 K/UL — SIGNIFICANT CHANGE UP (ref 0–0)
PLATELET # BLD AUTO: 432 K/UL — HIGH (ref 150–400)
PMV BLD: 10.2 FL — SIGNIFICANT CHANGE UP (ref 7–13)
RBC # BLD: 5.05 M/UL — SIGNIFICANT CHANGE UP (ref 3.8–5.2)
RBC # FLD: 13.9 % — SIGNIFICANT CHANGE UP (ref 10.3–14.5)
WBC # BLD: 16 K/UL — HIGH (ref 3.8–10.5)
WBC # FLD AUTO: 16 K/UL — HIGH (ref 3.8–10.5)

## 2019-04-26 PROCEDURE — 99231 SBSQ HOSP IP/OBS SF/LOW 25: CPT | Mod: GC

## 2019-04-26 PROCEDURE — 93971 EXTREMITY STUDY: CPT | Mod: 26,LT

## 2019-04-26 PROCEDURE — 99233 SBSQ HOSP IP/OBS HIGH 50: CPT | Mod: GC

## 2019-04-26 PROCEDURE — 74018 RADEX ABDOMEN 1 VIEW: CPT | Mod: 26

## 2019-04-26 RX ORDER — POLYETHYLENE GLYCOL 3350 17 G/17G
17 POWDER, FOR SOLUTION ORAL DAILY
Qty: 0 | Refills: 0 | Status: DISCONTINUED | OUTPATIENT
Start: 2019-04-26 | End: 2019-04-26

## 2019-04-26 RX ORDER — DEXTROSE MONOHYDRATE, SODIUM CHLORIDE, AND POTASSIUM CHLORIDE 50; .745; 4.5 G/1000ML; G/1000ML; G/1000ML
1000 INJECTION, SOLUTION INTRAVENOUS
Qty: 0 | Refills: 0 | Status: DISCONTINUED | OUTPATIENT
Start: 2019-04-26 | End: 2019-05-02

## 2019-04-26 RX ORDER — SENNA PLUS 8.6 MG/1
2 TABLET ORAL AT BEDTIME
Qty: 0 | Refills: 0 | Status: DISCONTINUED | OUTPATIENT
Start: 2019-04-26 | End: 2019-04-26

## 2019-04-26 RX ORDER — MINERAL OIL
133 OIL (ML) MISCELLANEOUS ONCE
Qty: 0 | Refills: 0 | Status: COMPLETED | OUTPATIENT
Start: 2019-04-26 | End: 2019-04-26

## 2019-04-26 RX ADMIN — GABAPENTIN 100 MILLIGRAM(S): 400 CAPSULE ORAL at 13:44

## 2019-04-26 RX ADMIN — Medication 650 MILLIGRAM(S): at 01:10

## 2019-04-26 RX ADMIN — ENOXAPARIN SODIUM 70 MILLIGRAM(S): 100 INJECTION SUBCUTANEOUS at 06:49

## 2019-04-26 RX ADMIN — Medication 650 MILLIGRAM(S): at 13:44

## 2019-04-26 RX ADMIN — GABAPENTIN 100 MILLIGRAM(S): 400 CAPSULE ORAL at 22:44

## 2019-04-26 RX ADMIN — OXYCODONE HYDROCHLORIDE 15 MILLIGRAM(S): 5 TABLET ORAL at 23:40

## 2019-04-26 RX ADMIN — Medication 650 MILLIGRAM(S): at 08:30

## 2019-04-26 RX ADMIN — Medication 650 MILLIGRAM(S): at 18:48

## 2019-04-26 RX ADMIN — POLYETHYLENE GLYCOL 3350 17 GRAM(S): 17 POWDER, FOR SOLUTION ORAL at 13:45

## 2019-04-26 RX ADMIN — DEXTROSE MONOHYDRATE, SODIUM CHLORIDE, AND POTASSIUM CHLORIDE 100 MILLILITER(S): 50; .745; 4.5 INJECTION, SOLUTION INTRAVENOUS at 22:45

## 2019-04-26 RX ADMIN — OXYCODONE HYDROCHLORIDE 15 MILLIGRAM(S): 5 TABLET ORAL at 13:44

## 2019-04-26 RX ADMIN — Medication 10 MILLIGRAM(S): at 06:50

## 2019-04-26 RX ADMIN — Medication 650 MILLIGRAM(S): at 12:34

## 2019-04-26 RX ADMIN — GABAPENTIN 100 MILLIGRAM(S): 400 CAPSULE ORAL at 06:50

## 2019-04-26 RX ADMIN — PANTOPRAZOLE SODIUM 40 MILLIGRAM(S): 20 TABLET, DELAYED RELEASE ORAL at 18:18

## 2019-04-26 RX ADMIN — Medication 650 MILLIGRAM(S): at 07:35

## 2019-04-26 RX ADMIN — Medication 102 MILLIGRAM(S): at 06:49

## 2019-04-26 RX ADMIN — OXYCODONE HYDROCHLORIDE 15 MILLIGRAM(S): 5 TABLET ORAL at 14:14

## 2019-04-26 RX ADMIN — OXYCODONE HYDROCHLORIDE 15 MILLIGRAM(S): 5 TABLET ORAL at 22:42

## 2019-04-26 RX ADMIN — OXYCODONE HYDROCHLORIDE 15 MILLIGRAM(S): 5 TABLET ORAL at 03:46

## 2019-04-26 RX ADMIN — Medication 650 MILLIGRAM(S): at 18:18

## 2019-04-26 RX ADMIN — OXYCODONE HYDROCHLORIDE 15 MILLIGRAM(S): 5 TABLET ORAL at 04:46

## 2019-04-26 RX ADMIN — ENOXAPARIN SODIUM 70 MILLIGRAM(S): 100 INJECTION SUBCUTANEOUS at 18:18

## 2019-04-26 RX ADMIN — DEXTROSE MONOHYDRATE, SODIUM CHLORIDE, AND POTASSIUM CHLORIDE 100 MILLILITER(S): 50; .745; 4.5 INJECTION, SOLUTION INTRAVENOUS at 06:50

## 2019-04-26 RX ADMIN — Medication 650 MILLIGRAM(S): at 02:10

## 2019-04-26 RX ADMIN — Medication 133 MILLILITER(S): at 22:44

## 2019-04-26 NOTE — PROGRESS NOTE ADULT - ATTENDING COMMENTS
Pt seen with fellow.  Agree with above plan.  Currently symptoms are well controlled, had BM today.  SBO management per surgery team.  Will sign off, reconsult as needed.

## 2019-04-26 NOTE — PROGRESS NOTE ADULT - ATTENDING COMMENTS
I saw and examined the patient and agree with the above note.    Malignant small bowel obstruction  -continue medical mgmt   - +flatus and BM  -enemas as needed  -discussed possible operative mgmt in future if she continues to be obstipated or continuously uncomfortable    DVT  -therapeutic lovenox  -monitor edema  -keep extremity elevated when not ambulating   -vascular surg recs appreciated    Bonifacio White MD (Cell: [phone])  Acute and Critical Care Surgery    The Acute Care Surgery (B Team) Attending Group Practice:  Dr. Leonardo Celeste, Dr. Imtiaz Skinner, Dr. Callie Finch, Dr. Bonifacio White    Urgent issues - spectra [phone] or [phone]  Nonurgent issues - [phone]  Patient appointments or after hours - [phone] .

## 2019-04-26 NOTE — PROGRESS NOTE ADULT - SUBJECTIVE AND OBJECTIVE BOX
INTERVAL HPI/OVERNIGHT EVENTS:  Patient states oxycodone 15 mg brings her pain down to an acceptable level (3/10).  She has received 2 doses in past 24 hours.  Patient reports 2 bowel movements yesterday and one today.      DNR on chart:   Allergies    No Known Allergies    Intolerances    MEDICATIONS  (STANDING):  acetaminophen   Tablet .. 650 milliGRAM(s) Oral every 6 hours  dextrose 5% + sodium chloride 0.45% with potassium chloride 20 mEq/L 1000 milliLiter(s) (100 mL/Hr) IV Continuous <Continuous>  enoxaparin Injectable 70 milliGRAM(s) SubCutaneous two times a day  gabapentin 100 milliGRAM(s) Oral three times a day  methimazole 5 milliGRAM(s) Oral daily  pantoprazole  Injectable 40 milliGRAM(s) IV Push daily  polyethylene glycol 3350 17 Gram(s) Oral daily  senna 2 Tablet(s) Oral at bedtime    MEDICATIONS  (PRN):  melatonin 3 milliGRAM(s) Oral at bedtime PRN Insomnia  oxyCODONE    IR 15 milliGRAM(s) Oral every 6 hours PRN Breakthrough pain for R leg only. Not for abdominal pain.      ITEMS UNCHECKED ARE NOT PRESENT    PRESENT SYMPTOMS: [ ]Unable to obtain due to poor mentation   Source if other than patient:  [ ]Family   [ ]Team     Pain (Impact on QOL):    Location: right thigh  Minimal acceptable level (0-10 scale):          4  Aggravating factors: ambulation  Quality:  Radiation:  Severity (0-10 scale):  3/10   Timing:    Dyspnea:                           [ ]Mild [ ]Moderate [ ]Severe  Anxiety:                             [ ]Mild [ ]Moderate [ ]Severe  Fatigue:                             [ ]Mild [ ]Moderate [ ]Severe  Nausea:                             [ ]Mild [ ]Moderate [ ]Severe  Loss of appetite:              [ ]Mild [ ]Moderate [ ]Severe  Constipation:                    [x ]Mild [ ]Moderate [ x]Severe    PAIN AD Score:	  http://geriatrictoolkit.[place].[facility]/cog/painad.pdf (Ctrl + left click to view)    Other Symptoms:  [x ]All other review of systems negative     Karnofsky Performance Score/Palliative Performance Status Version 2:      50   %    http://palliative.info/resource_material/PPSv2.pdf    PHYSICAL EXAM:  Vital Signs Last 24 Hrs  T(C): 36.4 (26 Apr 2019 06:45), Max: 36.7 (25 Apr 2019 13:28)  T(F): 97.5 (26 Apr 2019 06:45), Max: 98.1 (25 Apr 2019 13:28)  HR: 88 (26 Apr 2019 06:45) (88 - 120)  BP: 118/85 (26 Apr 2019 06:45) (114/82 - 146/105)  BP(mean): --  RR: 16 (26 Apr 2019 06:45) (16 - 20)  SpO2: 98% (26 Apr 2019 06:45) (97% - 100%)    GENERAL:  [ x]Alert  [x ]Oriented x  3 [ ]Lethargic  [ ]Cachexia  [ ]Unarousable  [ x]Verbal  [ ]Non-Verbal    Behavioral:   [ ] Anxiety  [ ] Delirium [ ] Agitation [ ] Other    HEENT:  [ x]Normal   [ ]Dry mouth   [ ]ET Tube/Trach  [ ]Oral lesions    PULMONARY:   [x ]Clear [ ]Tachypnea  [ ]Audible excessive secretions   [ ]Rhonchi        [ ]Right [ ]Left [ ]Bilateral  [ ]Crackles        [ ]Right [ ]Left [ ]Bilateral  [ ]Wheezing     [ ]Right [ ]Left [ ]Bilateral    CARDIOVASCULAR:    [ x]Regular [ ]Irregular [ ]Tachy  [ ]Steve [ ]Murmur [ ]Other    GASTROINTESTINAL:  [x ]Soft  [ ]Distended   [ ]+BS  [ ]Non tender [ ]Tender  [ ]PEG [ ]OGT/ NGT   Last BM: 4/26    GENITOURINARY:  [ x]Normal [ ] Incontinent   [ ]Oliguria/Anuria   [ ]Manriquez    MUSCULOSKELETAL:   [ ]Normal   [ ]Weakness  [ ]Bed/Wheelchair bound [x ]Edema right thigh    NEUROLOGIC:   [x ]No focal deficits  [ ] Cognitive impairment  [ ] Dysphagia [ ]Dysarthria [ ] Paresis [ ]Other     SKIN:   [x]Normal   [ ]Pressure ulcer(s)  [ ]Rash    CRITICAL CARE:  [ ] Shock Present  [ ]Septic [ ]Cardiogenic [ ]Neurologic [ ]Hypovolemic  [ ]  Vasopressors [ ]  Inotropes   [ ] Respiratory failure present  [ ] Acute  [ ] Chronic [ ] Hypoxic  [ ] Hypercarbic [ ] Other  [ ] Other organ failure     LABS:                        12.7   12.32 )-----------( 400      ( 25 Apr 2019 05:30 )             40.1   04-25    135  |  97<L>  |  15  ----------------------------<  126<H>  4.0   |  25  |  0.55    Ca    8.5      25 Apr 2019 05:30  Phos  3.6     04-25  Mg     2.1     04-25    RADIOLOGY & ADDITIONAL STUDIES: reviewed.   abdominal x ray 4/45/19  ******PRELIMINARY REPORT******          INTERPRETATION:  Increasing distension of multiple small bowel loops.   Findings may represent recurrent small bowel obstruction or ileus.    Protein Calorie Malnutrition Present: [ ] yes [ ] no  [ ] PPSV2 < or = 30%  [ ] significant weight loss [ ] poor nutritional intake [ ] anasarca [ ] catabolic state Albumin, Serum: 3.8 g/dL (04-16-19 @ 18:18)  Artificial Nutrition [ ]     REFERRALS:   [ ]Chaplaincy  [ ] Hospice  [ ]Child Life  [ ]Social Work  [ ]Case management [ ]Holistic Therapy   Goals of Care Document: INTERVAL HPI/OVERNIGHT EVENTS:  Patient states oxycodone 15 mg brings her pain down to an acceptable level (3/10).  She has received 2 doses in past 24 hours.  Patient reports 2 bowel movements yesterday and one today.      DNR on chart:   Allergies    No Known Allergies    Intolerances    MEDICATIONS  (STANDING):  acetaminophen   Tablet .. 650 milliGRAM(s) Oral every 6 hours  dextrose 5% + sodium chloride 0.45% with potassium chloride 20 mEq/L 1000 milliLiter(s) (100 mL/Hr) IV Continuous <Continuous>  enoxaparin Injectable 70 milliGRAM(s) SubCutaneous two times a day  gabapentin 100 milliGRAM(s) Oral three times a day  methimazole 5 milliGRAM(s) Oral daily  pantoprazole  Injectable 40 milliGRAM(s) IV Push daily  polyethylene glycol 3350 17 Gram(s) Oral daily  senna 2 Tablet(s) Oral at bedtime    MEDICATIONS  (PRN):  melatonin 3 milliGRAM(s) Oral at bedtime PRN Insomnia  oxyCODONE    IR 15 milliGRAM(s) Oral every 6 hours PRN Breakthrough pain for R leg only. Not for abdominal pain.    ITEMS UNCHECKED ARE NOT PRESENT    PRESENT SYMPTOMS: [ ]Unable to obtain due to poor mentation   Source if other than patient:  [ ]Family   [ ]Team     Pain (Impact on QOL):    Location: right thigh  Minimal acceptable level (0-10 scale):          4  Aggravating factors: ambulation  Quality:  Radiation:  Severity (0-10 scale):  3/10   Timing:    Dyspnea:                           [ ]Mild [ ]Moderate [ ]Severe  Anxiety:                             [ ]Mild [ ]Moderate [ ]Severe  Fatigue:                             [ ]Mild [ ]Moderate [ ]Severe  Nausea:                             [ ]Mild [ ]Moderate [ ]Severe  Loss of appetite:              [ ]Mild [ ]Moderate [ ]Severe  Constipation:                    [x ]Mild [ ]Moderate [ x]Severe    PAIN AD Score:	  http://geriatrictoolkit.[place].[facility]/cog/painad.pdf (Ctrl + left click to view)    Other Symptoms:  [x ]All other review of systems negative     Karnofsky Performance Score/Palliative Performance Status Version 2:      50   %    http://palliative.info/resource_material/PPSv2.pdf    PHYSICAL EXAM:  Vital Signs Last 24 Hrs  T(C): 36.4 (26 Apr 2019 06:45), Max: 36.7 (25 Apr 2019 13:28)  T(F): 97.5 (26 Apr 2019 06:45), Max: 98.1 (25 Apr 2019 13:28)  HR: 88 (26 Apr 2019 06:45) (88 - 120)  BP: 118/85 (26 Apr 2019 06:45) (114/82 - 146/105)  BP(mean): --  RR: 16 (26 Apr 2019 06:45) (16 - 20)  SpO2: 98% (26 Apr 2019 06:45) (97% - 100%)    GENERAL:  [ x]Alert  [x ]Oriented x  3 [ ]Lethargic  [ ]Cachexia  [ ]Unarousable  [ x]Verbal  [ ]Non-Verbal    Behavioral:   [ ] Anxiety  [ ] Delirium [ ] Agitation [ ] Other    HEENT:  [ x]Normal   [ ]Dry mouth   [ ]ET Tube/Trach  [ ]Oral lesions    PULMONARY:   [x ]Clear [ ]Tachypnea  [ ]Audible excessive secretions   [ ]Rhonchi        [ ]Right [ ]Left [ ]Bilateral  [ ]Crackles        [ ]Right [ ]Left [ ]Bilateral  [ ]Wheezing     [ ]Right [ ]Left [ ]Bilateral    CARDIOVASCULAR:    [ x]Regular [ ]Irregular [ ]Tachy  [ ]Steve [ ]Murmur [ ]Other    GASTROINTESTINAL:  [x ]Soft  [ ]Distended   [ ]+BS  [ ]Non tender [ ]Tender  [ ]PEG [ ]OGT/ NGT   Last BM: 4/26    GENITOURINARY:  [ x]Normal [ ] Incontinent   [ ]Oliguria/Anuria   [ ]Manriquez    MUSCULOSKELETAL:   [ ]Normal   [ ]Weakness  [ ]Bed/Wheelchair bound [x ]Edema right thigh    NEUROLOGIC:   [x ]No focal deficits  [ ] Cognitive impairment  [ ] Dysphagia [ ]Dysarthria [ ] Paresis [ ]Other     SKIN:   [x]Normal   [ ]Pressure ulcer(s)  [ ]Rash    CRITICAL CARE:  [ ] Shock Present  [ ]Septic [ ]Cardiogenic [ ]Neurologic [ ]Hypovolemic  [ ]  Vasopressors [ ]  Inotropes   [ ] Respiratory failure present  [ ] Acute  [ ] Chronic [ ] Hypoxic  [ ] Hypercarbic [ ] Other  [ ] Other organ failure     LABS:                        12.7   12.32 )-----------( 400      ( 25 Apr 2019 05:30 )             40.1   04-25    135  |  97<L>  |  15  ----------------------------<  126<H>  4.0   |  25  |  0.55    Ca    8.5      25 Apr 2019 05:30  Phos  3.6     04-25  Mg     2.1     04-25    RADIOLOGY & ADDITIONAL STUDIES: reviewed.   abdominal x ray 4/45/19    ******PRELIMINARY REPORT******          INTERPRETATION:  Increasing distension of multiple small bowel loops.   Findings may represent recurrent small bowel obstruction or ileus.    Protein Calorie Malnutrition Present: [ ] yes [ ] no  [ ] PPSV2 < or = 30%  [ ] significant weight loss [ ] poor nutritional intake [ ] anasarca [ ] catabolic state Albumin, Serum: 3.8 g/dL (04-16-19 @ 18:18)  Artificial Nutrition [ ]     REFERRALS:   [ ]Chaplaincy  [ ] Hospice  [ ]Child Life  [ ]Social Work  [ ]Case management [ ]Holistic Therapy   Goals of Care Document:

## 2019-04-26 NOTE — PROGRESS NOTE ADULT - ASSESSMENT
53y female with recurrent SBO, likely malignant, passing flatus and BM, and tolerating diet.      - Continue regular diet, serial abdominal exams  - continue malignant SBO protocol meds (decadron/octreotide/reglan), continue to monitor GIF  - F/U Palliative recommendations  - Continue Therapeutic lovenox for DVT anticoagulation  - Pain control: oxy 10mg q6h prn  - reconsulted vascular for RLE pain, as per vacular resident will start gabapentin and will f/u with attending  - Bowel regimen    B surgery  [id number]

## 2019-04-26 NOTE — PROGRESS NOTE ADULT - PROBLEM SELECTOR PLAN 1
patient is on Lovenox 70mg BID  patient has pain in R thigh, which responds to oxy IR 15mg tablet prn

## 2019-04-26 NOTE — PROVIDER CONTACT NOTE (MEDICATION) - ACTION/TREATMENT ORDERED:
patient returned to npo status . warm packs given for comfort patient returned to npo status . warm packs given for comfort. do not administer oxycodone for abominal pain as per provider order

## 2019-04-26 NOTE — PROGRESS NOTE ADULT - PROBLEM SELECTOR PLAN 2
Patient might require depo injection of octreotide.  Patient had abdominal x ray yesterday which showed possible recurrent SBO on prelim read.  Patient had bowel movement yesterday and today.  unclear if surgical intervention would provide benefit.   Plan is for conservative management for now.

## 2019-04-26 NOTE — PROGRESS NOTE ADULT - SUBJECTIVE AND OBJECTIVE BOX
GENERAL SURGERY NOTE    Consulting Service: B Team (pager [phone])    No acute events. Patient endorses more flatus, reports BM, no n/v with regular diet.  She still has pain at site of known/previous DVT.     Vital Signs (24 Hrs):  T(C): 36.5 (04-26-19 @ 01:21), Max: 36.7 (04-25-19 @ 13:28)  HR: 89 (04-26-19 @ 01:21) (89 - 120)  BP: 115/79 (04-26-19 @ 01:21) (114/82 - 146/105)  RR: 18 (04-26-19 @ 01:21) (18 - 20)  SpO2: 100% (04-26-19 @ 01:21) (97% - 100%)  Wt(kg): --  Daily     Daily     I&O's Summary    24 Apr 2019 07:01  -  25 Apr 2019 07:00  --------------------------------------------------------  IN: 1500 mL / OUT: 400 mL / NET: 1100 mL    25 Apr 2019 07:01  -  26 Apr 2019 06:40  --------------------------------------------------------  IN: 1200 mL / OUT: 0 mL / NET: 1200 mL      GEN: NAD, alert and oriented x 3  HEENT: WNL  CHEST: Symmetrical chest rise, breath sounds CTAB  ABD: Soft, non-tender, non-distended  EXT/VASC: Proximal LLE is diffusely edematous, no erythema, tender to deep palpation.    LABS:                        12.7   12.32 )-----------( 400      ( 25 Apr 2019 05:30 )             40.1       04-25    135  |  97<L>  |  15  ----------------------------<  126<H>  4.0   |  25  |  0.55    Ca    8.5      25 Apr 2019 05:30  Phos  3.6     04-25  Mg     2.1     04-25

## 2019-04-27 LAB
ANION GAP SERPL CALC-SCNC: 16 MMO/L — HIGH (ref 7–14)
BUN SERPL-MCNC: 19 MG/DL — SIGNIFICANT CHANGE UP (ref 7–23)
CALCIUM SERPL-MCNC: 8.2 MG/DL — LOW (ref 8.4–10.5)
CHLORIDE SERPL-SCNC: 97 MMOL/L — LOW (ref 98–107)
CO2 SERPL-SCNC: 24 MMOL/L — SIGNIFICANT CHANGE UP (ref 22–31)
CREAT SERPL-MCNC: 0.48 MG/DL — LOW (ref 0.5–1.3)
GLUCOSE SERPL-MCNC: 146 MG/DL — HIGH (ref 70–99)
HCT VFR BLD CALC: 47.7 % — HIGH (ref 34.5–45)
HGB BLD-MCNC: 14.9 G/DL — SIGNIFICANT CHANGE UP (ref 11.5–15.5)
MAGNESIUM SERPL-MCNC: 2.3 MG/DL — SIGNIFICANT CHANGE UP (ref 1.6–2.6)
MCHC RBC-ENTMCNC: 27.3 PG — SIGNIFICANT CHANGE UP (ref 27–34)
MCHC RBC-ENTMCNC: 31.2 % — LOW (ref 32–36)
MCV RBC AUTO: 87.5 FL — SIGNIFICANT CHANGE UP (ref 80–100)
NRBC # FLD: 0 K/UL — SIGNIFICANT CHANGE UP (ref 0–0)
PHOSPHATE SERPL-MCNC: 3 MG/DL — SIGNIFICANT CHANGE UP (ref 2.5–4.5)
PLATELET # BLD AUTO: 455 K/UL — HIGH (ref 150–400)
PMV BLD: 10.4 FL — SIGNIFICANT CHANGE UP (ref 7–13)
POTASSIUM SERPL-MCNC: 3.9 MMOL/L — SIGNIFICANT CHANGE UP (ref 3.5–5.3)
POTASSIUM SERPL-SCNC: 3.9 MMOL/L — SIGNIFICANT CHANGE UP (ref 3.5–5.3)
RBC # BLD: 5.45 M/UL — HIGH (ref 3.8–5.2)
RBC # FLD: 14.7 % — HIGH (ref 10.3–14.5)
SODIUM SERPL-SCNC: 137 MMOL/L — SIGNIFICANT CHANGE UP (ref 135–145)
WBC # BLD: 16.37 K/UL — HIGH (ref 3.8–10.5)
WBC # FLD AUTO: 16.37 K/UL — HIGH (ref 3.8–10.5)

## 2019-04-27 RX ORDER — ACETAMINOPHEN 500 MG
1000 TABLET ORAL ONCE
Qty: 0 | Refills: 0 | Status: COMPLETED | OUTPATIENT
Start: 2019-04-27 | End: 2019-04-27

## 2019-04-27 RX ORDER — ONDANSETRON 8 MG/1
4 TABLET, FILM COATED ORAL ONCE
Qty: 0 | Refills: 0 | Status: COMPLETED | OUTPATIENT
Start: 2019-04-27 | End: 2019-04-27

## 2019-04-27 RX ORDER — HYDROMORPHONE HYDROCHLORIDE 2 MG/ML
0.5 INJECTION INTRAMUSCULAR; INTRAVENOUS; SUBCUTANEOUS ONCE
Qty: 0 | Refills: 0 | Status: DISCONTINUED | OUTPATIENT
Start: 2019-04-27 | End: 2019-04-27

## 2019-04-27 RX ORDER — HYDROMORPHONE HYDROCHLORIDE 2 MG/ML
0.5 INJECTION INTRAMUSCULAR; INTRAVENOUS; SUBCUTANEOUS EVERY 4 HOURS
Qty: 0 | Refills: 0 | Status: DISCONTINUED | OUTPATIENT
Start: 2019-04-27 | End: 2019-05-01

## 2019-04-27 RX ORDER — ACETAMINOPHEN 500 MG
1000 TABLET ORAL ONCE
Qty: 0 | Refills: 0 | Status: COMPLETED | OUTPATIENT
Start: 2019-04-27 | End: 2019-04-30

## 2019-04-27 RX ORDER — KETOROLAC TROMETHAMINE 30 MG/ML
30 SYRINGE (ML) INJECTION EVERY 8 HOURS
Qty: 0 | Refills: 0 | Status: DISCONTINUED | OUTPATIENT
Start: 2019-04-27 | End: 2019-05-02

## 2019-04-27 RX ORDER — KETOROLAC TROMETHAMINE 30 MG/ML
15 SYRINGE (ML) INJECTION EVERY 8 HOURS
Qty: 0 | Refills: 0 | Status: DISCONTINUED | OUTPATIENT
Start: 2019-04-27 | End: 2019-04-27

## 2019-04-27 RX ORDER — KETOROLAC TROMETHAMINE 30 MG/ML
15 SYRINGE (ML) INJECTION ONCE
Qty: 0 | Refills: 0 | Status: DISCONTINUED | OUTPATIENT
Start: 2019-04-27 | End: 2019-04-27

## 2019-04-27 RX ORDER — ONDANSETRON 8 MG/1
4 TABLET, FILM COATED ORAL EVERY 8 HOURS
Qty: 0 | Refills: 0 | Status: DISCONTINUED | OUTPATIENT
Start: 2019-04-27 | End: 2019-04-27

## 2019-04-27 RX ADMIN — HYDROMORPHONE HYDROCHLORIDE 0.5 MILLIGRAM(S): 2 INJECTION INTRAMUSCULAR; INTRAVENOUS; SUBCUTANEOUS at 21:30

## 2019-04-27 RX ADMIN — Medication 400 MILLIGRAM(S): at 01:47

## 2019-04-27 RX ADMIN — Medication 400 MILLIGRAM(S): at 08:43

## 2019-04-27 RX ADMIN — ENOXAPARIN SODIUM 70 MILLIGRAM(S): 100 INJECTION SUBCUTANEOUS at 06:51

## 2019-04-27 RX ADMIN — Medication 1000 MILLIGRAM(S): at 09:13

## 2019-04-27 RX ADMIN — HYDROMORPHONE HYDROCHLORIDE 0.5 MILLIGRAM(S): 2 INJECTION INTRAMUSCULAR; INTRAVENOUS; SUBCUTANEOUS at 15:24

## 2019-04-27 RX ADMIN — Medication 3 MILLIGRAM(S): at 02:29

## 2019-04-27 RX ADMIN — ONDANSETRON 4 MILLIGRAM(S): 8 TABLET, FILM COATED ORAL at 10:19

## 2019-04-27 RX ADMIN — Medication 1000 MILLIGRAM(S): at 18:09

## 2019-04-27 RX ADMIN — DEXTROSE MONOHYDRATE, SODIUM CHLORIDE, AND POTASSIUM CHLORIDE 125 MILLILITER(S): 50; .745; 4.5 INJECTION, SOLUTION INTRAVENOUS at 18:55

## 2019-04-27 RX ADMIN — ENOXAPARIN SODIUM 70 MILLIGRAM(S): 100 INJECTION SUBCUTANEOUS at 18:48

## 2019-04-27 RX ADMIN — Medication 400 MILLIGRAM(S): at 23:55

## 2019-04-27 RX ADMIN — Medication 30 MILLIGRAM(S): at 11:44

## 2019-04-27 RX ADMIN — PANTOPRAZOLE SODIUM 40 MILLIGRAM(S): 20 TABLET, DELAYED RELEASE ORAL at 13:07

## 2019-04-27 RX ADMIN — HYDROMORPHONE HYDROCHLORIDE 0.5 MILLIGRAM(S): 2 INJECTION INTRAMUSCULAR; INTRAVENOUS; SUBCUTANEOUS at 14:54

## 2019-04-27 RX ADMIN — HYDROMORPHONE HYDROCHLORIDE 0.5 MILLIGRAM(S): 2 INJECTION INTRAMUSCULAR; INTRAVENOUS; SUBCUTANEOUS at 22:15

## 2019-04-27 RX ADMIN — ONDANSETRON 4 MILLIGRAM(S): 8 TABLET, FILM COATED ORAL at 19:16

## 2019-04-27 RX ADMIN — Medication 30 MILLIGRAM(S): at 12:00

## 2019-04-27 RX ADMIN — Medication 400 MILLIGRAM(S): at 17:39

## 2019-04-27 RX ADMIN — DEXTROSE MONOHYDRATE, SODIUM CHLORIDE, AND POTASSIUM CHLORIDE 100 MILLILITER(S): 50; .745; 4.5 INJECTION, SOLUTION INTRAVENOUS at 10:20

## 2019-04-27 NOTE — PROGRESS NOTE ADULT - ASSESSMENT
53y female with recurrent SBO, likely malignant, now with increased N/V.      - Hypaque enema today   - Continue regular diet, serial abdominal exams  - continue to monitor GIF  - F/U Palliative recommendations  - Continue Therapeutic lovenox for DVT anticoagulation  - Pain control: oxy 10mg q6h prn  - reconsulted vascular for RLE pain, as per vacular resident will start gabapentin and will f/u with attending    B surgery  [id number]

## 2019-04-27 NOTE — PROGRESS NOTE ADULT - SUBJECTIVE AND OBJECTIVE BOX
GENERAL SURGERY NOTE    Consulting Service: B Team (pager [phone])    No acute events. + N/V with regular diet.  She still has pain at site of known/previous DVT.     Vital Signs (24 Hrs):  T(C): 36.7 (04-27-19 @ 08:47), Max: 37.4 (04-26-19 @ 17:28)  HR: 103 (04-27-19 @ 08:47) (86 - 112)  BP: 121/81 (04-27-19 @ 08:47) (110/74 - 121/81)  RR: 20 (04-27-19 @ 08:47) (17 - 20)  SpO2: 97% (04-27-19 @ 08:47) (96% - 99%)  Wt(kg): --  Daily     Daily     I&O's Summary    26 Apr 2019 07:01  -  27 Apr 2019 07:00  --------------------------------------------------------  IN: 900 mL / OUT: 650 mL / NET: 250 mL      GEN: NAD, alert and oriented x 3  HEENT: WNL  CHEST: Symmetrical chest rise, breath sounds CTAB  ABD: Soft, mildly tender, mildly distended.  EXT/VASC: Proximal LLE is diffusely edematous, no erythema, tender to deep palpation.    LABS:                  GE                      14.9   16.37 )-----------( 455      ( 27 Apr 2019 06:42 )             47.7       04-27    137  |  97<L>  |  19  ----------------------------<  146<H>  3.9   |  24  |  0.48<L>    Ca    8.2<L>      27 Apr 2019 06:42  Phos  3.0     04-27  Mg     2.3     04-27

## 2019-04-27 NOTE — PROGRESS NOTE ADULT - ATTENDING COMMENTS
I saw and examined the patient and agree with the above note.    Malignant small bowel obstruction  - +flatus and BM noted yesterday however in pain today  -enemas as needed - will obtain a Hypaque enema to eval for distal obstruction as I feel she may progress to needing a palliative bypass.  -discussed possible operative mgmt in future if she continues to be obstipated or continuously uncomfortable    DVT  -therapeutic lovenox  -monitor edema  -keep extremity elevated when not ambulating   -vascular surg recs appreciated    Bonifacio White MD (Cell: [phone])  Acute and Critical Care Surgery    The Acute Care Surgery (B Team) Attending Group Practice:  Dr. Leonardo Celeste, Dr. Imtiaz Skinner, Dr. Callie Finch, Dr. Bonifacio White    Urgent issues - spectra [phone] or [phone]  Nonurgent issues - [phone]  Patient appointments or after hours - [phone] .

## 2019-04-27 NOTE — PROVIDER CONTACT NOTE (MEDICATION) - ACTION/TREATMENT ORDERED:
Provider made aware, patient is NPO now because patient had vomited before. Will order tylenol IV med.

## 2019-04-27 NOTE — PROVIDER CONTACT NOTE (MEDICATION) - REASON
Nausea
patient refusing fleet enema and c/o abdominal pain 10/10 after eating
Tylenol PO medication, provider stated patient will be NPO

## 2019-04-27 NOTE — PROVIDER CONTACT NOTE (MEDICATION) - SITUATION
Pt. c/o nausea.
patient refusing fleet enema.pt reports enema causes her to have pain in anal area. pt is also   c/o abdominal pain 10/10 after eating.she reports that pains feels like cramp
Wanted to clarify and know whether tylenol tablet can be given to patient, since patient felt nauseous and had 1 episode of emesis.

## 2019-04-27 NOTE — PROVIDER CONTACT NOTE (MEDICATION) - BACKGROUND
53 y.o. female with likely malignant SBO.
pt aqdmitted for gi bleed and right leg dvt
Patient admitted for gastrointestinal hemorrhage

## 2019-04-27 NOTE — PROVIDER CONTACT NOTE (MEDICATION) - ASSESSMENT
Patient resting in bed with no signs of distress.
A&Ox4.  No acute distress noted.  C/o of nausea.
pt c/o abdominal pain 10/10 after eating dinner

## 2019-04-27 NOTE — PROVIDER CONTACT NOTE (OTHER) - ACTION/TREATMENT ORDERED:
Provider made aware, stated to give gabapentin and oxycodone during standing order time. Unable to give another tylenol medication. Continue to monitor for now.

## 2019-04-28 LAB
ANION GAP SERPL CALC-SCNC: 12 MMO/L — SIGNIFICANT CHANGE UP (ref 7–14)
BUN SERPL-MCNC: 18 MG/DL — SIGNIFICANT CHANGE UP (ref 7–23)
CALCIUM SERPL-MCNC: 8 MG/DL — LOW (ref 8.4–10.5)
CHLORIDE SERPL-SCNC: 101 MMOL/L — SIGNIFICANT CHANGE UP (ref 98–107)
CO2 SERPL-SCNC: 23 MMOL/L — SIGNIFICANT CHANGE UP (ref 22–31)
CREAT SERPL-MCNC: 0.53 MG/DL — SIGNIFICANT CHANGE UP (ref 0.5–1.3)
GLUCOSE SERPL-MCNC: 90 MG/DL — SIGNIFICANT CHANGE UP (ref 70–99)
HCT VFR BLD CALC: 43.1 % — SIGNIFICANT CHANGE UP (ref 34.5–45)
HGB BLD-MCNC: 13.3 G/DL — SIGNIFICANT CHANGE UP (ref 11.5–15.5)
MAGNESIUM SERPL-MCNC: 2.3 MG/DL — SIGNIFICANT CHANGE UP (ref 1.6–2.6)
MCHC RBC-ENTMCNC: 27.2 PG — SIGNIFICANT CHANGE UP (ref 27–34)
MCHC RBC-ENTMCNC: 30.9 % — LOW (ref 32–36)
MCV RBC AUTO: 88.1 FL — SIGNIFICANT CHANGE UP (ref 80–100)
NRBC # FLD: 0 K/UL — SIGNIFICANT CHANGE UP (ref 0–0)
PHOSPHATE SERPL-MCNC: 3.5 MG/DL — SIGNIFICANT CHANGE UP (ref 2.5–4.5)
PLATELET # BLD AUTO: 378 K/UL — SIGNIFICANT CHANGE UP (ref 150–400)
PMV BLD: 9.8 FL — SIGNIFICANT CHANGE UP (ref 7–13)
POTASSIUM SERPL-MCNC: 4.2 MMOL/L — SIGNIFICANT CHANGE UP (ref 3.5–5.3)
POTASSIUM SERPL-SCNC: 4.2 MMOL/L — SIGNIFICANT CHANGE UP (ref 3.5–5.3)
RBC # BLD: 4.89 M/UL — SIGNIFICANT CHANGE UP (ref 3.8–5.2)
RBC # FLD: 14.4 % — SIGNIFICANT CHANGE UP (ref 10.3–14.5)
SODIUM SERPL-SCNC: 136 MMOL/L — SIGNIFICANT CHANGE UP (ref 135–145)
WBC # BLD: 12.04 K/UL — HIGH (ref 3.8–10.5)
WBC # FLD AUTO: 12.04 K/UL — HIGH (ref 3.8–10.5)

## 2019-04-28 PROCEDURE — 74018 RADEX ABDOMEN 1 VIEW: CPT | Mod: 26

## 2019-04-28 RX ADMIN — ENOXAPARIN SODIUM 70 MILLIGRAM(S): 100 INJECTION SUBCUTANEOUS at 18:37

## 2019-04-28 RX ADMIN — GABAPENTIN 100 MILLIGRAM(S): 400 CAPSULE ORAL at 05:36

## 2019-04-28 RX ADMIN — HYDROMORPHONE HYDROCHLORIDE 0.5 MILLIGRAM(S): 2 INJECTION INTRAMUSCULAR; INTRAVENOUS; SUBCUTANEOUS at 02:47

## 2019-04-28 RX ADMIN — Medication 30 MILLIGRAM(S): at 14:18

## 2019-04-28 RX ADMIN — HYDROMORPHONE HYDROCHLORIDE 0.5 MILLIGRAM(S): 2 INJECTION INTRAMUSCULAR; INTRAVENOUS; SUBCUTANEOUS at 10:43

## 2019-04-28 RX ADMIN — PANTOPRAZOLE SODIUM 40 MILLIGRAM(S): 20 TABLET, DELAYED RELEASE ORAL at 11:54

## 2019-04-28 RX ADMIN — HYDROMORPHONE HYDROCHLORIDE 0.5 MILLIGRAM(S): 2 INJECTION INTRAMUSCULAR; INTRAVENOUS; SUBCUTANEOUS at 15:40

## 2019-04-28 RX ADMIN — Medication 3 MILLIGRAM(S): at 22:21

## 2019-04-28 RX ADMIN — HYDROMORPHONE HYDROCHLORIDE 0.5 MILLIGRAM(S): 2 INJECTION INTRAMUSCULAR; INTRAVENOUS; SUBCUTANEOUS at 22:00

## 2019-04-28 RX ADMIN — HYDROMORPHONE HYDROCHLORIDE 0.5 MILLIGRAM(S): 2 INJECTION INTRAMUSCULAR; INTRAVENOUS; SUBCUTANEOUS at 11:41

## 2019-04-28 RX ADMIN — HYDROMORPHONE HYDROCHLORIDE 0.5 MILLIGRAM(S): 2 INJECTION INTRAMUSCULAR; INTRAVENOUS; SUBCUTANEOUS at 21:08

## 2019-04-28 RX ADMIN — Medication 30 MILLIGRAM(S): at 06:59

## 2019-04-28 RX ADMIN — GABAPENTIN 100 MILLIGRAM(S): 400 CAPSULE ORAL at 14:19

## 2019-04-28 RX ADMIN — Medication 3 MILLIGRAM(S): at 02:47

## 2019-04-28 RX ADMIN — DEXTROSE MONOHYDRATE, SODIUM CHLORIDE, AND POTASSIUM CHLORIDE 125 MILLILITER(S): 50; .745; 4.5 INJECTION, SOLUTION INTRAVENOUS at 15:41

## 2019-04-28 RX ADMIN — HYDROMORPHONE HYDROCHLORIDE 0.5 MILLIGRAM(S): 2 INJECTION INTRAMUSCULAR; INTRAVENOUS; SUBCUTANEOUS at 16:16

## 2019-04-28 RX ADMIN — Medication 1000 MILLIGRAM(S): at 01:44

## 2019-04-28 RX ADMIN — Medication 30 MILLIGRAM(S): at 14:35

## 2019-04-28 RX ADMIN — DEXTROSE MONOHYDRATE, SODIUM CHLORIDE, AND POTASSIUM CHLORIDE 125 MILLILITER(S): 50; .745; 4.5 INJECTION, SOLUTION INTRAVENOUS at 18:46

## 2019-04-28 RX ADMIN — HYDROMORPHONE HYDROCHLORIDE 0.5 MILLIGRAM(S): 2 INJECTION INTRAMUSCULAR; INTRAVENOUS; SUBCUTANEOUS at 04:03

## 2019-04-28 RX ADMIN — Medication 30 MILLIGRAM(S): at 05:35

## 2019-04-28 RX ADMIN — ENOXAPARIN SODIUM 70 MILLIGRAM(S): 100 INJECTION SUBCUTANEOUS at 05:35

## 2019-04-28 RX ADMIN — GABAPENTIN 100 MILLIGRAM(S): 400 CAPSULE ORAL at 21:08

## 2019-04-28 NOTE — PROGRESS NOTE ADULT - ASSESSMENT
53y female with recurrent SBO, likely malignant, now with increased N/V.        - Continue regular diet, serial abdominal exams  - continue to monitor GIF  - F/U Palliative recommendations  - Continue Therapeutic lovenox for DVT anticoagulation  - Pain control: oxy 10mg q6h prn  - reconsulted vascular for RLE pain, as per vacular resident will start gabapentin and will f/u with attending    B surgery  [id number] 53y female with recurrent SBO, likely malignant, now with increased N/V.      - Fluoro study Monday   - Continue regular diet, serial abdominal exams  - continue to monitor GIF  - F/U Palliative recommendations  - Continue Therapeutic lovenox for DVT anticoagulation  - Pain control: oxy 10mg q6h prn  - reconsulted vascular for RLE pain, as per vacular resident will start gabapentin and will f/u with attending    B surgery  [id number]

## 2019-04-28 NOTE — PROGRESS NOTE ADULT - SUBJECTIVE AND OBJECTIVE BOX
GENERAL SURGERY NOTE    Consulting Service: B Team (pager [phone])    No acute events. + N/V with regular diet.  She still has pain at site of known/previous DVT.     Vital Signs (24 Hrs):  T(C): 36.9 (04-28-19 @ 05:33), Max: 36.9 (04-28-19 @ 05:33)  HR: 89 (04-28-19 @ 05:33) (88 - 93)  BP: 120/79 (04-28-19 @ 05:33) (103/70 - 120/79)  RR: 16 (04-28-19 @ 05:33) (10 - 18)  SpO2: 98% (04-28-19 @ 05:33) (95% - 98%)  Wt(kg): --  Daily     Daily     I&O's Summary    27 Apr 2019 07:01  -  28 Apr 2019 07:00  --------------------------------------------------------  IN: 2025 mL / OUT: 450 mL / NET: 1575 mL    GEN: NAD, alert and oriented x 3  HEENT: WNL  CHEST: Symmetrical chest rise, breath sounds CTAB  ABD: Soft, mildly tender, mildly distended.  EXT/VASC: Proximal LLE is diffusely edematous, no erythema, tender to deep palpation.    LABS:                        13.3   12.04 )-----------( 378      ( 28 Apr 2019 05:43 )             43.1       04-28    136  |  101  |  18  ----------------------------<  90  4.2   |  23  |  0.53    Ca    8.0<L>      28 Apr 2019 05:43  Phos  3.5     04-28  Mg     2.3     04-28

## 2019-04-28 NOTE — PROGRESS NOTE ADULT - PROBLEM SELECTOR PROBLEM 1
SBO (small bowel obstruction)
DVT, lower extremity
SBO (small bowel obstruction)
DVT, lower extremity

## 2019-04-28 NOTE — PROGRESS NOTE ADULT - PROBLEM SELECTOR PROBLEM 2
DVT, lower extremity
SBO (small bowel obstruction)
SBO (small bowel obstruction)

## 2019-04-29 LAB
ANION GAP SERPL CALC-SCNC: 10 MMO/L — SIGNIFICANT CHANGE UP (ref 7–14)
BUN SERPL-MCNC: 11 MG/DL — SIGNIFICANT CHANGE UP (ref 7–23)
CALCIUM SERPL-MCNC: 7.9 MG/DL — LOW (ref 8.4–10.5)
CHLORIDE SERPL-SCNC: 101 MMOL/L — SIGNIFICANT CHANGE UP (ref 98–107)
CO2 SERPL-SCNC: 23 MMOL/L — SIGNIFICANT CHANGE UP (ref 22–31)
CREAT SERPL-MCNC: 0.49 MG/DL — LOW (ref 0.5–1.3)
GLUCOSE SERPL-MCNC: 127 MG/DL — HIGH (ref 70–99)
HCT VFR BLD CALC: 41.1 % — SIGNIFICANT CHANGE UP (ref 34.5–45)
HGB BLD-MCNC: 12.7 G/DL — SIGNIFICANT CHANGE UP (ref 11.5–15.5)
MAGNESIUM SERPL-MCNC: 2.1 MG/DL — SIGNIFICANT CHANGE UP (ref 1.6–2.6)
MCHC RBC-ENTMCNC: 27.5 PG — SIGNIFICANT CHANGE UP (ref 27–34)
MCHC RBC-ENTMCNC: 30.9 % — LOW (ref 32–36)
MCV RBC AUTO: 89 FL — SIGNIFICANT CHANGE UP (ref 80–100)
NRBC # FLD: 0 K/UL — SIGNIFICANT CHANGE UP (ref 0–0)
PHOSPHATE SERPL-MCNC: 3.6 MG/DL — SIGNIFICANT CHANGE UP (ref 2.5–4.5)
PLATELET # BLD AUTO: 330 K/UL — SIGNIFICANT CHANGE UP (ref 150–400)
PMV BLD: 10.4 FL — SIGNIFICANT CHANGE UP (ref 7–13)
POTASSIUM SERPL-MCNC: 4.2 MMOL/L — SIGNIFICANT CHANGE UP (ref 3.5–5.3)
POTASSIUM SERPL-SCNC: 4.2 MMOL/L — SIGNIFICANT CHANGE UP (ref 3.5–5.3)
RBC # BLD: 4.62 M/UL — SIGNIFICANT CHANGE UP (ref 3.8–5.2)
RBC # FLD: 14.6 % — HIGH (ref 10.3–14.5)
SODIUM SERPL-SCNC: 134 MMOL/L — LOW (ref 135–145)
WBC # BLD: 8.68 K/UL — SIGNIFICANT CHANGE UP (ref 3.8–10.5)
WBC # FLD AUTO: 8.68 K/UL — SIGNIFICANT CHANGE UP (ref 3.8–10.5)

## 2019-04-29 PROCEDURE — 74270 X-RAY XM COLON 1CNTRST STD: CPT | Mod: 26

## 2019-04-29 RX ORDER — KETOROLAC TROMETHAMINE 30 MG/ML
15 SYRINGE (ML) INJECTION ONCE
Qty: 0 | Refills: 0 | Status: DISCONTINUED | OUTPATIENT
Start: 2019-04-29 | End: 2019-04-29

## 2019-04-29 RX ORDER — IBUPROFEN 200 MG
600 TABLET ORAL ONCE
Qty: 0 | Refills: 0 | Status: COMPLETED | OUTPATIENT
Start: 2019-04-29 | End: 2019-04-29

## 2019-04-29 RX ADMIN — GABAPENTIN 100 MILLIGRAM(S): 400 CAPSULE ORAL at 05:12

## 2019-04-29 RX ADMIN — HYDROMORPHONE HYDROCHLORIDE 0.5 MILLIGRAM(S): 2 INJECTION INTRAMUSCULAR; INTRAVENOUS; SUBCUTANEOUS at 02:27

## 2019-04-29 RX ADMIN — PANTOPRAZOLE SODIUM 40 MILLIGRAM(S): 20 TABLET, DELAYED RELEASE ORAL at 13:27

## 2019-04-29 RX ADMIN — HYDROMORPHONE HYDROCHLORIDE 0.5 MILLIGRAM(S): 2 INJECTION INTRAMUSCULAR; INTRAVENOUS; SUBCUTANEOUS at 06:46

## 2019-04-29 RX ADMIN — ENOXAPARIN SODIUM 70 MILLIGRAM(S): 100 INJECTION SUBCUTANEOUS at 18:02

## 2019-04-29 RX ADMIN — HYDROMORPHONE HYDROCHLORIDE 0.5 MILLIGRAM(S): 2 INJECTION INTRAMUSCULAR; INTRAVENOUS; SUBCUTANEOUS at 07:16

## 2019-04-29 RX ADMIN — Medication 15 MILLIGRAM(S): at 10:25

## 2019-04-29 RX ADMIN — ENOXAPARIN SODIUM 70 MILLIGRAM(S): 100 INJECTION SUBCUTANEOUS at 05:12

## 2019-04-29 RX ADMIN — GABAPENTIN 100 MILLIGRAM(S): 400 CAPSULE ORAL at 21:46

## 2019-04-29 RX ADMIN — Medication 15 MILLIGRAM(S): at 10:10

## 2019-04-29 RX ADMIN — Medication 3 MILLIGRAM(S): at 21:46

## 2019-04-29 RX ADMIN — HYDROMORPHONE HYDROCHLORIDE 0.5 MILLIGRAM(S): 2 INJECTION INTRAMUSCULAR; INTRAVENOUS; SUBCUTANEOUS at 02:57

## 2019-04-29 RX ADMIN — Medication 30 MILLIGRAM(S): at 18:46

## 2019-04-29 RX ADMIN — Medication 30 MILLIGRAM(S): at 00:00

## 2019-04-29 RX ADMIN — Medication 30 MILLIGRAM(S): at 18:16

## 2019-04-29 RX ADMIN — GABAPENTIN 100 MILLIGRAM(S): 400 CAPSULE ORAL at 13:23

## 2019-04-29 RX ADMIN — Medication 600 MILLIGRAM(S): at 13:25

## 2019-04-29 RX ADMIN — Medication 600 MILLIGRAM(S): at 13:55

## 2019-04-29 RX ADMIN — Medication 30 MILLIGRAM(S): at 00:35

## 2019-04-29 NOTE — PROGRESS NOTE ADULT - SUBJECTIVE AND OBJECTIVE BOX
Morning Surgical Progress Note  Patient is a 53y old  Female who presents with a chief complaint of SBO (28 Apr 2019 10:04)      SUBJECTIVE: Patient seen and examined at bedside with surgical team, patient states she feels the same she is passing gas, currently denying nausea and vomiting denies bowel movements.     Vital Signs Last 24 Hrs  T(C): 36.4 (29 Apr 2019 14:16), Max: 36.9 (29 Apr 2019 10:00)  T(F): 97.6 (29 Apr 2019 14:16), Max: 98.5 (29 Apr 2019 10:00)  HR: 96 (29 Apr 2019 14:16) (87 - 96)  BP: 130/94 (29 Apr 2019 14:16) (104/82 - 130/94)  RR: 18 (29 Apr 2019 14:16) (16 - 20)  SpO2: 100% (29 Apr 2019 14:16) (98% - 100%)I&O's Detail  28 Apr 2019 07:01  -  29 Apr 2019 07:00  --------------------------------------------------------  IN:    dextrose 5% + sodium chloride 0.45% with potassium chloride 20 mEq/L: 1500 mL    Oral Fluid: 80 mL  Total IN: 1580 mL  OUT:  Total OUT: 0 mL  Total NET: 1580 mL    29 Apr 2019 07:01  -  29 Apr 2019 16:53  --------------------------------------------------------  IN:  Total IN: 0 mL  OUT:  Total OUT: 0 mL  Total NET: 0 mL      MEDICATIONS  (STANDING):  acetaminophen  IVPB .. 1000 milliGRAM(s) IV Intermittent once  dextrose 5% + sodium chloride 0.45% with potassium chloride 20 mEq/L 1000 milliLiter(s) (125 mL/Hr) IV Continuous <Continuous>  enoxaparin Injectable 70 milliGRAM(s) SubCutaneous two times a day  gabapentin 100 milliGRAM(s) Oral three times a day  methimazole 5 milliGRAM(s) Oral daily  pantoprazole  Injectable 40 milliGRAM(s) IV Push daily  MEDICATIONS  (PRN):  HYDROmorphone  Injectable 0.5 milliGRAM(s) IV Push every 4 hours PRN Severe Pain (7 - 10)  ketorolac   Injectable 30 milliGRAM(s) IV Push every 8 hours PRN Moderate Pain (4 - 6)  melatonin 3 milliGRAM(s) Oral at bedtime PRN Insomnia      Physical Exam  Constitutional: A&Ox3, NAD  Respiratory: unlabored  Gastrointestinal: soft nontender nondistended    LABS:                        12.7   8.68  )-----------( 330      ( 29 Apr 2019 07:00 )             41.1     04-29    134<L>  |  101  |  11  ----------------------------<  127<H>  4.2   |  23  |  0.49<L>    Ca    7.9<L>      29 Apr 2019 07:00  Phos  3.6     04-29  Mg     2.1     04-29

## 2019-04-29 NOTE — CHART NOTE - NSCHARTNOTEFT_GEN_A_CORE
Attempted to see patient for extended LOS. However, patient requested to be seen at a later time and declined services at this time. RDN will re-attempt to see patient as able.

## 2019-04-29 NOTE — PROGRESS NOTE ADULT - ASSESSMENT
53y female with recurrent SBO, s/p barium enema this am found to have rectosigmoid stricture    - Clear diet  - continue to monitor GIF  - GI consult for scope  - Continue Therapeutic lovenox for DVT anticoagulation  - Pain control: oxy 10mg q6h prn  - reconsulted vascular for RLE pain, as per vacular resident will start gabapentin and will f/u with attending    B surgery  [id number]

## 2019-04-30 LAB
ANION GAP SERPL CALC-SCNC: 11 MMO/L — SIGNIFICANT CHANGE UP (ref 7–14)
BUN SERPL-MCNC: 9 MG/DL — SIGNIFICANT CHANGE UP (ref 7–23)
CALCIUM SERPL-MCNC: 7.9 MG/DL — LOW (ref 8.4–10.5)
CHLORIDE SERPL-SCNC: 102 MMOL/L — SIGNIFICANT CHANGE UP (ref 98–107)
CO2 SERPL-SCNC: 23 MMOL/L — SIGNIFICANT CHANGE UP (ref 22–31)
CREAT SERPL-MCNC: 0.54 MG/DL — SIGNIFICANT CHANGE UP (ref 0.5–1.3)
GLUCOSE SERPL-MCNC: 138 MG/DL — HIGH (ref 70–99)
HCT VFR BLD CALC: 39.4 % — SIGNIFICANT CHANGE UP (ref 34.5–45)
HGB BLD-MCNC: 12.3 G/DL — SIGNIFICANT CHANGE UP (ref 11.5–15.5)
MAGNESIUM SERPL-MCNC: 2.1 MG/DL — SIGNIFICANT CHANGE UP (ref 1.6–2.6)
MCHC RBC-ENTMCNC: 26.9 PG — LOW (ref 27–34)
MCHC RBC-ENTMCNC: 31.2 % — LOW (ref 32–36)
MCV RBC AUTO: 86 FL — SIGNIFICANT CHANGE UP (ref 80–100)
NRBC # FLD: 0.04 K/UL — SIGNIFICANT CHANGE UP (ref 0–0)
PHOSPHATE SERPL-MCNC: 3.6 MG/DL — SIGNIFICANT CHANGE UP (ref 2.5–4.5)
PLATELET # BLD AUTO: 347 K/UL — SIGNIFICANT CHANGE UP (ref 150–400)
PMV BLD: 10.2 FL — SIGNIFICANT CHANGE UP (ref 7–13)
POTASSIUM SERPL-MCNC: 4.1 MMOL/L — SIGNIFICANT CHANGE UP (ref 3.5–5.3)
POTASSIUM SERPL-SCNC: 4.1 MMOL/L — SIGNIFICANT CHANGE UP (ref 3.5–5.3)
RBC # BLD: 4.58 M/UL — SIGNIFICANT CHANGE UP (ref 3.8–5.2)
RBC # FLD: 14.7 % — HIGH (ref 10.3–14.5)
SODIUM SERPL-SCNC: 136 MMOL/L — SIGNIFICANT CHANGE UP (ref 135–145)
WBC # BLD: 8.66 K/UL — SIGNIFICANT CHANGE UP (ref 3.8–10.5)
WBC # FLD AUTO: 8.66 K/UL — SIGNIFICANT CHANGE UP (ref 3.8–10.5)

## 2019-04-30 PROCEDURE — 99223 1ST HOSP IP/OBS HIGH 75: CPT | Mod: GC

## 2019-04-30 PROCEDURE — 99233 SBSQ HOSP IP/OBS HIGH 50: CPT

## 2019-04-30 RX ORDER — ONDANSETRON 8 MG/1
4 TABLET, FILM COATED ORAL ONCE
Qty: 0 | Refills: 0 | Status: COMPLETED | OUTPATIENT
Start: 2019-04-30 | End: 2019-05-01

## 2019-04-30 RX ADMIN — GABAPENTIN 100 MILLIGRAM(S): 400 CAPSULE ORAL at 15:42

## 2019-04-30 RX ADMIN — GABAPENTIN 100 MILLIGRAM(S): 400 CAPSULE ORAL at 22:01

## 2019-04-30 RX ADMIN — Medication 1000 MILLIGRAM(S): at 01:44

## 2019-04-30 RX ADMIN — HYDROMORPHONE HYDROCHLORIDE 0.5 MILLIGRAM(S): 2 INJECTION INTRAMUSCULAR; INTRAVENOUS; SUBCUTANEOUS at 11:59

## 2019-04-30 RX ADMIN — Medication 400 MILLIGRAM(S): at 01:11

## 2019-04-30 RX ADMIN — HYDROMORPHONE HYDROCHLORIDE 0.5 MILLIGRAM(S): 2 INJECTION INTRAMUSCULAR; INTRAVENOUS; SUBCUTANEOUS at 12:15

## 2019-04-30 RX ADMIN — GABAPENTIN 100 MILLIGRAM(S): 400 CAPSULE ORAL at 05:21

## 2019-04-30 RX ADMIN — Medication 30 MILLIGRAM(S): at 20:02

## 2019-04-30 RX ADMIN — ENOXAPARIN SODIUM 70 MILLIGRAM(S): 100 INJECTION SUBCUTANEOUS at 19:22

## 2019-04-30 RX ADMIN — Medication 30 MILLIGRAM(S): at 05:36

## 2019-04-30 RX ADMIN — Medication 30 MILLIGRAM(S): at 19:32

## 2019-04-30 RX ADMIN — PANTOPRAZOLE SODIUM 40 MILLIGRAM(S): 20 TABLET, DELAYED RELEASE ORAL at 12:21

## 2019-04-30 RX ADMIN — ENOXAPARIN SODIUM 70 MILLIGRAM(S): 100 INJECTION SUBCUTANEOUS at 05:20

## 2019-04-30 RX ADMIN — Medication 30 MILLIGRAM(S): at 05:21

## 2019-04-30 NOTE — CONSULT NOTE ADULT - ASSESSMENT
Impression:  1) Rectosigmoid anastamtoic stricture  2) Malignant small bowel obstruction   3) Rectal cancer s/p resection, chemotherapy now with metastatic disease  4) DVT on anticoagulation     Recommendations:   To follow Impression:  1) Rectosigmoid anastamtoic stricture  2) Small bowel obstruction - likely malignant   3) Rectal cancer s/p resection, chemotherapy now with metastatic disease  4) DVT on anticoagulation     Recommendations:   - unclear if symptoms (abdominal pain, N/V, constipation/obstipation) are from SBO or rectosigmoid stricture - would therefore repeat CT abdomen/pelvis to determine presence/degree of SBO  - pending above, would consider colonoscopy however would only plan on stent placement if there is plan for surgical resection of stricture  - continue NGT to low intermittent suction   - management of colon cancer per primary/oncology  - diet per primary  - pain control, anti-emetics as needed    Umesh, [id number]

## 2019-04-30 NOTE — PROGRESS NOTE ADULT - SUBJECTIVE AND OBJECTIVE BOX
Morning Surgical Progress Note  Patient is a 53y old  Female who presents with a chief complaint of SBO (29 Apr 2019 16:52)    SUBJECTIVE: Patient seen and examined at bedside with surgical team, patient without complaints. She states she is continueing to pass "a little" gas and had a bm yesterday. She denies nausea and vomiting.     Vital Signs Last 24 Hrs  T(C): 36.7 (30 Apr 2019 05:19), Max: 37 (29 Apr 2019 22:16)  T(F): 98 (30 Apr 2019 05:19), Max: 98.6 (29 Apr 2019 22:16)  HR: 96 (30 Apr 2019 05:19) (89 - 100)  BP: 121/84 (30 Apr 2019 05:19) (116/80 - 130/94)  RR: 16 (30 Apr 2019 05:19) (16 - 18)  SpO2: 100% (30 Apr 2019 05:19) (99% - 100%)I&O's Detail  29 Apr 2019 07:01  -  30 Apr 2019 07:00  --------------------------------------------------------  IN:    dextrose 5% + sodium chloride 0.45% with potassium chloride 20 mEq/L: 2625 mL    IV PiggyBack: 100 mL    Oral Fluid: 295 mL  Total IN: 3020 mL    OUT:  Total OUT: 0 mL  Total NET: 3020 mL    MEDICATIONS  (STANDING):  dextrose 5% + sodium chloride 0.45% with potassium chloride 20 mEq/L 1000 milliLiter(s) (125 mL/Hr) IV Continuous <Continuous>  enoxaparin Injectable 70 milliGRAM(s) SubCutaneous two times a day  gabapentin 100 milliGRAM(s) Oral three times a day  methimazole 5 milliGRAM(s) Oral daily  pantoprazole  Injectable 40 milliGRAM(s) IV Push daily  MEDICATIONS  (PRN):  HYDROmorphone  Injectable 0.5 milliGRAM(s) IV Push every 4 hours PRN Severe Pain (7 - 10)  ketorolac   Injectable 30 milliGRAM(s) IV Push every 8 hours PRN Moderate Pain (4 - 6)  melatonin 3 milliGRAM(s) Oral at bedtime PRN Insomnia    Physical Exam  Constitutional: A&Ox3, NAD  Gastrointestinal: obese, softnontender nondistended    LABS:                        12.3   8.66  )-----------( 347      ( 30 Apr 2019 06:05 )             39.4     04-30    136  |  102  |  9   ----------------------------<  138<H>  4.1   |  23  |  0.54    Ca    7.9<L>      30 Apr 2019 06:05  Phos  3.6     04-30  Mg     2.1     04-30

## 2019-04-30 NOTE — CONSULT NOTE ADULT - SUBJECTIVE AND OBJECTIVE BOX
GASTROENTEROLOGY INITIAL CONSULT NOTE    Chief Complaint:  Patient is a 53y old  Female who presents with a chief complaint of SBO (30 Apr 2019 08:39)      HPI: 53y Female with past medical history colorectal cancer s/p LAR (2017) and chemotherapy and radiation now with metastatic disease to liver initially admitted 4/17/19 with abdominal pain. At the time of admission, patient reported one day of diffuse abdominal pain and distension associated with nausea and multiple episodes of vomiting. Since being admitted to the hospital, she endorses difficulty passing bowel movements, now only intermittently passing loose brown stool. She has been passing gas. She has limited PO intake and denies nausea or vomiting.     Allergies:  No Known Allergies      Hospital Medications:  dextrose 5% + sodium chloride 0.45% with potassium chloride 20 mEq/L 1000 milliLiter(s) IV Continuous <Continuous>  enoxaparin Injectable 70 milliGRAM(s) SubCutaneous two times a day  gabapentin 100 milliGRAM(s) Oral three times a day  HYDROmorphone  Injectable 0.5 milliGRAM(s) IV Push every 4 hours PRN  ketorolac   Injectable 30 milliGRAM(s) IV Push every 8 hours PRN  melatonin 3 milliGRAM(s) Oral at bedtime PRN  methimazole 5 milliGRAM(s) Oral daily  pantoprazole  Injectable 40 milliGRAM(s) IV Push daily      PMHX/PSHX:  DVT, lower extremity  SBO (small bowel obstruction)  Hemorrhoid  Cervical cancer  Colon neoplasm  Hyperthyroidism  S/P colon resection  H/O exploratory laparotomy  No significant past surgical history      Family history:  Family history of diabetes mellitus  Family history of leukemia      Social History:     ROS:     General:  No wt loss, fevers, chills, night sweats, fatigue,   Eyes:  Good vision, no reported pain  ENT:  No sore throat, pain, runny nose, dysphagia  CV:  No pain, palpitations, hypo/hypertension  Resp:  No dyspnea, cough, tachypnea, wheezing  GI:  see HPI  :  No pain, bleeding, incontinence, nocturia  Muscle:  No pain, weakness  Neuro:  No weakness, tingling, memory problems  Psych:  No fatigue, insomnia, mood problems, depression  Endocrine:  No polyuria, polydipsia, cold/heat intolerance  Heme:  No petechiae, ecchymosis, easy bruisability  Skin:  No rash, tattoos, scars, edema      PHYSICAL EXAM:   Vital Signs:  Vital Signs Last 24 Hrs  T(C): 36.7 (30 Apr 2019 05:19), Max: 37 (29 Apr 2019 22:16)  T(F): 98 (30 Apr 2019 05:19), Max: 98.6 (29 Apr 2019 22:16)  HR: 96 (30 Apr 2019 05:19) (89 - 100)  BP: 121/84 (30 Apr 2019 05:19) (116/80 - 130/94)  BP(mean): --  RR: 16 (30 Apr 2019 05:19) (16 - 18)  SpO2: 100% (30 Apr 2019 05:19) (99% - 100%)  Daily     Daily     GENERAL:  no acute distress  HEENT:  -icterus, +NGT  CHEST:  clear bilaterally, no wheezes or rales  HEART:  RRR, S1S2  ABDOMEN:  soft, non-tender, mildly distended,  EXTEREMITIES:  no  edema  SKIN: warm/dry  NEURO:  alert, oriented, conversant     LABS:                        12.3   8.66  )-----------( 347      ( 30 Apr 2019 06:05 )             39.4     04-30    136  |  102  |  9   ----------------------------<  138<H>  4.1   |  23  |  0.54    Ca    7.9<L>      30 Apr 2019 06:05  Phos  3.6     04-30  Mg     2.1     04-30              Imaging:  CT A/P 4/21: BOWEL: Small bowel obstruction with transition point in the right   hemipelvis (series 2 image 94) as seen on prior CT dated 4/16/2019.   Extensive small bowel involvement has increased when compared to prior   study. Contrast from prior studies is again seen within the colon and   rectum. Thickening of the rectal anastomosis is again noted. No   pneumatosis or hypoenhancement to suggest ischemia    Xray barium enema 4/29: Stricture within the rectosigmoid junction, corresponding to the previously described area of narrowing of the rectosigmoid colon from CT abdomen pelvis were 4/16/2019.

## 2019-04-30 NOTE — PROGRESS NOTE ADULT - ATTENDING COMMENTS
Seen and examined, chart and note reviewed, case discussed with B team    Bowel obstruction  a.  Notes multiple loose bowel movements  b.  Remains distended  c.  Keep NPO, IVF    Large bowel stricture  a.  Barium enema reviewed  b.  GI consult re: sigmoidoscopy with bx/stent  c.  May need ex lap, DAVID and diverting loop

## 2019-04-30 NOTE — PROGRESS NOTE ADULT - ASSESSMENT
53y female with recurrent SBO, s/p barium enema this am found to have rectosigmoid stricture, awaiting recommendations by gi    - NPO for possible scope  - continue to monitor GIF  - GI consulted for scope yesterday afternoon awaiting recommendations  - Will need sb follow through to rule out SBO, maybe need a possible diversion with transverse loop  - Continue Therapeutic lovenox for RLE DVT anticoagulation  - Pain control: oxy 10mg q6h prn    B surgery  [id number]

## 2019-04-30 NOTE — PROGRESS NOTE ADULT - SUBJECTIVE AND OBJECTIVE BOX
HPI:  53F w/hx of colorectal CA s/p LAR (2017, Dr. Armstrong) with mets to liver and cervix, s/p chemotherapy (FOLFOX, then FOLFIRI, last dose Jan 2019) and radiation, p/w 1-day h/o R-sided abdominal pain associated with nausea and several episodes of nausea. She denies associated fever or chills, dysuria, CP, SOB. Last flatus and last BM 1 day PTA. In the ED, she was tachycardic to 120s, normotensive, afebrile. WBC 12.84, lactate 2.1. CT A/P demonstrates high-grade SBO with transition point in right hemiabdomen. The patient was treated at [facility] in Summer 2018 for SBO, which was managed nonoperatively.    Of note, patient was recently diagnosed with DVT of RLE for which she is taking Eliquis. She is followed by Dr. Kiran of heme/onc. Most recent PET CT in Feb 2019 showed no recurrence/spread of disease. Last chemo January 2019. (17 Apr 2019 01:42)    PAST MEDICAL & SURGICAL HISTORY:  DVT, lower extremity: RLE, dx&#x27;d April 2019  SBO (small bowel obstruction)  Hemorrhoid  Cervical cancer  Colon neoplasm: s/p LAR  Hyperthyroidism  S/P colon resection  H/O exploratory laparotomy: 1/6/17, with LAR    Medications:  dextrose 5% + sodium chloride 0.45% with potassium chloride 20 mEq/L 1000 milliLiter(s) IV Continuous <Continuous>  enoxaparin Injectable 70 milliGRAM(s) SubCutaneous two times a day  gabapentin 100 milliGRAM(s) Oral three times a day  HYDROmorphone  Injectable 0.5 milliGRAM(s) IV Push every 4 hours PRN Severe Pain (7 - 10)  ketorolac   Injectable 30 milliGRAM(s) IV Push every 8 hours PRN Moderate Pain (4 - 6)  melatonin 3 milliGRAM(s) Oral at bedtime PRN Insomnia  methimazole 5 milliGRAM(s) Oral daily  pantoprazole  Injectable 40 milliGRAM(s) IV Push daily    Labs:  CBC Full  -  ( 30 Apr 2019 06:05 )  WBC Count : 8.66 K/uL  Hemoglobin : 12.3 g/dL  Hematocrit : 39.4 %  Platelet Count - Automated : 347 K/uL  Mean Cell Volume : 86.0 fL  Mean Cell Hemoglobin : 26.9 pg  Mean Cell Hemoglobin Concentration : 31.2 %  Auto Neutrophil # : x  Auto Lymphocyte # : x  Auto Monocyte # : x  Auto Eosinophil # : x  Auto Basophil # : x  Auto Neutrophil % : x  Auto Lymphocyte % : x  Auto Monocyte % : x  Auto Eosinophil % : x  Auto Basophil % : x    04-30    136  |  102  |  9   ----------------------------<  138<H>  4.1   |  23  |  0.54    Ca    7.9<L>      30 Apr 2019 06:05  Phos  3.6     04-30  Mg     2.1     04-30        Radiology:             ROS:  Patient comfortable without distress  No SOB or chest pain  No palpitation  NPO, no flatus or BM  Vital Signs Last 24 Hrs  T(C): 36.8 (30 Apr 2019 10:13), Max: 37 (29 Apr 2019 22:16)  T(F): 98.2 (30 Apr 2019 10:13), Max: 98.6 (29 Apr 2019 22:16)  HR: 91 (30 Apr 2019 10:13) (91 - 100)  BP: 121/84 (30 Apr 2019 10:13) (118/81 - 130/94)  BP(mean): --  RR: 16 (30 Apr 2019 10:13) (16 - 18)  SpO2: 100% (30 Apr 2019 10:13) (99% - 100%)    Physical exam:  Patient alert and oriented  No distress  CVS: S1, S2 regular or murmur  Chest: bilateral breath sound without rales  Abdomen: soft, not tender, no organomegaly or masses  No focal neuro deficit  No edema      Assessment and Plan: HPI:  Patient with h/o colon cancer since early 2017, had surgery, FOLFOX Avastin, with very good response, switched to FOLFIRI for allergic reaction followed by RT. Disease recurrence with mets to cervix, restarted chemo, last cycle In January 2019.  She was last admitted in 8/2018 with obstruction treated conservatively. Had Rt DVT 4/2019  Admitted again with SBO    PAST MEDICAL & SURGICAL HISTORY:  DVT, lower extremity: RLE, dx&#x27;d April 2019  SBO (small bowel obstruction)  Hemorrhoid  Cervical cancer  Colon neoplasm: s/p LAR  Hyperthyroidism  S/P colon resection  H/O exploratory laparotomy: 1/6/17, with LAR    Medications:  dextrose 5% + sodium chloride 0.45% with potassium chloride 20 mEq/L 1000 milliLiter(s) IV Continuous <Continuous>  enoxaparin Injectable 70 milliGRAM(s) SubCutaneous two times a day  gabapentin 100 milliGRAM(s) Oral three times a day  HYDROmorphone  Injectable 0.5 milliGRAM(s) IV Push every 4 hours PRN Severe Pain (7 - 10)  ketorolac   Injectable 30 milliGRAM(s) IV Push every 8 hours PRN Moderate Pain (4 - 6)  melatonin 3 milliGRAM(s) Oral at bedtime PRN Insomnia  methimazole 5 milliGRAM(s) Oral daily  pantoprazole  Injectable 40 milliGRAM(s) IV Push daily    Labs:  CBC Full  -  ( 30 Apr 2019 06:05 )  WBC Count : 8.66 K/uL  Hemoglobin : 12.3 g/dL  Hematocrit : 39.4 %  Platelet Count - Automated : 347 K/uL  Mean Cell Volume : 86.0 fL  Mean Cell Hemoglobin : 26.9 pg  Mean Cell Hemoglobin Concentration : 31.2 %  Auto Neutrophil # : x  Auto Lymphocyte # : x  Auto Monocyte # : x  Auto Eosinophil # : x  Auto Basophil # : x  Auto Neutrophil % : x  Auto Lymphocyte % : x  Auto Monocyte % : x  Auto Eosinophil % : x  Auto Basophil % : x    04-30    136  |  102  |  9   ----------------------------<  138<H>  4.1   |  23  |  0.54    Ca    7.9<L>      30 Apr 2019 06:05  Phos  3.6     04-30  Mg     2.1     04-30        Radiology:     < from: Xray Barium Enema (04.29.19 @ 12:05) >  Stricture within the rectosigmoid junction, corresponding to the   previously described area of narrowing of the rectosigmoid colon from CT   abdomen pelvis were 4/16/2019.        < end of copied text >          ROS:    No SOB or chest pain  No palpitation  NPO, no flatus or BM, abdominal discomfort  Vital Signs Last 24 Hrs  T(C): 36.8 (30 Apr 2019 10:13), Max: 37 (29 Apr 2019 22:16)  T(F): 98.2 (30 Apr 2019 10:13), Max: 98.6 (29 Apr 2019 22:16)  HR: 91 (30 Apr 2019 10:13) (91 - 100)  BP: 121/84 (30 Apr 2019 10:13) (118/81 - 130/94)  BP(mean): --  RR: 16 (30 Apr 2019 10:13) (16 - 18)  SpO2: 100% (30 Apr 2019 10:13) (99% - 100%)    Physical exam:  Patient alert and oriented  No distress  CVS: S1, S2 regular or murmur  Chest: bilateral breath sound without rales  Abdomen: soft, distension  No focal neuro deficit  No edema      Assessment and Plan:

## 2019-04-30 NOTE — PROGRESS NOTE ADULT - ASSESSMENT
Patient with h/o colon cancer since early 2017, had surgery, FOLFOX Avastin, with very good response, switched to FOLFIRI for allergic reaction followed by RT. Disease recurrence with mets to cervix, restarted chemo, last cycle In January 2019.  She was last admitted in 8/2018 with obstruction treated conservatively. Had Rt DVT 4/2019  WasaAdmitted  with SBO. So far it is not resolving. Surgery eval regarding anastomotic stricture vs SBO etc noted.  Potential plan of colonoscopy, stent, may need surgery noted.  Need of any systemic therapy will be determined after management of her abdominal symptoms

## 2019-05-01 ENCOUNTER — TRANSCRIPTION ENCOUNTER (OUTPATIENT)
Age: 54
End: 2019-05-01

## 2019-05-01 DIAGNOSIS — E05.90 THYROTOXICOSIS, UNSPECIFIED WITHOUT THYROTOXIC CRISIS OR STORM: ICD-10-CM

## 2019-05-01 DIAGNOSIS — Z01.818 ENCOUNTER FOR OTHER PREPROCEDURAL EXAMINATION: ICD-10-CM

## 2019-05-01 LAB
ANION GAP SERPL CALC-SCNC: 14 MMO/L — SIGNIFICANT CHANGE UP (ref 7–14)
APTT BLD: 36.5 SEC — HIGH (ref 27.5–36.3)
BLD GP AB SCN SERPL QL: NEGATIVE — SIGNIFICANT CHANGE UP
BUN SERPL-MCNC: 10 MG/DL — SIGNIFICANT CHANGE UP (ref 7–23)
CALCIUM SERPL-MCNC: 8 MG/DL — LOW (ref 8.4–10.5)
CHLORIDE SERPL-SCNC: 101 MMOL/L — SIGNIFICANT CHANGE UP (ref 98–107)
CO2 SERPL-SCNC: 22 MMOL/L — SIGNIFICANT CHANGE UP (ref 22–31)
CREAT SERPL-MCNC: 0.56 MG/DL — SIGNIFICANT CHANGE UP (ref 0.5–1.3)
GLUCOSE SERPL-MCNC: 128 MG/DL — HIGH (ref 70–99)
HCG SERPL-ACNC: < 5 MIU/ML — SIGNIFICANT CHANGE UP
HCT VFR BLD CALC: 40.4 % — SIGNIFICANT CHANGE UP (ref 34.5–45)
HGB BLD-MCNC: 12.6 G/DL — SIGNIFICANT CHANGE UP (ref 11.5–15.5)
INR BLD: 1.08 — SIGNIFICANT CHANGE UP (ref 0.88–1.17)
MAGNESIUM SERPL-MCNC: 2.1 MG/DL — SIGNIFICANT CHANGE UP (ref 1.6–2.6)
MCHC RBC-ENTMCNC: 26.9 PG — LOW (ref 27–34)
MCHC RBC-ENTMCNC: 31.2 % — LOW (ref 32–36)
MCV RBC AUTO: 86.3 FL — SIGNIFICANT CHANGE UP (ref 80–100)
NRBC # FLD: 0.02 K/UL — SIGNIFICANT CHANGE UP (ref 0–0)
PHOSPHATE SERPL-MCNC: 3.8 MG/DL — SIGNIFICANT CHANGE UP (ref 2.5–4.5)
PLATELET # BLD AUTO: 360 K/UL — SIGNIFICANT CHANGE UP (ref 150–400)
PMV BLD: 10 FL — SIGNIFICANT CHANGE UP (ref 7–13)
POTASSIUM SERPL-MCNC: 4.2 MMOL/L — SIGNIFICANT CHANGE UP (ref 3.5–5.3)
POTASSIUM SERPL-SCNC: 4.2 MMOL/L — SIGNIFICANT CHANGE UP (ref 3.5–5.3)
PROTHROM AB SERPL-ACNC: 12.4 SEC — SIGNIFICANT CHANGE UP (ref 9.8–13.1)
RBC # BLD: 4.68 M/UL — SIGNIFICANT CHANGE UP (ref 3.8–5.2)
RBC # FLD: 14.8 % — HIGH (ref 10.3–14.5)
RH IG SCN BLD-IMP: POSITIVE — SIGNIFICANT CHANGE UP
RH IG SCN BLD-IMP: POSITIVE — SIGNIFICANT CHANGE UP
SODIUM SERPL-SCNC: 137 MMOL/L — SIGNIFICANT CHANGE UP (ref 135–145)
T3 SERPL-MCNC: 71.3 NG/DL — LOW (ref 80–200)
T4 AB SER-ACNC: 7.32 UG/DL — SIGNIFICANT CHANGE UP (ref 5.1–13)
TSH SERPL-MCNC: 3.17 UIU/ML — SIGNIFICANT CHANGE UP (ref 0.27–4.2)
WBC # BLD: 8.57 K/UL — SIGNIFICANT CHANGE UP (ref 3.8–10.5)
WBC # FLD AUTO: 8.57 K/UL — SIGNIFICANT CHANGE UP (ref 3.8–10.5)

## 2019-05-01 PROCEDURE — 99233 SBSQ HOSP IP/OBS HIGH 50: CPT | Mod: 57

## 2019-05-01 PROCEDURE — 99232 SBSQ HOSP IP/OBS MODERATE 35: CPT | Mod: GC

## 2019-05-01 PROCEDURE — 99223 1ST HOSP IP/OBS HIGH 75: CPT | Mod: GC

## 2019-05-01 RX ORDER — ONDANSETRON 8 MG/1
4 TABLET, FILM COATED ORAL ONCE
Qty: 0 | Refills: 0 | Status: COMPLETED | OUTPATIENT
Start: 2019-05-01 | End: 2019-05-01

## 2019-05-01 RX ORDER — HYDROMORPHONE HYDROCHLORIDE 2 MG/ML
0.5 INJECTION INTRAMUSCULAR; INTRAVENOUS; SUBCUTANEOUS
Qty: 0 | Refills: 0 | Status: DISCONTINUED | OUTPATIENT
Start: 2019-05-01 | End: 2019-05-02

## 2019-05-01 RX ADMIN — HYDROMORPHONE HYDROCHLORIDE 0.5 MILLIGRAM(S): 2 INJECTION INTRAMUSCULAR; INTRAVENOUS; SUBCUTANEOUS at 15:50

## 2019-05-01 RX ADMIN — GABAPENTIN 100 MILLIGRAM(S): 400 CAPSULE ORAL at 06:05

## 2019-05-01 RX ADMIN — HYDROMORPHONE HYDROCHLORIDE 0.5 MILLIGRAM(S): 2 INJECTION INTRAMUSCULAR; INTRAVENOUS; SUBCUTANEOUS at 11:59

## 2019-05-01 RX ADMIN — ONDANSETRON 4 MILLIGRAM(S): 8 TABLET, FILM COATED ORAL at 03:01

## 2019-05-01 RX ADMIN — HYDROMORPHONE HYDROCHLORIDE 0.5 MILLIGRAM(S): 2 INJECTION INTRAMUSCULAR; INTRAVENOUS; SUBCUTANEOUS at 23:24

## 2019-05-01 RX ADMIN — HYDROMORPHONE HYDROCHLORIDE 0.5 MILLIGRAM(S): 2 INJECTION INTRAMUSCULAR; INTRAVENOUS; SUBCUTANEOUS at 12:15

## 2019-05-01 RX ADMIN — Medication 3 MILLIGRAM(S): at 21:21

## 2019-05-01 RX ADMIN — Medication 30 MILLIGRAM(S): at 21:20

## 2019-05-01 RX ADMIN — GABAPENTIN 100 MILLIGRAM(S): 400 CAPSULE ORAL at 21:21

## 2019-05-01 RX ADMIN — ONDANSETRON 4 MILLIGRAM(S): 8 TABLET, FILM COATED ORAL at 12:32

## 2019-05-01 RX ADMIN — ENOXAPARIN SODIUM 70 MILLIGRAM(S): 100 INJECTION SUBCUTANEOUS at 06:05

## 2019-05-01 RX ADMIN — PANTOPRAZOLE SODIUM 40 MILLIGRAM(S): 20 TABLET, DELAYED RELEASE ORAL at 13:48

## 2019-05-01 RX ADMIN — ONDANSETRON 4 MILLIGRAM(S): 8 TABLET, FILM COATED ORAL at 21:20

## 2019-05-01 RX ADMIN — HYDROMORPHONE HYDROCHLORIDE 0.5 MILLIGRAM(S): 2 INJECTION INTRAMUSCULAR; INTRAVENOUS; SUBCUTANEOUS at 06:21

## 2019-05-01 RX ADMIN — HYDROMORPHONE HYDROCHLORIDE 0.5 MILLIGRAM(S): 2 INJECTION INTRAMUSCULAR; INTRAVENOUS; SUBCUTANEOUS at 23:09

## 2019-05-01 RX ADMIN — DEXTROSE MONOHYDRATE, SODIUM CHLORIDE, AND POTASSIUM CHLORIDE 125 MILLILITER(S): 50; .745; 4.5 INJECTION, SOLUTION INTRAVENOUS at 13:48

## 2019-05-01 RX ADMIN — ENOXAPARIN SODIUM 70 MILLIGRAM(S): 100 INJECTION SUBCUTANEOUS at 18:10

## 2019-05-01 RX ADMIN — HYDROMORPHONE HYDROCHLORIDE 0.5 MILLIGRAM(S): 2 INJECTION INTRAMUSCULAR; INTRAVENOUS; SUBCUTANEOUS at 15:34

## 2019-05-01 RX ADMIN — HYDROMORPHONE HYDROCHLORIDE 0.5 MILLIGRAM(S): 2 INJECTION INTRAMUSCULAR; INTRAVENOUS; SUBCUTANEOUS at 06:06

## 2019-05-01 RX ADMIN — ONDANSETRON 4 MILLIGRAM(S): 8 TABLET, FILM COATED ORAL at 15:36

## 2019-05-01 RX ADMIN — Medication 30 MILLIGRAM(S): at 21:50

## 2019-05-01 NOTE — DIETITIAN INITIAL EVALUATION ADULT. - ORAL INTAKE PTA
Per sister (whom primarily took care of patient PTA), states patient had good appetite and PO intake but  declined over last 2 weeks PTA. She only consumed 2 small meals and/or 1 Ensure po supplement, variable between 25-50% of nutritional needs./poor

## 2019-05-01 NOTE — CONSULT NOTE ADULT - SUBJECTIVE AND OBJECTIVE BOX
ROHITH MELLO  53y  Female      Patient is a 53y old  Female who presents with a chief complaint of SBO (01 May 2019 09:23)    HPI:  53F w/hx of colorectal CA s/p LAR (2017, Dr. Armstrong) with mets to liver and cervix, s/p chemotherapy (FOLFOX, then FOLFIRI, last dose Jan 2019) and radiation, p/w 1-day h/o R-sided abdominal pain associated with nausea and several episodes of nausea. She denies associated fever or chills, dysuria, CP, SOB. Last flatus and last BM 1 day PTA. In the ED, she was tachycardic to 120s, normotensive, afebrile. WBC 12.84, lactate 2.1. CT A/P demonstrates high-grade SBO with transition point in right hemiabdomen. The patient was treated at [facility] in Summer 2018 for SBO, which was managed nonoperatively.    Of note, patient was recently diagnosed with DVT of RLE for which she is taking Eliquis. She is followed by Dr. Kiran of heme/onc. Most recent PET CT in Feb 2019 showed no recurrence/spread of disease. Last chemo January 2019. (17 Apr 2019 01:42)        PAST MEDICAL/SURGICAL HISTORY  PAST MEDICAL & SURGICAL HISTORY:  DVT, lower extremity: RLE, dx&#x27;d April 2019  SBO (small bowel obstruction)  Hemorrhoid  Cervical cancer  Colon neoplasm: s/p LAR  Hyperthyroidism  S/P colon resection  H/O exploratory laparotomy: 1/6/17, with LAR      REVIEW OF SYSTEMS:  CONSTITUTIONAL: No fever, weight loss, or fatigue  EYES: No eye pain, visual disturbances, or discharge  ENMT:  No difficulty hearing, tinnitus, vertigo; No sinus or throat pain  NECK: No pain or stiffness  BREASTS: No pain, masses, or nipple discharge  RESPIRATORY: No cough, wheezing, chills or hemoptysis; No shortness of breath  CARDIOVASCULAR: No chest pain, palpitations, dizziness, or leg swelling  GASTROINTESTINAL: No abdominal or epigastric pain. No nausea, vomiting, or hematemesis; No diarrhea or constipation. No melena or hematochezia.  GENITOURINARY: No dysuria, frequency, hematuria, or incontinence  NEUROLOGICAL: No headaches, memory loss, loss of strength, numbness, or tremors  SKIN: No itching, burning, rashes, or lesions   LYMPH NODES: No enlarged glands  ENDOCRINE: No heat or cold intolerance; No hair loss  MUSCULOSKELETAL: No joint pain or swelling; No muscle, back, or extremity pain  PSYCHIATRIC: No depression, anxiety, mood swings, or difficulty sleeping  HEME/LYMPH: No easy bruising, or bleeding gums  ALLERY AND IMMUNOLOGIC: No hives or eczema    T(C): 36.7 (05-01-19 @ 09:39), Max: 37.2 (04-30-19 @ 14:03)  HR: 93 (05-01-19 @ 09:39) (90 - 109)  BP: 114/78 (05-01-19 @ 09:39) (104/68 - 120/89)  RR: 18 (05-01-19 @ 09:39) (16 - 18)  SpO2: 98% (05-01-19 @ 09:39) (98% - 100%)  Wt(kg): --Vital Signs Last 24 Hrs  T(C): 36.7 (01 May 2019 09:39), Max: 37.2 (30 Apr 2019 14:03)  T(F): 98 (01 May 2019 09:39), Max: 98.9 (30 Apr 2019 14:03)  HR: 93 (01 May 2019 09:39) (90 - 109)  BP: 114/78 (01 May 2019 09:39) (104/68 - 120/89)  BP(mean): --  RR: 18 (01 May 2019 09:39) (16 - 18)  SpO2: 98% (01 May 2019 09:39) (98% - 100%)    PHYSICAL EXAM:  GENERAL: NAD, well-groomed, well-developed  HEAD:  Atraumatic, Normocephalic  EYES: EOMI, PERRLA, conjunctiva and sclera clear  ENMT: No tonsillar erythema, exudates, or enlargement; Moist mucous membranes, Good dentition, No lesions  NECK: Supple, No JVD, Normal thyroid  NERVOUS SYSTEM:  Alert & Oriented X3, Good concentration; Motor Strength 5/5 B/L upper and lower extremities; DTRs 2+ intact and symmetric  CHEST/LUNG: Clear to percussion bilaterally; No rales, rhonchi, wheezing, or rubs  HEART: Regular rate and rhythm; No murmurs, rubs, or gallops  ABDOMEN: Soft, Nontender, Nondistended; Bowel sounds present  EXTREMITIES:  2+ Peripheral Pulses, No clubbing, cyanosis, or edema  LYMPH: No lymphadenopathy noted  SKIN: No rashes or lesions    Consultant(s) Notes Reviewed:  [x ] YES  [ ] NO  Care Discussed with Consultants/Other Providers [ x] YES  [ ] NO    LABS:      The Labs were reviewed by me   The Radiology was reviewed by me    EKG tracing reviewed by me    05-01    137  |  101  |  10  ----------------------------<  128<H>  4.2   |  22  |  0.56  04-30    136  |  102  |  9   ----------------------------<  138<H>  4.1   |  23  |  0.54  04-29    134<L>  |  101  |  11  ----------------------------<  127<H>  4.2   |  23  |  0.49<L>    Ca    8.0<L>      01 May 2019 06:00  Ca    7.9<L>      30 Apr 2019 06:05  Ca    7.9<L>      29 Apr 2019 07:00  Phos  3.8     05-01  Mg     2.1     05-01      Magnesium, Serum: 2.1 mg/dL (05-01-19 @ 06:00)  Magnesium, Serum: 2.1 mg/dL (04-30-19 @ 06:05)  Magnesium, Serum: 2.1 mg/dL (04-29-19 @ 07:00)    Phosphorus Level, Serum: 3.8 mg/dL (05-01-19 @ 06:00)  Phosphorus Level, Serum: 3.6 mg/dL (04-30-19 @ 06:05)  Phosphorus Level, Serum: 3.6 mg/dL (04-29-19 @ 07:00)      PT/INR - ( 01 May 2019 06:00 )   PT: 12.4 SEC;   INR: 1.08          PTT - ( 01 May 2019 06:00 )  PTT:36.5 SEC                                        12.6   8.57  )-----------( 360      ( 01 May 2019 06:00 )             40.4                         12.3   8.66  )-----------( 347      ( 30 Apr 2019 06:05 )             39.4                         12.7   8.68  )-----------( 330      ( 29 Apr 2019 07:00 )             41.1     CAPILLARY BLOOD GLUCOSE                RADIOLOGY & ADDITIONAL TESTS:    Imaging Personally Reviewed:  [ ] YES  [ ] NO ROHITH MELLO  53y  Female      Patient is a 53y old  Female who presents with a chief complaint of SBO (01 May 2019 09:23)    HPI:  53F w/hx of colorectal CA s/p LAR (2017, Dr. Armstrong) with mets to liver and cervix, s/p chemotherapy (FOLFOX, then FOLFIRI, last dose Jan 2019) and radiation, p/w 1-day h/o R-sided abdominal pain associated with nausea and several episodes of nausea. She denies associated fever or chills, dysuria, CP, SOB. Last flatus and last BM 1 day PTA. In the ED, she was tachycardic to 120s, normotensive, afebrile. WBC 12.84, lactate 2.1. CT A/P demonstrates high-grade SBO with transition point in right hemiabdomen. The patient was treated at [facility] in Summer 2018 for SBO, which was managed nonoperatively.    Of note, patient was recently diagnosed with DVT of RLE for which she is taking Eliquis. She is followed by Dr. Kiran of heme/onc. Most recent PET CT in Feb 2019 showed no recurrence/spread of disease. Last chemo January 2019. (17 Apr 2019 01:42)    The patient has been treated conservatively for SBO during this admission, but has had minimal resolution of her symptoms.         PAST MEDICAL/SURGICAL HISTORY  PAST MEDICAL & SURGICAL HISTORY:  DVT, lower extremity: RLE, dx&#x27;d April 2019  SBO (small bowel obstruction)  Hemorrhoid  Cervical cancer  Colon neoplasm: s/p LAR  Hyperthyroidism  S/P colon resection  H/O exploratory laparotomy: 1/6/17, with LAR      REVIEW OF SYSTEMS:  CONSTITUTIONAL: No fever, weight loss, or fatigue  EYES: No eye pain, visual disturbances, or discharge  ENMT:  No difficulty hearing, tinnitus, vertigo; No sinus or throat pain  NECK: No pain or stiffness  BREASTS: No pain, masses, or nipple discharge  RESPIRATORY: No cough, wheezing, chills or hemoptysis; No shortness of breath  CARDIOVASCULAR: No chest pain, palpitations, dizziness, or leg swelling  GASTROINTESTINAL: No abdominal or epigastric pain. No nausea, vomiting, or hematemesis; No diarrhea or constipation. No melena or hematochezia.  GENITOURINARY: No dysuria, frequency, hematuria, or incontinence  NEUROLOGICAL: No headaches, memory loss, loss of strength, numbness, or tremors  SKIN: No itching, burning, rashes, or lesions   LYMPH NODES: No enlarged glands  ENDOCRINE: No heat or cold intolerance; No hair loss  MUSCULOSKELETAL: No joint pain or swelling; No muscle, back, or extremity pain  PSYCHIATRIC: No depression, anxiety, mood swings, or difficulty sleeping  HEME/LYMPH: No easy bruising, or bleeding gums  ALLERY AND IMMUNOLOGIC: No hives or eczema    T(C): 36.7 (05-01-19 @ 09:39), Max: 37.2 (04-30-19 @ 14:03)  HR: 93 (05-01-19 @ 09:39) (90 - 109)  BP: 114/78 (05-01-19 @ 09:39) (104/68 - 120/89)  RR: 18 (05-01-19 @ 09:39) (16 - 18)  SpO2: 98% (05-01-19 @ 09:39) (98% - 100%)  Wt(kg): --Vital Signs Last 24 Hrs  T(C): 36.7 (01 May 2019 09:39), Max: 37.2 (30 Apr 2019 14:03)  T(F): 98 (01 May 2019 09:39), Max: 98.9 (30 Apr 2019 14:03)  HR: 93 (01 May 2019 09:39) (90 - 109)  BP: 114/78 (01 May 2019 09:39) (104/68 - 120/89)  BP(mean): --  RR: 18 (01 May 2019 09:39) (16 - 18)  SpO2: 98% (01 May 2019 09:39) (98% - 100%)    PHYSICAL EXAM:  GENERAL: NAD, well-groomed, well-developed  HEAD:  Atraumatic, Normocephalic  EYES: EOMI, PERRLA, conjunctiva and sclera clear  ENMT: No tonsillar erythema, exudates, or enlargement; Moist mucous membranes, Good dentition, No lesions  NECK: Supple, No JVD, Normal thyroid  NERVOUS SYSTEM:  Alert & Oriented X3, Good concentration; Motor Strength 5/5 B/L upper and lower extremities; DTRs 2+ intact and symmetric  CHEST/LUNG: Clear to percussion bilaterally; No rales, rhonchi, wheezing, or rubs  HEART: Regular rate and rhythm; No murmurs, rubs, or gallops  ABDOMEN: Soft, Nontender, Nondistended; Bowel sounds present  EXTREMITIES:  2+ Peripheral Pulses, No clubbing, cyanosis, or edema  LYMPH: No lymphadenopathy noted  SKIN: No rashes or lesions    Consultant(s) Notes Reviewed:  [x ] YES  [ ] NO  Care Discussed with Consultants/Other Providers [ x] YES  [ ] NO    LABS:      The Labs were reviewed by me   The Radiology was reviewed by me    EKG tracing reviewed by me    05-01    137  |  101  |  10  ----------------------------<  128<H>  4.2   |  22  |  0.56  04-30    136  |  102  |  9   ----------------------------<  138<H>  4.1   |  23  |  0.54  04-29    134<L>  |  101  |  11  ----------------------------<  127<H>  4.2   |  23  |  0.49<L>    Ca    8.0<L>      01 May 2019 06:00  Ca    7.9<L>      30 Apr 2019 06:05  Ca    7.9<L>      29 Apr 2019 07:00  Phos  3.8     05-01  Mg     2.1     05-01      Magnesium, Serum: 2.1 mg/dL (05-01-19 @ 06:00)  Magnesium, Serum: 2.1 mg/dL (04-30-19 @ 06:05)  Magnesium, Serum: 2.1 mg/dL (04-29-19 @ 07:00)    Phosphorus Level, Serum: 3.8 mg/dL (05-01-19 @ 06:00)  Phosphorus Level, Serum: 3.6 mg/dL (04-30-19 @ 06:05)  Phosphorus Level, Serum: 3.6 mg/dL (04-29-19 @ 07:00)      PT/INR - ( 01 May 2019 06:00 )   PT: 12.4 SEC;   INR: 1.08          PTT - ( 01 May 2019 06:00 )  PTT:36.5 SEC                                        12.6   8.57  )-----------( 360      ( 01 May 2019 06:00 )             40.4                         12.3   8.66  )-----------( 347      ( 30 Apr 2019 06:05 )             39.4                         12.7   8.68  )-----------( 330      ( 29 Apr 2019 07:00 )             41.1     CAPILLARY BLOOD GLUCOSE                RADIOLOGY & ADDITIONAL TESTS:    Imaging Personally Reviewed:  [ ] YES  [ ] NO ROHITH MELLO  53y  Female      Patient is a 53y old  Female who presents with a chief complaint of SBO (01 May 2019 09:23)    HPI:  53F w/hx of colorectal CA s/p LAR (2017, Dr. Armstrong) with mets to liver and cervix, s/p chemotherapy (FOLFOX, then FOLFIRI, last dose Jan 2019) and radiation, p/w 1-day h/o R-sided abdominal pain associated with nausea and several episodes of nausea. She denies associated fever or chills, dysuria, CP, SOB. Last flatus and last BM 1 day PTA. In the ED, she was tachycardic to 120s, normotensive, afebrile. WBC 12.84, lactate 2.1. CT A/P demonstrates high-grade SBO with transition point in right hemiabdomen. The patient was treated at [facility] in Summer 2018 for SBO, which was managed nonoperatively.    Of note, patient was recently diagnosed with DVT of RLE for which she is taking Eliquis. She is followed by Dr. Kiran of heme/onc. Most recent PET CT in Feb 2019 showed no recurrence/spread of disease. Last chemo January 2019. (17 Apr 2019 01:42)    The patient has been treated conservatively for SBO during this admission, but has had minimal resolution of her symptoms. Plan for surgery for ex-lap, adhesion lysis and/or ostomy.     The patient states prior to her DVT in the right leg, was ambulatory and able to walk a flight of stairs and or city block with issue. Had a stress test done years ago and states it was normal. Has not had an echo or cardiac catherization. No history of COPD or asthma as well. The patient         PAST MEDICAL/SURGICAL HISTORY  PAST MEDICAL & SURGICAL HISTORY:  DVT, lower extremity: RLE, dx&#x27;d April 2019  SBO (small bowel obstruction)  Hemorrhoid  Cervical cancer  Colon neoplasm: s/p LAR  Hyperthyroidism  S/P colon resection  H/O exploratory laparotomy: 1/6/17, with LAR      REVIEW OF SYSTEMS:  CONSTITUTIONAL: No fever, weight loss, or fatigue  EYES: No eye pain, visual disturbances, or discharge  ENMT:  No difficulty hearing, tinnitus, vertigo; No sinus or throat pain  NECK: No pain or stiffness  BREASTS: No pain, masses, or nipple discharge  RESPIRATORY: No cough, wheezing, chills or hemoptysis; No shortness of breath  CARDIOVASCULAR: No chest pain, palpitations, dizziness, or leg swelling  GASTROINTESTINAL: No abdominal or epigastric pain. No nausea, vomiting, or hematemesis; No diarrhea or constipation. No melena or hematochezia.  GENITOURINARY: No dysuria, frequency, hematuria, or incontinence  NEUROLOGICAL: No headaches, memory loss, loss of strength, numbness, or tremors  SKIN: No itching, burning, rashes, or lesions   LYMPH NODES: No enlarged glands  ENDOCRINE: No heat or cold intolerance; No hair loss  MUSCULOSKELETAL: No joint pain or swelling; No muscle, back, or extremity pain  PSYCHIATRIC: No depression, anxiety, mood swings, or difficulty sleeping  HEME/LYMPH: No easy bruising, or bleeding gums  ALLERY AND IMMUNOLOGIC: No hives or eczema    T(C): 36.7 (05-01-19 @ 09:39), Max: 37.2 (04-30-19 @ 14:03)  HR: 93 (05-01-19 @ 09:39) (90 - 109)  BP: 114/78 (05-01-19 @ 09:39) (104/68 - 120/89)  RR: 18 (05-01-19 @ 09:39) (16 - 18)  SpO2: 98% (05-01-19 @ 09:39) (98% - 100%)  Wt(kg): --Vital Signs Last 24 Hrs  T(C): 36.7 (01 May 2019 09:39), Max: 37.2 (30 Apr 2019 14:03)  T(F): 98 (01 May 2019 09:39), Max: 98.9 (30 Apr 2019 14:03)  HR: 93 (01 May 2019 09:39) (90 - 109)  BP: 114/78 (01 May 2019 09:39) (104/68 - 120/89)  BP(mean): --  RR: 18 (01 May 2019 09:39) (16 - 18)  SpO2: 98% (01 May 2019 09:39) (98% - 100%)    PHYSICAL EXAM:  GENERAL: NAD, well-groomed, well-developed  HEAD:  Atraumatic, Normocephalic  EYES: EOMI, PERRLA, conjunctiva and sclera clear  ENMT: No tonsillar erythema, exudates, or enlargement; Moist mucous membranes, Good dentition, No lesions  NECK: Supple, No JVD, Normal thyroid  NERVOUS SYSTEM:  Alert & Oriented X3, Good concentration; Motor Strength 5/5 B/L upper and lower extremities; DTRs 2+ intact and symmetric  CHEST/LUNG: Clear to percussion bilaterally; No rales, rhonchi, wheezing, or rubs  HEART: Regular rate and rhythm; No murmurs, rubs, or gallops  ABDOMEN: Soft, Nontender, Nondistended; Bowel sounds present  EXTREMITIES:  2+ Peripheral Pulses, No clubbing, cyanosis, or edema  LYMPH: No lymphadenopathy noted  SKIN: No rashes or lesions    Consultant(s) Notes Reviewed:  [x ] YES  [ ] NO  Care Discussed with Consultants/Other Providers [ x] YES  [ ] NO    LABS:      The Labs were reviewed by me   The Radiology was reviewed by me    EKG tracing reviewed by me    05-01    137  |  101  |  10  ----------------------------<  128<H>  4.2   |  22  |  0.56  04-30    136  |  102  |  9   ----------------------------<  138<H>  4.1   |  23  |  0.54  04-29    134<L>  |  101  |  11  ----------------------------<  127<H>  4.2   |  23  |  0.49<L>    Ca    8.0<L>      01 May 2019 06:00  Ca    7.9<L>      30 Apr 2019 06:05  Ca    7.9<L>      29 Apr 2019 07:00  Phos  3.8     05-01  Mg     2.1     05-01      Magnesium, Serum: 2.1 mg/dL (05-01-19 @ 06:00)  Magnesium, Serum: 2.1 mg/dL (04-30-19 @ 06:05)  Magnesium, Serum: 2.1 mg/dL (04-29-19 @ 07:00)    Phosphorus Level, Serum: 3.8 mg/dL (05-01-19 @ 06:00)  Phosphorus Level, Serum: 3.6 mg/dL (04-30-19 @ 06:05)  Phosphorus Level, Serum: 3.6 mg/dL (04-29-19 @ 07:00)      PT/INR - ( 01 May 2019 06:00 )   PT: 12.4 SEC;   INR: 1.08          PTT - ( 01 May 2019 06:00 )  PTT:36.5 SEC                                        12.6   8.57  )-----------( 360      ( 01 May 2019 06:00 )             40.4                         12.3   8.66  )-----------( 347      ( 30 Apr 2019 06:05 )             39.4                         12.7   8.68  )-----------( 330      ( 29 Apr 2019 07:00 )             41.1     CAPILLARY BLOOD GLUCOSE                RADIOLOGY & ADDITIONAL TESTS:    Imaging Personally Reviewed:  [ ] YES  [ ] NO ROHITH MELLO  53y  Female      Patient is a 53y old  Female who presents with a chief complaint of SBO (01 May 2019 09:23)    HPI:  53F w/hx of colorectal CA s/p LAR (2017, Dr. Armstrong) with mets to liver and cervix, s/p chemotherapy (FOLFOX, then FOLFIRI, last dose Jan 2019) and radiation, p/w 1-day h/o R-sided abdominal pain associated with nausea and several episodes of nausea. She denies associated fever or chills, dysuria, CP, SOB. Last flatus and last BM 1 day PTA. In the ED, she was tachycardic to 120s, normotensive, afebrile. WBC 12.84, lactate 2.1. CT A/P demonstrates high-grade SBO with transition point in right hemiabdomen. The patient was treated at [facility] in Summer 2018 for SBO, which was managed nonoperatively.    Of note, patient was recently diagnosed with DVT of RLE for which she is taking Eliquis. She is followed by Dr. Kiran of heme/onc. Most recent PET CT in Feb 2019 showed no recurrence/spread of disease. Last chemo January 2019. (17 Apr 2019 01:42)    The patient has been treated conservatively for SBO during this admission, but has had minimal resolution of her symptoms. Plan for surgery for ex-lap, adhesion lysis and/or ostomy.     The patient states prior to her DVT in the right leg, was ambulatory and able to walk a flight of stairs and or city block with issue. Had a stress test done years ago and states it was normal. Has not had an echo or cardiac catherization. No history of COPD or asthma as well. The patient is currently on treatment for hyperthyroidism with methimazole, never been on steriods, and has been symptom free for several years. No history of reaction to anesthesia in the past.          PAST MEDICAL/SURGICAL HISTORY  PAST MEDICAL & SURGICAL HISTORY:  DVT, lower extremity: RLE, dx&#x27;d April 2019  SBO (small bowel obstruction)  Hemorrhoid  Cervical cancer  Colon neoplasm: s/p LAR  Hyperthyroidism  S/P colon resection  H/O exploratory laparotomy: 1/6/17, with LAR      REVIEW OF SYSTEMS:  CONSTITUTIONAL: No fever, weight loss. + for fatigue  EYES: No eye pain, visual disturbances, or discharge  ENMT:  No difficulty hearing, tinnitus, vertigo; No sinus or throat pain  RESPIRATORY: No cough, wheezing, chills or hemoptysis; No shortness of breath  CARDIOVASCULAR: No chest pain, palpitations, dizziness, or leg swelling  GASTROINTESTINAL: No abdominal or epigastric pain. No vomiting, or hematemesis; No diarrhea or constipation. No melena or hematochezia. + for nausea and no BM   GENITOURINARY: No dysuria, frequency, hematuria, or incontinence  NEUROLOGICAL: No headaches, memory loss, loss of strength, numbness, or tremors  SKIN: No itching, burning, rashes, or lesions   LYMPH NODES: No enlarged glands  ENDOCRINE: No heat or cold intolerance; No hair loss  MUSCULOSKELETAL: No joint pain or swelling; No muscle, back, or extremity pain  PSYCHIATRIC: No depression, anxiety, mood swings, or difficulty sleeping  HEME/LYMPH: No easy bruising, or bleeding gums  ALLERY AND IMMUNOLOGIC: No hives or eczema    T(C): 36.7 (05-01-19 @ 09:39), Max: 37.2 (04-30-19 @ 14:03)  HR: 93 (05-01-19 @ 09:39) (90 - 109)  BP: 114/78 (05-01-19 @ 09:39) (104/68 - 120/89)  RR: 18 (05-01-19 @ 09:39) (16 - 18)  SpO2: 98% (05-01-19 @ 09:39) (98% - 100%)  Wt(kg): --Vital Signs Last 24 Hrs  T(C): 36.7 (01 May 2019 09:39), Max: 37.2 (30 Apr 2019 14:03)  T(F): 98 (01 May 2019 09:39), Max: 98.9 (30 Apr 2019 14:03)  HR: 93 (01 May 2019 09:39) (90 - 109)  BP: 114/78 (01 May 2019 09:39) (104/68 - 120/89)  BP(mean): --  RR: 18 (01 May 2019 09:39) (16 - 18)  SpO2: 98% (01 May 2019 09:39) (98% - 100%)    PHYSICAL EXAM:  GENERAL: NAD, well-groomed, well-developed  EYES: EOMI, PERRLA, conjunctiva and sclera clear  ENMT: No tonsillar erythema, exudates, or enlargement; Moist mucous membranes, Good dentition, No lesions  NERVOUS SYSTEM:  Alert & Oriented X3, Good concentration; Motor Strength 5/5 B/L upper and lower extremities;   CHEST/LUNG: Clear to percussion bilaterally; No rales, rhonchi, wheezing, or rubs  HEART: Regular rate and rhythm; No murmurs, rubs, or gallops  ABDOMEN: Soft, Nontender, Nondistended; Bowel sounds present, hypoactive   EXTREMITIES:  2+ Peripheral Pulses, No clubbing, cyanosis. Right leg swelling  LYMPH: No lymphadenopathy noted  SKIN: No rashes or lesions    Consultant(s) Notes Reviewed:  [x ] YES  [ ] NO  Care Discussed with Consultants/Other Providers [ x] YES  [ ] NO    LABS:      The Labs were reviewed by me   The Radiology was reviewed by me    EKG tracing reviewed by me    05-01    137  |  101  |  10  ----------------------------<  128<H>  4.2   |  22  |  0.56  04-30    136  |  102  |  9   ----------------------------<  138<H>  4.1   |  23  |  0.54  04-29    134<L>  |  101  |  11  ----------------------------<  127<H>  4.2   |  23  |  0.49<L>    Ca    8.0<L>      01 May 2019 06:00  Ca    7.9<L>      30 Apr 2019 06:05  Ca    7.9<L>      29 Apr 2019 07:00  Phos  3.8     05-01  Mg     2.1     05-01      Magnesium, Serum: 2.1 mg/dL (05-01-19 @ 06:00)  Magnesium, Serum: 2.1 mg/dL (04-30-19 @ 06:05)  Magnesium, Serum: 2.1 mg/dL (04-29-19 @ 07:00)    Phosphorus Level, Serum: 3.8 mg/dL (05-01-19 @ 06:00)  Phosphorus Level, Serum: 3.6 mg/dL (04-30-19 @ 06:05)  Phosphorus Level, Serum: 3.6 mg/dL (04-29-19 @ 07:00)      PT/INR - ( 01 May 2019 06:00 )   PT: 12.4 SEC;   INR: 1.08          PTT - ( 01 May 2019 06:00 )  PTT:36.5 SEC                                        12.6   8.57  )-----------( 360      ( 01 May 2019 06:00 )             40.4                         12.3   8.66  )-----------( 347      ( 30 Apr 2019 06:05 )             39.4                         12.7   8.68  )-----------( 330      ( 29 Apr 2019 07:00 )             41.1     Thyroid Stimulating Hormone, Serum: 1.820 uU/mL (01.04.17 @ 07:19)    EKG: NSR, no ST or T changes     RADIOLOGY & ADDITIONAL TESTS:    < from: CT Abdomen and Pelvis w/ Oral Cont and w/ IV Cont (04.21.19 @ 16:31) >  IMPRESSION: Small bowel obstruction with transition point in the right   hemipelvis as on prior abdominal CTs. Interval increase in small bowel   distention. No pneumatosis or interloop ascites.    < end of copied text >      Imaging Personally Reviewed:  [x ] YES  [ ] NO ROIHTH MELLO  53y  Female      Patient is a 53y old  Female who presents with a chief complaint of SBO (01 May 2019 09:23)    HPI:  53F w/hx of colorectal CA s/p LAR (2017, Dr. Armstrong) with mets to liver and cervix, s/p chemotherapy (FOLFOX, then FOLFIRI, last dose Jan 2019) and radiation, p/w 1-day h/o R-sided abdominal pain associated with nausea and several episodes of nausea. She denies associated fever or chills, dysuria, CP, SOB. Last flatus and last BM 1 day PTA. In the ED, she was tachycardic to 120s, normotensive, afebrile. WBC 12.84, lactate 2.1. CT A/P demonstrates high-grade SBO with transition point in right hemiabdomen. The patient was treated at [facility] in Summer 2018 for SBO, which was managed nonoperatively.    Of note, patient was recently diagnosed with DVT of RLE for which she is taking Eliquis. She is followed by Dr. Kiran of heme/onc. Most recent PET CT in Feb 2019 showed no recurrence/spread of disease. Last chemo January 2019. (17 Apr 2019 01:42)    The patient has been treated conservatively for SBO during this admission, but has had minimal resolution of her symptoms. Plan for surgery for ex-lap, adhesion lysis and/or ostomy.     The patient states prior to her DVT in the right leg, was ambulatory and able to walk a flight of stairs and or city block without issue. Had a stress test done years ago and states it was normal. Has not had an echo or cardiac catherization. No history of COPD or asthma as well. The patient is currently on treatment for hyperthyroidism with methimazole, never been on steriods, and has been symptom free for several years. No history of reaction to anesthesia in the past.          PAST MEDICAL/SURGICAL HISTORY  PAST MEDICAL & SURGICAL HISTORY:  DVT, lower extremity: RLE, dx&#x27;d April 2019  SBO (small bowel obstruction)  Hemorrhoid  Cervical cancer  Colon neoplasm: s/p LAR  Hyperthyroidism  S/P colon resection  H/O exploratory laparotomy: 1/6/17, with LAR      REVIEW OF SYSTEMS:  CONSTITUTIONAL: No fever, weight loss. + for fatigue  EYES: No eye pain, visual disturbances, or discharge  ENMT:  No difficulty hearing, tinnitus, vertigo; No sinus or throat pain  RESPIRATORY: No cough, wheezing, chills or hemoptysis; No shortness of breath  CARDIOVASCULAR: No chest pain, palpitations, dizziness, or leg swelling  GASTROINTESTINAL: No abdominal or epigastric pain. No vomiting, or hematemesis; No diarrhea or constipation. No melena or hematochezia. + for nausea and no BM   GENITOURINARY: No dysuria, frequency, hematuria, or incontinence  NEUROLOGICAL: No headaches, memory loss, loss of strength, numbness, or tremors  SKIN: No itching, burning, rashes, or lesions   LYMPH NODES: No enlarged glands  ENDOCRINE: No heat or cold intolerance; No hair loss  MUSCULOSKELETAL: No joint pain or swelling; No muscle, back, or extremity pain  PSYCHIATRIC: No depression, anxiety, mood swings, or difficulty sleeping  HEME/LYMPH: No easy bruising, or bleeding gums  ALLERY AND IMMUNOLOGIC: No hives or eczema    T(C): 36.7 (05-01-19 @ 09:39), Max: 37.2 (04-30-19 @ 14:03)  HR: 93 (05-01-19 @ 09:39) (90 - 109)  BP: 114/78 (05-01-19 @ 09:39) (104/68 - 120/89)  RR: 18 (05-01-19 @ 09:39) (16 - 18)  SpO2: 98% (05-01-19 @ 09:39) (98% - 100%)  Wt(kg): --Vital Signs Last 24 Hrs  T(C): 36.7 (01 May 2019 09:39), Max: 37.2 (30 Apr 2019 14:03)  T(F): 98 (01 May 2019 09:39), Max: 98.9 (30 Apr 2019 14:03)  HR: 93 (01 May 2019 09:39) (90 - 109)  BP: 114/78 (01 May 2019 09:39) (104/68 - 120/89)  BP(mean): --  RR: 18 (01 May 2019 09:39) (16 - 18)  SpO2: 98% (01 May 2019 09:39) (98% - 100%)    PHYSICAL EXAM:  GENERAL: NAD, well-groomed, well-developed  EYES: EOMI, PERRLA, conjunctiva and sclera clear  ENMT: No tonsillar erythema, exudates, or enlargement; Moist mucous membranes, Good dentition, No lesions  NERVOUS SYSTEM:  Alert & Oriented X3, Good concentration; Motor Strength 5/5 B/L upper and lower extremities;   CHEST/LUNG: Clear to percussion bilaterally; No rales, rhonchi, wheezing, or rubs  HEART: Regular rate and rhythm; No murmurs, rubs, or gallops  ABDOMEN: Soft, Nontender, Nondistended; Bowel sounds present, hypoactive   EXTREMITIES:  2+ Peripheral Pulses, No clubbing, cyanosis. Right leg swelling  LYMPH: No lymphadenopathy noted  SKIN: No rashes or lesions    Consultant(s) Notes Reviewed:  [x ] YES  [ ] NO  Care Discussed with Consultants/Other Providers [ x] YES  [ ] NO    LABS:      The Labs were reviewed by me   The Radiology was reviewed by me    EKG tracing reviewed by me    05-01    137  |  101  |  10  ----------------------------<  128<H>  4.2   |  22  |  0.56  04-30    136  |  102  |  9   ----------------------------<  138<H>  4.1   |  23  |  0.54  04-29    134<L>  |  101  |  11  ----------------------------<  127<H>  4.2   |  23  |  0.49<L>    Ca    8.0<L>      01 May 2019 06:00  Ca    7.9<L>      30 Apr 2019 06:05  Ca    7.9<L>      29 Apr 2019 07:00  Phos  3.8     05-01  Mg     2.1     05-01      Magnesium, Serum: 2.1 mg/dL (05-01-19 @ 06:00)  Magnesium, Serum: 2.1 mg/dL (04-30-19 @ 06:05)  Magnesium, Serum: 2.1 mg/dL (04-29-19 @ 07:00)    Phosphorus Level, Serum: 3.8 mg/dL (05-01-19 @ 06:00)  Phosphorus Level, Serum: 3.6 mg/dL (04-30-19 @ 06:05)  Phosphorus Level, Serum: 3.6 mg/dL (04-29-19 @ 07:00)      PT/INR - ( 01 May 2019 06:00 )   PT: 12.4 SEC;   INR: 1.08          PTT - ( 01 May 2019 06:00 )  PTT:36.5 SEC                                        12.6   8.57  )-----------( 360      ( 01 May 2019 06:00 )             40.4                         12.3   8.66  )-----------( 347      ( 30 Apr 2019 06:05 )             39.4                         12.7   8.68  )-----------( 330      ( 29 Apr 2019 07:00 )             41.1     Thyroid Stimulating Hormone, Serum: 1.820 uU/mL (01.04.17 @ 07:19)    EKG: NSR, no ST or T changes     RADIOLOGY & ADDITIONAL TESTS:    < from: CT Abdomen and Pelvis w/ Oral Cont and w/ IV Cont (04.21.19 @ 16:31) >  IMPRESSION: Small bowel obstruction with transition point in the right   hemipelvis as on prior abdominal CTs. Interval increase in small bowel   distention. No pneumatosis or interloop ascites.    < end of copied text >      Imaging Personally Reviewed:  [x ] YES  [ ] NO

## 2019-05-01 NOTE — CONSULT NOTE ADULT - PROBLEM SELECTOR RECOMMENDATION 9
- No hx of cardiac or pulmonary disease   - No hx of reaction to anesthia   - Able to complete >4 METS of activity   - RICI score of 1. 6% chance of MACE occuring surgery   - Please check TSH, T3, and T4 prior to surgery to ensure pt's hyperthyroidism is well controlled prior to surgery   - Pt has low medical risk for this high risk procedure - No hx of cardiac or pulmonary disease   - No hx of reaction to anesthesia   - Able to complete >4 METS of activity   - RICI score of 1. 6% chance of MACE occurring surgery   - Please check TSH, T3, and T4 prior to surgery to ensure pt's hyperthyroidism is well controlled prior to surgery   - If TSH is below the normal limit, call endocrinology for pre-op management of her hyperthyroidism.   - Pt has low medical risk for this high risk procedure

## 2019-05-01 NOTE — CONSULT NOTE ADULT - PROBLEM SELECTOR RECOMMENDATION 3
- Please check TSH, T3, and T4 prior to surgery to ensure pt's hyperthyroidism is well controlled prior to surgery   - c/w methimazole 5mg qdaily - Please check TSH, T3, and T4 prior to surgery to ensure pt's hyperthyroidism is well controlled prior to surgery   - c/w methimazole 5mg qdaily  - If TSH is below the normal limit, call endocrinology for pre-op management of her hyperthyroidism.   - Uncontrolled hyperthyroidism could lead to adverse outcomes intra and post operatively.

## 2019-05-01 NOTE — PROGRESS NOTE ADULT - ATTENDING COMMENTS
Seen and examined, chart and note reviewed, case discussed with B team    Small bowel obstruction  a.  Distended, complains of nausea, bloating  b.  With feculent vomitus in pm  c.  NPO, NGT  d.  For diagnostic DAVID johnson.  Risks benefits and alternatives to OR discussed with patient    Stricture colon  a.  Aborted colonoscopy today  b.  May need bypass/colostomy     Appreciate risk stratification  Continue lovenox

## 2019-05-01 NOTE — PROGRESS NOTE ADULT - SUBJECTIVE AND OBJECTIVE BOX
Morning Surgical Progress Note  Patient is a 53y old  Female who presents with a chief complaint of SBO (29 Apr 2019 16:52)    SUBJECTIVE: Patient seen and examined at bedside with surgical team, patient without complaints. Minimal bowel function. Vomited overnight.    Vital Signs (24 Hrs):  T(C): 36.6 (05-01-19 @ 06:04), Max: 37.2 (04-30-19 @ 14:03)  HR: 90 (05-01-19 @ 06:22) (90 - 109)  BP: 110/75 (05-01-19 @ 06:22) (104/68 - 121/84)  RR: 17 (05-01-19 @ 06:04) (16 - 18)  SpO2: 100% (05-01-19 @ 06:04) (98% - 100%)  Wt(kg): --  Daily     Daily     I&O's Summary    30 Apr 2019 07:01  -  01 May 2019 07:00  --------------------------------------------------------  IN: 3250 mL / OUT: 800 mL / NET: 2450 mL        Physical Exam  Constitutional: A&Ox3, NAD  Gastrointestinal: obese, softnontender nondistended    LABS:                        12.6   8.57  )-----------( 360      ( 01 May 2019 06:00 )             40.4       05-01    137  |  101  |  10  ----------------------------<  128<H>  4.2   |  22  |  0.56    Ca    8.0<L>      01 May 2019 06:00  Phos  3.8     05-01  Mg     2.1     05-01        PT/INR - ( 01 May 2019 06:00 )   PT: 12.4 SEC;   INR: 1.08          PTT - ( 01 May 2019 06:00 )  PTT:36.5 SEC

## 2019-05-01 NOTE — DIETITIAN INITIAL EVALUATION ADULT. - ENERGY NEEDS
Height (cm): 162.56 (17 Apr 2019 01:45) Weight (kg): 71.4 (17 Apr 2019 01:45)  BMI (kg/m2): 27 (17 Apr 2019 01:45) IBW: 54.5kg (+/-10%) %IBW: 131%  +2 right leg edema per flowsheet. Skin intact.

## 2019-05-01 NOTE — PROGRESS NOTE ADULT - SUBJECTIVE AND OBJECTIVE BOX
Chief Complaint:  Patient is a 53y old  Female who presents with a chief complaint of SBO (01 May 2019 11:40)    Interval Events:   Patient schedule for flexible sigmoidoscopy with biopsies of stricture. Developed fecal vomiting after enema so procedure was canceled. Patient otherwise feels well without specific complaints.    Allergies:  No Known Allergies    Hospital Medications:  dextrose 5% + sodium chloride 0.45% with potassium chloride 20 mEq/L 1000 milliLiter(s) IV Continuous <Continuous>  enoxaparin Injectable 70 milliGRAM(s) SubCutaneous two times a day  gabapentin 100 milliGRAM(s) Oral three times a day  HYDROmorphone  Injectable 0.5 milliGRAM(s) IV Push every 4 hours PRN  ketorolac   Injectable 30 milliGRAM(s) IV Push every 8 hours PRN  melatonin 3 milliGRAM(s) Oral at bedtime PRN  methimazole 5 milliGRAM(s) Oral daily  pantoprazole  Injectable 40 milliGRAM(s) IV Push daily    PMHX/PSHX:  DVT, lower extremity  SBO (small bowel obstruction)  Hemorrhoid  Cervical cancer  Colon neoplasm  Hyperthyroidism  S/P colon resection  H/O exploratory laparotomy  No significant past surgical history    ROS:   General:  No fevers, chills  ENT:  No sore throat or dysphagia  CV:  No pain or palpitations  Resp:  No dyspnea, cough or  wheezing  GI:  No pain, + nausea, + vomiting, No rectal bleeding, No tarry stools,  Skin:  No rash or edema    PHYSICAL EXAM:   Vital Signs:  Vital Signs Last 24 Hrs  T(C): 36.9 (01 May 2019 13:46), Max: 36.9 (01 May 2019 13:46)  T(F): 98.4 (01 May 2019 13:46), Max: 98.4 (01 May 2019 13:46)  HR: 96 (01 May 2019 13:46) (90 - 115)  BP: 125/82 (01 May 2019 13:46) (107/73 - 125/82)  BP(mean): --  RR: 16 (01 May 2019 13:46) (16 - 18)  SpO2: 100% (01 May 2019 13:46) (98% - 100%)  Daily     Daily     GENERAL:  NAD, Appears stated age  HEENT:  NC/AT,  conjunctivae clear and pink, sclera -anicteric  CHEST:  Normal Effort, Breath sounds clear  HEART:  RRR, S1 + S2, no murmurs  ABDOMEN:  Soft, non-tender, non-distended, BS+  EXTEREMITIES:  no cyanosis or edema  SKIN:  Warm & Dry.  NEURO:  Alert, oriented    LABS:                        12.6   8.57  )-----------( 360      ( 01 May 2019 06:00 )             40.4     Mean Cell Volume: 86.3 fL (05-01-19 @ 06:00)    05-01    137  |  101  |  10  ----------------------------<  128<H>  4.2   |  22  |  0.56    Ca    8.0<L>      01 May 2019 06:00  Phos  3.8     05-01  Mg     2.1     05-01        PT/INR - ( 01 May 2019 06:00 )   PT: 12.4 SEC;   INR: 1.08          PTT - ( 01 May 2019 06:00 )  PTT:36.5 SEC                            12.6   8.57  )-----------( 360      ( 01 May 2019 06:00 )             40.4                         12.3   8.66  )-----------( 347      ( 30 Apr 2019 06:05 )             39.4                         12.7   8.68  )-----------( 330      ( 29 Apr 2019 07:00 )             41.1       Imaging:

## 2019-05-01 NOTE — DIETITIAN INITIAL EVALUATION ADULT. - PERTINENT LABORATORY DATA
05-01 Na137 mmol/L Glu 128 mg/dL<H> K+ 4.2 mmol/L Cr  0.56 mg/dL BUN 10 mg/dL 05-01 Phos 3.8 mg/dL 04-07 [id number] 5.5 %

## 2019-05-01 NOTE — DIETITIAN INITIAL EVALUATION ADULT. - NS AS NUTRI INTERV COLLABORAT
Collaboration with other providers/If patient unable to tolerate diet advancement, please consider alternative means of nutrition given prolonged insufficient intake and malnutrition diagnosis.

## 2019-05-01 NOTE — DIETITIAN INITIAL EVALUATION ADULT. - PERTINENT MEDS FT
Please change to extended release like she usually takes. Please see if patient can take Tylenol for her pain. If she needs something additional need to be seen. Please also have her scheduled follow-up here for her diabetes. See last result note for her about her INR. As we were not able to get hold of her. Please relay this message to her and have her recheck her INR and get in with the Coumadin clinic. MEDICATIONS  (STANDING):  dextrose 5% + sodium chloride 0.45% with potassium chloride 20 mEq/L 1000 milliLiter(s) (125 mL/Hr) IV Continuous <Continuous>  enoxaparin Injectable 70 milliGRAM(s) SubCutaneous two times a day  gabapentin 100 milliGRAM(s) Oral three times a day  methimazole 5 milliGRAM(s) Oral daily  pantoprazole  Injectable 40 milliGRAM(s) IV Push daily    MEDICATIONS  (PRN):  HYDROmorphone  Injectable 0.5 milliGRAM(s) IV Push every 3 hours PRN Severe Pain (7 - 10)  ketorolac   Injectable 30 milliGRAM(s) IV Push every 8 hours PRN Moderate Pain (4 - 6)  melatonin 3 milliGRAM(s) Oral at bedtime PRN Insomnia

## 2019-05-01 NOTE — CONSULT NOTE ADULT - ASSESSMENT
53F w/hx of colorectal CA s/p LAR (2017, Dr. Armstrong) with mets to liver and cervix, s/p chemotherapy (FOLFOX, then FOLFIRI, last dose Jan 2019) and radiation admitted for SBO. Plan for ex-lap and adhesion and lysis. Medicine called for pre op optimization    ALL RECOMMENDATIONS ONLY FINAL UNTIL ATTENDING CO-SIGNS THE NOTE. 53F w/hx of colorectal CA s/p LAR (2017, Dr. Armstrong) with mets to liver and cervix, s/p chemotherapy (FOLFOX, then FOLFIRI, last dose Jan 2019) and radiation admitted for SBO. Plan for ex-lap and adhesion and lysis. Medicine called for pre op optimization

## 2019-05-01 NOTE — PROGRESS NOTE ADULT - ASSESSMENT
53y female with recurrent SBO, s/p barium enema this am found to have rectosigmoid stricture, awaiting recommendations by gi    - Planning for OR Tomorrow with Dr Skinner possible diversion with transverse loop  - Pre Op enemas  - PICC placement and initiation of TPN today  - Pre Op and Consent pending    - continue to monitor GIF  - Continue Therapeutic lovenox for RLE DVT anticoagulation  - Pain control: oxy 10mg q6h prn    B surgery  [id number]

## 2019-05-01 NOTE — CHART NOTE - NSCHARTNOTEFT_GEN_A_CORE
GI CHART NOTE    Plan of care discussed with Dr. Skinner. Plan to proceed with flex sig with biopsies today. Please give 2 tap water enemas.

## 2019-05-01 NOTE — PROGRESS NOTE ADULT - SUBJECTIVE AND OBJECTIVE BOX
Surgery Preop Note    Patient is a 53y old  Female who presents with a chief complaint of SBO (01 May 2019 14:52)    Diagnosis: SBO  Procedure: diagnostic lap,possible DAVID, possible ostomy, possible bowel resection  Surgeon: Dr. Skinner                          12.6   8.57  )-----------( 360      ( 01 May 2019 06:00 )             40.4     05-01    137  |  101  |  10  ----------------------------<  128<H>  4.2   |  22  |  0.56    Ca    8.0<L>      01 May 2019 06:00  Phos  3.8     05-01  Mg     2.1     05-01      PT/INR - ( 01 May 2019 06:00 )   PT: 12.4 SEC;   INR: 1.08          PTT - ( 01 May 2019 06:00 )  PTT:36.5 SEC  ABO Interpretation: B (05-01 @ 07:02)  ABO Interpretation: B (05-01 @ 03:00)      Chest X RAY clear lungs from 4/19  EKG Sinus tachycardia from 4/16     MEDICATIONS  (STANDING):  dextrose 5% + sodium chloride 0.45% with potassium chloride 20 mEq/L 1000 milliLiter(s) (125 mL/Hr) IV Continuous <Continuous>  enoxaparin Injectable 70 milliGRAM(s) SubCutaneous two times a day  gabapentin 100 milliGRAM(s) Oral three times a day  methimazole 5 milliGRAM(s) Oral daily  ondansetron Injectable 4 milliGRAM(s) IV Push once  pantoprazole  Injectable 40 milliGRAM(s) IV Push daily    MEDICATIONS  (PRN):  HYDROmorphone  Injectable 0.5 milliGRAM(s) IV Push every 4 hours PRN Severe Pain (7 - 10)  ketorolac   Injectable 30 milliGRAM(s) IV Push every 8 hours PRN Moderate Pain (4 - 6)  melatonin 3 milliGRAM(s) Oral at bedtime PRN Insomnia      Assessment & Plan:  53y Female with SBO planned for OR tomorrow for diagnostic lap, possible DAVID, possible bowel resection, possible ostomy  NPO after midnight, IVF  Pain Control  Hold therapeutic lovenox prior to OR  UA pending  Consent to be obtained and placed in chart  Medicine risk stratification pending    B Team surgery [id number]

## 2019-05-01 NOTE — DIETITIAN INITIAL EVALUATION ADULT. - OTHER INFO
Initial Dietitian Evaluation 2/2 to extended length of stay. 52 y/o female with SBO likely OR tomorrow per chart review. During the course of this admission, patient remains on NPO/clear and regular diet (for few day). Inability to tolerate po intake, endorse nausea vomiting. NKFA. Usual body weight reported to be 157lbs PTA. Sister states that patient likely lost weight during this admission however no new weight documented. Patient admit weight 157.4lbs and with +2 right leg edema per flowsheet documentation.

## 2019-05-01 NOTE — PROGRESS NOTE ADULT - ASSESSMENT
Impression:  1) Rectosigmoid anastomotic stricture: Unclear if related to scarring vs recurrence of malignancy  2) Small bowel obstruction - Suspect malignancy  3) Rectal cancer s/p resection / chemotherapy now with metastatic disease  4) DVT on anticoagulation: Lovenox BID    Recommendations:   - Flex Sig planned for today with biopsies of anastomotic stricture, but this was canceled in the setting of SBO and fecal emesis given risks of aspiration and worsening of SBO with insufflation.   - Discussed with surgery team: Patient for OR tomorrow for diagnostic lap.  - Place NGT  - NPO  - Supportive care per primary team    Becky Garcia MD  Gastroenterology Fellow  [phone]  Please page on call fellow on weekends and after 5pm on weekdays Impression:  1) Rectosigmoid anastomotic stricture: Unclear if related to scarring vs recurrence of malignancy  2) Small bowel obstruction - Suspect malignancy  3) Rectal cancer s/p resection / chemotherapy now with metastatic disease  4) DVT on anticoagulation: Lovenox BID    Recommendations:   - Flex Sig planned for today with biopsies of anastomotic stricture, but this was canceled in the setting of SBO and fecal emesis given risks of aspiration and worsening of SBO with insufflation.   - Discussed with surgery team: Patient for OR tomorrow for diagnostic lap.  - Place NGT  - NPO  - Supportive care per primary team  - Call with questions    Becky Garcia MD  Gastroenterology Fellow  [phone]  Please page on call fellow on weekends and after 5pm on weekdays

## 2019-05-01 NOTE — PROGRESS NOTE ADULT - ASSESSMENT
met colon cancer recurrent with dvt now sbo to have surgical diversion  will need further systemic chemo, likely will find active cancer upon reexploration    femoral dvt  on lovenox sq bid, hold prior to surgery

## 2019-05-01 NOTE — PROGRESS NOTE ADULT - SUBJECTIVE AND OBJECTIVE BOX
met colon cancer  hpi  53 year old woman with rectal cancer had met disease NINA after chemo last year had regional recurrence vagina retreated with chemo with response.  Off of therapy developed right thigh DVT and now abd pain unclear etiology followed by SBO  await surgery after two weeks conservative management failed  pmh sh fh unchanged ;7 pt ros nausea, vomiting with any food no flatus otherwise neg  physical  no distress  vs  97.8 91 107.73 17 100 sat  lungs clear  cor s1s2  abd soft mild diffusely tender  ext no edema    data wbc 8570 hgb 12.6 hct 40 plt 360,000  inr 1.0 ptt 36 cr 0.56

## 2019-05-01 NOTE — PROVIDER CONTACT NOTE (OTHER) - SITUATION
Patient received tap water enema x1 and became nauseous and began vomiting stool. Patioent was to receive a second enema but is refusing

## 2019-05-02 ENCOUNTER — RESULT REVIEW (OUTPATIENT)
Age: 54
End: 2019-05-02

## 2019-05-02 LAB
ALBUMIN SERPL ELPH-MCNC: 2.4 G/DL — LOW (ref 3.3–5)
ALP SERPL-CCNC: 171 U/L — HIGH (ref 40–120)
ALT FLD-CCNC: 18 U/L — SIGNIFICANT CHANGE UP (ref 4–33)
ANION GAP SERPL CALC-SCNC: 11 MMO/L — SIGNIFICANT CHANGE UP (ref 7–14)
ANION GAP SERPL CALC-SCNC: 12 MMO/L — SIGNIFICANT CHANGE UP (ref 7–14)
AST SERPL-CCNC: 9 U/L — SIGNIFICANT CHANGE UP (ref 4–32)
BASOPHILS # BLD AUTO: 0.02 K/UL — SIGNIFICANT CHANGE UP (ref 0–0.2)
BASOPHILS NFR BLD AUTO: 0.2 % — SIGNIFICANT CHANGE UP (ref 0–2)
BILIRUB SERPL-MCNC: 0.3 MG/DL — SIGNIFICANT CHANGE UP (ref 0.2–1.2)
BUN SERPL-MCNC: 8 MG/DL — SIGNIFICANT CHANGE UP (ref 7–23)
BUN SERPL-MCNC: 9 MG/DL — SIGNIFICANT CHANGE UP (ref 7–23)
CA-I BLD-SCNC: 1.1 MMOL/L — SIGNIFICANT CHANGE UP (ref 1.03–1.23)
CALCIUM SERPL-MCNC: 8 MG/DL — LOW (ref 8.4–10.5)
CALCIUM SERPL-MCNC: 8 MG/DL — LOW (ref 8.4–10.5)
CHLORIDE SERPL-SCNC: 102 MMOL/L — SIGNIFICANT CHANGE UP (ref 98–107)
CHLORIDE SERPL-SCNC: 99 MMOL/L — SIGNIFICANT CHANGE UP (ref 98–107)
CO2 SERPL-SCNC: 22 MMOL/L — SIGNIFICANT CHANGE UP (ref 22–31)
CO2 SERPL-SCNC: 23 MMOL/L — SIGNIFICANT CHANGE UP (ref 22–31)
CREAT SERPL-MCNC: 0.62 MG/DL — SIGNIFICANT CHANGE UP (ref 0.5–1.3)
CREAT SERPL-MCNC: 0.67 MG/DL — SIGNIFICANT CHANGE UP (ref 0.5–1.3)
EOSINOPHIL # BLD AUTO: 0.11 K/UL — SIGNIFICANT CHANGE UP (ref 0–0.5)
EOSINOPHIL NFR BLD AUTO: 1.3 % — SIGNIFICANT CHANGE UP (ref 0–6)
GLUCOSE SERPL-MCNC: 118 MG/DL — HIGH (ref 70–99)
GLUCOSE SERPL-MCNC: 183 MG/DL — HIGH (ref 70–99)
HCT VFR BLD CALC: 39.3 % — SIGNIFICANT CHANGE UP (ref 34.5–45)
HCT VFR BLD CALC: 48.3 % — HIGH (ref 34.5–45)
HGB BLD-MCNC: 12.4 G/DL — SIGNIFICANT CHANGE UP (ref 11.5–15.5)
HGB BLD-MCNC: 15 G/DL — SIGNIFICANT CHANGE UP (ref 11.5–15.5)
IMM GRANULOCYTES NFR BLD AUTO: 0.6 % — SIGNIFICANT CHANGE UP (ref 0–1.5)
LYMPHOCYTES # BLD AUTO: 1.35 K/UL — SIGNIFICANT CHANGE UP (ref 1–3.3)
LYMPHOCYTES # BLD AUTO: 15.5 % — SIGNIFICANT CHANGE UP (ref 13–44)
MAGNESIUM SERPL-MCNC: 1.8 MG/DL — SIGNIFICANT CHANGE UP (ref 1.6–2.6)
MAGNESIUM SERPL-MCNC: 2 MG/DL — SIGNIFICANT CHANGE UP (ref 1.6–2.6)
MCHC RBC-ENTMCNC: 27.1 PG — SIGNIFICANT CHANGE UP (ref 27–34)
MCHC RBC-ENTMCNC: 27.2 PG — SIGNIFICANT CHANGE UP (ref 27–34)
MCHC RBC-ENTMCNC: 31.1 % — LOW (ref 32–36)
MCHC RBC-ENTMCNC: 31.6 % — LOW (ref 32–36)
MCV RBC AUTO: 85.8 FL — SIGNIFICANT CHANGE UP (ref 80–100)
MCV RBC AUTO: 87.7 FL — SIGNIFICANT CHANGE UP (ref 80–100)
MONOCYTES # BLD AUTO: 1.17 K/UL — HIGH (ref 0–0.9)
MONOCYTES NFR BLD AUTO: 13.4 % — SIGNIFICANT CHANGE UP (ref 2–14)
NEUTROPHILS # BLD AUTO: 6.03 K/UL — SIGNIFICANT CHANGE UP (ref 1.8–7.4)
NEUTROPHILS NFR BLD AUTO: 69 % — SIGNIFICANT CHANGE UP (ref 43–77)
NRBC # FLD: 0 K/UL — SIGNIFICANT CHANGE UP (ref 0–0)
NRBC # FLD: 0 K/UL — SIGNIFICANT CHANGE UP (ref 0–0)
PHOSPHATE SERPL-MCNC: 4.1 MG/DL — SIGNIFICANT CHANGE UP (ref 2.5–4.5)
PHOSPHATE SERPL-MCNC: 4.6 MG/DL — HIGH (ref 2.5–4.5)
PLATELET # BLD AUTO: 350 K/UL — SIGNIFICANT CHANGE UP (ref 150–400)
PLATELET # BLD AUTO: 372 K/UL — SIGNIFICANT CHANGE UP (ref 150–400)
PMV BLD: 9.4 FL — SIGNIFICANT CHANGE UP (ref 7–13)
PMV BLD: 9.8 FL — SIGNIFICANT CHANGE UP (ref 7–13)
POTASSIUM SERPL-MCNC: 4 MMOL/L — SIGNIFICANT CHANGE UP (ref 3.5–5.3)
POTASSIUM SERPL-MCNC: 4.5 MMOL/L — SIGNIFICANT CHANGE UP (ref 3.5–5.3)
POTASSIUM SERPL-SCNC: 4 MMOL/L — SIGNIFICANT CHANGE UP (ref 3.5–5.3)
POTASSIUM SERPL-SCNC: 4.5 MMOL/L — SIGNIFICANT CHANGE UP (ref 3.5–5.3)
PREALB SERPL-MCNC: 10 MG/DL — LOW (ref 20–40)
PROT SERPL-MCNC: 5 G/DL — LOW (ref 6–8.3)
RBC # BLD: 4.58 M/UL — SIGNIFICANT CHANGE UP (ref 3.8–5.2)
RBC # BLD: 5.51 M/UL — HIGH (ref 3.8–5.2)
RBC # FLD: 14.6 % — HIGH (ref 10.3–14.5)
RBC # FLD: 14.7 % — HIGH (ref 10.3–14.5)
SODIUM SERPL-SCNC: 133 MMOL/L — LOW (ref 135–145)
SODIUM SERPL-SCNC: 136 MMOL/L — SIGNIFICANT CHANGE UP (ref 135–145)
TRIGL SERPL-MCNC: 122 MG/DL — SIGNIFICANT CHANGE UP (ref 10–149)
WBC # BLD: 11.74 K/UL — HIGH (ref 3.8–10.5)
WBC # BLD: 8.73 K/UL — SIGNIFICANT CHANGE UP (ref 3.8–10.5)
WBC # FLD AUTO: 11.74 K/UL — HIGH (ref 3.8–10.5)
WBC # FLD AUTO: 8.73 K/UL — SIGNIFICANT CHANGE UP (ref 3.8–10.5)

## 2019-05-02 PROCEDURE — 44120 REMOVAL OF SMALL INTESTINE: CPT | Mod: 59

## 2019-05-02 PROCEDURE — 88305 TISSUE EXAM BY PATHOLOGIST: CPT | Mod: 26

## 2019-05-02 PROCEDURE — 88307 TISSUE EXAM BY PATHOLOGIST: CPT | Mod: 26

## 2019-05-02 PROCEDURE — 99223 1ST HOSP IP/OBS HIGH 75: CPT | Mod: 25

## 2019-05-02 PROCEDURE — 71045 X-RAY EXAM CHEST 1 VIEW: CPT | Mod: 26

## 2019-05-02 PROCEDURE — 88360 TUMOR IMMUNOHISTOCHEM/MANUAL: CPT | Mod: 26

## 2019-05-02 PROCEDURE — 45330 DIAGNOSTIC SIGMOIDOSCOPY: CPT | Mod: 59

## 2019-05-02 PROCEDURE — 88342 IMHCHEM/IMCYTCHM 1ST ANTB: CPT | Mod: 26,59

## 2019-05-02 PROCEDURE — 88341 IMHCHEM/IMCYTCHM EA ADD ANTB: CPT | Mod: 26,59

## 2019-05-02 RX ORDER — ONDANSETRON 8 MG/1
4 TABLET, FILM COATED ORAL ONCE
Qty: 0 | Refills: 0 | Status: DISCONTINUED | OUTPATIENT
Start: 2019-05-02 | End: 2019-05-03

## 2019-05-02 RX ORDER — SODIUM CHLORIDE 9 MG/ML
500 INJECTION, SOLUTION INTRAVENOUS ONCE
Qty: 0 | Refills: 0 | Status: COMPLETED | OUTPATIENT
Start: 2019-05-02 | End: 2019-05-02

## 2019-05-02 RX ORDER — ONDANSETRON 8 MG/1
4 TABLET, FILM COATED ORAL EVERY 6 HOURS
Qty: 0 | Refills: 0 | Status: DISCONTINUED | OUTPATIENT
Start: 2019-05-02 | End: 2019-05-09

## 2019-05-02 RX ORDER — ENOXAPARIN SODIUM 100 MG/ML
40 INJECTION SUBCUTANEOUS DAILY
Qty: 0 | Refills: 0 | Status: DISCONTINUED | OUTPATIENT
Start: 2019-05-02 | End: 2019-05-03

## 2019-05-02 RX ORDER — ACETAMINOPHEN 500 MG
1000 TABLET ORAL ONCE
Qty: 0 | Refills: 0 | Status: COMPLETED | OUTPATIENT
Start: 2019-05-03 | End: 2019-05-03

## 2019-05-02 RX ORDER — SODIUM CHLORIDE 9 MG/ML
1000 INJECTION, SOLUTION INTRAVENOUS
Qty: 0 | Refills: 0 | Status: DISCONTINUED | OUTPATIENT
Start: 2019-05-02 | End: 2019-05-03

## 2019-05-02 RX ORDER — HYDROMORPHONE HYDROCHLORIDE 2 MG/ML
30 INJECTION INTRAMUSCULAR; INTRAVENOUS; SUBCUTANEOUS
Qty: 0 | Refills: 0 | Status: DISCONTINUED | OUTPATIENT
Start: 2019-05-02 | End: 2019-05-09

## 2019-05-02 RX ORDER — HYDROMORPHONE HYDROCHLORIDE 2 MG/ML
0.5 INJECTION INTRAMUSCULAR; INTRAVENOUS; SUBCUTANEOUS
Qty: 0 | Refills: 0 | Status: DISCONTINUED | OUTPATIENT
Start: 2019-05-02 | End: 2019-05-03

## 2019-05-02 RX ORDER — SODIUM CHLORIDE 9 MG/ML
1000 INJECTION, SOLUTION INTRAVENOUS ONCE
Qty: 0 | Refills: 0 | Status: COMPLETED | OUTPATIENT
Start: 2019-05-02 | End: 2019-05-02

## 2019-05-02 RX ORDER — NALOXONE HYDROCHLORIDE 4 MG/.1ML
0.1 SPRAY NASAL
Qty: 0 | Refills: 0 | Status: DISCONTINUED | OUTPATIENT
Start: 2019-05-02 | End: 2019-05-09

## 2019-05-02 RX ORDER — HYDROMORPHONE HYDROCHLORIDE 2 MG/ML
0.5 INJECTION INTRAMUSCULAR; INTRAVENOUS; SUBCUTANEOUS
Qty: 0 | Refills: 0 | Status: DISCONTINUED | OUTPATIENT
Start: 2019-05-02 | End: 2019-05-09

## 2019-05-02 RX ORDER — CHLORHEXIDINE GLUCONATE 213 G/1000ML
1 SOLUTION TOPICAL ONCE
Qty: 0 | Refills: 0 | Status: COMPLETED | OUTPATIENT
Start: 2019-05-02 | End: 2019-05-02

## 2019-05-02 RX ORDER — HYDROMORPHONE HYDROCHLORIDE 2 MG/ML
1 INJECTION INTRAMUSCULAR; INTRAVENOUS; SUBCUTANEOUS
Qty: 0 | Refills: 0 | Status: DISCONTINUED | OUTPATIENT
Start: 2019-05-02 | End: 2019-05-03

## 2019-05-02 RX ORDER — ONDANSETRON 8 MG/1
4 TABLET, FILM COATED ORAL ONCE
Qty: 0 | Refills: 0 | Status: COMPLETED | OUTPATIENT
Start: 2019-05-02 | End: 2019-05-02

## 2019-05-02 RX ADMIN — ONDANSETRON 4 MILLIGRAM(S): 8 TABLET, FILM COATED ORAL at 08:15

## 2019-05-02 RX ADMIN — SODIUM CHLORIDE 2000 MILLILITER(S): 9 INJECTION, SOLUTION INTRAVENOUS at 19:16

## 2019-05-02 RX ADMIN — GABAPENTIN 100 MILLIGRAM(S): 400 CAPSULE ORAL at 06:26

## 2019-05-02 RX ADMIN — HYDROMORPHONE HYDROCHLORIDE 30 MILLILITER(S): 2 INJECTION INTRAMUSCULAR; INTRAVENOUS; SUBCUTANEOUS at 18:08

## 2019-05-02 RX ADMIN — HYDROMORPHONE HYDROCHLORIDE 30 MILLILITER(S): 2 INJECTION INTRAMUSCULAR; INTRAVENOUS; SUBCUTANEOUS at 23:05

## 2019-05-02 RX ADMIN — SODIUM CHLORIDE 1500 MILLILITER(S): 9 INJECTION, SOLUTION INTRAVENOUS at 21:35

## 2019-05-02 RX ADMIN — CHLORHEXIDINE GLUCONATE 1 APPLICATION(S): 213 SOLUTION TOPICAL at 11:03

## 2019-05-02 RX ADMIN — DEXTROSE MONOHYDRATE, SODIUM CHLORIDE, AND POTASSIUM CHLORIDE 125 MILLILITER(S): 50; .745; 4.5 INJECTION, SOLUTION INTRAVENOUS at 11:03

## 2019-05-02 NOTE — CHART NOTE - NSCHARTNOTEFT_GEN_A_CORE
Surgery Post-Operative Note    Procedure: Small bowel resection with anastomosis    Subjective: Patient examined at bedside. Reports pain though improved with medication. Denies nausea/vomiting, NGT is in place.    Vital Signs Last 24 Hrs  T(C): 36.9 (02 May 2019 20:00), Max: 36.9 (02 May 2019 06:00)  T(F): 98.5 (02 May 2019 20:00), Max: 98.5 (02 May 2019 20:00)  HR: 104 (02 May 2019 22:00) (94 - 124)  BP: 112/78 (02 May 2019 22:00) (109/70 - 138/88)  BP(mean): 86 (02 May 2019 22:00) (75 - 105)  RR: 16 (02 May 2019 22:00) (16 - 22)  SpO2: 98% (02 May 2019 22:00) (96% - 100%)    Physical Exam:  General: NAD, resting comfortably in bed, NGT in place  Pulmonary: Nonlabored breathing, no respiratory distress  Cardiovascular: NSR  Abdominal: soft, ND, appropriately tender to palpation, no RT, no guarding, incisions/dressing intact with SS drainage  Ext: STEVE      LABS:                        15.0   11.74 )-----------( 372      ( 02 May 2019 18:10 )             48.3     05-02    136  |  102  |  8   ----------------------------<  183<H>  4.5   |  22  |  0.62    Ca    8.0<L>      02 May 2019 18:10  Phos  4.6     05-02  Mg     1.8     05-02    TPro  5.0<L>  /  Alb  2.4<L>  /  TBili  0.3  /  DBili  x   /  AST  9   /  ALT  18  /  AlkPhos  171<H>  05-02    PT/INR - ( 01 May 2019 06:00 )   PT: 12.4 SEC;   INR: 1.08          PTT - ( 01 May 2019 06:00 )  PTT:36.5 SEC  CAPILLARY BLOOD GLUCOSE          LIVER FUNCTIONS - ( 02 May 2019 06:37 )  Alb: 2.4 g/dL / Pro: 5.0 g/dL / ALK PHOS: 171 u/L / ALT: 18 u/L / AST: 9 u/L / GGT: x                 Radiology and Additional Studies:    Assessment:53y Female s/p Diagnostic laparoscopy, exploratory laparotomy, lysis of adhesions, small bowel resection, rigid sigmoidoscopy    Plan:  - Pain control: PCA  - NPO/IVF/NGT  - Monitor vital signs  - Monitor abdominal exam    B Team Surgery, [id number]

## 2019-05-02 NOTE — PROGRESS NOTE ADULT - ASSESSMENT
53y female with recurrent SBO, s/p barium enema this am found to have rectosigmoid stricture, awaiting recommendations by gi    - NPO, preop/consented for OR today, possible diversion  - NPO/IVF  - Postoperative check  - continue to monitor GIF  - Will require anticoagulation to resume, pending OR course/surgery  - OOB/WBAT    B surgery  [id number] 53y female with recurrent SBO, s/p barium enema, found to have rectosigmoid stricture, OR scheduled 5/2.     - NPO, preop/consented for OR today, possible diversion  - NPO/IVF  - Postoperative check  - continue to monitor GIF  - Will require anticoagulation to resume, pending OR course/surgery  - OOB/WBAT    B surgery  [id number]

## 2019-05-02 NOTE — BRIEF OPERATIVE NOTE - OPERATION/FINDINGS
Procedure: Diagnostic laparoscopy, exploratory laparotomy, lysis of adhesions, small bowel resection, rigid sigmoidoscopy    Findings: A diagnostic laparoscopy was done. No obvious metastases were noted. Small bowel causing the obstruction was adherent to the pelvis. Case was then converted to an exploratory laparotomy. Adhesions were taken down, and the small bowel was freed from the pelvis. Small bowel, causing the obstruction, was then resected. Previous rectosigmoid stricture was not readily appreciated. Distal rectosigmoid colon was soft, and patent. A rigid sigmoidoscopy was limited by presence of copious stool. A small bowel anastomosis of the small bowel to the distal ileum was then performed. Fascia was closed and skin was approximated with staples. Procedure: Diagnostic laparoscopy, exploratory laparotomy, lysis of adhesions, small bowel resection, rigid sigmoidoscopy    Findings: A diagnostic laparoscopy was done. No obvious metastases were noted. Small bowel causing the obstruction was adherent to the pelvis. Case was then converted to an exploratory laparotomy. Adhesions were taken down, and the small bowel was freed from the pelvis. Small bowel, causing the obstruction, was then resected. Previous rectosigmoid stricture was seen. A rigid sigmoidoscopy was limited by presence of copious stool. A small bowel anastomosis of the small bowel to the distal ileum was then performed. Fascia was closed and skin was approximated with staples.

## 2019-05-02 NOTE — PROGRESS NOTE ADULT - SUBJECTIVE AND OBJECTIVE BOX
SURGERY Progress Note  ROHITH FUNMILAYO    S: Patient seen at bedside.  Overnight patient endorsed thigh pain at site of DVT.  Lovenox held in preparation for OR today.      O:  T(C): 36.8 (05-02-19 @ 10:06), Max: 36.9 (05-02-19 @ 06:00)  HR: 94 (05-02-19 @ 10:06) (94 - 113)  BP: 121/84 (05-02-19 @ 10:06) (109/70 - 124/86)  RR: 16 (05-02-19 @ 10:06) (16 - 18)  SpO2: 100% (05-02-19 @ 10:06) (97% - 100%)    Physical Exam:  Gen: NAD  Neuro: Follows commands  Resp: No acute respiratory distress, normal effort  CV: Normal rate, regular rhythm  Abd: Soft, mildly distended, nontender    Labs:  CBC (05-02 @ 05:36)                              12.4                           8.73    )----------------(  350        69.0  % Neutrophils, 15.5  % Lymphocytes, ANC: 6.03                                39.3                CBC (05-01 @ 06:00)                              12.6                           8.57    )----------------(  360        --    % Neutrophils, --    % Lymphocytes, ANC: --                                  40.4                  BMP (05-02 @ 06:37)             133<L>  |  99      |  9     		Ca++ 1.10    Ca 8.0<L>             ---------------------------------( 118<H>		Mg 2.0                4.0     |  23      |  0.67  			Ph 4.1     BMP (05-01 @ 06:00)             137     |  101     |  10    		Ca++ --      Ca 8.0<L>             ---------------------------------( 128<H>		Mg 2.1                4.2     |  22      |  0.56  			Ph 3.8       LFTs (05-02 @ 06:37)      TPro 5.0<L> / Alb 2.4<L> / TBili 0.3 / DBili -- / AST 9 / ALT 18 / AlkPhos 171<H>    Coags (05-01 @ 06:00)  aPTT 36.5<H> / INR 1.08 / PT 12.4            A/P: 53y Female s/p    - Pain control  - OOB as tolerated  - Diet:  - Monitor GI/ fxn  - Dispo: Floor

## 2019-05-02 NOTE — CONSULT NOTE ADULT - SUBJECTIVE AND OBJECTIVE BOX
Nutrition Support Consult Note    HPI:  53y Female with SBO planned for OR today for diagnostic lap, possible DAVID, possible bowel resection, possible ostomy.    Nutrition support evaluation requested for initiation of TPN/lipids in view of prolonged NPO.     Allergies  No Known Allergies    MEDICATIONS  (STANDING):  dextrose 5% + sodium chloride 0.45% with potassium chloride 20 mEq/L 1000 milliLiter(s) (125 mL/Hr) IV Continuous <Continuous>  gabapentin 100 milliGRAM(s) Oral three times a day  methimazole 5 milliGRAM(s) Oral daily  pantoprazole  Injectable 40 milliGRAM(s) IV Push daily    MEDICATIONS  (PRN):  melatonin 3 milliGRAM(s) Oral at bedtime PRN Insomnia    PAST MEDICAL & SURGICAL HISTORY:  DVT, lower extremity: RLE, dx&#x27;d April 2019  SBO (small bowel obstruction)  Hemorrhoid  Cervical cancer  Colon neoplasm: s/p LAR  Hyperthyroidism  S/P colon resection  H/O exploratory laparotomy: 1/6/17, with LAR    FAMILY HISTORY:  Family history of diabetes mellitus  Family history of leukemia    Vital Signs Last 24 Hrs  T(C): 36.8 (02 May 2019 10:06), Max: 36.9 (01 May 2019 13:46)  T(F): 98.2 (02 May 2019 10:06), Max: 98.4 (01 May 2019 13:46)  HR: 94 (02 May 2019 10:06) (94 - 115)  BP: 121/84 (02 May 2019 10:06) (109/70 - 125/82)  RR: 16 (02 May 2019 10:06) (16 - 18)  SpO2: 100% (02 May 2019 10:06) (97% - 100%)    I&O's Detail    01 May 2019 07:01  -  02 May 2019 07:00  --------------------------------------------------------  IN:    dextrose 5% + sodium chloride 0.45% with potassium chloride 20 mEq/L: 2750 mL  Total IN: 2750 mL    OUT:  Total OUT: 0 mL    Total NET: 2750 mL      02 May 2019 07:01  -  02 May 2019 11:44  --------------------------------------------------------  IN:    dextrose 5% + sodium chloride 0.45% with potassium chloride 20 mEq/L: 500 mL  Total IN: 500 mL    OUT:    Voided: 650 mL  Total OUT: 650 mL    Total NET: -150 mL    PHYSICAL EXAM:  GENERAL:  NAD, Appears stated age  CHEST:  Normal Effort, Breath sounds clear  HEART:  RRR, S1 + S2, no murmurs  ABDOMEN:  Soft, non-tender, non-distended, BS+  EXTREMITIES  no cyanosis or edema  SKIN:  Warm & Dry.  NEURO:  Alert, oriented    LABS:                        12.4   8.73  )-----------( 350      ( 02 May 2019 05:36 )             39.3     05-02    133<L>  |  99  |  9   ----------------------------<  118<H>  4.0   |  23  |  0.67    Ca    8.0<L>      02 May 2019 06:37  Phos  4.1     05-02  Mg     2.0     05-02    TPro  5.0<L>  /  Alb  2.4<L>  /  TBili  0.3  /  DBili  x   /  AST  9   /  ALT  18  /  AlkPhos  171<H>  05-02    LIVER FUNCTIONS - ( 02 May 2019 06:37 )  Alb: 2.4 g/dL / Pro: 5.0 g/dL / ALK PHOS: 171 u/L / ALT: 18 u/L / AST: 9 u/L / GGT: x           Prealbumin 5/2/19: 10    PT/INR - ( 01 May 2019 06:00 )   PT: 12.4 SEC;   INR: 1.08      PTT - ( 01 May 2019 06:00 )  PTT:36.5 SEC    Current Weight: 68 kG  Height: 162.56 cm  Ideal Body Weight: 54 kG  BMI: 25.7  Current Diet: [ x ]NPO  Appetite: [ x ]Poor [  ]Adequate [  ]Good    CLINICAL INDICATORS  MALNUTRITION IN CONTEXT OF ACUTE ILLNESS OR INJURY  SEVERE MALNUTRITION  Weight Loss  [  ] >2% in 1 week  [  ] >5% in 1 month  [  ] >7.5% in 3 months    Caloric Intake  [ x ] <50% of nutrition needs >= 5 days    Generalized Edema  Severe Edema    Metabolic Requirements:  The patient will require:  _____25________ kilocalories / kg / day  Diagnosis:  [  ] Mild protein malnutrition  [  ] Moderate protein calorie malnutrition in acute illness/ injury  [ x ] Severe protein calorie malnutrition in acute illness/ injury  [  ] Moderate protein calorie malnutrition in chronic illness  [  ] Severe protein calorie malnutrition in chronic illness    Plan:  OR today    will follow up with primary team post op       Nutrition support pager [phone]

## 2019-05-03 LAB
ANION GAP SERPL CALC-SCNC: 14 MMO/L — SIGNIFICANT CHANGE UP (ref 7–14)
BUN SERPL-MCNC: 8 MG/DL — SIGNIFICANT CHANGE UP (ref 7–23)
CALCIUM SERPL-MCNC: 7.7 MG/DL — LOW (ref 8.4–10.5)
CHLORIDE SERPL-SCNC: 97 MMOL/L — LOW (ref 98–107)
CO2 SERPL-SCNC: 24 MMOL/L — SIGNIFICANT CHANGE UP (ref 22–31)
CREAT SERPL-MCNC: 0.56 MG/DL — SIGNIFICANT CHANGE UP (ref 0.5–1.3)
GLUCOSE SERPL-MCNC: 117 MG/DL — HIGH (ref 70–99)
HCT VFR BLD CALC: 38 % — SIGNIFICANT CHANGE UP (ref 34.5–45)
HGB BLD-MCNC: 12.2 G/DL — SIGNIFICANT CHANGE UP (ref 11.5–15.5)
MCHC RBC-ENTMCNC: 27.7 PG — SIGNIFICANT CHANGE UP (ref 27–34)
MCHC RBC-ENTMCNC: 32.1 % — SIGNIFICANT CHANGE UP (ref 32–36)
MCV RBC AUTO: 86.2 FL — SIGNIFICANT CHANGE UP (ref 80–100)
NRBC # FLD: 0 K/UL — SIGNIFICANT CHANGE UP (ref 0–0)
PLATELET # BLD AUTO: 331 K/UL — SIGNIFICANT CHANGE UP (ref 150–400)
PMV BLD: 9.9 FL — SIGNIFICANT CHANGE UP (ref 7–13)
POTASSIUM SERPL-MCNC: 4.2 MMOL/L — SIGNIFICANT CHANGE UP (ref 3.5–5.3)
POTASSIUM SERPL-SCNC: 4.2 MMOL/L — SIGNIFICANT CHANGE UP (ref 3.5–5.3)
RBC # BLD: 4.41 M/UL — SIGNIFICANT CHANGE UP (ref 3.8–5.2)
RBC # FLD: 14.7 % — HIGH (ref 10.3–14.5)
SODIUM SERPL-SCNC: 135 MMOL/L — SIGNIFICANT CHANGE UP (ref 135–145)
WBC # BLD: 22.1 K/UL — HIGH (ref 3.8–10.5)
WBC # FLD AUTO: 22.1 K/UL — HIGH (ref 3.8–10.5)

## 2019-05-03 PROCEDURE — 74018 RADEX ABDOMEN 1 VIEW: CPT | Mod: 26

## 2019-05-03 PROCEDURE — 71045 X-RAY EXAM CHEST 1 VIEW: CPT | Mod: 26

## 2019-05-03 RX ORDER — ACETAMINOPHEN 500 MG
1000 TABLET ORAL ONCE
Qty: 0 | Refills: 0 | Status: COMPLETED | OUTPATIENT
Start: 2019-05-04 | End: 2019-05-04

## 2019-05-03 RX ORDER — ACETAMINOPHEN 500 MG
1000 TABLET ORAL ONCE
Qty: 0 | Refills: 0 | Status: COMPLETED | OUTPATIENT
Start: 2019-05-03 | End: 2019-05-03

## 2019-05-03 RX ORDER — ENOXAPARIN SODIUM 100 MG/ML
70 INJECTION SUBCUTANEOUS
Qty: 0 | Refills: 0 | Status: DISCONTINUED | OUTPATIENT
Start: 2019-05-03 | End: 2019-05-11

## 2019-05-03 RX ORDER — DEXTROSE MONOHYDRATE, SODIUM CHLORIDE, AND POTASSIUM CHLORIDE 50; .745; 4.5 G/1000ML; G/1000ML; G/1000ML
1000 INJECTION, SOLUTION INTRAVENOUS
Qty: 0 | Refills: 0 | Status: DISCONTINUED | OUTPATIENT
Start: 2019-05-03 | End: 2019-05-07

## 2019-05-03 RX ADMIN — Medication 400 MILLIGRAM(S): at 19:17

## 2019-05-03 RX ADMIN — Medication 400 MILLIGRAM(S): at 00:15

## 2019-05-03 RX ADMIN — Medication 1000 MILLIGRAM(S): at 06:32

## 2019-05-03 RX ADMIN — HYDROMORPHONE HYDROCHLORIDE 30 MILLILITER(S): 2 INJECTION INTRAMUSCULAR; INTRAVENOUS; SUBCUTANEOUS at 11:09

## 2019-05-03 RX ADMIN — HYDROMORPHONE HYDROCHLORIDE 30 MILLILITER(S): 2 INJECTION INTRAMUSCULAR; INTRAVENOUS; SUBCUTANEOUS at 08:46

## 2019-05-03 RX ADMIN — DEXTROSE MONOHYDRATE, SODIUM CHLORIDE, AND POTASSIUM CHLORIDE 100 MILLILITER(S): 50; .745; 4.5 INJECTION, SOLUTION INTRAVENOUS at 18:52

## 2019-05-03 RX ADMIN — Medication 1000 MILLIGRAM(S): at 00:45

## 2019-05-03 RX ADMIN — SODIUM CHLORIDE 100 MILLILITER(S): 9 INJECTION, SOLUTION INTRAVENOUS at 14:24

## 2019-05-03 RX ADMIN — Medication 1000 MILLIGRAM(S): at 13:18

## 2019-05-03 RX ADMIN — Medication 400 MILLIGRAM(S): at 12:48

## 2019-05-03 RX ADMIN — HYDROMORPHONE HYDROCHLORIDE 30 MILLILITER(S): 2 INJECTION INTRAMUSCULAR; INTRAVENOUS; SUBCUTANEOUS at 20:24

## 2019-05-03 RX ADMIN — Medication 1000 MILLIGRAM(S): at 19:47

## 2019-05-03 RX ADMIN — Medication 400 MILLIGRAM(S): at 06:02

## 2019-05-03 RX ADMIN — ENOXAPARIN SODIUM 70 MILLIGRAM(S): 100 INJECTION SUBCUTANEOUS at 19:16

## 2019-05-03 RX ADMIN — HYDROMORPHONE HYDROCHLORIDE 0.5 MILLIGRAM(S): 2 INJECTION INTRAMUSCULAR; INTRAVENOUS; SUBCUTANEOUS at 11:10

## 2019-05-03 RX ADMIN — PANTOPRAZOLE SODIUM 40 MILLIGRAM(S): 20 TABLET, DELAYED RELEASE ORAL at 14:24

## 2019-05-03 NOTE — PROGRESS NOTE ADULT - SUBJECTIVE AND OBJECTIVE BOX
Anesthesia Pain Management Service    SUBJECTIVE: in some pain right now     Pain Scale Score	At rest: __7_ 	With Activity: ___ 	[X ] Refer to charted pain scores    THERAPY:    [ ] IV PCA Morphine		[ ] 5 mg/mL	[ ] 1 mg/mL  [X ] IV PCA Hydromorphone	[ ] 5 mg/mL	[X ] 1 mg/mL  [ ] IV PCA Fentanyl		[ ] 50 micrograms/mL    Demand dose __0.2_ lockout __6_ (minutes) Continuous Rate _0__ Total: _9.7__  mg used (in past 24 hours)      MEDICATIONS  (STANDING):  acetaminophen  IVPB .. 1000 milliGRAM(s) IV Intermittent once  enoxaparin Injectable 70 milliGRAM(s) SubCutaneous two times a day  gabapentin 100 milliGRAM(s) Oral three times a day  HYDROmorphone PCA (1 mG/mL) 30 milliLiter(s) PCA Continuous PCA Continuous  lactated ringers. 1000 milliLiter(s) (100 mL/Hr) IV Continuous <Continuous>  pantoprazole  Injectable 40 milliGRAM(s) IV Push daily    MEDICATIONS  (PRN):  HYDROmorphone  Injectable 0.5 milliGRAM(s) IV Push every 10 minutes PRN Moderate Pain (4 - 6)  HYDROmorphone  Injectable 1 milliGRAM(s) IV Push every 10 minutes PRN Severe Pain (7 - 10)  HYDROmorphone PCA (1 mG/mL) Rescue Clinician Bolus 0.5 milliGRAM(s) IV Push every 15 minutes PRN for Pain Scale GREATER THAN 6  naloxone Injectable 0.1 milliGRAM(s) IV Push every 3 minutes PRN For ANY of the following changes in patient status:  A. RR LESS THAN 10 breaths per minute, B. Oxygen saturation LESS THAN 90%, C. Sedation score of 6  ondansetron Injectable 4 milliGRAM(s) IV Push every 6 hours PRN Nausea  ondansetron Injectable 4 milliGRAM(s) IV Push once PRN Nausea and/or Vomiting      OBJECTIVE: laying in bed     Sedation Score:	[ X] Alert	[ ] Drowsy 	[ ] Arousable	[ ] Asleep	[ ] Unresponsive    Side Effects:	[X ] None	[ ] Nausea	[ ] Vomiting	[ ] Pruritus  		[ ] Other:    Vital Signs Last 24 Hrs  T(C): 36.3 (03 May 2019 09:15), Max: 36.9 (02 May 2019 20:00)  T(F): 97.4 (03 May 2019 09:15), Max: 98.5 (02 May 2019 20:00)  HR: 97 (03 May 2019 09:15) (91 - 124)  BP: 103/68 (03 May 2019 09:15) (103/68 - 138/88)  BP(mean): 86 (02 May 2019 22:00) (75 - 105)  RR: 16 (03 May 2019 09:15) (15 - 22)  SpO2: 98% (03 May 2019 09:15) (96% - 100%)    ASSESSMENT/ PLAN    Therapy to  be:	[ X] Continue   [ ] Discontinued   [ ] Change to prn Analgesics    Documentation and Verification of current medications:   [X] Done	[ ] Not done, not elligible    Comments: NPO with NGT, demand dose increased to 0.25mg and bolus of 0.5mg given at bedside.

## 2019-05-03 NOTE — PHYSICAL THERAPY INITIAL EVALUATION ADULT - ADDITIONAL COMMENTS
Patient reports she lives alone in a private house, 1 flight of stairs to negotiate. Patient reports she required assistance with ADLs and ambulated household distances independently. Patient reports she has a home health aide Monday-Thursday 3-4 hours/day.    Patient was left semi-supine in bed as found, all lines/tubes intact and call morillo within reach, CHANELLE cherry

## 2019-05-03 NOTE — PHYSICAL THERAPY INITIAL EVALUATION ADULT - RANGE OF MOTION EXAMINATION, REHAB EVAL
pt deferred ROM assessment to right LE secondary to pain./Left LE ROM was WFL (within functional limits)/bilateral upper extremity ROM was WFL (within functional limits)

## 2019-05-03 NOTE — PROGRESS NOTE ADULT - SUBJECTIVE AND OBJECTIVE BOX
ANESTHESIA POSTOP CHECK    53y Female POSTOP DAY 1 S/P   x[ ] General Anesthesia  [ ] Waldo Anesthesia  [ ] MAC    Vital Signs Last 24 Hrs  T(C): 36.4 (03 May 2019 14:21), Max: 36.9 (02 May 2019 20:00)  T(F): 97.6 (03 May 2019 14:21), Max: 98.5 (02 May 2019 20:00)  HR: 99 (03 May 2019 14:21) (91 - 124)  BP: 113/78 (03 May 2019 14:21) (103/68 - 138/88)  BP(mean): 86 (02 May 2019 22:00) (75 - 105)  RR: 16 (03 May 2019 14:21) (15 - 22)  SpO2: 98% (03 May 2019 14:21) (96% - 100%)  I&O's Summary    02 May 2019 07:01  -  03 May 2019 07:00  --------------------------------------------------------  IN: 3400 mL / OUT: 1675 mL / NET: 1725 mL    03 May 2019 07:01  -  03 May 2019 16:12  --------------------------------------------------------  IN: 800 mL / OUT: 450 mL / NET: 350 mL        [x ] NO APPARENT ANESTHESIA COMPLICATIONS      Comments:

## 2019-05-03 NOTE — PHYSICAL THERAPY INITIAL EVALUATION ADULT - PERTINENT HX OF CURRENT PROBLEM, REHAB EVAL
Patient is a 53 year old female admitted to [facility] on 4/17 with 1-day history of Right sided abdominal pain associated with nausea and several episodes of nausea. PMH includes: colorectal CA s/p LAR (2017, Dr. Armstrong) with mets to liver and cervix, s/p chemotherapy and radiation.

## 2019-05-03 NOTE — PROGRESS NOTE ADULT - ATTENDING COMMENTS
Seen and examined, chart and note reviewed, case discussed with B team    s/p small bowel resection  a.  With incisional pain  b.  NPO, NGT, await GI function  c.  Continue IVF resuscitation  d.  Restart Lovenox BID  E.  Discussed mgt of stricture with family and pt

## 2019-05-03 NOTE — PHYSICAL THERAPY INITIAL EVALUATION ADULT - GENERAL OBSERVATIONS, REHAB EVAL
Patient was received semi-supine in bed, c/o pain but willing to participate in PT evaluation. +PCA pump, +IV.

## 2019-05-03 NOTE — PROGRESS NOTE ADULT - SUBJECTIVE AND OBJECTIVE BOX
Day __2_ of Anesthesia Pain Management Service    SUBJECTIVE:    Therapy:	  [x ] IV PCA	   [ ] Epidural           [ ] s/p Spinal Opoid              [ ] Postpartum infusion	  [ ] Patient controlled regional anesthesia (PCRA)    [ ] prn Analgesics    OBJECTIVE:   [x ] No new signs     [ ] Other:    Side Effects:  [x ] None			[ ] Other:    Assessment of Catheter Site:		[ ] Intact		[ ] Other:    ASSESSMENT/PLAN  [x ] Continue current therapy    [ ] Therapy changed to:    [ ] IV PCA       [ ] Epidural     [ ] prn Analgesics     Comments:

## 2019-05-03 NOTE — PROGRESS NOTE ADULT - SUBJECTIVE AND OBJECTIVE BOX
Morning Surgical Progress Note  Patient is a 53y old  Female who presents with a chief complaint of SBO (29 Apr 2019 16:52)    SUBJECTIVE: Patient seen and examined at bedside with surgical team, patient without complaints    Vital Signs (24 Hrs):  T(C): 36.3 (05-03-19 @ 09:15), Max: 36.9 (05-02-19 @ 20:00)  HR: 97 (05-03-19 @ 09:15) (91 - 124)  BP: 103/68 (05-03-19 @ 09:15) (103/68 - 138/88)  BP(mean): 86 (05-02-19 @ 22:00) (75 - 105)  RR: 16 (05-03-19 @ 09:15) (15 - 22)  SpO2: 98% (05-03-19 @ 09:15) (96% - 100%)      05-02 @ 07:01  -  05-03 @ 07:00  --------------------------------------------------------  IN:    dextrose 5% + sodium chloride 0.45% with potassium chloride 20 mEq/L: 500 mL    IV PiggyBack: 200 mL    Lactated Ringers IV Bolus: 2000 mL    lactated ringers.: 700 mL  Total IN: 3400 mL    OUT:    Indwelling Catheter - Urethral: 875 mL    Nasoenteral Tube: 150 mL    Voided: 650 mL  Total OUT: 1675 mL    Total NET: 1725 mL      05-03 @ 07:01  -  05-03 @ 11:28  --------------------------------------------------------  IN:  Total IN: 0 mL    OUT:    Indwelling Catheter - Urethral: 250 mL  Total OUT: 250 mL    Total NET: -250 mL      Physical Exam  Constitutional: A&Ox3, NAD  Gastrointestinal: obese, soft, appropriately tender, nondistended, dressing c/d/i    LABS:  CBC (05-03 @ 06:16)                              12.2                           22.10<H>  )----------------(  331        --    % Neutrophils, --    % Lymphocytes, ANC: --                                  38.0    CBC (05-02 @ 18:10)                              15.0                           11.74<H>  )----------------(  372        --    % Neutrophils, --    % Lymphocytes, ANC: --                                  48.3<H>    BMP (05-03 @ 06:16)             135     |  97<L>   |  8     		Ca++ --      Ca 7.7<L>             ---------------------------------( 117<H>		Mg --                 4.2     |  24      |  0.56  			Ph --      BMP (05-02 @ 18:10)             136     |  102     |  8     		Ca++ --      Ca 8.0<L>             ---------------------------------( 183<H>		Mg 1.8                4.5     |  22      |  0.62  			Ph 4.6<H>    LFTs (05-02 @ 06:37)      TPro 5.0<L> / Alb 2.4<L> / TBili 0.3 / DBili -- / AST 9 / ALT 18 / AlkPhos 171<H>      Assessment and Plan:   · Assessment		  53y female with recurrent SBO s/p Diagnostic laparoscopy, exploratory laparotomy, lysis of adhesions, small bowel resection, rigid sigmoidoscopy  - pain control  - NPO/NG tube  - T Lovenox      B surgery  [id number]

## 2019-05-03 NOTE — PHYSICAL THERAPY INITIAL EVALUATION ADULT - MANUAL MUSCLE TESTING RESULTS, REHAB EVAL
bilateral UEs & Left LE grossly at least 3+/5, pt deferred strength assessment to right LE secondary to pain.

## 2019-05-03 NOTE — PHYSICAL THERAPY INITIAL EVALUATION ADULT - PATIENT PROFILE REVIEW, REHAB EVAL
PT orders received: ambulate as tolerated. Consult with CHANELLE LIMA, pt may participate in PT evaluation./yes

## 2019-05-04 LAB
ANION GAP SERPL CALC-SCNC: 10 MMO/L — SIGNIFICANT CHANGE UP (ref 7–14)
BUN SERPL-MCNC: 9 MG/DL — SIGNIFICANT CHANGE UP (ref 7–23)
CALCIUM SERPL-MCNC: 8 MG/DL — LOW (ref 8.4–10.5)
CHLORIDE SERPL-SCNC: 99 MMOL/L — SIGNIFICANT CHANGE UP (ref 98–107)
CO2 SERPL-SCNC: 26 MMOL/L — SIGNIFICANT CHANGE UP (ref 22–31)
CREAT SERPL-MCNC: 0.58 MG/DL — SIGNIFICANT CHANGE UP (ref 0.5–1.3)
GLUCOSE SERPL-MCNC: 105 MG/DL — HIGH (ref 70–99)
HCT VFR BLD CALC: 33.5 % — LOW (ref 34.5–45)
HGB BLD-MCNC: 10.8 G/DL — LOW (ref 11.5–15.5)
MAGNESIUM SERPL-MCNC: 1.8 MG/DL — SIGNIFICANT CHANGE UP (ref 1.6–2.6)
MCHC RBC-ENTMCNC: 28 PG — SIGNIFICANT CHANGE UP (ref 27–34)
MCHC RBC-ENTMCNC: 32.2 % — SIGNIFICANT CHANGE UP (ref 32–36)
MCV RBC AUTO: 86.8 FL — SIGNIFICANT CHANGE UP (ref 80–100)
NRBC # FLD: 0 K/UL — SIGNIFICANT CHANGE UP (ref 0–0)
PHOSPHATE SERPL-MCNC: 2.9 MG/DL — SIGNIFICANT CHANGE UP (ref 2.5–4.5)
PLATELET # BLD AUTO: 302 K/UL — SIGNIFICANT CHANGE UP (ref 150–400)
PMV BLD: 9.6 FL — SIGNIFICANT CHANGE UP (ref 7–13)
POTASSIUM SERPL-MCNC: 4.1 MMOL/L — SIGNIFICANT CHANGE UP (ref 3.5–5.3)
POTASSIUM SERPL-SCNC: 4.1 MMOL/L — SIGNIFICANT CHANGE UP (ref 3.5–5.3)
RBC # BLD: 3.86 M/UL — SIGNIFICANT CHANGE UP (ref 3.8–5.2)
RBC # FLD: 14.7 % — HIGH (ref 10.3–14.5)
SODIUM SERPL-SCNC: 135 MMOL/L — SIGNIFICANT CHANGE UP (ref 135–145)
WBC # BLD: 16.39 K/UL — HIGH (ref 3.8–10.5)
WBC # FLD AUTO: 16.39 K/UL — HIGH (ref 3.8–10.5)

## 2019-05-04 PROCEDURE — 74018 RADEX ABDOMEN 1 VIEW: CPT | Mod: 26

## 2019-05-04 RX ORDER — ACETAMINOPHEN 500 MG
1000 TABLET ORAL ONCE
Qty: 0 | Refills: 0 | Status: COMPLETED | OUTPATIENT
Start: 2019-05-05 | End: 2019-05-05

## 2019-05-04 RX ADMIN — Medication 400 MILLIGRAM(S): at 12:52

## 2019-05-04 RX ADMIN — HYDROMORPHONE HYDROCHLORIDE 0.5 MILLIGRAM(S): 2 INJECTION INTRAMUSCULAR; INTRAVENOUS; SUBCUTANEOUS at 22:54

## 2019-05-04 RX ADMIN — Medication 1000 MILLIGRAM(S): at 13:20

## 2019-05-04 RX ADMIN — PANTOPRAZOLE SODIUM 40 MILLIGRAM(S): 20 TABLET, DELAYED RELEASE ORAL at 13:29

## 2019-05-04 RX ADMIN — HYDROMORPHONE HYDROCHLORIDE 30 MILLILITER(S): 2 INJECTION INTRAMUSCULAR; INTRAVENOUS; SUBCUTANEOUS at 22:58

## 2019-05-04 RX ADMIN — Medication 400 MILLIGRAM(S): at 07:00

## 2019-05-04 RX ADMIN — ENOXAPARIN SODIUM 70 MILLIGRAM(S): 100 INJECTION SUBCUTANEOUS at 17:47

## 2019-05-04 RX ADMIN — Medication 400 MILLIGRAM(S): at 01:02

## 2019-05-04 RX ADMIN — HYDROMORPHONE HYDROCHLORIDE 30 MILLILITER(S): 2 INJECTION INTRAMUSCULAR; INTRAVENOUS; SUBCUTANEOUS at 08:23

## 2019-05-04 RX ADMIN — Medication 400 MILLIGRAM(S): at 18:35

## 2019-05-04 RX ADMIN — ENOXAPARIN SODIUM 70 MILLIGRAM(S): 100 INJECTION SUBCUTANEOUS at 05:40

## 2019-05-04 RX ADMIN — Medication 1000 MILLIGRAM(S): at 19:07

## 2019-05-04 RX ADMIN — Medication 1000 MILLIGRAM(S): at 07:30

## 2019-05-04 RX ADMIN — HYDROMORPHONE HYDROCHLORIDE 30 MILLILITER(S): 2 INJECTION INTRAMUSCULAR; INTRAVENOUS; SUBCUTANEOUS at 20:06

## 2019-05-04 RX ADMIN — Medication 1000 MILLIGRAM(S): at 01:32

## 2019-05-04 RX ADMIN — DEXTROSE MONOHYDRATE, SODIUM CHLORIDE, AND POTASSIUM CHLORIDE 100 MILLILITER(S): 50; .745; 4.5 INJECTION, SOLUTION INTRAVENOUS at 12:54

## 2019-05-04 NOTE — PROVIDER CONTACT NOTE (OTHER) - SITUATION
On pulse oximetry it was noted that pt's HR was 117. Pt c/o pain and IV PCA bolus given. Post  bolus pt's HR has increased to 120 and RR has increased to 23.

## 2019-05-04 NOTE — PROVIDER CONTACT NOTE (OTHER) - ACTION/TREATMENT ORDERED:
MD will visit with pt, continue to monitor closely and notify MD of any changed, MD will visit with pt shortly.

## 2019-05-04 NOTE — PROGRESS NOTE ADULT - SUBJECTIVE AND OBJECTIVE BOX
SURGERY Progress Note  ROHITH LORENZDE    S: Patient seen at bedside.  Overnight patient endorsed thigh pain at site of DVT.  No acute events since OR. -/-    O:  Vital Signs (24 Hrs):  T(C): 36.7 (05-04-19 @ 05:39), Max: 36.9 (05-03-19 @ 21:56)  HR: 95 (05-04-19 @ 05:39) (95 - 108)  BP: 111/76 (05-04-19 @ 05:39) (98/66 - 113/78)  RR: 18 (05-04-19 @ 05:39) (16 - 18)  SpO2: 98% (05-04-19 @ 05:39) (98% - 100%)  Wt(kg): --  Daily     Daily     I&O's Summary    03 May 2019 07:01  -  04 May 2019 07:00  --------------------------------------------------------  IN: 2800 mL / OUT: 1550 mL / NET: 1250 mL    04 May 2019 07:01  -  04 May 2019 09:58  --------------------------------------------------------  IN: 0 mL / OUT: 100 mL / NET: -100 mL      Physical Exam:  Gen: NAD  Neuro: Follows commands  Resp: No acute respiratory distress, normal effort  CV: Normal rate, regular rhythm  Gastrointestinal: obese, soft, appropriately tender, nondistended, dressing c/d/i    Labs:                        10.8   16.39 )-----------( 302      ( 04 May 2019 06:00 )             33.5       05-04    135  |  99  |  9   ----------------------------<  105<H>  4.1   |  26  |  0.58    Ca    8.0<L>      04 May 2019 06:00  Phos  2.9     05-04  Mg     1.8     05-04

## 2019-05-04 NOTE — PROGRESS NOTE ADULT - ATTENDING COMMENTS
hypocaloric intake>5-days, await return of bowel function  nutrition consult for TPN to support until achieves enteral tolerance  will likely need PICC

## 2019-05-04 NOTE — PROGRESS NOTE ADULT - ASSESSMENT
53y female with recurrent SBO s/p Diagnostic laparoscopy, exploratory laparotomy, lysis of adhesions, small bowel resection, rigid sigmoidoscopy    - pain control  - NPO/NG tube  - IVF  - T Lovenox  - continue to monitor GIF    B surgery  [id number]

## 2019-05-04 NOTE — PROGRESS NOTE ADULT - SUBJECTIVE AND OBJECTIVE BOX
Anesthesia Pain Management Service    SUBJECTIVE: Patient is doing well with IV PCA and no significant problems reported.    Pain Scale Score	At rest: ___ 	With Activity: ___ 	[X ] Refer to charted pain scores    THERAPY:    [ ] IV PCA Morphine		[ ] 5 mg/mL	[ ] 1 mg/mL  [X ] IV PCA Hydromorphone	[ ] 5 mg/mL	[X ] 1 mg/mL  [ ] IV PCA Fentanyl		[ ] 50 micrograms/mL    Demand dose __0.2_ lockout __6_ (minutes) Continuous Rate _0__ Total: __6.7_  mg used (in past 24 hours)      MEDICATIONS  (STANDING):  acetaminophen  IVPB .. 1000 milliGRAM(s) IV Intermittent once  dextrose 5% + sodium chloride 0.45% with potassium chloride 20 mEq/L 1000 milliLiter(s) (100 mL/Hr) IV Continuous <Continuous>  enoxaparin Injectable 70 milliGRAM(s) SubCutaneous two times a day  gabapentin 100 milliGRAM(s) Oral three times a day  HYDROmorphone PCA (1 mG/mL) 30 milliLiter(s) PCA Continuous PCA Continuous  pantoprazole  Injectable 40 milliGRAM(s) IV Push daily    MEDICATIONS  (PRN):  HYDROmorphone PCA (1 mG/mL) Rescue Clinician Bolus 0.5 milliGRAM(s) IV Push every 15 minutes PRN for Pain Scale GREATER THAN 6  naloxone Injectable 0.1 milliGRAM(s) IV Push every 3 minutes PRN For ANY of the following changes in patient status:  A. RR LESS THAN 10 breaths per minute, B. Oxygen saturation LESS THAN 90%, C. Sedation score of 6  ondansetron Injectable 4 milliGRAM(s) IV Push every 6 hours PRN Nausea      OBJECTIVE:    Sedation Score:	[ X] Alert	[ ] Drowsy 	[ ] Arousable	[ ] Asleep	[ ] Unresponsive    Side Effects:	[X ] None	[ ] Nausea	[ ] Vomiting	[ ] Pruritus  		[ ] Other:    Vital Signs Last 24 Hrs  T(C): 37 (04 May 2019 14:03), Max: 37 (04 May 2019 14:03)  T(F): 98.6 (04 May 2019 14:03), Max: 98.6 (04 May 2019 14:03)  HR: 98 (04 May 2019 14:03) (95 - 108)  BP: 107/74 (04 May 2019 14:03) (98/66 - 113/78)  BP(mean): --  RR: 18 (04 May 2019 14:03) (16 - 18)  SpO2: 98% (04 May 2019 14:03) (98% - 100%)    ASSESSMENT/ PLAN    Therapy to  be:	[ X] Continue   [ ] Discontinued   [ ] Change to prn Analgesics    Documentation and Verification of current medications:   [X] Done	[ ] Not done, not elligible    Comments: NGT    Progress Note written now but Patient was seen earlier.

## 2019-05-04 NOTE — PROVIDER CONTACT NOTE (OTHER) - ASSESSMENT
Pt resting in bed. Pain alleviated with PCA bolus. Tachycardia noted pt has no c/o chest pain. No s/s of fever.

## 2019-05-05 LAB
ANION GAP SERPL CALC-SCNC: 15 MMO/L — HIGH (ref 7–14)
BUN SERPL-MCNC: 7 MG/DL — SIGNIFICANT CHANGE UP (ref 7–23)
CALCIUM SERPL-MCNC: 7.4 MG/DL — LOW (ref 8.4–10.5)
CHLORIDE SERPL-SCNC: 99 MMOL/L — SIGNIFICANT CHANGE UP (ref 98–107)
CO2 SERPL-SCNC: 21 MMOL/L — LOW (ref 22–31)
CREAT SERPL-MCNC: 0.47 MG/DL — LOW (ref 0.5–1.3)
GLUCOSE SERPL-MCNC: 101 MG/DL — HIGH (ref 70–99)
HCT VFR BLD CALC: 34.3 % — LOW (ref 34.5–45)
HGB BLD-MCNC: 11.1 G/DL — LOW (ref 11.5–15.5)
MAGNESIUM SERPL-MCNC: 1.8 MG/DL — SIGNIFICANT CHANGE UP (ref 1.6–2.6)
MCHC RBC-ENTMCNC: 26.9 PG — LOW (ref 27–34)
MCHC RBC-ENTMCNC: 32.4 % — SIGNIFICANT CHANGE UP (ref 32–36)
MCV RBC AUTO: 83.3 FL — SIGNIFICANT CHANGE UP (ref 80–100)
NRBC # FLD: 0 K/UL — SIGNIFICANT CHANGE UP (ref 0–0)
PHOSPHATE SERPL-MCNC: 2.3 MG/DL — LOW (ref 2.5–4.5)
PLATELET # BLD AUTO: 295 K/UL — SIGNIFICANT CHANGE UP (ref 150–400)
PMV BLD: 9.1 FL — SIGNIFICANT CHANGE UP (ref 7–13)
POTASSIUM SERPL-MCNC: 3.6 MMOL/L — SIGNIFICANT CHANGE UP (ref 3.5–5.3)
POTASSIUM SERPL-SCNC: 3.6 MMOL/L — SIGNIFICANT CHANGE UP (ref 3.5–5.3)
RBC # BLD: 4.12 M/UL — SIGNIFICANT CHANGE UP (ref 3.8–5.2)
RBC # FLD: 14.6 % — HIGH (ref 10.3–14.5)
SODIUM SERPL-SCNC: 135 MMOL/L — SIGNIFICANT CHANGE UP (ref 135–145)
WBC # BLD: 5.08 K/UL — SIGNIFICANT CHANGE UP (ref 3.8–10.5)
WBC # FLD AUTO: 5.08 K/UL — SIGNIFICANT CHANGE UP (ref 3.8–10.5)

## 2019-05-05 PROCEDURE — 74018 RADEX ABDOMEN 1 VIEW: CPT | Mod: 26

## 2019-05-05 PROCEDURE — 93010 ELECTROCARDIOGRAM REPORT: CPT

## 2019-05-05 PROCEDURE — 71275 CT ANGIOGRAPHY CHEST: CPT | Mod: 26

## 2019-05-05 RX ORDER — POTASSIUM PHOSPHATE, MONOBASIC POTASSIUM PHOSPHATE, DIBASIC 236; 224 MG/ML; MG/ML
15 INJECTION, SOLUTION INTRAVENOUS ONCE
Qty: 0 | Refills: 0 | Status: COMPLETED | OUTPATIENT
Start: 2019-05-05 | End: 2019-05-05

## 2019-05-05 RX ORDER — POTASSIUM PHOSPHATE, MONOBASIC POTASSIUM PHOSPHATE, DIBASIC 236; 224 MG/ML; MG/ML
30 INJECTION, SOLUTION INTRAVENOUS ONCE
Qty: 0 | Refills: 0 | Status: DISCONTINUED | OUTPATIENT
Start: 2019-05-05 | End: 2019-05-05

## 2019-05-05 RX ORDER — SODIUM CHLORIDE 9 MG/ML
1000 INJECTION, SOLUTION INTRAVENOUS ONCE
Qty: 0 | Refills: 0 | Status: COMPLETED | OUTPATIENT
Start: 2019-05-05 | End: 2019-05-05

## 2019-05-05 RX ADMIN — Medication 400 MILLIGRAM(S): at 01:05

## 2019-05-05 RX ADMIN — ENOXAPARIN SODIUM 70 MILLIGRAM(S): 100 INJECTION SUBCUTANEOUS at 05:35

## 2019-05-05 RX ADMIN — ENOXAPARIN SODIUM 70 MILLIGRAM(S): 100 INJECTION SUBCUTANEOUS at 17:36

## 2019-05-05 RX ADMIN — PANTOPRAZOLE SODIUM 40 MILLIGRAM(S): 20 TABLET, DELAYED RELEASE ORAL at 13:37

## 2019-05-05 RX ADMIN — Medication 1000 MILLIGRAM(S): at 07:12

## 2019-05-05 RX ADMIN — SODIUM CHLORIDE 1000 MILLILITER(S): 9 INJECTION, SOLUTION INTRAVENOUS at 08:42

## 2019-05-05 RX ADMIN — POTASSIUM PHOSPHATE, MONOBASIC POTASSIUM PHOSPHATE, DIBASIC 62.5 MILLIMOLE(S): 236; 224 INJECTION, SOLUTION INTRAVENOUS at 12:18

## 2019-05-05 RX ADMIN — Medication 400 MILLIGRAM(S): at 13:37

## 2019-05-05 RX ADMIN — Medication 1000 MILLIGRAM(S): at 01:35

## 2019-05-05 RX ADMIN — Medication 400 MILLIGRAM(S): at 06:42

## 2019-05-05 RX ADMIN — HYDROMORPHONE HYDROCHLORIDE 30 MILLILITER(S): 2 INJECTION INTRAMUSCULAR; INTRAVENOUS; SUBCUTANEOUS at 08:14

## 2019-05-05 RX ADMIN — DEXTROSE MONOHYDRATE, SODIUM CHLORIDE, AND POTASSIUM CHLORIDE 100 MILLILITER(S): 50; .745; 4.5 INJECTION, SOLUTION INTRAVENOUS at 08:43

## 2019-05-05 RX ADMIN — HYDROMORPHONE HYDROCHLORIDE 30 MILLILITER(S): 2 INJECTION INTRAMUSCULAR; INTRAVENOUS; SUBCUTANEOUS at 20:15

## 2019-05-05 RX ADMIN — Medication 1000 MILLIGRAM(S): at 14:15

## 2019-05-05 NOTE — PROVIDER CONTACT NOTE (OTHER) - ASSESSMENT
Pt resting in bed. No c/o chest pain. MEWS is 7 as warned by sunrise. Assistant director of nursing notified and MD aware.

## 2019-05-05 NOTE — PROGRESS NOTE ADULT - ASSESSMENT
53y female with recurrent SBO s/p Diagnostic laparoscopy, exploratory laparotomy, lysis of adhesions, small bowel resection, rigid sigmoidoscopy    - Tachycardia, EKG showed normal sinus rhythm.  Tachycardia unchanged after 1L fluid bolus.  Plan for CTA Chest and CT Abd/Pelvis to r/o PE and evaluate anastamosis  - pain control: PCA and IV Tylenol   - NPO/NG tube  - IVF  - Continue therapeutic lovenox  - continue to monitor GIF    B surgery  [id number]

## 2019-05-05 NOTE — PROVIDER CONTACT NOTE (OTHER) - ACTION/TREATMENT ORDERED:
md made aware, patient has been tachy all day and an EKG/angio of chest was done, will continue to monitor

## 2019-05-05 NOTE — PROGRESS NOTE ADULT - SUBJECTIVE AND OBJECTIVE BOX
Anesthesia Pain Management Service    SUBJECTIVE: Patient is doing well with IV PCA and no significant problems reported.    Pain Scale Score	At rest: ___ 	With Activity: ___ 	[X ] Refer to charted pain scores    THERAPY:    [ ] IV PCA Morphine		[ ] 5 mg/mL	[ ] 1 mg/mL  [X ] IV PCA Hydromorphone	[ ] 5 mg/mL	[X ] 1 mg/mL  [ ] IV PCA Fentanyl		[ ] 50 micrograms/mL    Demand dose __0.25_ lockout __6_ (minutes) Continuous Rate _0__ Total: __11_  mg used (in past 24 hours)      MEDICATIONS  (STANDING):  acetaminophen  IVPB .. 1000 milliGRAM(s) IV Intermittent once  dextrose 5% + sodium chloride 0.45% with potassium chloride 20 mEq/L 1000 milliLiter(s) (100 mL/Hr) IV Continuous <Continuous>  enoxaparin Injectable 70 milliGRAM(s) SubCutaneous two times a day  gabapentin 100 milliGRAM(s) Oral three times a day  HYDROmorphone PCA (1 mG/mL) 30 milliLiter(s) PCA Continuous PCA Continuous  pantoprazole  Injectable 40 milliGRAM(s) IV Push daily    MEDICATIONS  (PRN):  HYDROmorphone PCA (1 mG/mL) Rescue Clinician Bolus 0.5 milliGRAM(s) IV Push every 15 minutes PRN for Pain Scale GREATER THAN 6  naloxone Injectable 0.1 milliGRAM(s) IV Push every 3 minutes PRN For ANY of the following changes in patient status:  A. RR LESS THAN 10 breaths per minute, B. Oxygen saturation LESS THAN 90%, C. Sedation score of 6  ondansetron Injectable 4 milliGRAM(s) IV Push every 6 hours PRN Nausea      OBJECTIVE:    Sedation Score:	[ X] Alert	[ ] Drowsy 	[ ] Arousable	[ ] Asleep	[ ] Unresponsive    Side Effects:	[X ] None	[ ] Nausea	[ ] Vomiting	[ ] Pruritus  		[ ] Other:    Vital Signs Last 24 Hrs  T(C): 37.1 (05 May 2019 09:16), Max: 37.6 (04 May 2019 23:05)  T(F): 98.7 (05 May 2019 09:16), Max: 99.6 (04 May 2019 23:05)  HR: 118 (05 May 2019 09:16) (92 - 134)  BP: 114/67 (05 May 2019 09:16) (107/74 - 148/98)  BP(mean): --  RR: 22 (05 May 2019 09:16) (16 - 32)  SpO2: 96% (05 May 2019 09:16) (95% - 98%)    ASSESSMENT/ PLAN    Therapy to  be:	[ X] Continue   [ ] Discontinued   [ ] Change to prn Analgesics    Documentation and Verification of current medications:   [X] Done	[ ] Not done, not elligible    Comments: Pt has some opioid tolerance as she admits to taking Oxycodone 10mg qD preop for at least a week. NGT still in place.    Progress Note written now but Patient was seen earlier.

## 2019-05-05 NOTE — PROGRESS NOTE ADULT - SUBJECTIVE AND OBJECTIVE BOX
SURGERY Progress Note  ROHITH MELLO    S: Patient seen at bedside, she remains persistently tachycardic (120-130s) despite 1L fluid bolus this morning.  EKG overnight showed normal sinus rhythm.   NGT shown to be too far, moved back.    She denies any nausea/vomiting.  Endorses leg pain.  Denies any chest pain or difficulties breathing.     O:  Vital Signs Last 24 Hrs  T(C): 37.1 (05 May 2019 09:16), Max: 37.6 (04 May 2019 23:05)  T(F): 98.7 (05 May 2019 09:16), Max: 99.6 (04 May 2019 23:05)  HR: 118 (05 May 2019 09:16) (92 - 134)  BP: 114/67 (05 May 2019 09:16) (107/74 - 148/98)  BP(mean): --  RR: 22 (05 May 2019 09:16) (16 - 32)  SpO2: 96% (05 May 2019 09:16) (95% - 98%)    05-04-19 @ 07:01  -  05-05-19 @ 07:00  --------------------------------------------------------  IN: 2300 mL / OUT: 1150 mL / NET: 1150 mL    05-05-19 @ 07:01  -  05-05-19 @ 11:48  --------------------------------------------------------  IN: 0 mL / OUT: 400 mL / NET: -400 mL        Physical Exam:  Gen: NAD  Neuro: Follows commands  Resp: No acute respiratory distress, normal effort  Gastrointestinal: obese, soft, appropriately tender, nondistended, dressing c/d/i, NGT with bilious output    Labs:     CBC (05-05 @ 02:10)                              11.1<L>                         5.08    )----------------(  295        --    % Neutrophils, --    % Lymphocytes, ANC: --                                  34.3<L>              CBC (05-04 @ 06:00)                              10.8<L>                         16.39<H>  )----------------(  302        --    % Neutrophils, --    % Lymphocytes, ANC: --                                  33.5<L>                BMP (05-05 @ 02:06)             135     |  99      |  7     		Ca++ --      Ca 7.4<L>             ---------------------------------( 101<H>		Mg 1.8                3.6     |  21<L>   |  0.47<L>			Ph 2.3<L>  BMP (05-04 @ 06:00)             135     |  99      |  9     		Ca++ --      Ca 8.0<L>             ---------------------------------( 105<H>		Mg 1.8                4.1     |  26      |  0.58  			Ph 2.9

## 2019-05-05 NOTE — PROGRESS NOTE ADULT - ATTENDING COMMENTS
I saw and examined the patient and agree with the above note.    Pt with resting HR of 120's, sinus, stable BP and afebrile. Complains of normal post-op pain as well as chronic right leg pain from DVT.   Incision appears noninfected  Currently anticoagulated for DVT and a CTPA would not be needed in a stable situation to r/o a PE  Afebrile, will hold off on infectious work up  Volume resuscitate   Recheck labs  Will re-scan early this week If HR is persistently elevated     I spent 25min reviewing history, data, images and in discussion/coordination of care. Greater than 50% of my time was spent in face-to-face discussion with the patient regarding the above differential and plans going forward.     Bonifacio White MD (Cell: [phone])  Acute and Critical Care Surgery    The Acute Care Surgery (B Team) Attending Group Practice:  Dr. Leonardo Celeste, Dr. Imtiaz Skinner, Dr. Callie Finch, Dr. Bonifacio White    Urgent issues - spectra [phone] or [phone]  Nonurgent issues - [phone]  Patient appointments or after hours - [phone]

## 2019-05-06 LAB
ANION GAP SERPL CALC-SCNC: 11 MMO/L — SIGNIFICANT CHANGE UP (ref 7–14)
BUN SERPL-MCNC: 10 MG/DL — SIGNIFICANT CHANGE UP (ref 7–23)
CALCIUM SERPL-MCNC: 7 MG/DL — LOW (ref 8.4–10.5)
CHLORIDE SERPL-SCNC: 105 MMOL/L — SIGNIFICANT CHANGE UP (ref 98–107)
CO2 SERPL-SCNC: 22 MMOL/L — SIGNIFICANT CHANGE UP (ref 22–31)
CREAT SERPL-MCNC: 0.47 MG/DL — LOW (ref 0.5–1.3)
FACT X ACT/NOR PPP: 25.5 % — LOW (ref 70–150)
GLUCOSE BLDC GLUCOMTR-MCNC: 128 MG/DL — HIGH (ref 70–99)
GLUCOSE SERPL-MCNC: 111 MG/DL — HIGH (ref 70–99)
HCT VFR BLD CALC: 33.3 % — LOW (ref 34.5–45)
HGB BLD-MCNC: 10.6 G/DL — LOW (ref 11.5–15.5)
MAGNESIUM SERPL-MCNC: 1.9 MG/DL — SIGNIFICANT CHANGE UP (ref 1.6–2.6)
MCHC RBC-ENTMCNC: 26.4 PG — LOW (ref 27–34)
MCHC RBC-ENTMCNC: 31.8 % — LOW (ref 32–36)
MCV RBC AUTO: 83 FL — SIGNIFICANT CHANGE UP (ref 80–100)
NRBC # FLD: 0 K/UL — SIGNIFICANT CHANGE UP (ref 0–0)
PHOSPHATE SERPL-MCNC: 2.1 MG/DL — LOW (ref 2.5–4.5)
PLATELET # BLD AUTO: 285 K/UL — SIGNIFICANT CHANGE UP (ref 150–400)
PMV BLD: 9.2 FL — SIGNIFICANT CHANGE UP (ref 7–13)
POTASSIUM SERPL-MCNC: 4.1 MMOL/L — SIGNIFICANT CHANGE UP (ref 3.5–5.3)
POTASSIUM SERPL-SCNC: 4.1 MMOL/L — SIGNIFICANT CHANGE UP (ref 3.5–5.3)
RBC # BLD: 4.01 M/UL — SIGNIFICANT CHANGE UP (ref 3.8–5.2)
RBC # FLD: 14.6 % — HIGH (ref 10.3–14.5)
SODIUM SERPL-SCNC: 138 MMOL/L — SIGNIFICANT CHANGE UP (ref 135–145)
TSH SERPL-MCNC: 1.49 UIU/ML — SIGNIFICANT CHANGE UP (ref 0.27–4.2)
WBC # BLD: 10.05 K/UL — SIGNIFICANT CHANGE UP (ref 3.8–10.5)
WBC # FLD AUTO: 10.05 K/UL — SIGNIFICANT CHANGE UP (ref 3.8–10.5)

## 2019-05-06 PROCEDURE — 99233 SBSQ HOSP IP/OBS HIGH 50: CPT

## 2019-05-06 PROCEDURE — 36573 INSJ PICC RS&I 5 YR+: CPT

## 2019-05-06 PROCEDURE — 74177 CT ABD & PELVIS W/CONTRAST: CPT | Mod: 26

## 2019-05-06 RX ORDER — ACETAMINOPHEN 500 MG
1000 TABLET ORAL ONCE
Qty: 0 | Refills: 0 | Status: COMPLETED | OUTPATIENT
Start: 2019-05-06 | End: 2019-05-06

## 2019-05-06 RX ORDER — I.V. FAT EMULSION 20 G/100ML
7.5 EMULSION INTRAVENOUS
Qty: 18 | Refills: 0 | Status: DISCONTINUED | OUTPATIENT
Start: 2019-05-06 | End: 2019-05-08

## 2019-05-06 RX ORDER — CHLORHEXIDINE GLUCONATE 213 G/1000ML
1 SOLUTION TOPICAL
Qty: 0 | Refills: 0 | Status: DISCONTINUED | OUTPATIENT
Start: 2019-05-06 | End: 2019-05-12

## 2019-05-06 RX ORDER — ELECTROLYTE SOLUTION,INJ
1 VIAL (ML) INTRAVENOUS
Qty: 0 | Refills: 0 | Status: DISCONTINUED | OUTPATIENT
Start: 2019-05-06 | End: 2019-05-06

## 2019-05-06 RX ADMIN — I.V. FAT EMULSION 7.5 ML/HR: 20 EMULSION INTRAVENOUS at 18:07

## 2019-05-06 RX ADMIN — Medication 1000 MILLIGRAM(S): at 19:00

## 2019-05-06 RX ADMIN — ENOXAPARIN SODIUM 70 MILLIGRAM(S): 100 INJECTION SUBCUTANEOUS at 05:39

## 2019-05-06 RX ADMIN — Medication 63.75 MILLIMOLE(S): at 09:58

## 2019-05-06 RX ADMIN — HYDROMORPHONE HYDROCHLORIDE 30 MILLILITER(S): 2 INJECTION INTRAMUSCULAR; INTRAVENOUS; SUBCUTANEOUS at 19:59

## 2019-05-06 RX ADMIN — Medication 1 EACH: at 18:04

## 2019-05-06 RX ADMIN — DEXTROSE MONOHYDRATE, SODIUM CHLORIDE, AND POTASSIUM CHLORIDE 100 MILLILITER(S): 50; .745; 4.5 INJECTION, SOLUTION INTRAVENOUS at 09:59

## 2019-05-06 RX ADMIN — ENOXAPARIN SODIUM 70 MILLIGRAM(S): 100 INJECTION SUBCUTANEOUS at 17:40

## 2019-05-06 RX ADMIN — Medication 1000 MILLIGRAM(S): at 13:00

## 2019-05-06 RX ADMIN — GABAPENTIN 100 MILLIGRAM(S): 400 CAPSULE ORAL at 22:03

## 2019-05-06 RX ADMIN — Medication 1000 MILLIGRAM(S): at 22:33

## 2019-05-06 RX ADMIN — PANTOPRAZOLE SODIUM 40 MILLIGRAM(S): 20 TABLET, DELAYED RELEASE ORAL at 15:56

## 2019-05-06 RX ADMIN — Medication 400 MILLIGRAM(S): at 12:39

## 2019-05-06 RX ADMIN — HYDROMORPHONE HYDROCHLORIDE 30 MILLILITER(S): 2 INJECTION INTRAMUSCULAR; INTRAVENOUS; SUBCUTANEOUS at 08:01

## 2019-05-06 RX ADMIN — Medication 400 MILLIGRAM(S): at 18:24

## 2019-05-06 RX ADMIN — Medication 400 MILLIGRAM(S): at 22:03

## 2019-05-06 NOTE — PROGRESS NOTE ADULT - SUBJECTIVE AND OBJECTIVE BOX
SURGERY DAILY PROGRESS NOTE:       SUBJECTIVE/ROS: Patient examined at bedside. Tachy, HDS. CT negative for PE, CT abd/pelv negative for leaks.          MEDICATIONS  (STANDING):  acetaminophen  IVPB .. 1000 milliGRAM(s) IV Intermittent once  acetaminophen  IVPB .. 1000 milliGRAM(s) IV Intermittent once  dextrose 5% + sodium chloride 0.45% with potassium chloride 20 mEq/L 1000 milliLiter(s) (100 mL/Hr) IV Continuous <Continuous>  enoxaparin Injectable 70 milliGRAM(s) SubCutaneous two times a day  fat emulsion (Fish Oil and Plant Based) 20% Infusion 7.5 mL/Hr (7.5 mL/Hr) IV Continuous <Continuous>  gabapentin 100 milliGRAM(s) Oral three times a day  HYDROmorphone PCA (1 mG/mL) 30 milliLiter(s) PCA Continuous PCA Continuous  methimazole 5 milliGRAM(s) Oral daily  pantoprazole  Injectable 40 milliGRAM(s) IV Push daily  Parenteral Nutrition - Adult 1 Each (42 mL/Hr) TPN Continuous <Continuous>    MEDICATIONS  (PRN):  HYDROmorphone PCA (1 mG/mL) Rescue Clinician Bolus 0.5 milliGRAM(s) IV Push every 15 minutes PRN for Pain Scale GREATER THAN 6  naloxone Injectable 0.1 milliGRAM(s) IV Push every 3 minutes PRN For ANY of the following changes in patient status:  A. RR LESS THAN 10 breaths per minute, B. Oxygen saturation LESS THAN 90%, C. Sedation score of 6  ondansetron Injectable 4 milliGRAM(s) IV Push every 6 hours PRN Nausea      OBJECTIVE:    Vital Signs Last 24 Hrs  T(C): 37.4 (06 May 2019 09:35), Max: 37.6 (06 May 2019 06:00)  T(F): 99.4 (06 May 2019 09:35), Max: 99.6 (06 May 2019 06:00)  HR: 127 (06 May 2019 09:35) (117 - 130)  BP: 110/75 (06 May 2019 09:35) (110/75 - 119/79)  BP(mean): --  RR: 18 (06 May 2019 09:35) (18 - 22)  SpO2: 100% (06 May 2019 09:35) (98% - 100%)        I&O's Detail    05 May 2019 07:01  -  06 May 2019 07:00  --------------------------------------------------------  IN:    dextrose 5% + sodium chloride 0.45% with potassium chloride 20 mEq/L: 2200 mL    IV PiggyBack: 1250 mL  Total IN: 3450 mL    OUT:    Voided: 1730 mL  Total OUT: 1730 mL    Total NET: 1720 mL      06 May 2019 07:01  -  06 May 2019 17:59  --------------------------------------------------------  IN:    dextrose 5% + sodium chloride 0.45% with potassium chloride 20 mEq/L: 200 mL    IV PiggyBack: 100 mL  Total IN: 300 mL    OUT:    Voided: 350 mL  Total OUT: 350 mL    Total NET: -50 mL          Daily     Daily     LABS:                        10.6   10.05 )-----------( 285      ( 06 May 2019 07:05 )             33.3     05-06    138  |  105  |  10  ----------------------------<  111<H>  4.1   |  22  |  0.47<L>    Ca    7.0<L>      06 May 2019 07:05  Phos  2.1     05-06  Mg     1.9     05-06                  Physical Exam:  Gen: NAD  Neuro: Follows commands  Resp: No acute respiratory distress, normal effort  Gastrointestinal: obese, soft, appropriately tender, nondistended, dressing c/d/i, NGT with bilious output

## 2019-05-06 NOTE — CHART NOTE - NSCHARTNOTEFT_GEN_A_CORE
POST-PROCEDURE NOTE:    Procedure: PICC in left basilic vein, placement by IR    Subjective: Pt doing OK. Pain "a little better". -gas/-BM.    Vital Signs Last 24 Hrs  T(C): 36.8 (06 May 2019 22:15), Max: 37.6 (06 May 2019 06:00)  T(F): 98.2 (06 May 2019 22:15), Max: 99.6 (06 May 2019 06:00)  HR: 100 (06 May 2019 22:15) (96 - 130)  BP: 110/70 (06 May 2019 22:15) (110/70 - 115/78)  BP(mean): --  RR: 20 (06 May 2019 22:15) (18 - 22)  SpO2: 100% (06 May 2019 22:15) (100% - 100%)  I&O's Summary    05 May 2019 07:01  -  06 May 2019 07:00  --------------------------------------------------------  IN: 3450 mL / OUT: 1730 mL / NET: 1720 mL    06 May 2019 07:01  -  07 May 2019 01:22  --------------------------------------------------------  IN: 500 mL / OUT: 1350 mL / NET: -850 mL                            10.6   10.05 )-----------( 285      ( 06 May 2019 07:05 )             33.3     05-06    138  |  105  |  10  ----------------------------<  111<H>  4.1   |  22  |  0.47<L>    Ca    7.0<L>      06 May 2019 07:05  Phos  2.1     05-06  Mg     1.9     05-06         PHYSICAL EXAM:  Gen: in NAD, NGT to LCWS with bilious output  Resp: breathing easily, no stridor  CV: no peripheral edema/cyanosis  Abd: soft, +tender on left side of incision, obese abdomen, soft, nondistended, dressing c/d/i    ASSESSMENT/PLAN:  53y female with recurrent SBO s/p diagnostic laparoscopy, exploratory laparotomy, lysis of adhesions, small bowel resection, rigid sigmoidoscopy on 5/2. S/p PICC placement, start of TPN on 5/6.    - Pain control: PCA and IV Tylenol.  - NPO / NGT to LCWS.  - Start TPN.  - Continue therapeutic lovenox.  - Continue to monitor GIF.    B surgery  [id number]

## 2019-05-06 NOTE — PROGRESS NOTE ADULT - SUBJECTIVE AND OBJECTIVE BOX
Anesthesia Pain Management Service- Attending Addendum    SUBJECTIVE: Pt doing well with IV PCA without problems reported.    Therapy:	  [ X] IV PCA	   [ ] Epidural           [ ] s/p Spinal Opoid              [ ] Postpartum infusion	  [ ] Patient controlled regional anesthesia (PCRA)    [ ] prn Analgesics    Allergies    No Known Allergies    Intolerances      MEDICATIONS  (STANDING):  acetaminophen  IVPB .. 1000 milliGRAM(s) IV Intermittent once  acetaminophen  IVPB .. 1000 milliGRAM(s) IV Intermittent once  dextrose 5% + sodium chloride 0.45% with potassium chloride 20 mEq/L 1000 milliLiter(s) (100 mL/Hr) IV Continuous <Continuous>  enoxaparin Injectable 70 milliGRAM(s) SubCutaneous two times a day  fat emulsion (Fish Oil and Plant Based) 20% Infusion 7.5 mL/Hr (7.5 mL/Hr) IV Continuous <Continuous>  gabapentin 100 milliGRAM(s) Oral three times a day  HYDROmorphone PCA (1 mG/mL) 30 milliLiter(s) PCA Continuous PCA Continuous  methimazole 5 milliGRAM(s) Oral daily  pantoprazole  Injectable 40 milliGRAM(s) IV Push daily  Parenteral Nutrition - Adult 1 Each (42 mL/Hr) TPN Continuous <Continuous>    MEDICATIONS  (PRN):  HYDROmorphone PCA (1 mG/mL) Rescue Clinician Bolus 0.5 milliGRAM(s) IV Push every 15 minutes PRN for Pain Scale GREATER THAN 6  naloxone Injectable 0.1 milliGRAM(s) IV Push every 3 minutes PRN For ANY of the following changes in patient status:  A. RR LESS THAN 10 breaths per minute, B. Oxygen saturation LESS THAN 90%, C. Sedation score of 6  ondansetron Injectable 4 milliGRAM(s) IV Push every 6 hours PRN Nausea      OBJECTIVE:   [X] No new signs     [ ] Other:    Side Effects:  [X ] None			[ ] Other:    Assessment of Catheter Site:		[ ] Intact		[ ] Other:    ASSESSMENT/PLAN  [ X] Continue current therapy    [ ] Therapy changed to:    [ ] IV PCA       [ ] Epidural     [ ] prn Analgesics     Comments:

## 2019-05-06 NOTE — PROGRESS NOTE ADULT - ATTENDING COMMENTS
53y female with recurrent SBO s/p Diagnostic laparoscopy, exploratory laparotomy, lysis of adhesions, small bowel resection, rigid sigmoidoscopy. Patient with prolonged NPO status, meets criteria for severe protein calorie malnutrition requiring TPN for nutritional support.     PICC placement planned for today     will start TPN/lipids today with half calories and volume, plan to increase to full volume and calories tomorrow    monitor fingersticks, obtain daily weights\    will follow up with primary team on plan    1. Protein calorie malnutrition being optimized with TPN: CHO [210 ] gm.  AA [85 ] gm. SMOF Lipids [ 35] gm.  2.  Hyperglycemia managed with: [0] units of regular insulin    3.  Check fluid balance daily.  Strict I/O  [ ] [ ]   4.  Daily BMP, Ionized Calcium, Magnesium and Phosphorous   5.  Triglycerides at initiation of TPN and monthly [ ] [ ]   6.  Pepcid in TPN for Gi prophylaxis  [ ]   I have seen and examined the patient, reviewed the laboratory and radiologic data and agree with the history, physical assessment and plan.  I reviewed and discussed with all consultants, house staff and PA's.  The note is edited where appropriate. The Nutrition Support Team (NST) discusses on an ongoing basis with the primary team and all consultants, House staff and PA's to have a permanent risk benefit analyses on all decisions.    I spent  45  minutes in total encounter; more than 50% of the visit counseling and coordinating nutrition care while providing diagnoses, assessment, and plan.

## 2019-05-06 NOTE — CHART NOTE - NSCHARTNOTEFT_GEN_A_CORE
Pre Interventional Radiology Procedure Note  Patient Age: 53y    Patient Gender: Female    Procedure (including site / side if known): PICC    Diagnosis / Indication: SBO, s/p ex-lap, SBR, needs TPN for nutrition    Pertinent Medical History:    PAST MEDICAL & SURGICAL HISTORY:  DVT, lower extremity: RLE, dx&#x27;d April 2019  SBO (small bowel obstruction)  Hemorrhoid  Cervical cancer  Colon neoplasm: s/p LAR  Hyperthyroidism  S/P colon resection  H/O exploratory laparotomy: 1/6/17, with LAR    Pertinent Labs:                            10.6   10.05 )-----------( 285      ( 06 May 2019 07:05 )             33.3       05-06    138  |  105  |  10  ----------------------------<  111<H>  4.1   |  22  |  0.47<L>    Ca    7.0<L>      06 May 2019 07:05  Phos  2.1     05-06  Mg     1.9     05-06            Patient and Family aware:   [x]Y   [  ]N

## 2019-05-06 NOTE — PROGRESS NOTE ADULT - SUBJECTIVE AND OBJECTIVE BOX
Anesthesia Pain Management Service    SUBJECTIVE: Patient is doing well with IV PCA and no significant problems reported.  Patient says she feels so much better than yesterday, and pain pump helps.    Pain Scale Score	At rest: __5/10_ 	With Activity: ___ 	[X ] Refer to charted pain scores    THERAPY:    [ ] IV PCA Morphine		[ ] 5 mg/mL	[ ] 1 mg/mL  [X ] IV PCA Hydromorphone	[ ] 5 mg/mL	[X ] 1 mg/mL  [ ] IV PCA Fentanyl		[ ] 50 micrograms/mL    Demand dose __0.2_ lockout __6_ (minutes) Continuous Rate _0__ Total: _7__  mg used (in past 24 hours)      MEDICATIONS  (STANDING):  acetaminophen  IVPB .. 1000 milliGRAM(s) IV Intermittent once  acetaminophen  IVPB .. 1000 milliGRAM(s) IV Intermittent once  acetaminophen  IVPB .. 1000 milliGRAM(s) IV Intermittent once  dextrose 5% + sodium chloride 0.45% with potassium chloride 20 mEq/L 1000 milliLiter(s) (100 mL/Hr) IV Continuous <Continuous>  enoxaparin Injectable 70 milliGRAM(s) SubCutaneous two times a day  gabapentin 100 milliGRAM(s) Oral three times a day  HYDROmorphone PCA (1 mG/mL) 30 milliLiter(s) PCA Continuous PCA Continuous  methimazole 5 milliGRAM(s) Oral daily  pantoprazole  Injectable 40 milliGRAM(s) IV Push daily  sodium phosphate IVPB 15 milliMole(s) IV Intermittent once    MEDICATIONS  (PRN):  HYDROmorphone PCA (1 mG/mL) Rescue Clinician Bolus 0.5 milliGRAM(s) IV Push every 15 minutes PRN for Pain Scale GREATER THAN 6  naloxone Injectable 0.1 milliGRAM(s) IV Push every 3 minutes PRN For ANY of the following changes in patient status:  A. RR LESS THAN 10 breaths per minute, B. Oxygen saturation LESS THAN 90%, C. Sedation score of 6  ondansetron Injectable 4 milliGRAM(s) IV Push every 6 hours PRN Nausea      OBJECTIVE:  Patient is lying in bed,  NPO with NGT.     Sedation Score:	[ X] Alert	[ ] Drowsy 	[ ] Arousable	[ ] Asleep	[ ] Unresponsive    Side Effects:	[X ] None	[ ] Nausea	[ ] Vomiting	[ ] Pruritus  		[ ] Other:    Vital Signs Last 24 Hrs  T(C): 37.4 (06 May 2019 09:35), Max: 37.6 (06 May 2019 06:00)  T(F): 99.4 (06 May 2019 09:35), Max: 99.6 (06 May 2019 06:00)  HR: 127 (06 May 2019 09:35) (116 - 130)  BP: 110/75 (06 May 2019 09:35) (110/75 - 119/79)  BP(mean): --  RR: 18 (06 May 2019 09:35) (18 - 23)  SpO2: 100% (06 May 2019 09:35) (97% - 100%)    ASSESSMENT/ PLAN    Therapy to  be:	[ X] Continue   [ ] Discontinued   [ ] Change to prn Analgesics    Documentation and Verification of current medications:   [X] Done	[ ] Not done, not elligible    Comments: Continue current pain regimen, added IV Tylenol.

## 2019-05-06 NOTE — PROGRESS NOTE ADULT - ASSESSMENT
53y female with recurrent SBO s/p Diagnostic laparoscopy, exploratory laparotomy, lysis of adhesions, small bowel resection, rigid sigmoidoscopy    - pain control: PCA and IV Tylenol   - NPO/NG tube  - IVF  - Continue therapeutic lovenox  - continue to monitor GIF    B surgery  [id number]

## 2019-05-06 NOTE — PROGRESS NOTE ADULT - SUBJECTIVE AND OBJECTIVE BOX
NUTRITION NOTE  [id number] GEBORDE  ===============================    Interval events  Patient was seen and examined at bedside. PICC placement planned for today and plan to start TPN/lipids for nutritional support later today.  NGT remains in place, prolonged NPO     Vital Signs Last 24 Hrs  T(C): 37.4 (06 May 2019 09:35), Max: 37.6 (06 May 2019 06:00)  T(F): 99.4 (06 May 2019 09:35), Max: 99.6 (06 May 2019 06:00)  HR: 127 (06 May 2019 09:35) (116 - 130)  BP: 110/75 (06 May 2019 09:35) (110/75 - 119/79)  RR: 18 (06 May 2019 09:35) (18 - 23)  SpO2: 100% (06 May 2019 09:35) (97% - 100%)    MEDICATIONS  (STANDING):  acetaminophen  IVPB .. 1000 milliGRAM(s) IV Intermittent once  acetaminophen  IVPB .. 1000 milliGRAM(s) IV Intermittent once  acetaminophen  IVPB .. 1000 milliGRAM(s) IV Intermittent once  dextrose 5% + sodium chloride 0.45% with potassium chloride 20 mEq/L 1000 milliLiter(s) (100 mL/Hr) IV Continuous <Continuous>  enoxaparin Injectable 70 milliGRAM(s) SubCutaneous two times a day  fat emulsion (Fish Oil and Plant Based) 20% Infusion 7.5 mL/Hr (7.5 mL/Hr) IV Continuous <Continuous>  gabapentin 100 milliGRAM(s) Oral three times a day  HYDROmorphone PCA (1 mG/mL) 30 milliLiter(s) PCA Continuous PCA Continuous  methimazole 5 milliGRAM(s) Oral daily  pantoprazole  Injectable 40 milliGRAM(s) IV Push daily  Parenteral Nutrition - Adult 1 Each (42 mL/Hr) TPN Continuous <Continuous>    MEDICATIONS  (PRN):  HYDROmorphone PCA (1 mG/mL) Rescue Clinician Bolus 0.5 milliGRAM(s) IV Push every 15 minutes PRN for Pain Scale GREATER THAN 6  naloxone Injectable 0.1 milliGRAM(s) IV Push every 3 minutes PRN For ANY of the following changes in patient status:  A. RR LESS THAN 10 breaths per minute, B. Oxygen saturation LESS THAN 90%, C. Sedation score of 6  ondansetron Injectable 4 milliGRAM(s) IV Push every 6 hours PRN Nausea    I&O's Detail    05 May 2019 07:01  -  06 May 2019 07:00  --------------------------------------------------------  IN:    dextrose 5% + sodium chloride 0.45% with potassium chloride 20 mEq/L: 2200 mL    IV PiggyBack: 1250 mL  Total IN: 3450 mL    OUT:    Voided: 1730 mL  Total OUT: 1730 mL    Total NET: 1720 mL      06 May 2019 07:01  -  06 May 2019 12:05  --------------------------------------------------------  IN:  Total IN: 0 mL    OUT:  Total OUT: 0 mL    Total NET: 0 mL    PHYSICAL EXAM   General: NAD, resting comfortably   Pulm: nonlabored respirations   GI/Nutrition: soft, appropriately tender NGT in place   Extremity: warm     LABORATORY                        10.6   10.05 )-----------( 285      ( 06 May 2019 07:05 )             33.3     138  |  105  |  10  ----------------------------<  111<H>  4.1   |  22  |  0.47<L>    Ca    7.0<L>      06 May 2019 07:05  Phos  2.1     05-06  Mg     1.9     05-06    05-02 Chol -- LDL -- HDL -- Trig 122    Prealbumin 5/2/19: 10    Diet: NPO and TPN/lipids (starting today 5/6/19)    ASSESSMENT/PLAN:  53y female with recurrent SBO s/p Diagnostic laparoscopy, exploratory laparotomy, lysis of adhesions, small bowel resection, rigid sigmoidoscopy. Patient with prolonged NPO status, meets criteria for severe protein calorie malnutrition requiring TPN for nutritional support.     PICC placement planned for today     will start TPN/lipids today with half calories and volume, plan to increase to full volume and calories tomorrow    monitor fingersticks, obtain daily weights\    will follow up with primary team on plan    1. Protein calorie malnutrition being optimized with TPN: CHO [210 ] gm.  AA [85 ] gm. SMOF Lipids [ 35] gm.  2.  Hyperglycemia managed with: [0] units of regular insulin    3.  Check fluid balance daily.  Strict I/O  [ ] [ ]   4.  Daily BMP, Ionized Calcium, Magnesium and Phosphorous   5.  Triglycerides at initiation of TPN and monthly [ ] [ ]   6.  Pepcid in TPN for Gi prophylaxis  [ ]     Nutrition support pager [phone]

## 2019-05-07 LAB
ANION GAP SERPL CALC-SCNC: 10 MMO/L — SIGNIFICANT CHANGE UP (ref 7–14)
BUN SERPL-MCNC: 11 MG/DL — SIGNIFICANT CHANGE UP (ref 7–23)
CA-I BLD-SCNC: 0.98 MMOL/L — LOW (ref 1.03–1.23)
CALCIUM SERPL-MCNC: 6.9 MG/DL — LOW (ref 8.4–10.5)
CHLORIDE SERPL-SCNC: 102 MMOL/L — SIGNIFICANT CHANGE UP (ref 98–107)
CO2 SERPL-SCNC: 23 MMOL/L — SIGNIFICANT CHANGE UP (ref 22–31)
CREAT SERPL-MCNC: 0.45 MG/DL — LOW (ref 0.5–1.3)
GLUCOSE BLDC GLUCOMTR-MCNC: 102 MG/DL — HIGH (ref 70–99)
GLUCOSE BLDC GLUCOMTR-MCNC: 81 MG/DL — SIGNIFICANT CHANGE UP (ref 70–99)
GLUCOSE BLDC GLUCOMTR-MCNC: 86 MG/DL — SIGNIFICANT CHANGE UP (ref 70–99)
GLUCOSE BLDC GLUCOMTR-MCNC: 94 MG/DL — SIGNIFICANT CHANGE UP (ref 70–99)
GLUCOSE SERPL-MCNC: 102 MG/DL — HIGH (ref 70–99)
HCT VFR BLD CALC: 30.1 % — LOW (ref 34.5–45)
HGB BLD-MCNC: 9.3 G/DL — LOW (ref 11.5–15.5)
MAGNESIUM SERPL-MCNC: 2 MG/DL — SIGNIFICANT CHANGE UP (ref 1.6–2.6)
MCHC RBC-ENTMCNC: 26.6 PG — LOW (ref 27–34)
MCHC RBC-ENTMCNC: 30.9 % — LOW (ref 32–36)
MCV RBC AUTO: 86 FL — SIGNIFICANT CHANGE UP (ref 80–100)
NRBC # FLD: 0 K/UL — SIGNIFICANT CHANGE UP (ref 0–0)
PHOSPHATE SERPL-MCNC: 2 MG/DL — LOW (ref 2.5–4.5)
PLATELET # BLD AUTO: 204 K/UL — SIGNIFICANT CHANGE UP (ref 150–400)
PMV BLD: 9.4 FL — SIGNIFICANT CHANGE UP (ref 7–13)
POTASSIUM SERPL-MCNC: 3.5 MMOL/L — SIGNIFICANT CHANGE UP (ref 3.5–5.3)
POTASSIUM SERPL-SCNC: 3.5 MMOL/L — SIGNIFICANT CHANGE UP (ref 3.5–5.3)
RBC # BLD: 3.5 M/UL — LOW (ref 3.8–5.2)
RBC # FLD: 14.6 % — HIGH (ref 10.3–14.5)
SODIUM SERPL-SCNC: 135 MMOL/L — SIGNIFICANT CHANGE UP (ref 135–145)
T3 SERPL-MCNC: 54.9 NG/DL — LOW (ref 80–200)
T4 AB SER-ACNC: 4.14 UG/DL — LOW (ref 5.1–13)
TSH SERPL-MCNC: 1.75 UIU/ML — SIGNIFICANT CHANGE UP (ref 0.27–4.2)
WBC # BLD: 9.54 K/UL — SIGNIFICANT CHANGE UP (ref 3.8–10.5)
WBC # FLD AUTO: 9.54 K/UL — SIGNIFICANT CHANGE UP (ref 3.8–10.5)

## 2019-05-07 PROCEDURE — 99233 SBSQ HOSP IP/OBS HIGH 50: CPT

## 2019-05-07 RX ORDER — CALCIUM GLUCONATE 100 MG/ML
1 VIAL (ML) INTRAVENOUS ONCE
Qty: 0 | Refills: 0 | Status: DISCONTINUED | OUTPATIENT
Start: 2019-05-07 | End: 2019-05-07

## 2019-05-07 RX ORDER — ACETAMINOPHEN 500 MG
1000 TABLET ORAL ONCE
Qty: 0 | Refills: 0 | Status: COMPLETED | OUTPATIENT
Start: 2019-05-08 | End: 2019-05-08

## 2019-05-07 RX ORDER — I.V. FAT EMULSION 20 G/100ML
14.6 EMULSION INTRAVENOUS
Qty: 35 | Refills: 0 | Status: DISCONTINUED | OUTPATIENT
Start: 2019-05-07 | End: 2019-05-09

## 2019-05-07 RX ORDER — ACETAMINOPHEN 500 MG
1000 TABLET ORAL ONCE
Qty: 0 | Refills: 0 | Status: COMPLETED | OUTPATIENT
Start: 2019-05-07 | End: 2019-05-07

## 2019-05-07 RX ORDER — ELECTROLYTE SOLUTION,INJ
1 VIAL (ML) INTRAVENOUS
Qty: 0 | Refills: 0 | Status: DISCONTINUED | OUTPATIENT
Start: 2019-05-07 | End: 2019-05-07

## 2019-05-07 RX ORDER — POTASSIUM PHOSPHATE, MONOBASIC POTASSIUM PHOSPHATE, DIBASIC 236; 224 MG/ML; MG/ML
15 INJECTION, SOLUTION INTRAVENOUS ONCE
Qty: 0 | Refills: 0 | Status: DISCONTINUED | OUTPATIENT
Start: 2019-05-07 | End: 2019-05-07

## 2019-05-07 RX ORDER — CALCIUM GLUCONATE 100 MG/ML
1 VIAL (ML) INTRAVENOUS ONCE
Qty: 0 | Refills: 0 | Status: COMPLETED | OUTPATIENT
Start: 2019-05-07 | End: 2019-05-07

## 2019-05-07 RX ADMIN — HYDROMORPHONE HYDROCHLORIDE 30 MILLILITER(S): 2 INJECTION INTRAMUSCULAR; INTRAVENOUS; SUBCUTANEOUS at 20:33

## 2019-05-07 RX ADMIN — ENOXAPARIN SODIUM 70 MILLIGRAM(S): 100 INJECTION SUBCUTANEOUS at 19:05

## 2019-05-07 RX ADMIN — Medication 1000 MILLIGRAM(S): at 14:40

## 2019-05-07 RX ADMIN — PANTOPRAZOLE SODIUM 40 MILLIGRAM(S): 20 TABLET, DELAYED RELEASE ORAL at 14:00

## 2019-05-07 RX ADMIN — ENOXAPARIN SODIUM 70 MILLIGRAM(S): 100 INJECTION SUBCUTANEOUS at 06:57

## 2019-05-07 RX ADMIN — GABAPENTIN 100 MILLIGRAM(S): 400 CAPSULE ORAL at 06:58

## 2019-05-07 RX ADMIN — Medication 400 MILLIGRAM(S): at 14:07

## 2019-05-07 RX ADMIN — Medication 100 GRAM(S): at 19:04

## 2019-05-07 RX ADMIN — HYDROMORPHONE HYDROCHLORIDE 30 MILLILITER(S): 2 INJECTION INTRAMUSCULAR; INTRAVENOUS; SUBCUTANEOUS at 08:23

## 2019-05-07 RX ADMIN — Medication 400 MILLIGRAM(S): at 20:01

## 2019-05-07 RX ADMIN — HYDROMORPHONE HYDROCHLORIDE 30 MILLILITER(S): 2 INJECTION INTRAMUSCULAR; INTRAVENOUS; SUBCUTANEOUS at 11:50

## 2019-05-07 RX ADMIN — Medication 1 EACH: at 19:04

## 2019-05-07 RX ADMIN — I.V. FAT EMULSION 14.6 ML/HR: 20 EMULSION INTRAVENOUS at 19:05

## 2019-05-07 RX ADMIN — CHLORHEXIDINE GLUCONATE 1 APPLICATION(S): 213 SOLUTION TOPICAL at 07:24

## 2019-05-07 NOTE — PROGRESS NOTE ADULT - SUBJECTIVE AND OBJECTIVE BOX
Anesthesia Pain Management Service    SUBJECTIVE: Patient is doing well with IV PCA and no significant problems reported.    Pain Scale Score	At rest: __7_ 	With Activity: ___ 	[X ] Refer to charted pain scores    THERAPY:    [ ] IV PCA Morphine		[ ] 5 mg/mL	[ ] 1 mg/mL  [X ] IV PCA Hydromorphone	[ ] 5 mg/mL	[X ] 1 mg/mL  [ ] IV PCA Fentanyl		[ ] 50 micrograms/mL    Demand dose __0.2_ lockout __6_ (minutes) Continuous Rate _0__ Total: _7.75__  mg used (in past 24 hours)      MEDICATIONS  (STANDING):  chlorhexidine 4% Liquid 1 Application(s) Topical <User Schedule>  enoxaparin Injectable 70 milliGRAM(s) SubCutaneous two times a day  fat emulsion (Fish Oil and Plant Based) 20% Infusion 7.5 mL/Hr (7.5 mL/Hr) IV Continuous <Continuous>  gabapentin 100 milliGRAM(s) Oral three times a day  HYDROmorphone PCA (1 mG/mL) 30 milliLiter(s) PCA Continuous PCA Continuous  methimazole 5 milliGRAM(s) Oral daily  pantoprazole  Injectable 40 milliGRAM(s) IV Push daily  Parenteral Nutrition - Adult 1 Each (42 mL/Hr) TPN Continuous <Continuous>    MEDICATIONS  (PRN):  HYDROmorphone PCA (1 mG/mL) Rescue Clinician Bolus 0.5 milliGRAM(s) IV Push every 15 minutes PRN for Pain Scale GREATER THAN 6  naloxone Injectable 0.1 milliGRAM(s) IV Push every 3 minutes PRN For ANY of the following changes in patient status:  A. RR LESS THAN 10 breaths per minute, B. Oxygen saturation LESS THAN 90%, C. Sedation score of 6  ondansetron Injectable 4 milliGRAM(s) IV Push every 6 hours PRN Nausea      OBJECTIVE: laying in bed     Sedation Score:	[ X] Alert	[ ] Drowsy 	[ ] Arousable	[ ] Asleep	[ ] Unresponsive    Side Effects:	[X ] None	[ ] Nausea	[ ] Vomiting	[ ] Pruritus  		[ ] Other:    Vital Signs Last 24 Hrs  T(C): 37.3 (07 May 2019 10:27), Max: 37.3 (07 May 2019 10:27)  T(F): 99.2 (07 May 2019 10:27), Max: 99.2 (07 May 2019 10:27)  HR: 124 (07 May 2019 10:27) (96 - 124)  BP: 117/77 (07 May 2019 10:27) (105/78 - 117/77)  BP(mean): --  RR: 18 (07 May 2019 10:27) (18 - 20)  SpO2: 98% (07 May 2019 10:27) (98% - 100%)    ASSESSMENT/ PLAN    Therapy to  be:	[ X] Continue   [ ] Discontinued   [ ] Change to prn Analgesics    Documentation and Verification of current medications:   [X] Done	[ ] Not done, not elligible    Comments: NPO with NGT, continue current therapy     Progress Note written now but Patient was seen earlier.

## 2019-05-07 NOTE — PROGRESS NOTE ADULT - ATTENDING COMMENTS
I have personally interviewed and examined this patient, reviewed pertinent labs and imaging, and discussed the case with colleagues, residents, and physician assistants on B Team rounds.    Persistent tachycardia of unclear etiology  afebrile with nml wbc - does not appear septic  no evidence of hemorrhage  no PE on CTA  pain controlled  ?symptomatic hyperthyroidism? restarted on home thyroid meds - endo consult for further management  monitor electrolytes    cont tpn  await bowel fxn      The Acute Care Surgery (B Team) Attending Group Practice:  Dr. Myles Prieto, Dr. Leonardo Celeste, Dr. Imtiaz Skinner, Dr. Callie Finch, Dr. Bonifacio White    urgent issues - spectra [phone] or [phone]  nonurgent issues - [phone]  patient appointments or afterhours - [phone]

## 2019-05-07 NOTE — PROVIDER CONTACT NOTE (OTHER) - SITUATION
Patient with discomfort and elevated HR of 135. general discomfort reported by patient. PCA pump in use

## 2019-05-07 NOTE — PROGRESS NOTE ADULT - SUBJECTIVE AND OBJECTIVE BOX
Anesthesia Pain Management Service    SUBJECTIVE: Pt doing well with IV PCA without problems reported.    Therapy:	  [ X] IV PCA	   [ ] Epidural           [ ] s/p Spinal Opoid              [ ] Postpartum infusion	  [ ] Patient controlled regional anesthesia (PCRA)    [ ] prn Analgesics    Allergies    No Known Allergies    Intolerances      MEDICATIONS  (STANDING):  acetaminophen  IVPB .. 1000 milliGRAM(s) IV Intermittent once  chlorhexidine 4% Liquid 1 Application(s) Topical <User Schedule>  enoxaparin Injectable 70 milliGRAM(s) SubCutaneous two times a day  fat emulsion (Fish Oil and Plant Based) 20% Infusion 7.5 mL/Hr (7.5 mL/Hr) IV Continuous <Continuous>  fat emulsion (Fish Oil and Plant Based) 20% Infusion 14.6 mL/Hr (14.6 mL/Hr) IV Continuous <Continuous>  gabapentin 100 milliGRAM(s) Oral three times a day  HYDROmorphone PCA (1 mG/mL) 30 milliLiter(s) PCA Continuous PCA Continuous  methimazole 5 milliGRAM(s) Oral daily  pantoprazole  Injectable 40 milliGRAM(s) IV Push daily  Parenteral Nutrition - Adult 1 Each (42 mL/Hr) TPN Continuous <Continuous>  Parenteral Nutrition - Adult 1 Each (83 mL/Hr) TPN Continuous <Continuous>    MEDICATIONS  (PRN):  HYDROmorphone PCA (1 mG/mL) Rescue Clinician Bolus 0.5 milliGRAM(s) IV Push every 15 minutes PRN for Pain Scale GREATER THAN 6  naloxone Injectable 0.1 milliGRAM(s) IV Push every 3 minutes PRN For ANY of the following changes in patient status:  A. RR LESS THAN 10 breaths per minute, B. Oxygen saturation LESS THAN 90%, C. Sedation score of 6  ondansetron Injectable 4 milliGRAM(s) IV Push every 6 hours PRN Nausea      OBJECTIVE:   [X] No new signs     [ ] Other:    Side Effects:  [X ] None			[ ] Other:    Assessment of Catheter Site:		[ ] Intact		[ ] Other:    ASSESSMENT/PLAN  [ X] Continue current therapy    [ ] Therapy changed to:    [ ] IV PCA       [ ] Epidural     [ ] prn Analgesics     Comments:

## 2019-05-07 NOTE — PROGRESS NOTE ADULT - ASSESSMENT
53y female with recurrent SBO s/p Diagnostic laparoscopy, exploratory laparotomy, lysis of adhesions, small bowel resection, rigid sigmoidoscopy    - C/w NPO/TPN  - pain control: PCA and IV Tylenol   - C/w NG tube  - Continue therapeutic lovenox  - continue to monitor GIF  - F/u GI regarding colonoscopy    B surgery  [id number] 53y female with recurrent SBO s/p Diagnostic laparoscopy, exploratory laparotomy, lysis of adhesions, small bowel resection, rigid sigmoidoscopy    - C/w NPO/TPN  - pain control: PCA and IV Tylenol   - C/w NG tube  - Continue therapeutic lovenox  - continue to monitor GIF  - F/u GI regarding colonoscopy  - PT eval pending     B surgery  [id number]

## 2019-05-07 NOTE — PROGRESS NOTE ADULT - SUBJECTIVE AND OBJECTIVE BOX
SURGERY DAILY PROGRESS NOTE:       SUBJECTIVE/ROS: Patient examined at bedside. No acute events overnight. Tolerates diet w/o N/V. +BM/+Flatus         MEDICATIONS  (STANDING):  chlorhexidine 4% Liquid 1 Application(s) Topical <User Schedule>  enoxaparin Injectable 70 milliGRAM(s) SubCutaneous two times a day  fat emulsion (Fish Oil and Plant Based) 20% Infusion 7.5 mL/Hr (7.5 mL/Hr) IV Continuous <Continuous>  gabapentin 100 milliGRAM(s) Oral three times a day  HYDROmorphone PCA (1 mG/mL) 30 milliLiter(s) PCA Continuous PCA Continuous  methimazole 5 milliGRAM(s) Oral daily  pantoprazole  Injectable 40 milliGRAM(s) IV Push daily  Parenteral Nutrition - Adult 1 Each (42 mL/Hr) TPN Continuous <Continuous>    MEDICATIONS  (PRN):  HYDROmorphone PCA (1 mG/mL) Rescue Clinician Bolus 0.5 milliGRAM(s) IV Push every 15 minutes PRN for Pain Scale GREATER THAN 6  naloxone Injectable 0.1 milliGRAM(s) IV Push every 3 minutes PRN For ANY of the following changes in patient status:  A. RR LESS THAN 10 breaths per minute, B. Oxygen saturation LESS THAN 90%, C. Sedation score of 6  ondansetron Injectable 4 milliGRAM(s) IV Push every 6 hours PRN Nausea      OBJECTIVE:    Vital Signs Last 24 Hrs  T(C): 37.3 (07 May 2019 10:27), Max: 37.3 (07 May 2019 10:27)  T(F): 99.2 (07 May 2019 10:27), Max: 99.2 (07 May 2019 10:27)  HR: 124 (07 May 2019 10:27) (96 - 124)  BP: 117/77 (07 May 2019 10:27) (105/78 - 117/77)  BP(mean): --  RR: 18 (07 May 2019 10:27) (18 - 20)  SpO2: 98% (07 May 2019 10:27) (98% - 100%)        I&O's Detail    06 May 2019 07:01  -  07 May 2019 07:00  --------------------------------------------------------  IN:    dextrose 5% + sodium chloride 0.45% with potassium chloride 20 mEq/L: 400 mL    IV PiggyBack: 100 mL    TPN (Total Parenteral Nutrition): 336 mL  Total IN: 836 mL    OUT:    Nasoenteral Tube: 650 mL    Voided: 1300 mL  Total OUT: 1950 mL    Total NET: -1114 mL          Daily     Daily     LABS:                        9.3    9.54  )-----------( 204      ( 07 May 2019 06:25 )             30.1     05-07    135  |  102  |  11  ----------------------------<  102<H>  3.5   |  23  |  0.45<L>    Ca    6.9<L>      07 May 2019 06:25  Phos  2.0     05-07  Mg     2.0     05-07                    PHYSICAL EXAM:  GEN: NAD  Pulm: unlabored breathing on RA  CV: radial pulse 2+, cap refil <2sec  Abd: soft, nontender, nondistedned, incision CDI SURGERY DAILY PROGRESS NOTE:       SUBJECTIVE/ROS: Patient examined at bedside. No acute events overnight. PICC placed yesterday started on TPN. No GI fxn.          MEDICATIONS  (STANDING):  chlorhexidine 4% Liquid 1 Application(s) Topical <User Schedule>  enoxaparin Injectable 70 milliGRAM(s) SubCutaneous two times a day  fat emulsion (Fish Oil and Plant Based) 20% Infusion 7.5 mL/Hr (7.5 mL/Hr) IV Continuous <Continuous>  gabapentin 100 milliGRAM(s) Oral three times a day  HYDROmorphone PCA (1 mG/mL) 30 milliLiter(s) PCA Continuous PCA Continuous  methimazole 5 milliGRAM(s) Oral daily  pantoprazole  Injectable 40 milliGRAM(s) IV Push daily  Parenteral Nutrition - Adult 1 Each (42 mL/Hr) TPN Continuous <Continuous>    MEDICATIONS  (PRN):  HYDROmorphone PCA (1 mG/mL) Rescue Clinician Bolus 0.5 milliGRAM(s) IV Push every 15 minutes PRN for Pain Scale GREATER THAN 6  naloxone Injectable 0.1 milliGRAM(s) IV Push every 3 minutes PRN For ANY of the following changes in patient status:  A. RR LESS THAN 10 breaths per minute, B. Oxygen saturation LESS THAN 90%, C. Sedation score of 6  ondansetron Injectable 4 milliGRAM(s) IV Push every 6 hours PRN Nausea      OBJECTIVE:    Vital Signs Last 24 Hrs  T(C): 37.3 (07 May 2019 10:27), Max: 37.3 (07 May 2019 10:27)  T(F): 99.2 (07 May 2019 10:27), Max: 99.2 (07 May 2019 10:27)  HR: 124 (07 May 2019 10:27) (96 - 124)  BP: 117/77 (07 May 2019 10:27) (105/78 - 117/77)  BP(mean): --  RR: 18 (07 May 2019 10:27) (18 - 20)  SpO2: 98% (07 May 2019 10:27) (98% - 100%)        I&O's Detail    06 May 2019 07:01  -  07 May 2019 07:00  --------------------------------------------------------  IN:    dextrose 5% + sodium chloride 0.45% with potassium chloride 20 mEq/L: 400 mL    IV PiggyBack: 100 mL    TPN (Total Parenteral Nutrition): 336 mL  Total IN: 836 mL    OUT:    Nasoenteral Tube: 650 mL    Voided: 1300 mL  Total OUT: 1950 mL    Total NET: -1114 mL          Daily     Daily     LABS:                        9.3    9.54  )-----------( 204      ( 07 May 2019 06:25 )             30.1     05-07    135  |  102  |  11  ----------------------------<  102<H>  3.5   |  23  |  0.45<L>    Ca    6.9<L>      07 May 2019 06:25  Phos  2.0     05-07  Mg     2.0     05-07            Physical Exam:  Gen: NAD  Neuro: Follows commands  Resp: No acute respiratory distress, normal effort  Gastrointestinal: obese, soft, appropriately tender, nondistended, dressing c/d/i, NGT with bilious output

## 2019-05-07 NOTE — PROGRESS NOTE ADULT - SUBJECTIVE AND OBJECTIVE BOX
NUTRITION NOTE  [id number] GEBORDE  ===============================    Interval events; no acute events overnight     VITAL SIGNS:  T(C): 37.3 (05-07-19 @ 10:27), Max: 37.3 (05-07-19 @ 10:27)  HR: 124 (05-07-19 @ 10:27) (96 - 124)  BP: 117/77 (05-07-19 @ 10:27) (105/78 - 117/77)  ABP: --  ABP(mean): --  RR: 18 (05-07-19 @ 10:27) (18 - 20)  SpO2: 98% (05-07-19 @ 10:27) (98% - 100%)  CVP(mm Hg): --  05-06 @ 07:01  -  05-07 @ 07:00  --------------------------------------------------------  IN: 836 mL / OUT: 1950 mL / NET: -1114 mL    05-07 @ 07:01  -  05-07 @ 12:38  --------------------------------------------------------  IN: 168 mL / OUT: 300 mL / NET: -132 mL      Glucose     Physical Exam:   Gen: NAD  Neuro: Follows commands  Resp: No acute respiratory distress, normal effort  Gastrointestinal: obese, soft, appropriately tender, nondistended, dressing c/d/i, NGT with bilious output  PICC Site: C/D/I    Metabolic/FLUIDS/ELECTROLYTES/NUTRITION:  Gastrointestinal Medications:  fat emulsion (Fish Oil and Plant Based) 20% Infusion 7.5 mL/Hr IV Continuous <Continuous>  fat emulsion (Fish Oil and Plant Based) 20% Infusion 14.6 mL/Hr IV Continuous <Continuous>  pantoprazole  Injectable 40 milliGRAM(s) IV Push daily  Parenteral Nutrition - Adult 1 Each TPN Continuous <Continuous>  Parenteral Nutrition - Adult 1 Each TPN Continuous <Continuous>    I&O's Detail  53y  Daily   05-07    135  |  102  |  11  ----------------------------<  102<H>  3.5   |  23  |  0.45<L>    Ca    6.9<L>      07 May 2019 06:25  Phos  2.0     05-07  Mg     2.0     05-07    Diet: NPO     OTHER MEDICATIONS:  Endocrine/Metabolic Medications:  methimazole 5 milliGRAM(s) Oral daily    Topical/Other Medications:  chlorhexidine 4% Liquid 1 Application(s) Topical <User Schedule>  naloxone Injectable 0.1 milliGRAM(s) IV Push every 3 minutes PRN    ASSESSMENT/PLAN:  53y female with recurrent SBO s/p Diagnostic laparoscopy, exploratory laparotomy, lysis of adhesions, small bowel resection, rigid sigmoidoscopy     -increased TPN to 2L total k/kaitlynn [id number]  -plan per primary team

## 2019-05-07 NOTE — PROGRESS NOTE ADULT - ATTENDING COMMENTS
53y female with recurrent SBO s/p Diagnostic laparoscopy, exploratory laparotomy, lysis of adhesions, small bowel resection, rigid sigmoidoscopy     -increased TPN to 2L total k/kaitlynn 1408  -plan per primary team     I have seen and examined the patient, reviewed the laboratory and radiologic data and agree with the history, physical assessment and plan.  I reviewed and discussed with all consultants, house staff and PA's.  The note is edited where appropriate. The Nutrition Support Team (NST) discusses on an ongoing basis with the primary team and all consultants, House staff and PA's to have a permanent risk benefit analyses on all decisions.    I spent  45  minutes in total encounter; more than 50% of the visit counseling and coordinating nutrition care while providing diagnoses, assessment, and plan.

## 2019-05-08 LAB
ANION GAP SERPL CALC-SCNC: 15 MMO/L — HIGH (ref 7–14)
APPEARANCE UR: SIGNIFICANT CHANGE UP
BACTERIA # UR AUTO: NEGATIVE — SIGNIFICANT CHANGE UP
BILIRUB UR-MCNC: NEGATIVE — SIGNIFICANT CHANGE UP
BLOOD UR QL VISUAL: NEGATIVE — SIGNIFICANT CHANGE UP
BUN SERPL-MCNC: 10 MG/DL — SIGNIFICANT CHANGE UP (ref 7–23)
CA-I BLD-SCNC: 0.95 MMOL/L — LOW (ref 1.03–1.23)
CALCIUM SERPL-MCNC: 7.1 MG/DL — LOW (ref 8.4–10.5)
CHLORIDE SERPL-SCNC: 97 MMOL/L — LOW (ref 98–107)
CO2 SERPL-SCNC: 21 MMOL/L — LOW (ref 22–31)
COLOR SPEC: YELLOW — SIGNIFICANT CHANGE UP
CREAT SERPL-MCNC: 0.48 MG/DL — LOW (ref 0.5–1.3)
GLUCOSE BLDC GLUCOMTR-MCNC: 109 MG/DL — HIGH (ref 70–99)
GLUCOSE BLDC GLUCOMTR-MCNC: 111 MG/DL — HIGH (ref 70–99)
GLUCOSE BLDC GLUCOMTR-MCNC: 115 MG/DL — HIGH (ref 70–99)
GLUCOSE SERPL-MCNC: 104 MG/DL — HIGH (ref 70–99)
GLUCOSE UR-MCNC: NEGATIVE — SIGNIFICANT CHANGE UP
HCT VFR BLD CALC: 27.1 % — LOW (ref 34.5–45)
HGB BLD-MCNC: 8.7 G/DL — LOW (ref 11.5–15.5)
HYALINE CASTS # UR AUTO: NEGATIVE — SIGNIFICANT CHANGE UP
KETONES UR-MCNC: NEGATIVE — SIGNIFICANT CHANGE UP
LEUKOCYTE ESTERASE UR-ACNC: NEGATIVE — SIGNIFICANT CHANGE UP
MAGNESIUM SERPL-MCNC: 1.8 MG/DL — SIGNIFICANT CHANGE UP (ref 1.6–2.6)
MCHC RBC-ENTMCNC: 26.5 PG — LOW (ref 27–34)
MCHC RBC-ENTMCNC: 32.1 % — SIGNIFICANT CHANGE UP (ref 32–36)
MCV RBC AUTO: 82.6 FL — SIGNIFICANT CHANGE UP (ref 80–100)
NITRITE UR-MCNC: NEGATIVE — SIGNIFICANT CHANGE UP
NRBC # FLD: 0 K/UL — SIGNIFICANT CHANGE UP (ref 0–0)
PH UR: 6.5 — SIGNIFICANT CHANGE UP (ref 5–8)
PHOSPHATE SERPL-MCNC: 2.5 MG/DL — SIGNIFICANT CHANGE UP (ref 2.5–4.5)
PLATELET # BLD AUTO: 148 K/UL — LOW (ref 150–400)
PMV BLD: 9.7 FL — SIGNIFICANT CHANGE UP (ref 7–13)
POTASSIUM SERPL-MCNC: 3 MMOL/L — LOW (ref 3.5–5.3)
POTASSIUM SERPL-SCNC: 3 MMOL/L — LOW (ref 3.5–5.3)
PROT UR-MCNC: 20 — SIGNIFICANT CHANGE UP
RBC # BLD: 3.28 M/UL — LOW (ref 3.8–5.2)
RBC # FLD: 14.4 % — SIGNIFICANT CHANGE UP (ref 10.3–14.5)
RBC CASTS # UR COMP ASSIST: SIGNIFICANT CHANGE UP (ref 0–?)
SODIUM SERPL-SCNC: 133 MMOL/L — LOW (ref 135–145)
SP GR SPEC: 1.02 — SIGNIFICANT CHANGE UP (ref 1–1.04)
SQUAMOUS # UR AUTO: SIGNIFICANT CHANGE UP
T4 FREE SERPL-MCNC: 1.21 NG/DL — SIGNIFICANT CHANGE UP (ref 0.9–1.8)
UROBILINOGEN FLD QL: SIGNIFICANT CHANGE UP
WBC # BLD: 14.17 K/UL — HIGH (ref 3.8–10.5)
WBC # FLD AUTO: 14.17 K/UL — HIGH (ref 3.8–10.5)
WBC UR QL: SIGNIFICANT CHANGE UP (ref 0–?)

## 2019-05-08 PROCEDURE — 99233 SBSQ HOSP IP/OBS HIGH 50: CPT

## 2019-05-08 PROCEDURE — 99223 1ST HOSP IP/OBS HIGH 75: CPT

## 2019-05-08 RX ORDER — ELECTROLYTE SOLUTION,INJ
1 VIAL (ML) INTRAVENOUS
Qty: 0 | Refills: 0 | Status: DISCONTINUED | OUTPATIENT
Start: 2019-05-08 | End: 2019-05-08

## 2019-05-08 RX ORDER — ACETAMINOPHEN 500 MG
1000 TABLET ORAL ONCE
Qty: 0 | Refills: 0 | Status: COMPLETED | OUTPATIENT
Start: 2019-05-08 | End: 2019-05-08

## 2019-05-08 RX ORDER — CALCIUM GLUCONATE 100 MG/ML
1 VIAL (ML) INTRAVENOUS ONCE
Qty: 0 | Refills: 0 | Status: DISCONTINUED | OUTPATIENT
Start: 2019-05-08 | End: 2019-05-08

## 2019-05-08 RX ORDER — CALCIUM GLUCONATE 100 MG/ML
1 VIAL (ML) INTRAVENOUS ONCE
Qty: 0 | Refills: 0 | Status: COMPLETED | OUTPATIENT
Start: 2019-05-08 | End: 2019-05-08

## 2019-05-08 RX ORDER — I.V. FAT EMULSION 20 G/100ML
15.8 EMULSION INTRAVENOUS
Qty: 38 | Refills: 0 | Status: DISCONTINUED | OUTPATIENT
Start: 2019-05-08 | End: 2019-05-10

## 2019-05-08 RX ORDER — ACETAMINOPHEN 500 MG
1000 TABLET ORAL ONCE
Qty: 0 | Refills: 0 | Status: COMPLETED | OUTPATIENT
Start: 2019-05-09 | End: 2019-05-09

## 2019-05-08 RX ADMIN — Medication 400 MILLIGRAM(S): at 07:08

## 2019-05-08 RX ADMIN — Medication 1000 MILLIGRAM(S): at 20:00

## 2019-05-08 RX ADMIN — PANTOPRAZOLE SODIUM 40 MILLIGRAM(S): 20 TABLET, DELAYED RELEASE ORAL at 13:47

## 2019-05-08 RX ADMIN — I.V. FAT EMULSION 15.8 ML/HR: 20 EMULSION INTRAVENOUS at 17:51

## 2019-05-08 RX ADMIN — HYDROMORPHONE HYDROCHLORIDE 30 MILLILITER(S): 2 INJECTION INTRAMUSCULAR; INTRAVENOUS; SUBCUTANEOUS at 20:13

## 2019-05-08 RX ADMIN — Medication 1000 MILLIGRAM(S): at 02:27

## 2019-05-08 RX ADMIN — Medication 400 MILLIGRAM(S): at 01:57

## 2019-05-08 RX ADMIN — Medication 1000 MILLIGRAM(S): at 07:38

## 2019-05-08 RX ADMIN — ENOXAPARIN SODIUM 70 MILLIGRAM(S): 100 INJECTION SUBCUTANEOUS at 07:07

## 2019-05-08 RX ADMIN — Medication 100 GRAM(S): at 13:48

## 2019-05-08 RX ADMIN — CHLORHEXIDINE GLUCONATE 1 APPLICATION(S): 213 SOLUTION TOPICAL at 11:09

## 2019-05-08 RX ADMIN — Medication 400 MILLIGRAM(S): at 13:48

## 2019-05-08 RX ADMIN — HYDROMORPHONE HYDROCHLORIDE 30 MILLILITER(S): 2 INJECTION INTRAMUSCULAR; INTRAVENOUS; SUBCUTANEOUS at 08:22

## 2019-05-08 RX ADMIN — Medication 400 MILLIGRAM(S): at 19:30

## 2019-05-08 RX ADMIN — ENOXAPARIN SODIUM 70 MILLIGRAM(S): 100 INJECTION SUBCUTANEOUS at 17:53

## 2019-05-08 RX ADMIN — Medication 1 EACH: at 17:51

## 2019-05-08 RX ADMIN — Medication 1000 MILLIGRAM(S): at 14:18

## 2019-05-08 NOTE — PROGRESS NOTE ADULT - SUBJECTIVE AND OBJECTIVE BOX
SURGERY DAILY PROGRESS NOTE:       SUBJECTIVE/ROS: Patient examined at bedside. Continues to be tachy, asymptomatic. No flatus or BM. NGT w/ minimal output. Pain well controlled. Worked with PT yesterday.        MEDICATIONS  (STANDING):  chlorhexidine 4% Liquid 1 Application(s) Topical <User Schedule>  enoxaparin Injectable 70 milliGRAM(s) SubCutaneous two times a day  fat emulsion (Fish Oil and Plant Based) 20% Infusion 7.5 mL/Hr (7.5 mL/Hr) IV Continuous <Continuous>  fat emulsion (Fish Oil and Plant Based) 20% Infusion 14.6 mL/Hr (14.6 mL/Hr) IV Continuous <Continuous>  gabapentin 100 milliGRAM(s) Oral three times a day  HYDROmorphone PCA (1 mG/mL) 30 milliLiter(s) PCA Continuous PCA Continuous  methimazole 5 milliGRAM(s) Oral daily  pantoprazole  Injectable 40 milliGRAM(s) IV Push daily  Parenteral Nutrition - Adult 1 Each (83 mL/Hr) TPN Continuous <Continuous>    MEDICATIONS  (PRN):  HYDROmorphone PCA (1 mG/mL) Rescue Clinician Bolus 0.5 milliGRAM(s) IV Push every 15 minutes PRN for Pain Scale GREATER THAN 6  naloxone Injectable 0.1 milliGRAM(s) IV Push every 3 minutes PRN For ANY of the following changes in patient status:  A. RR LESS THAN 10 breaths per minute, B. Oxygen saturation LESS THAN 90%, C. Sedation score of 6  ondansetron Injectable 4 milliGRAM(s) IV Push every 6 hours PRN Nausea      OBJECTIVE:    Vital Signs Last 24 Hrs  T(C): 36.7 (08 May 2019 05:51), Max: 37.7 (07 May 2019 13:50)  T(F): 98 (08 May 2019 05:51), Max: 99.8 (07 May 2019 13:50)  HR: 126 (08 May 2019 05:51) (118 - 135)  BP: 109/71 (08 May 2019 05:51) (109/71 - 147/91)  BP(mean): --  RR: 20 (08 May 2019 05:51) (16 - 20)  SpO2: 98% (08 May 2019 05:51) (97% - 98%)        I&O's Detail    07 May 2019 07:01  -  08 May 2019 07:00  --------------------------------------------------------  IN:    fat emulsion (Fish Oil and Plant Based) 20% Infusion: 146 mL    IV PiggyBack: 300 mL    TPN (Total Parenteral Nutrition): 1334 mL  Total IN: 1780 mL    OUT:    Nasoenteral Tube: 650 mL    Voided: 1200 mL  Total OUT: 1850 mL    Total NET: -70 mL          Daily     Daily Weight in k.5 (07 May 2019 13:50)    LABS:                        9.3    9.54  )-----------( 204      ( 07 May 2019 06:25 )             30.1     05-07    135  |  102  |  11  ----------------------------<  102<H>  3.5   |  23  |  0.45<L>    Ca    6.9<L>      07 May 2019 06:25  Phos  2.0     05-07  Mg     2.0     05-07        Physical Exam:  Gen: NAD  Neuro: Follows commands  Resp: No acute respiratory distress, normal effort  Gastrointestinal: obese, soft, appropriately tender, nondistended, serous foul smelling output from the incision, NGT with bilious output

## 2019-05-08 NOTE — PROGRESS NOTE ADULT - SUBJECTIVE AND OBJECTIVE BOX
colon cancer  hpi  53 year old woman with recurrent colon cancer met in past complete response to chemo and RT then pelvic recurrence again response to chemo  then DVT finally prolonged gi sx eventually admitted for SBO  underwent surgery found no visible tumor 6 days s/p anastomosis after bowel resection   pmh sh fh unchanged 7 pt ros abd pain, no ambulation otherwise neg  physical  mid aged  vs t98 126 109/71 20 98 sat  lungs clear  cor s1s2  abd firm no bs midline incision some oozing  ext no edema    data wbc 9540 hgb 9.3 hct 30 plt [id number] cr 0.48 tsh 1.75  ct a/p mod free air

## 2019-05-08 NOTE — PROGRESS NOTE ADULT - ATTENDING COMMENTS
I have personally interviewed and examined this patient, reviewed pertinent labs and imaging, and discussed the case with colleagues, residents, and physician assistants on B Team rounds.    Plan  wound drainage noted - suspect fat necrosis. Even if has small fascial dehiscence with leakage of ascites would not consider operative intervention at -no evidence of evisceration. Will consider CT scan if any deterioration eg fever, leukocytosis, hypotension  fu endocrine consult- tachycardia - malabsorbing methimizole? may need parenteral agent?   cont therapeutic anticoagulation for dvt    [facility] Acute Care Surgery (B Team) Attending Group Practice:  Dr. Myles Prieto, Dr. Leonardo Celeste, Dr. Imtiaz Skinenr, Dr. Callie Finch, Dr. Bonifacio White    urgent issues - spectra [phone] or [id number]  nonurgent issues - [phone]  patient appointments or afterhours - [phone]

## 2019-05-08 NOTE — PROGRESS NOTE ADULT - ASSESSMENT
met colon cancer no evidence of disease after chemo and rt then pelvic recurrence s/p chemo with response  then dvt and sbo  surgical exploration no evidence of malignancy, resection of bowel and anastomosis  delayed recovery of bowel function, persistent free air   ? further surgical intervention  d/w pt, nurse, PA await surgery decisions about next step

## 2019-05-08 NOTE — PROGRESS NOTE ADULT - SUBJECTIVE AND OBJECTIVE BOX
Day _3__ of Anesthesia Pain Management Service    SUBJECTIVE:    Therapy:	  [ x] IV PCA	   [ ] Epidural           [ ] s/p Spinal Opoid              [ ] Postpartum infusion	  [ ] Patient controlled regional anesthesia (PCRA)    [ ] prn Analgesics    OBJECTIVE:   [x ] No new signs     [ ] Other:    Side Effects:  [x ] None			[ ] Other:    Assessment of Catheter Site:		[ ] Intact		[ ] Other:    ASSESSMENT/PLAN  [ x] Continue current therapy    [ ] Therapy changed to:    [ ] IV PCA       [ ] Epidural     [ ] prn Analgesics     Comments:

## 2019-05-08 NOTE — PROGRESS NOTE ADULT - ASSESSMENT
53y female with recurrent SBO s/p Diagnostic laparoscopy, exploratory laparotomy, lysis of adhesions, small bowel resection, rigid sigmoidoscopy. Patient is persistanly tachy, HDS. CT abd/pel on 5/5 w/o evidence of anastomotic leak; CTPA negative for PE.     - C/w NPO/TPN  - pain control: PCA and IV Tylenol   - C/w NG tube  - F/u Endo recs  - Continue therapeutic lovenox  - continue to monitor GIF  - Monitor wound   - PT/OOB    B surgery  [id number]

## 2019-05-08 NOTE — PROVIDER CONTACT NOTE (OTHER) - SITUATION
Midline incision with moderate serous drainage, odorous.  Gauze, abd pad and paper adhesive applied.

## 2019-05-08 NOTE — PROGRESS NOTE ADULT - SUBJECTIVE AND OBJECTIVE BOX
Anesthesia Pain Management Service    SUBJECTIVE: Patient is doing well with IV PCA and no significant problems reported.  Patient wants IV PCA because it helps with her pain.    Pain Scale Score	At rest: ___ 	With Activity: 7/10___ 	[X ] Refer to charted pain scores    THERAPY:    [ ] IV PCA Morphine		[ ] 5 mg/mL	[ ] 1 mg/mL  [X ] IV PCA Hydromorphone	[ ] 5 mg/mL	[X ] 1 mg/mL  [ ] IV PCA Fentanyl		[ ] 50 micrograms/mL    Demand dose __0.2_ lockout __6_ (minutes) Continuous Rate _0__ Total: _9.2__  mg used (in past 24 hours)      MEDICATIONS  (STANDING):  acetaminophen  IVPB .. 1000 milliGRAM(s) IV Intermittent once  acetaminophen  IVPB .. 1000 milliGRAM(s) IV Intermittent once  acetaminophen  IVPB .. 1000 milliGRAM(s) IV Intermittent once  calcium gluconate IVPB 1 Gram(s) IV Intermittent once  chlorhexidine 4% Liquid 1 Application(s) Topical <User Schedule>  enoxaparin Injectable 70 milliGRAM(s) SubCutaneous two times a day  fat emulsion (Fish Oil and Plant Based) 20% Infusion 14.6 mL/Hr (14.6 mL/Hr) IV Continuous <Continuous>  fat emulsion (Fish Oil and Plant Based) 20% Infusion 15.8 mL/Hr (15.8 mL/Hr) IV Continuous <Continuous>  gabapentin 100 milliGRAM(s) Oral three times a day  HYDROmorphone PCA (1 mG/mL) 30 milliLiter(s) PCA Continuous PCA Continuous  methimazole 5 milliGRAM(s) Oral daily  pantoprazole  Injectable 40 milliGRAM(s) IV Push daily  Parenteral Nutrition - Adult 1 Each (83 mL/Hr) TPN Continuous <Continuous>  Parenteral Nutrition - Adult 1 Each (83 mL/Hr) TPN Continuous <Continuous>    MEDICATIONS  (PRN):  HYDROmorphone PCA (1 mG/mL) Rescue Clinician Bolus 0.5 milliGRAM(s) IV Push every 15 minutes PRN for Pain Scale GREATER THAN 6  naloxone Injectable 0.1 milliGRAM(s) IV Push every 3 minutes PRN For ANY of the following changes in patient status:  A. RR LESS THAN 10 breaths per minute, B. Oxygen saturation LESS THAN 90%, C. Sedation score of 6  ondansetron Injectable 4 milliGRAM(s) IV Push every 6 hours PRN Nausea      OBJECTIVE:  Patient lying in bed, sitting up, NPO with NGT.    Sedation Score:	[ X] Alert	[ ] Drowsy 	[ ] Arousable	[ ] Asleep	[ ] Unresponsive    Side Effects:	[X ] None	[ ] Nausea	[ ] Vomiting	[ ] Pruritus  		[ ] Other:    Vital Signs Last 24 Hrs  T(C): 37.4 (08 May 2019 09:49), Max: 37.7 (07 May 2019 13:50)  T(F): 99.3 (08 May 2019 09:49), Max: 99.8 (07 May 2019 13:50)  HR: 125 (08 May 2019 09:49) (118 - 135)  BP: 103/73 (08 May 2019 09:49) (103/73 - 147/91)  BP(mean): --  RR: 18 (08 May 2019 09:49) (16 - 20)  SpO2: 98% (08 May 2019 09:49) (97% - 98%)    ASSESSMENT/ PLAN    Therapy to  be:	[ X] Continue   [ ] Discontinued   [ ] Change to prn Analgesics    Documentation and Verification of current medications:   [X] Done	[ ] Not done, not elligible    Comments:  Discussed patient with team and wants to continue IV PCA, IV Tylenol added.

## 2019-05-08 NOTE — CONSULT NOTE ADULT - ASSESSMENT
Patient is a 52 yo woman with hx of hyperthyroidism, colon cancer with metastatic  disease, SBO, recent DVT admitted for nausea and abdominal pain/recurrence of SBO. Endocrine asked to see for hyperthyroidism

## 2019-05-08 NOTE — CONSULT NOTE ADULT - SUBJECTIVE AND OBJECTIVE BOX
Patient is a 54 yo woman with colorectal cancer and metastases, hx of hyperthyroidism admitted with nausea. Has a history of small bowel obstruction. Course completed by prolonged recovery.  Endocrine asked to see for hyperthyroidism    The patient was diagnosed with hyperthyroidism 3-4 years ago. She denies having any endocrinologist and is managed by her PCP. Since her diagnosis years ago, has been on methimazole 5 mg daily.  She does not provide further detail on the diagnosis (i.e Grave's, thyroiditis, nodules).  There is no family hx of thyroid disease.  No exposure to lithium/amiodarone or radiation to the NECK.  Patient was on FOLFOX and FOLFIRI with last treatment in January 2019. Course complicated by tachycardia.    PAST MEDICAL & SURGICAL HISTORY:  DVT, lower extremity: RLE, dx&#x27;d April 2019  SBO (small bowel obstruction)  Hemorrhoid  Cervical cancer  Colon neoplasm: s/p LAR  Hyperthyroidism  S/P colon resection  H/O exploratory laparotomy: 1/6/17, with LAR    FAMILY HISTORY:  Family history of diabetes mellitus  Family history of leukemia    Social History:  Non smoker  No recreational drugs  No alcohol      Outpatient Medications:  · 	oxyCODONE 5 mg oral capsule: 1-2 cap(s) orally every 4 hours, As Needed MDD:12 tabs, Last Dose Taken:    · 	docusate sodium 100 mg oral capsule: 1 cap(s) orally 3 times a day, Last Dose Taken:    · 	methIMAzole 5 mg oral tablet:  orally once a day, Last Dose Taken:    · 	Eliquis 5 mg oral tablet: 1 tab(s) orally 2 times a day, Last Dose Taken:    · 	LORazepam 0.5 mg oral tablet: 1 tab(s) orally 3 times a day, As Needed, Last Dose Taken:    · 	prochlorperazine 10 mg oral tablet: 1 tab(s) orally 3 times a day, As Needed    MEDICATIONS  (STANDING):  acetaminophen  IVPB .. 1000 milliGRAM(s) IV Intermittent once  acetaminophen  IVPB .. 1000 milliGRAM(s) IV Intermittent once  chlorhexidine 4% Liquid 1 Application(s) Topical <User Schedule>  enoxaparin Injectable 70 milliGRAM(s) SubCutaneous two times a day  fat emulsion (Fish Oil and Plant Based) 20% Infusion 14.6 mL/Hr (14.6 mL/Hr) IV Continuous <Continuous>  fat emulsion (Fish Oil and Plant Based) 20% Infusion 15.8 mL/Hr (15.8 mL/Hr) IV Continuous <Continuous>  gabapentin 100 milliGRAM(s) Oral three times a day  HYDROmorphone PCA (1 mG/mL) 30 milliLiter(s) PCA Continuous PCA Continuous  methimazole 5 milliGRAM(s) Oral daily  pantoprazole  Injectable 40 milliGRAM(s) IV Push daily  Parenteral Nutrition - Adult 1 Each (83 mL/Hr) TPN Continuous <Continuous>  Parenteral Nutrition - Adult 1 Each (83 mL/Hr) TPN Continuous <Continuous>    MEDICATIONS  (PRN):  HYDROmorphone PCA (1 mG/mL) Rescue Clinician Bolus 0.5 milliGRAM(s) IV Push every 15 minutes PRN for Pain Scale GREATER THAN 6  naloxone Injectable 0.1 milliGRAM(s) IV Push every 3 minutes PRN For ANY of the following changes in patient status:  A. RR LESS THAN 10 breaths per minute, B. Oxygen saturation LESS THAN 90%, C. Sedation score of 6  ondansetron Injectable 4 milliGRAM(s) IV Push every 6 hours PRN Nausea      Allergies  No Known Allergies    Review of Systems:  Constitutional: No fever  Eyes: No blurry vision  Neuro: No tremors  HEENT: No pain  Cardiovascular: No chest pain, palpitations  Respiratory: No SOB, no cough  GI: + nausea, +abdominal pain  : No dysuria  Skin: no rash  Psych: no depression  Endocrine: no polyuria, polydipsia  ALL OTHER SYSTEMS REVIEWED AND NEGATIVE    PHYSICAL EXAM:  VITALS: T(C): 37.3 (05-08-19 @ 13:50)  T(F): 99.1 (05-08-19 @ 13:50), Max: 99.3 (05-08-19 @ 09:49)  HR: 133 (05-08-19 @ 13:50) (118 - 133)  BP: 120/79 (05-08-19 @ 13:50) (103/73 - 120/79)  RR:  (16 - 20)  SpO2:  (97% - 99%)  Wt(kg): --  GENERAL: NAD, well-groomed, well-developed  EYES: No proptosis, no lid lag, anicteric  HEENT:  Atraumatic, Normocephalic, moist mucous membranes; + NGT  THYROID: Limited by position and NGT placement/discomfort  RESPIRATORY: Clear to auscultation bilaterally; No rales, rhonchi, wheezing, or rubs; right mediport  CARDIOVASCULAR: Regular rate and rhythm; No murmurs; no peripheral edema  GI: Soft, nontender, non distended, normal bowel sounds  SKIN: Dry, intact, No rashes or lesions  MUSCULOSKELETAL: Moves all extremities  NEURO: No tremors  PSYCH: Alert and oriented x 3, reactive affect, normal mood    POCT Blood Glucose.: 109 mg/dL (05-08-19 @ 06:32)  POCT Blood Glucose.: 102 mg/dL (05-07-19 @ 23:53)  POCT Blood Glucose.: 81 mg/dL (05-07-19 @ 18:03)  POCT Blood Glucose.: 86 mg/dL (05-07-19 @ 12:45)  POCT Blood Glucose.: 94 mg/dL (05-07-19 @ 06:30)  POCT Blood Glucose.: 128 mg/dL (05-06-19 @ 23:42)                            9.3    9.54  )-----------( 204      ( 07 May 2019 06:25 )             30.1       05-08    133<L>  |  97<L>  |  10  ----------------------------<  104<H>  3.0<L>   |  21<L>  |  0.48<L>    EGFR if : 132  EGFR if non : 114    Ca    7.1<L>      05-08  Mg     1.8     05-08  Phos  2.5     05-08    Thyroid Function Tests:  05-08 @ 06:30 TSH -- FreeT4 1.21   05-07 @ 06:25 TSH 1.75 FreeT4 -- T3 54.9   Hemoglobin A1C, Whole Blood: 5.5 % [4.0 - 5.6] (04-07-19 @ 19:10)    05-02 Chol -- LDL -- HDL -- Trig 122

## 2019-05-08 NOTE — PROGRESS NOTE ADULT - ATTENDING COMMENTS
53y female with recurrent SBO s/p Diagnostic laparoscopy, exploratory laparotomy, lysis of adhesions, small bowel resection, rigid sigmoidoscopy. Patient with prolonged NPO status, meets criteria for severe protein calorie malnutrition requiring TPN for nutritional support. PICC placed and TPN/lipids started on 5/6/19.    TPN infusion volume increased to 2L, TPN is providing 1446 kcal - increased calories in TPN to meet nutritional requirements     labs reviewed - electrolytes adjusted in TPN    monitor fingersticks, obtain daily weights\    continue parenteral nutritional at this time, will follow up with primary team on plan    1. Protein calorie malnutrition being optimized with TPN: CHO [210 ] gm.  AA [88 ] gm. SMOF Lipids [38] gm.  2.  Hyperglycemia managed with: [0] units of regular insulin    3.  Check fluid balance daily.  Strict I/O  [ ] [ ]   4.  Daily BMP, Ionized Calcium, Magnesium and Phosphorous   5.  Triglycerides at initiation of TPN and monthly [ ] [ ]   6.  Pepcid in TPN for Gi prophylaxis  [ ]   I have seen and examined the patient, reviewed the laboratory and radiologic data and agree with the history, physical assessment and plan.  I reviewed and discussed with all consultants, house staff and PA's.  The note is edited where appropriate. The Nutrition Support Team (NST) discusses on an ongoing basis with the primary team and all consultants, House staff and PA's to have a permanent risk benefit analyses on all decisions.    I spent  45  minutes in total encounter; more than 50% of the visit counseling and coordinating nutrition care while providing diagnoses, assessment, and plan.

## 2019-05-08 NOTE — CONSULT NOTE ADULT - PROBLEM SELECTOR RECOMMENDATION 9
-patient has been on methimazole for at least 2-3 years.  Can check a TSH receptor antibody to determine whether this is Grave's disease. Based on oncology consultation she was not on immune check point inhibitors or PD1-inhibitors  -importantly, she is chemically euthyroid with a normal free t4 of 1.21 and TSH of 1.75 on stable dose of methimazole  -do not think the thyroid is the a major factor in the patient's tachycardia.  Patient seems to be in some discomfort, can optimize pain control. Consider cardiology work up in the setting of previous DVT or consider the addition of a beta blocker if no cardiac contraindications  -continue the methimazole 5 mg, monitor for side effects including agranulocytosis and increased liver enzymes. She would benefit from definitive treatment if needed but she defers discussion at this time -patient has been on methimazole for at least 2-3 years.  Can check a TSH receptor antibody to determine whether this is Grave's disease. Based on oncology consultation she was not on immune check point inhibitors or PD1-inhibitors  -importantly, she is chemically euthyroid with a normal free t4 of 1.21 and TSH of 1.75 on stable dose of methimazole  -do not think the thyroid is the a major factor in the patient's tachycardia.  Patient seems to be in some discomfort, can optimize pain control. Consider cardiology work up in the setting of previous DVT (rule out PE, heart strain) or consider the addition of a beta blocker if no cardiac contraindications  -continue the methimazole 5 mg, monitor for side effects including agranulocytosis and increased liver enzymes. She would benefit from definitive treatment if needed but she defers discussion at this time -patient has been on methimazole for at least 2-3 years.  Can check a TSH receptor antibody to determine whether this is Grave's disease. Based on oncology consultation she was not on immune check point inhibitors or PD1-inhibitors  -importantly, she is chemically euthyroid with a normal free t4 of 1.21 and TSH of 1.75 on stable dose of methimazole  -do not think the thyroid is the a major factor in the patient's tachycardia.  Patient seems to be in some discomfort, can optimize pain control. Consider cardiology work up in the setting of previous DVT (team states PE ruled out, heart strain) or consider the addition of a beta blocker if no cardiac contraindications  -continue the methimazole 5 mg, monitor for side effects including agranulocytosis and increased liver enzymes. She would benefit from definitive treatment if needed but she defers discussion at this time  -discharge on same dose of methimazole 5 mg with outpatient endocrine follow up.  [facility] [place] [facility] -patient has been on methimazole for at least 2-3 years.  Can check a TSH receptor antibody to determine whether this is Grave's disease. Based on oncology consultation she was not on immune check point inhibitors or PD1-inhibitors  -importantly, she is chemically euthyroid with a normal free t4 of 1.21 and TSH of 1.75 on stable dose of methimazole  -do not think the thyroid is the a major factor in the patient's tachycardia.  Patient seems to be in some discomfort, can optimize pain control. Consider cardiology work up in the setting of previous DVT (team states PE ruled out, heart strain) or consider the addition of a beta blocker if no cardiac contraindications  -continue the methimazole 5 mg, monitor for side effects including agranulocytosis and increased liver enzymes. She would benefit from definitive treatment if needed but she defers discussion at this time  -discharge on same dose of methimazole 5 mg with outpatient endocrine follow up.  [facility] [place] [facility] or Faculty practice [phone]    Plan discussed with surgery PA. Endocrine will sign off, please recall with questions

## 2019-05-08 NOTE — CHART NOTE - NSCHARTNOTEFT_GEN_A_CORE
Source:  nursing & EMR    Diet : PN @ 83 ml / hr     Patient lethargic,  on PN , tolerating , wt. stable , receiving adequate nutrition via PN . Requested pt.'s wt. to nursing yesterday and obtained it .      Parenteral Nutrition: PN providing 1446 kcal, 210 gm dextrose, 88 gm amino acids, 38 gm 20% SMOFLIPIDS       Current Weight: 68.5 KG on 5/7/19; 68 kg on 5/2/19; UBW - 71.2 kg; 71.4 kg on 4/17/19; IBW - 54.4 kg     Pertinent Medications: MEDICATIONS  (STANDING):  acetaminophen  IVPB .. 1000 milliGRAM(s) IV Intermittent once  acetaminophen  IVPB .. 1000 milliGRAM(s) IV Intermittent once  acetaminophen  IVPB .. 1000 milliGRAM(s) IV Intermittent once  calcium gluconate IVPB 1 Gram(s) IV Intermittent once  chlorhexidine 4% Liquid 1 Application(s) Topical <User Schedule>  enoxaparin Injectable 70 milliGRAM(s) SubCutaneous two times a day  fat emulsion (Fish Oil and Plant Based) 20% Infusion 14.6 mL/Hr (14.6 mL/Hr) IV Continuous <Continuous>  fat emulsion (Fish Oil and Plant Based) 20% Infusion 15.8 mL/Hr (15.8 mL/Hr) IV Continuous <Continuous>  gabapentin 100 milliGRAM(s) Oral three times a day  HYDROmorphone PCA (1 mG/mL) 30 milliLiter(s) PCA Continuous PCA Continuous  methimazole 5 milliGRAM(s) Oral daily  pantoprazole  Injectable 40 milliGRAM(s) IV Push daily  Parenteral Nutrition - Adult 1 Each (83 mL/Hr) TPN Continuous <Continuous>  Parenteral Nutrition - Adult 1 Each (83 mL/Hr) TPN Continuous <Continuous>    MEDICATIONS  (PRN):  HYDROmorphone PCA (1 mG/mL) Rescue Clinician Bolus 0.5 milliGRAM(s) IV Push every 15 minutes PRN for Pain Scale GREATER THAN 6  naloxone Injectable 0.1 milliGRAM(s) IV Push every 3 minutes PRN For ANY of the following changes in patient status:  A. RR LESS THAN 10 breaths per minute, B. Oxygen saturation LESS THAN 90%, C. Sedation score of 6  ondansetron Injectable 4 milliGRAM(s) IV Push every 6 hours PRN Nausea    Pertinent Labs:  05-08 Na133 mmol/L<L> Glu 104 mg/dL<H> K+ 3.0 mmol/L<L> Cr  0.48 mg/dL<L> BUN 10 mg/dL 05-08 Phos 2.5 mg/dL 05-02 Alb 2.4 g/dL<L> 05-02 PAB 10 mg/dL<L> 05-02 Chol --    LDL --    HDL --    Trig 122 mg/dL      Skin: intact    Estimated Needs:  1360 - 1632 kcal/d ( 25 - 30 kcal/kg IBW ) protein 55 - 82 gm/d ( 1.0 - 1.5 gm/kg IBW )  - recalculated:       Previous Nutrition Diagnosis: - Malnutrition      Nutrition Diagnosis is - ongoing      Recommend to continue PN as per NST .      Monitoring and Evaluation:  Tolerance to diet prescription , weights AND follow up per protocol

## 2019-05-08 NOTE — PROGRESS NOTE ADULT - SUBJECTIVE AND OBJECTIVE BOX
NUTRITION NOTE  [id number] GEBORDE  ===============================    Interval events  Patient was seen and examined at bedside, no acute events overnight.   PICC placed and TPN/lipids started on 19 for nutritional support.   NGT remains in place, NPO, now on full volume TPN     Vital Signs Last 24 Hrs  T(C): 37.4 (08 May 2019 09:49), Max: 37.7 (07 May 2019 13:50)  T(F): 99.3 (08 May 2019 09:49), Max: 99.8 (07 May 2019 13:50)  HR: 125 (08 May 2019 09:49) (118 - 135)  BP: 103/73 (08 May 2019 09:49) (103/73 - 147/91)  RR: 18 (08 May 2019 09:49) (16 - 20)  SpO2: 98% (08 May 2019 09:49) (97% - 98%)    MEDICATIONS  (STANDING):  acetaminophen  IVPB .. 1000 milliGRAM(s) IV Intermittent once  acetaminophen  IVPB .. 1000 milliGRAM(s) IV Intermittent once  acetaminophen  IVPB .. 1000 milliGRAM(s) IV Intermittent once  calcium gluconate IVPB 1 Gram(s) IV Intermittent once  chlorhexidine 4% Liquid 1 Application(s) Topical <User Schedule>  enoxaparin Injectable 70 milliGRAM(s) SubCutaneous two times a day  fat emulsion (Fish Oil and Plant Based) 20% Infusion 14.6 mL/Hr (14.6 mL/Hr) IV Continuous <Continuous>  fat emulsion (Fish Oil and Plant Based) 20% Infusion 15.8 mL/Hr (15.8 mL/Hr) IV Continuous <Continuous>  gabapentin 100 milliGRAM(s) Oral three times a day  HYDROmorphone PCA (1 mG/mL) 30 milliLiter(s) PCA Continuous PCA Continuous  methimazole 5 milliGRAM(s) Oral daily  pantoprazole  Injectable 40 milliGRAM(s) IV Push daily  Parenteral Nutrition - Adult 1 Each (83 mL/Hr) TPN Continuous <Continuous>  Parenteral Nutrition - Adult 1 Each (83 mL/Hr) TPN Continuous <Continuous>    MEDICATIONS  (PRN):  HYDROmorphone PCA (1 mG/mL) Rescue Clinician Bolus 0.5 milliGRAM(s) IV Push every 15 minutes PRN for Pain Scale GREATER THAN 6  naloxone Injectable 0.1 milliGRAM(s) IV Push every 3 minutes PRN For ANY of the following changes in patient status:  A. RR LESS THAN 10 breaths per minute, B. Oxygen saturation LESS THAN 90%, C. Sedation score of 6  ondansetron Injectable 4 milliGRAM(s) IV Push every 6 hours PRN Nausea    I&O's Detail    07 May 2019 07:  -  08 May 2019 07:00  --------------------------------------------------------  IN:    fat emulsion (Fish Oil and Plant Based) 20% Infusion: 146 mL    IV PiggyBack: 300 mL    TPN (Total Parenteral Nutrition): 1334 mL  Total IN: 1780 mL    OUT:    Nasoenteral Tube: 650 mL    Voided: 1200 mL  Total OUT: 1850 mL    Total NET: -70 mL      08 May 2019 07:  -  08 May 2019 10:36  --------------------------------------------------------  IN:  Total IN: 0 mL    OUT:    Voided: 100 mL  Total OUT: 100 mL    Total NET: -100 mL    POCT Blood Glucose.: 109 mg/dL (08 May 2019 06:32)  POCT Blood Glucose.: 102 mg/dL (07 May 2019 23:53)  POCT Blood Glucose.: 81 mg/dL (07 May 2019 18:03)  POCT Blood Glucose.: 86 mg/dL (07 May 2019 12:45)    Daily Weight in k.5 (07 May 2019 13:50)    Drug Dosing Weight  Height (cm): 162.56 (02 May 2019 11:28)  Weight (kg): 68 (02 May 2019 11:28)  BMI (kg/m2): 25.7 (02 May 2019 11:28)  BSA (m2): 1.73 (02 May 2019 11:28)    PHYSICAL EXAM   General: NAD, resting comfortably   Pulm: nonlabored respirations   GI/Nutrition: soft, appropriately tender NGT in place   Extremity: warm     LABORATORY                            9.3    9.54  )-----------( 204      ( 07 May 2019 06:25 )             30.1     05-08    133<L>  |  97<L>  |  10  ----------------------------<  104<H>  3.0<L>   |  21<L>  |  0.48<L>    Ca    7.1<L>      08 May 2019 06:30  Phos  2.5     05-08  Mg     1.8     05-08    05-02 Chol -- LDL -- HDL -- Trig 122    Prealbumin 19: 10    Diet: NPO and TPN/lipids (started on 19)    ASSESSMENT/PLAN:  53y female with recurrent SBO s/p Diagnostic laparoscopy, exploratory laparotomy, lysis of adhesions, small bowel resection, rigid sigmoidoscopy. Patient with prolonged NPO status, meets criteria for severe protein calorie malnutrition requiring TPN for nutritional support. PICC placed and TPN/lipids started on 19.    TPN infusion volume increased to 2L, TPN is providing 1446 kcal - increased calories in TPN to meet nutritional requirements     labs reviewed - electrolytes adjusted in TPN    monitor fingersticks, obtain daily weights\    continue parenteral nutritional at this time, will follow up with primary team on plan    1. Protein calorie malnutrition being optimized with TPN: CHO [210 ] gm.  AA [88 ] gm. SMOF Lipids [38] gm.  2.  Hyperglycemia managed with: [0] units of regular insulin    3.  Check fluid balance daily.  Strict I/O  [ ] [ ]   4.  Daily BMP, Ionized Calcium, Magnesium and Phosphorous   5.  Triglycerides at initiation of TPN and monthly [ ] [ ]   6.  Pepcid in TPN for Gi prophylaxis  [ ]     Nutrition support pager [phone]

## 2019-05-09 LAB
ANION GAP SERPL CALC-SCNC: 14 MMO/L — SIGNIFICANT CHANGE UP (ref 7–14)
ANION GAP SERPL CALC-SCNC: 16 MMO/L — HIGH (ref 7–14)
BASOPHILS NFR SPEC: 0 % — SIGNIFICANT CHANGE UP (ref 0–2)
BLASTS # FLD: 0 % — SIGNIFICANT CHANGE UP (ref 0–0)
BLD GP AB SCN SERPL QL: NEGATIVE — SIGNIFICANT CHANGE UP
BUN SERPL-MCNC: 15 MG/DL — SIGNIFICANT CHANGE UP (ref 7–23)
BUN SERPL-MCNC: 15 MG/DL — SIGNIFICANT CHANGE UP (ref 7–23)
CA-I BLD-SCNC: 0.99 MMOL/L — LOW (ref 1.03–1.23)
CALCIUM SERPL-MCNC: 7.2 MG/DL — LOW (ref 8.4–10.5)
CALCIUM SERPL-MCNC: 7.8 MG/DL — LOW (ref 8.4–10.5)
CHLORIDE SERPL-SCNC: 102 MMOL/L — SIGNIFICANT CHANGE UP (ref 98–107)
CHLORIDE SERPL-SCNC: 103 MMOL/L — SIGNIFICANT CHANGE UP (ref 98–107)
CO2 SERPL-SCNC: 21 MMOL/L — LOW (ref 22–31)
CO2 SERPL-SCNC: 23 MMOL/L — SIGNIFICANT CHANGE UP (ref 22–31)
CREAT SERPL-MCNC: 0.45 MG/DL — LOW (ref 0.5–1.3)
CREAT SERPL-MCNC: 0.53 MG/DL — SIGNIFICANT CHANGE UP (ref 0.5–1.3)
EOSINOPHIL NFR FLD: 1.8 % — SIGNIFICANT CHANGE UP (ref 0–6)
GIANT PLATELETS BLD QL SMEAR: PRESENT — SIGNIFICANT CHANGE UP
GLUCOSE BLDC GLUCOMTR-MCNC: 108 MG/DL — HIGH (ref 70–99)
GLUCOSE BLDC GLUCOMTR-MCNC: 117 MG/DL — HIGH (ref 70–99)
GLUCOSE SERPL-MCNC: 102 MG/DL — HIGH (ref 70–99)
GLUCOSE SERPL-MCNC: 96 MG/DL — SIGNIFICANT CHANGE UP (ref 70–99)
HCT VFR BLD CALC: 26.4 % — LOW (ref 34.5–45)
HCT VFR BLD CALC: 28.3 % — LOW (ref 34.5–45)
HGB BLD-MCNC: 8.4 G/DL — LOW (ref 11.5–15.5)
HGB BLD-MCNC: 9.6 G/DL — LOW (ref 11.5–15.5)
LYMPHOCYTES NFR SPEC AUTO: 6.4 % — LOW (ref 13–44)
MAGNESIUM SERPL-MCNC: 2 MG/DL — SIGNIFICANT CHANGE UP (ref 1.6–2.6)
MAGNESIUM SERPL-MCNC: 2 MG/DL — SIGNIFICANT CHANGE UP (ref 1.6–2.6)
MCHC RBC-ENTMCNC: 26.7 PG — LOW (ref 27–34)
MCHC RBC-ENTMCNC: 27.1 PG — SIGNIFICANT CHANGE UP (ref 27–34)
MCHC RBC-ENTMCNC: 31.8 % — LOW (ref 32–36)
MCHC RBC-ENTMCNC: 33.9 % — SIGNIFICANT CHANGE UP (ref 32–36)
MCV RBC AUTO: 79.9 FL — LOW (ref 80–100)
MCV RBC AUTO: 83.8 FL — SIGNIFICANT CHANGE UP (ref 80–100)
METAMYELOCYTES # FLD: 0.9 % — SIGNIFICANT CHANGE UP (ref 0–1)
MONOCYTES NFR BLD: 0 % — LOW (ref 2–9)
MYELOCYTES NFR BLD: 0 % — SIGNIFICANT CHANGE UP (ref 0–0)
NEUTROPHIL AB SER-ACNC: 69.1 % — SIGNIFICANT CHANGE UP (ref 43–77)
NEUTS BAND # BLD: 21.8 % — HIGH (ref 0–6)
NRBC # FLD: 0.02 K/UL — SIGNIFICANT CHANGE UP (ref 0–0)
NRBC # FLD: 0.03 K/UL — SIGNIFICANT CHANGE UP (ref 0–0)
OTHER - HEMATOLOGY %: 0 — SIGNIFICANT CHANGE UP
PHOSPHATE SERPL-MCNC: 2.8 MG/DL — SIGNIFICANT CHANGE UP (ref 2.5–4.5)
PHOSPHATE SERPL-MCNC: 3 MG/DL — SIGNIFICANT CHANGE UP (ref 2.5–4.5)
PLATELET # BLD AUTO: 118 K/UL — LOW (ref 150–400)
PLATELET # BLD AUTO: 138 K/UL — LOW (ref 150–400)
PLATELET COUNT - ESTIMATE: SIGNIFICANT CHANGE UP
PMV BLD: 10.2 FL — SIGNIFICANT CHANGE UP (ref 7–13)
PMV BLD: 11 FL — SIGNIFICANT CHANGE UP (ref 7–13)
POTASSIUM SERPL-MCNC: 3 MMOL/L — LOW (ref 3.5–5.3)
POTASSIUM SERPL-MCNC: 3.1 MMOL/L — LOW (ref 3.5–5.3)
POTASSIUM SERPL-SCNC: 3 MMOL/L — LOW (ref 3.5–5.3)
POTASSIUM SERPL-SCNC: 3.1 MMOL/L — LOW (ref 3.5–5.3)
PROMYELOCYTES # FLD: 0 % — SIGNIFICANT CHANGE UP (ref 0–0)
RBC # BLD: 3.15 M/UL — LOW (ref 3.8–5.2)
RBC # BLD: 3.54 M/UL — LOW (ref 3.8–5.2)
RBC # FLD: 14.5 % — SIGNIFICANT CHANGE UP (ref 10.3–14.5)
RBC # FLD: 14.9 % — HIGH (ref 10.3–14.5)
RH IG SCN BLD-IMP: POSITIVE — SIGNIFICANT CHANGE UP
SMUDGE CELLS # BLD: PRESENT — SIGNIFICANT CHANGE UP
SODIUM SERPL-SCNC: 139 MMOL/L — SIGNIFICANT CHANGE UP (ref 135–145)
SODIUM SERPL-SCNC: 140 MMOL/L — SIGNIFICANT CHANGE UP (ref 135–145)
VARIANT LYMPHS # BLD: 0 % — SIGNIFICANT CHANGE UP
WBC # BLD: 14.67 K/UL — HIGH (ref 3.8–10.5)
WBC # BLD: 15.82 K/UL — HIGH (ref 3.8–10.5)
WBC # FLD AUTO: 14.67 K/UL — HIGH (ref 3.8–10.5)
WBC # FLD AUTO: 15.82 K/UL — HIGH (ref 3.8–10.5)

## 2019-05-09 PROCEDURE — 99233 SBSQ HOSP IP/OBS HIGH 50: CPT

## 2019-05-09 PROCEDURE — 71045 X-RAY EXAM CHEST 1 VIEW: CPT | Mod: 26

## 2019-05-09 RX ORDER — I.V. FAT EMULSION 20 G/100ML
15.8 EMULSION INTRAVENOUS
Qty: 38 | Refills: 0 | Status: DISCONTINUED | OUTPATIENT
Start: 2019-05-09 | End: 2019-05-10

## 2019-05-09 RX ORDER — CHLORPROMAZINE HCL 10 MG
10 TABLET ORAL ONCE
Refills: 0 | Status: COMPLETED | OUTPATIENT
Start: 2019-05-09 | End: 2019-05-09

## 2019-05-09 RX ORDER — POTASSIUM CHLORIDE 20 MEQ
10 PACKET (EA) ORAL
Refills: 0 | Status: COMPLETED | OUTPATIENT
Start: 2019-05-09 | End: 2019-05-09

## 2019-05-09 RX ORDER — HYDROMORPHONE HYDROCHLORIDE 2 MG/ML
30 INJECTION INTRAMUSCULAR; INTRAVENOUS; SUBCUTANEOUS
Refills: 0 | Status: DISCONTINUED | OUTPATIENT
Start: 2019-05-09 | End: 2019-05-11

## 2019-05-09 RX ORDER — ELECTROLYTE SOLUTION,INJ
1 VIAL (ML) INTRAVENOUS
Refills: 0 | Status: DISCONTINUED | OUTPATIENT
Start: 2019-05-09 | End: 2019-05-10

## 2019-05-09 RX ORDER — ELECTROLYTE SOLUTION,INJ
1 VIAL (ML) INTRAVENOUS
Refills: 0 | Status: DISCONTINUED | OUTPATIENT
Start: 2019-05-09 | End: 2019-05-09

## 2019-05-09 RX ORDER — HYDROMORPHONE HYDROCHLORIDE 2 MG/ML
0.5 INJECTION INTRAMUSCULAR; INTRAVENOUS; SUBCUTANEOUS
Refills: 0 | Status: DISCONTINUED | OUTPATIENT
Start: 2019-05-09 | End: 2019-05-11

## 2019-05-09 RX ORDER — ONDANSETRON 8 MG/1
4 TABLET, FILM COATED ORAL EVERY 6 HOURS
Refills: 0 | Status: DISCONTINUED | OUTPATIENT
Start: 2019-05-09 | End: 2019-05-12

## 2019-05-09 RX ORDER — CALCIUM GLUCONATE 100 MG/ML
1 VIAL (ML) INTRAVENOUS ONCE
Refills: 0 | Status: COMPLETED | OUTPATIENT
Start: 2019-05-09 | End: 2019-05-09

## 2019-05-09 RX ORDER — NALOXONE HYDROCHLORIDE 4 MG/.1ML
0.1 SPRAY NASAL
Refills: 0 | Status: DISCONTINUED | OUTPATIENT
Start: 2019-05-09 | End: 2019-05-12

## 2019-05-09 RX ADMIN — ENOXAPARIN SODIUM 70 MILLIGRAM(S): 100 INJECTION SUBCUTANEOUS at 05:38

## 2019-05-09 RX ADMIN — ENOXAPARIN SODIUM 70 MILLIGRAM(S): 100 INJECTION SUBCUTANEOUS at 17:06

## 2019-05-09 RX ADMIN — Medication 100 MILLIEQUIVALENT(S): at 11:50

## 2019-05-09 RX ADMIN — PANTOPRAZOLE SODIUM 40 MILLIGRAM(S): 20 TABLET, DELAYED RELEASE ORAL at 13:00

## 2019-05-09 RX ADMIN — Medication 200 GRAM(S): at 10:00

## 2019-05-09 RX ADMIN — Medication 100 MILLIEQUIVALENT(S): at 12:53

## 2019-05-09 RX ADMIN — CHLORHEXIDINE GLUCONATE 1 APPLICATION(S): 213 SOLUTION TOPICAL at 05:47

## 2019-05-09 RX ADMIN — I.V. FAT EMULSION 15.8 ML/HR: 20 EMULSION INTRAVENOUS at 18:37

## 2019-05-09 RX ADMIN — Medication 10 MILLIGRAM(S): at 01:37

## 2019-05-09 RX ADMIN — HYDROMORPHONE HYDROCHLORIDE 30 MILLILITER(S): 2 INJECTION INTRAMUSCULAR; INTRAVENOUS; SUBCUTANEOUS at 07:59

## 2019-05-09 RX ADMIN — Medication 100 MILLIEQUIVALENT(S): at 10:42

## 2019-05-09 RX ADMIN — Medication 83 EACH: at 18:42

## 2019-05-09 RX ADMIN — HYDROMORPHONE HYDROCHLORIDE 30 MILLILITER(S): 2 INJECTION INTRAMUSCULAR; INTRAVENOUS; SUBCUTANEOUS at 20:28

## 2019-05-09 NOTE — PROGRESS NOTE ADULT - SUBJECTIVE AND OBJECTIVE BOX
Anesthesia Pain Management Service    SUBJECTIVE:    Therapy:	  [x ] IV PCA	   [ ] Epidural           [ ] s/p Spinal Opoid              [ ] Postpartum infusion	  [ ] Patient controlled regional anesthesia (PCRA)    [ ] prn Analgesics    OBJECTIVE:   [x ] No new signs     [ ] Other:    Side Effects:  [x ] None			[ ] Other:    Assessment of Catheter Site:		[ ] Intact		[ ] Other:    ASSESSMENT/PLAN  [x ] Continue current therapy    [ ] Therapy changed to:    [ ] IV PCA       [ ] Epidural     [ ] prn Analgesics     Comments:

## 2019-05-09 NOTE — PROGRESS NOTE ADULT - SUBJECTIVE AND OBJECTIVE BOX
SURGERY DAILY PROGRESS NOTE:       SUBJECTIVE/ROS: Patient examined at bedside. Continues to be tachy, asymptomatic. No flatus or BM. NGT w/ minimal output. Pain well controlled. Malodorous drainage from wound.          MEDICATIONS  (STANDING):  chlorhexidine 4% Liquid 1 Application(s) Topical <User Schedule>  enoxaparin Injectable 70 milliGRAM(s) SubCutaneous two times a day  fat emulsion (Fish Oil and Plant Based) 20% Infusion 15.8 mL/Hr (15.8 mL/Hr) IV Continuous <Continuous>  fat emulsion (Fish Oil and Plant Based) 20% Infusion 15.8 mL/Hr (15.8 mL/Hr) IV Continuous <Continuous>  gabapentin 100 milliGRAM(s) Oral three times a day  HYDROmorphone PCA (1 mG/mL) 30 milliLiter(s) PCA Continuous PCA Continuous  methimazole 5 milliGRAM(s) Oral daily  pantoprazole  Injectable 40 milliGRAM(s) IV Push daily  Parenteral Nutrition - Adult 1 Each (83 mL/Hr) TPN Continuous <Continuous>  Parenteral Nutrition - Adult 1 Each (83 mL/Hr) TPN Continuous <Continuous>    MEDICATIONS  (PRN):  HYDROmorphone PCA (1 mG/mL) Rescue Clinician Bolus 0.5 milliGRAM(s) IV Push every 15 minutes PRN for Pain Scale GREATER THAN 6  naloxone Injectable 0.1 milliGRAM(s) IV Push every 3 minutes PRN For ANY of the following changes in patient status:  A. RR LESS THAN 10 breaths per minute, B. Oxygen saturation LESS THAN 90%, C. Sedation score of 6  ondansetron Injectable 4 milliGRAM(s) IV Push every 6 hours PRN Nausea      OBJECTIVE:    Vital Signs Last 24 Hrs  T(C): 37.7 (09 May 2019 18:29), Max: 37.7 (09 May 2019 18:29)  T(F): 99.9 (09 May 2019 18:29), Max: 99.9 (09 May 2019 18:29)  HR: 133 (09 May 2019 18:29) (120 - 133)  BP: 134/84 (09 May 2019 18:29) (105/67 - 134/84)  BP(mean): --  RR: 16 (09 May 2019 18:29) (16 - 20)  SpO2: 99% (09 May 2019 18:29) (97% - 100%)        I&O's Detail    08 May 2019 07:01  -  09 May 2019 07:00  --------------------------------------------------------  IN:    fat emulsion (Fish Oil and Plant Based) 20% Infusion: 189.6 mL    IV PiggyBack: 200 mL    TPN (Total Parenteral Nutrition): 1992 mL  Total IN: 2381.6 mL    OUT:    Nasoenteral Tube: 325 mL    Voided: 2200 mL  Total OUT: 2525 mL    Total NET: -143.4 mL      09 May 2019 07:  -  09 May 2019 20:45  --------------------------------------------------------  IN:    IV PiggyBack: 400 mL  Total IN: 400 mL    OUT:    Drain: 200 mL    Nasoenteral Tube: 100 mL    Voided: 1300 mL  Total OUT: 1600 mL    Total NET: -1200 mL          Daily     Daily     LABS:                        9.6    15.82 )-----------( 138      ( 09 May 2019 19:35 )             28.3     05    139  |  102  |  15  ----------------------------<  102<H>  3.1<L>   |  23  |  0.45<L>    Ca    7.8<L>      09 May 2019 19:35  Phos  3.0       Mg     2.0             Urinalysis Basic - ( 08 May 2019 18:30 )    Color: YELLOW / Appearance: Lt TURBID / S.016 / pH: 6.5  Gluc: NEGATIVE / Ketone: NEGATIVE  / Bili: NEGATIVE / Urobili: TRACE   Blood: NEGATIVE / Protein: 20 / Nitrite: NEGATIVE   Leuk Esterase: NEGATIVE / RBC: 0-2 / WBC 0-2   Sq Epi: FEW / Non Sq Epi: x / Bacteria: NEGATIVE    Physical Exam:  Gen: NAD  Neuro: Follows commands  Resp: No acute respiratory distress, normal effort  Gastrointestinal: obese, soft, appropriately tender, nondistended, serous foul smelling output from the incision, NGT with bilious output

## 2019-05-09 NOTE — PROGRESS NOTE ADULT - SUBJECTIVE AND OBJECTIVE BOX
Anesthesia Pain Management Service    SUBJECTIVE: Patient is doing well with IV PCA and no significant problems reported.    Pain Scale Score	At rest: __5_ 	With Activity: ___ 	[X ] Refer to charted pain scores    THERAPY:    [ ] IV PCA Morphine		[ ] 5 mg/mL	[ ] 1 mg/mL  [X ] IV PCA Hydromorphone	[ ] 5 mg/mL	[X ] 1 mg/mL  [ ] IV PCA Fentanyl		[ ] 50 micrograms/mL    Demand dose __0.2_ lockout __6_ (minutes) Continuous Rate _0__ Total: __8_  mg used (in past 24 hours)      MEDICATIONS  (STANDING):  calcium gluconate IVPB 1 Gram(s) IV Intermittent once  chlorhexidine 4% Liquid 1 Application(s) Topical <User Schedule>  enoxaparin Injectable 70 milliGRAM(s) SubCutaneous two times a day  fat emulsion (Fish Oil and Plant Based) 20% Infusion 15.8 mL/Hr (15.8 mL/Hr) IV Continuous <Continuous>  fat emulsion (Fish Oil and Plant Based) 20% Infusion 15.8 mL/Hr (15.8 mL/Hr) IV Continuous <Continuous>  gabapentin 100 milliGRAM(s) Oral three times a day  HYDROmorphone PCA (1 mG/mL) 30 milliLiter(s) PCA Continuous PCA Continuous  methimazole 5 milliGRAM(s) Oral daily  pantoprazole  Injectable 40 milliGRAM(s) IV Push daily  Parenteral Nutrition - Adult 1 Each (83 mL/Hr) TPN Continuous <Continuous>  Parenteral Nutrition - Adult 1 Each (83 mL/Hr) TPN Continuous <Continuous>  potassium chloride  10 mEq/100 mL IVPB 10 milliEquivalent(s) IV Intermittent every 1 hour    MEDICATIONS  (PRN):  HYDROmorphone PCA (1 mG/mL) Rescue Clinician Bolus 0.5 milliGRAM(s) IV Push every 15 minutes PRN for Pain Scale GREATER THAN 6  naloxone Injectable 0.1 milliGRAM(s) IV Push every 3 minutes PRN For ANY of the following changes in patient status:  A. RR LESS THAN 10 breaths per minute, B. Oxygen saturation LESS THAN 90%, C. Sedation score of 6  ondansetron Injectable 4 milliGRAM(s) IV Push every 6 hours PRN Nausea      OBJECTIVE: laying in bed     Sedation Score:	[ X] Alert	[ ] Drowsy 	[ ] Arousable	[ ] Asleep	[ ] Unresponsive    Side Effects:	[X ] None	[ ] Nausea	[ ] Vomiting	[ ] Pruritus  		[ ] Other:    Vital Signs Last 24 Hrs  T(C): 37.3 (09 May 2019 09:50), Max: 37.3 (08 May 2019 13:50)  T(F): 99.2 (09 May 2019 09:50), Max: 99.2 (09 May 2019 09:50)  HR: 132 (09 May 2019 09:50) (118 - 133)  BP: 114/76 (09 May 2019 09:50) (101/65 - 120/79)  BP(mean): --  RR: 17 (09 May 2019 09:50) (16 - 20)  SpO2: 100% (09 May 2019 09:50) (98% - 100%)    ASSESSMENT/ PLAN    Therapy to  be:	[ X] Continue   [ ] Discontinued   [ ] Change to prn Analgesics    Documentation and Verification of current medications:   [X] Done	[ ] Not done, not elligible    Comments: NPO with NGT, continue current therapy

## 2019-05-09 NOTE — PROGRESS NOTE ADULT - SUBJECTIVE AND OBJECTIVE BOX
NUTRITION NOTE  [id number] [facility]  ===============================    Interval events  Patient was seen and examined at bedside, no acute events overnight.   PICC placed and TPN/lipids started on 19 for nutritional support.   NGT remains in place, NPO, now on full volume TPN     Vital Signs Last 24 Hrs  T(C): 37.3 (09 May 2019 09:50), Max: 37.3 (08 May 2019 13:50)  T(F): 99.2 (09 May 2019 09:50), Max: 99.2 (09 May 2019 09:50)  HR: 132 (09 May 2019 09:50) (118 - 133)  BP: 114/76 (09 May 2019 09:50) (101/65 - 120/79)  RR: 17 (09 May 2019 09:50) (16 - 20)  SpO2: 100% (09 May 2019 09:50) (98% - 100%)    MEDICATIONS  (STANDING):  chlorhexidine 4% Liquid 1 Application(s) Topical <User Schedule>  enoxaparin Injectable 70 milliGRAM(s) SubCutaneous two times a day  fat emulsion (Fish Oil and Plant Based) 20% Infusion 15.8 mL/Hr (15.8 mL/Hr) IV Continuous <Continuous>  fat emulsion (Fish Oil and Plant Based) 20% Infusion 15.8 mL/Hr (15.8 mL/Hr) IV Continuous <Continuous>  gabapentin 100 milliGRAM(s) Oral three times a day  HYDROmorphone PCA (1 mG/mL) 30 milliLiter(s) PCA Continuous PCA Continuous  methimazole 5 milliGRAM(s) Oral daily  pantoprazole  Injectable 40 milliGRAM(s) IV Push daily  Parenteral Nutrition - Adult 1 Each (83 mL/Hr) TPN Continuous <Continuous>  Parenteral Nutrition - Adult 1 Each (83 mL/Hr) TPN Continuous <Continuous>  potassium chloride  10 mEq/100 mL IVPB 10 milliEquivalent(s) IV Intermittent every 1 hour    MEDICATIONS  (PRN):  HYDROmorphone PCA (1 mG/mL) Rescue Clinician Bolus 0.5 milliGRAM(s) IV Push every 15 minutes PRN for Pain Scale GREATER THAN 6  naloxone Injectable 0.1 milliGRAM(s) IV Push every 3 minutes PRN For ANY of the following changes in patient status:  A. RR LESS THAN 10 breaths per minute, B. Oxygen saturation LESS THAN 90%, C. Sedation score of 6  ondansetron Injectable 4 milliGRAM(s) IV Push every 6 hours PRN Nausea    I&O's Detail    08 May 2019 07:01  -  09 May 2019 07:00  --------------------------------------------------------  IN:    fat emulsion (Fish Oil and Plant Based) 20% Infusion: 189.6 mL    IV PiggyBack: 200 mL    TPN (Total Parenteral Nutrition): 1992 mL  Total IN: 2381.6 mL    OUT:    Nasoenteral Tube: 325 mL    Voided: 2200 mL  Total OUT: 2525 mL    Total NET: -143.4 mL    POCT Blood Glucose.: 117 mg/dL (09 May 2019 05:49)  POCT Blood Glucose.: 111 mg/dL (08 May 2019 21:56)  POCT Blood Glucose.: 115 mg/dL (08 May 2019 18:10)    Daily Weight in k.5 (07 May 2019 13:50)    Drug Dosing Weight  Height (cm): 162.56 (02 May 2019 11:28)  Weight (kg): 68 (02 May 2019 11:28)  BMI (kg/m2): 25.7 (02 May 2019 11:28)  BSA (m2): 1.73 (02 May 2019 11:28)    PHYSICAL EXAM   General: NAD, resting comfortably   Pulm: nonlabored respirations   GI/Nutrition: soft, appropriately tender NGT in place   Extremity: warm     LABORATORY                                     8.4    14.67 )-----------( 118      ( 09 May 2019 05:50 )             26.4     05-    140  |  103  |  15  ----------------------------<  96  3.0<L>   |  21<L>  |  0.53    Ca    7.2<L>      09 May 2019 05:50  Phos  2.8     -  Mg     2.0         05-02 Chol -- LDL -- HDL -- Trig 122    Prealbumin 19: 10    Diet: NPO and TPN/lipids (started on 19)    ASSESSMENT/PLAN:  53y female with recurrent SBO s/p Diagnostic laparoscopy, exploratory laparotomy, lysis of adhesions, small bowel resection, rigid sigmoidoscopy. Patient with prolonged NPO status, meets criteria for severe protein calorie malnutrition requiring TPN for nutritional support. PICC placed and TPN/lipids started on 19.    TPN infusion volume increased to 2L, TPN is providing 1446 kcal - increased calories in TPN to meet nutritional requirements     labs reviewed - hypokalemia noted: increased K in TPN    monitor fingersticks, obtain daily weights\    continue parenteral nutritional at this time, will follow up with primary team on plan    1. Protein calorie malnutrition being optimized with TPN: CHO [210 ] gm.  AA [88 ] gm. SMOF Lipids [38] gm.  2.  Hyperglycemia managed with: [0] units of regular insulin    3.  Check fluid balance daily.  Strict I/O  [ ] [ ]   4.  Daily BMP, Ionized Calcium, Magnesium and Phosphorous   5.  Triglycerides at initiation of TPN and monthly [ ] [ ]   6.  Pepcid in TPN for Gi prophylaxis  [ ]     Nutrition support pager [phone]

## 2019-05-09 NOTE — PROGRESS NOTE ADULT - ASSESSMENT
53y female with recurrent SBO s/p Diagnostic laparoscopy, exploratory laparotomy, lysis of adhesions, small bowel resection, rigid sigmoidoscopy. Patient is persistanly tachy, HDS. CT abd/pel on 5/5 w/o evidence of anastomotic leak; CTPA negative for PE.     - C/w NPO/TPN  - pain control: PCA and IV Tylenol   - C/w NG tube  - F/u Endo recs: per endocrine note, "do not think the thyroid is the a major factor in the patient's tachycardia," still recommend continuing with methimazole   - Continue therapeutic lovenox  - continue to monitor GIF  - Monitor wound   - PT/OOB    B surgery  [id number]

## 2019-05-09 NOTE — PROGRESS NOTE ADULT - ATTENDING COMMENTS
53y female with recurrent SBO s/p Diagnostic laparoscopy, exploratory laparotomy, lysis of adhesions, small bowel resection, rigid sigmoidoscopy. Patient with prolonged NPO status, meets criteria for severe protein calorie malnutrition requiring TPN for nutritional support. PICC placed and TPN/lipids started on 5/6/19.    TPN infusion volume increased to 2L, TPN is providing 1446 kcal - increased calories in TPN to meet nutritional requirements     labs reviewed - hypokalemia noted: increased K in TPN    monitor fingersticks, obtain daily weights\    continue parenteral nutritional at this time, will follow up with primary team on plan    1. Protein calorie malnutrition being optimized with TPN: CHO [210 ] gm.  AA [88 ] gm. SMOF Lipids [38] gm.  2.  Hyperglycemia managed with: [0] units of regular insulin    3.  Check fluid balance daily.  Strict I/O  [ ] [ ]   4.  Daily BMP, Ionized Calcium, Magnesium and Phosphorous   5.  Triglycerides at initiation of TPN and monthly [ ] [ ]   6.  Pepcid in TPN for Gi prophylaxis  [ ]     I have seen and examined the patient, reviewed the laboratory and radiologic data and agree with the history, physical assessment and plan.  I reviewed and discussed with all consultants, house staff and PA's.  The note is edited where appropriate. The Nutrition Support Team (NST) discusses on an ongoing basis with the primary team and all consultants, House staff and PA's to have a permanent risk benefit analyses on all decisions.    I spent  45  minutes in total encounter; more than 50% of the visit counseling and coordinating nutrition care while providing diagnoses, assessment, and plan.

## 2019-05-09 NOTE — PROGRESS NOTE ADULT - ATTENDING COMMENTS
I have personally interviewed and examined this patient, reviewed pertinent labs and imaging, and discussed the case with colleagues, residents, and physician assistants on B Team rounds.    Passing flatus.   Plan  ngt clamp trial  transfuse 1 prbc  will consult cardiology if persistent tachycardia after aggressive repletion of lytes and transfusion      The Acute Care Surgery (B Team) Attending Group Practice:  Dr. Myles Prieto, Dr. Leonardo Celeste, Dr. Imtiaz Skinner, Dr. Callie Finch, Dr. Bonifacio White    urgent issues - spectra [phone] or [phone]  nonurgent issues - [phone]  patient appointments or afterhours - [phone]

## 2019-05-10 ENCOUNTER — TRANSCRIPTION ENCOUNTER (OUTPATIENT)
Age: 54
End: 2019-05-10

## 2019-05-10 LAB
ANION GAP SERPL CALC-SCNC: 13 MMO/L — SIGNIFICANT CHANGE UP (ref 7–14)
BUN SERPL-MCNC: 15 MG/DL — SIGNIFICANT CHANGE UP (ref 7–23)
CA-I BLD-SCNC: 1.03 MMOL/L — SIGNIFICANT CHANGE UP (ref 1.03–1.23)
CALCIUM SERPL-MCNC: 7.5 MG/DL — LOW (ref 8.4–10.5)
CHLORIDE SERPL-SCNC: 101 MMOL/L — SIGNIFICANT CHANGE UP (ref 98–107)
CO2 SERPL-SCNC: 24 MMOL/L — SIGNIFICANT CHANGE UP (ref 22–31)
CREAT SERPL-MCNC: 0.43 MG/DL — LOW (ref 0.5–1.3)
GLUCOSE BLDC GLUCOMTR-MCNC: 107 MG/DL — HIGH (ref 70–99)
GLUCOSE BLDC GLUCOMTR-MCNC: 107 MG/DL — HIGH (ref 70–99)
GLUCOSE BLDC GLUCOMTR-MCNC: 98 MG/DL — SIGNIFICANT CHANGE UP (ref 70–99)
GLUCOSE SERPL-MCNC: 95 MG/DL — SIGNIFICANT CHANGE UP (ref 70–99)
HCT VFR BLD CALC: 24.5 % — LOW (ref 34.5–45)
HCT VFR BLD CALC: 27.2 % — LOW (ref 34.5–45)
HGB BLD-MCNC: 8.1 G/DL — LOW (ref 11.5–15.5)
HGB BLD-MCNC: 8.8 G/DL — LOW (ref 11.5–15.5)
MAGNESIUM SERPL-MCNC: 2 MG/DL — SIGNIFICANT CHANGE UP (ref 1.6–2.6)
MCHC RBC-ENTMCNC: 26.4 PG — LOW (ref 27–34)
MCHC RBC-ENTMCNC: 26.4 PG — LOW (ref 27–34)
MCHC RBC-ENTMCNC: 32.4 % — SIGNIFICANT CHANGE UP (ref 32–36)
MCHC RBC-ENTMCNC: 33.1 % — SIGNIFICANT CHANGE UP (ref 32–36)
MCV RBC AUTO: 79.8 FL — LOW (ref 80–100)
MCV RBC AUTO: 81.7 FL — SIGNIFICANT CHANGE UP (ref 80–100)
NRBC # FLD: 0.02 K/UL — SIGNIFICANT CHANGE UP (ref 0–0)
NRBC # FLD: 0.04 K/UL — SIGNIFICANT CHANGE UP (ref 0–0)
PHOSPHATE SERPL-MCNC: 3.2 MG/DL — SIGNIFICANT CHANGE UP (ref 2.5–4.5)
PLATELET # BLD AUTO: 109 K/UL — LOW (ref 150–400)
PLATELET # BLD AUTO: 124 K/UL — LOW (ref 150–400)
PMV BLD: 11.2 FL — SIGNIFICANT CHANGE UP (ref 7–13)
PMV BLD: 11.6 FL — SIGNIFICANT CHANGE UP (ref 7–13)
POTASSIUM SERPL-MCNC: 3.2 MMOL/L — LOW (ref 3.5–5.3)
POTASSIUM SERPL-SCNC: 3.2 MMOL/L — LOW (ref 3.5–5.3)
RBC # BLD: 3.07 M/UL — LOW (ref 3.8–5.2)
RBC # BLD: 3.33 M/UL — LOW (ref 3.8–5.2)
RBC # FLD: 14.7 % — HIGH (ref 10.3–14.5)
RBC # FLD: 14.8 % — HIGH (ref 10.3–14.5)
SODIUM SERPL-SCNC: 138 MMOL/L — SIGNIFICANT CHANGE UP (ref 135–145)
WBC # BLD: 13.91 K/UL — HIGH (ref 3.8–10.5)
WBC # BLD: 14.69 K/UL — HIGH (ref 3.8–10.5)
WBC # FLD AUTO: 13.91 K/UL — HIGH (ref 3.8–10.5)
WBC # FLD AUTO: 14.69 K/UL — HIGH (ref 3.8–10.5)

## 2019-05-10 PROCEDURE — 71275 CT ANGIOGRAPHY CHEST: CPT | Mod: 26

## 2019-05-10 PROCEDURE — 99233 SBSQ HOSP IP/OBS HIGH 50: CPT

## 2019-05-10 PROCEDURE — 71045 X-RAY EXAM CHEST 1 VIEW: CPT | Mod: 26

## 2019-05-10 PROCEDURE — 74177 CT ABD & PELVIS W/CONTRAST: CPT | Mod: 26

## 2019-05-10 PROCEDURE — 93010 ELECTROCARDIOGRAM REPORT: CPT

## 2019-05-10 RX ORDER — DEXTROSE MONOHYDRATE, SODIUM CHLORIDE, AND POTASSIUM CHLORIDE 50; .745; 4.5 G/1000ML; G/1000ML; G/1000ML
1000 INJECTION, SOLUTION INTRAVENOUS
Refills: 0 | Status: DISCONTINUED | OUTPATIENT
Start: 2019-05-10 | End: 2019-05-11

## 2019-05-10 RX ORDER — POTASSIUM CHLORIDE 20 MEQ
10 PACKET (EA) ORAL
Refills: 0 | Status: COMPLETED | OUTPATIENT
Start: 2019-05-10 | End: 2019-05-10

## 2019-05-10 RX ORDER — ELECTROLYTE SOLUTION,INJ
1 VIAL (ML) INTRAVENOUS
Refills: 0 | Status: DISCONTINUED | OUTPATIENT
Start: 2019-05-10 | End: 2019-05-10

## 2019-05-10 RX ORDER — I.V. FAT EMULSION 20 G/100ML
15.8 EMULSION INTRAVENOUS
Qty: 38 | Refills: 0 | Status: DISCONTINUED | OUTPATIENT
Start: 2019-05-10 | End: 2019-05-10

## 2019-05-10 RX ORDER — ACETAMINOPHEN 500 MG
1000 TABLET ORAL ONCE
Refills: 0 | Status: DISCONTINUED | OUTPATIENT
Start: 2019-05-10 | End: 2019-05-12

## 2019-05-10 RX ORDER — PIPERACILLIN AND TAZOBACTAM 4; .5 G/20ML; G/20ML
3.38 INJECTION, POWDER, LYOPHILIZED, FOR SOLUTION INTRAVENOUS EVERY 8 HOURS
Refills: 0 | Status: DISCONTINUED | OUTPATIENT
Start: 2019-05-10 | End: 2019-05-12

## 2019-05-10 RX ADMIN — ENOXAPARIN SODIUM 70 MILLIGRAM(S): 100 INJECTION SUBCUTANEOUS at 06:53

## 2019-05-10 RX ADMIN — HYDROMORPHONE HYDROCHLORIDE 30 MILLILITER(S): 2 INJECTION INTRAMUSCULAR; INTRAVENOUS; SUBCUTANEOUS at 20:22

## 2019-05-10 RX ADMIN — GABAPENTIN 100 MILLIGRAM(S): 400 CAPSULE ORAL at 13:43

## 2019-05-10 RX ADMIN — PANTOPRAZOLE SODIUM 40 MILLIGRAM(S): 20 TABLET, DELAYED RELEASE ORAL at 13:44

## 2019-05-10 RX ADMIN — Medication 100 MILLIEQUIVALENT(S): at 16:46

## 2019-05-10 RX ADMIN — PIPERACILLIN AND TAZOBACTAM 25 GRAM(S): 4; .5 INJECTION, POWDER, LYOPHILIZED, FOR SOLUTION INTRAVENOUS at 21:38

## 2019-05-10 RX ADMIN — GABAPENTIN 100 MILLIGRAM(S): 400 CAPSULE ORAL at 21:39

## 2019-05-10 RX ADMIN — ENOXAPARIN SODIUM 70 MILLIGRAM(S): 100 INJECTION SUBCUTANEOUS at 18:49

## 2019-05-10 RX ADMIN — Medication 100 MILLIEQUIVALENT(S): at 18:48

## 2019-05-10 RX ADMIN — Medication 100 MILLIEQUIVALENT(S): at 13:43

## 2019-05-10 RX ADMIN — HYDROMORPHONE HYDROCHLORIDE 30 MILLILITER(S): 2 INJECTION INTRAMUSCULAR; INTRAVENOUS; SUBCUTANEOUS at 08:28

## 2019-05-10 RX ADMIN — CHLORHEXIDINE GLUCONATE 1 APPLICATION(S): 213 SOLUTION TOPICAL at 12:57

## 2019-05-10 NOTE — PROGRESS NOTE ADULT - SUBJECTIVE AND OBJECTIVE BOX
Anesthesia Pain Management Service    SUBJECTIVE: Patient is doing well with IV PCA and no significant problems reported.  Patient states that IV PCA helps with her pain.    Pain Scale Score	At rest: _5/10__ 	With Activity: ___ 	[X ] Refer to charted pain scores    THERAPY:    [ ] IV PCA Morphine		[ ] 5 mg/mL	[ ] 1 mg/mL  [X ] IV PCA Hydromorphone	[ ] 5 mg/mL	[X ] 1 mg/mL  [ ] IV PCA Fentanyl		[ ] 50 micrograms/mL    Demand dose __0.2_ lockout __6_ (minutes) Continuous Rate _0__ Total: __12.75_  mg used (in past 24 hours)      MEDICATIONS  (STANDING):  chlorhexidine 4% Liquid 1 Application(s) Topical <User Schedule>  enoxaparin Injectable 70 milliGRAM(s) SubCutaneous two times a day  fat emulsion (Fish Oil and Plant Based) 20% Infusion 15.8 mL/Hr (15.8 mL/Hr) IV Continuous <Continuous>  fat emulsion (Fish Oil and Plant Based) 20% Infusion 15.8 mL/Hr (15.8 mL/Hr) IV Continuous <Continuous>  gabapentin 100 milliGRAM(s) Oral three times a day  HYDROmorphone PCA (1 mG/mL) 30 milliLiter(s) PCA Continuous PCA Continuous  methimazole 5 milliGRAM(s) Oral daily  pantoprazole  Injectable 40 milliGRAM(s) IV Push daily  Parenteral Nutrition - Adult 1 Each (83 mL/Hr) TPN Continuous <Continuous>  Parenteral Nutrition - Adult 1 Each (83 mL/Hr) TPN Continuous <Continuous>    MEDICATIONS  (PRN):  HYDROmorphone PCA (1 mG/mL) Rescue Clinician Bolus 0.5 milliGRAM(s) IV Push every 15 minutes PRN for Pain Scale GREATER THAN 6  naloxone Injectable 0.1 milliGRAM(s) IV Push every 3 minutes PRN For ANY of the following changes in patient status:  A. RR LESS THAN 10 breaths per minute, B. Oxygen saturation LESS THAN 90%, C. Sedation score of 6  ondansetron Injectable 4 milliGRAM(s) IV Push every 6 hours PRN Nausea      OBJECTIVE:  Patient is NPO and lying in bed.    Sedation Score:	[ X] Alert	[ ] Drowsy 	[ ] Arousable	[ ] Asleep	[ ] Unresponsive    Side Effects:	[X ] None	[ ] Nausea	[ ] Vomiting	[ ] Pruritus  		[ ] Other:    Vital Signs Last 24 Hrs  T(C): 36.8 (10 May 2019 10:43), Max: 37.7 (09 May 2019 18:29)  T(F): 98.3 (10 May 2019 10:43), Max: 99.9 (09 May 2019 18:29)  HR: 133 (10 May 2019 10:43) (125 - 133)  BP: 118/84 (10 May 2019 10:43) (105/67 - 134/84)  BP(mean): --  RR: 22 (10 May 2019 10:43) (16 - 28)  SpO2: 100% (10 May 2019 10:43) (96% - 100%)    ASSESSMENT/ PLAN    Therapy to  be:	[ X] Continue   [ ] Discontinued   [ ] Change to prn Analgesics    Documentation and Verification of current medications:   [X] Done	[ ] Not done, not elligible    Comments:  Patient seen at bedside, tachycardic 130's. RN called and brought to bedside, informed that Surgical Team is aware.  Patient is NPO, Continue current pain regimen.  IV Tylenol ordered.

## 2019-05-10 NOTE — PROGRESS NOTE ADULT - ASSESSMENT
53y female with recurrent SBO s/p Diagnostic laparoscopy, exploratory laparotomy, lysis of adhesions, small bowel resection, rigid sigmoidoscopy. Patient is persistanly tachy, HDS. CT abd/pel on 5/5 w/o evidence of anastomotic leak; CTPA negative for PE.     - C/w NPO/TPN  - pain control: PCA and IV Tylenol   - F/u Endo recs; continuing with methimazole   - Continue therapeutic lovenox  - continue to monitor GIF  - Monitor wound   -trend CBC  - PT/OOB    B surgery  [id number]

## 2019-05-10 NOTE — PROVIDER CONTACT NOTE (OTHER) - SITUATION
Patient was sent for CT scan with Lft double lumen PICC, TPN  infusing at 83cc/hr labelled line. Contrast was given to patient through TPN lumen in CT scan by MD

## 2019-05-10 NOTE — PROGRESS NOTE ADULT - SUBJECTIVE AND OBJECTIVE BOX
Anesthesia Pain Management Service- Attending Addendum    SUBJECTIVE: Pt doing well with IV PCA without problems reported, educated on use this morning by NP    Therapy:	  [ X] IV PCA	   [ ] Epidural           [ ] s/p Spinal Opoid              [ ] Postpartum infusion	  [ ] Patient controlled regional anesthesia (PCRA)    [ ] prn Analgesics    Allergies    No Known Allergies    Intolerances      MEDICATIONS  (STANDING):  acetaminophen  IVPB .. 1000 milliGRAM(s) IV Intermittent once  chlorhexidine 4% Liquid 1 Application(s) Topical <User Schedule>  dextrose 5% + sodium chloride 0.45% with potassium chloride 20 mEq/L 1000 milliLiter(s) (100 mL/Hr) IV Continuous <Continuous>  enoxaparin Injectable 70 milliGRAM(s) SubCutaneous two times a day  gabapentin 100 milliGRAM(s) Oral three times a day  HYDROmorphone PCA (1 mG/mL) 30 milliLiter(s) PCA Continuous PCA Continuous  methimazole 5 milliGRAM(s) Oral daily  pantoprazole  Injectable 40 milliGRAM(s) IV Push daily  piperacillin/tazobactam IVPB. 3.375 Gram(s) IV Intermittent every 8 hours  potassium chloride  10 mEq/100 mL IVPB 10 milliEquivalent(s) IV Intermittent every 1 hour    MEDICATIONS  (PRN):  HYDROmorphone PCA (1 mG/mL) Rescue Clinician Bolus 0.5 milliGRAM(s) IV Push every 15 minutes PRN for Pain Scale GREATER THAN 6  naloxone Injectable 0.1 milliGRAM(s) IV Push every 3 minutes PRN For ANY of the following changes in patient status:  A. RR LESS THAN 10 breaths per minute, B. Oxygen saturation LESS THAN 90%, C. Sedation score of 6  ondansetron Injectable 4 milliGRAM(s) IV Push every 6 hours PRN Nausea      OBJECTIVE:   ] No new signs     [x ] Other: HR noted to be 130s, nursing/ team aware, patient on NC appears comfortable    Side Effects:  [X ] None			[ ] Other:    Assessment of Catheter Site:		[ ] Intact		[ ] Other:    ASSESSMENT/PLAN  [ X] Continue current therapy    [ ] Therapy changed to:    [ ] IV PCA       [ ] Epidural     [ ] prn Analgesics     Comments:

## 2019-05-10 NOTE — PROGRESS NOTE ADULT - ATTENDING COMMENTS
s/p small bowel resection secondary to SBO    Remains tachycardic, with persistent leukocytosis.  Note of liquified hematoma from upper pole pouch.    a.  CT reviewed  b.  Start IV zosyn for enteritis  c.  Liquified hematoma from likely a result of right sided myofascial flap bleeding/full dose AC.  Continue pouch of upper pole of wound  d.  Clears as tolerated  e.  Continue lovenox BID for RLE DVt

## 2019-05-10 NOTE — PROGRESS NOTE ADULT - SUBJECTIVE AND OBJECTIVE BOX
NUTRITION NOTE  [id number] [facility]  ===============================    Interval events  Patient was seen and examined at bedside, continues to be tachycardic.   PICC placed and TPN/lipids started on 19 for nutritional support.   NGT remains in place, NPO, now on full volume TPN     Vital Signs Last 24 Hrs  T(C): 37.4 (10 May 2019 05:48), Max: 37.7 (09 May 2019 18:29)  T(F): 99.4 (10 May 2019 05:48), Max: 99.9 (09 May 2019 18:29)  HR: 133 (10 May 2019 05:48) (125 - 133)  BP: 119/76 (10 May 2019 05:48) (105/67 - 134/84)  RR: 28 (10 May 2019 05:48) (16 - 28)  SpO2: 99% (10 May 2019 05:48) (96% - 100%)    MEDICATIONS  (STANDING):  chlorhexidine 4% Liquid 1 Application(s) Topical <User Schedule>  enoxaparin Injectable 70 milliGRAM(s) SubCutaneous two times a day  fat emulsion (Fish Oil and Plant Based) 20% Infusion 15.8 mL/Hr (15.8 mL/Hr) IV Continuous <Continuous>  fat emulsion (Fish Oil and Plant Based) 20% Infusion 15.8 mL/Hr (15.8 mL/Hr) IV Continuous <Continuous>  gabapentin 100 milliGRAM(s) Oral three times a day  HYDROmorphone PCA (1 mG/mL) 30 milliLiter(s) PCA Continuous PCA Continuous  methimazole 5 milliGRAM(s) Oral daily  pantoprazole  Injectable 40 milliGRAM(s) IV Push daily  Parenteral Nutrition - Adult 1 Each (83 mL/Hr) TPN Continuous <Continuous>  Parenteral Nutrition - Adult 1 Each (83 mL/Hr) TPN Continuous <Continuous>    MEDICATIONS  (PRN):  HYDROmorphone PCA (1 mG/mL) Rescue Clinician Bolus 0.5 milliGRAM(s) IV Push every 15 minutes PRN for Pain Scale GREATER THAN 6  naloxone Injectable 0.1 milliGRAM(s) IV Push every 3 minutes PRN For ANY of the following changes in patient status:  A. RR LESS THAN 10 breaths per minute, B. Oxygen saturation LESS THAN 90%, C. Sedation score of 6  ondansetron Injectable 4 milliGRAM(s) IV Push every 6 hours PRN Nausea    I&O's Detail    09 May 2019 07:01  -  10 May 2019 07:00  --------------------------------------------------------  IN:    fat emulsion (Fish Oil and Plant Based) 20% Infusion: 189.6 mL    IV PiggyBack: 400 mL    TPN (Total Parenteral Nutrition): 990 mL  Total IN: 1579.6 mL    OUT:    Drain: 1100 mL    Nasoenteral Tube: 100 mL    Voided: 2100 mL  Total OUT: 3300 mL    Total NET: -1720.4 mL    POCT Blood Glucose.: 107 mg/dL (10 May 2019 06:09)  POCT Blood Glucose.: 108 mg/dL (09 May 2019 18:01)    Daily Weight in k.5 (07 May 2019 13:50)    Drug Dosing Weight  Height (cm): 162.56 (02 May 2019 11:28)  Weight (kg): 68 (02 May 2019 11:28)  BMI (kg/m2): 25.7 (02 May 2019 11:28)  BSA (m2): 1.73 (02 May 2019 11:28)    PHYSICAL EXAM   General: NAD, resting comfortably   Pulm: nonlabored respirations   GI/Nutrition: soft, appropriately tender, nondistended, serous foul smelling output from the incision, NGT with bilious output  Extremity: warm     LABORATORY                                              8.1    14.69 )-----------( 124      ( 10 May 2019 05:53 )             24.5     05-10    138  |  101  |  15  ----------------------------<  95  3.2<L>   |  24  |  0.43<L>    Ca    7.5<L>      10 May 2019 05:53  Phos  3.2     05-10  Mg     2.0     05-10    05-02 Chol -- LDL -- HDL -- Trig 122    Prealbumin 5/2/19: 10    Diet: NPO and TPN/lipids (started on 19)    ASSESSMENT/PLAN:  53y female with recurrent SBO s/p Diagnostic laparoscopy, exploratory laparotomy, lysis of adhesions, small bowel resection, rigid sigmoidoscopy. Patient with prolonged NPO status, meets criteria for severe protein calorie malnutrition requiring TPN for nutritional support. PICC placed and TPN/lipids started on 19.    TPN infusion volume increased to 2L, TPN is providing 1446 kcal - increased calories in TPN to meet nutritional requirements     labs reviewed - hypokalemia noted: increased KCl to 90 meq in TPN     monitor fingersticks, obtain daily weights\    continue parenteral nutritional at this time, will follow up with primary team on plan    1. Protein calorie malnutrition being optimized with TPN: CHO [210 ] gm.  AA [88 ] gm. SMOF Lipids [38] gm.  2.  Hyperglycemia managed with: [0] units of regular insulin    3.  Check fluid balance daily.  Strict I/O  [ ] [ ]   4.  Daily BMP, Ionized Calcium, Magnesium and Phosphorous   5.  Triglycerides at initiation of TPN and monthly [ ] [ ]   6.  Pepcid in TPN for Gi prophylaxis  [ ]     Nutrition support pager [phone]

## 2019-05-10 NOTE — PROGRESS NOTE ADULT - ATTENDING COMMENTS
53y female with recurrent SBO s/p Diagnostic laparoscopy, exploratory laparotomy, lysis of adhesions, small bowel resection, rigid sigmoidoscopy. Patient with prolonged NPO status, meets criteria for severe protein calorie malnutrition requiring TPN for nutritional support. PICC placed and TPN/lipids started on 5/6/19.    TPN infusion volume increased to 2L, TPN is providing 1446 kcal - increased calories in TPN to meet nutritional requirements     labs reviewed - hypokalemia noted: increased KCl to 90 meq in TPN     monitor fingersticks, obtain daily weights\    continue parenteral nutritional at this time, will follow up with primary team on plan    1. Protein calorie malnutrition being optimized with TPN: CHO [210 ] gm.  AA [88 ] gm. SMOF Lipids [38] gm.  2.  Hyperglycemia managed with: [0] units of regular insulin    3.  Check fluid balance daily.  Strict I/O  [ ] [ ]   4.  Daily BMP, Ionized Calcium, Magnesium and Phosphorous   5.  Triglycerides at initiation of TPN and monthly [ ] [ ]   6.  Pepcid in TPN for Gi prophylaxis  [ ]     I have seen and examined the patient, reviewed the laboratory and radiologic data and agree with the history, physical assessment and plan.  I reviewed and discussed with all consultants, house staff and PA's.  The note is edited where appropriate. The Nutrition Support Team (NST) discusses on an ongoing basis with the primary team and all consultants, House staff and PA's to have a permanent risk benefit analyses on all decisions.    I spent  45  minutes in total encounter; more than 50% of the visit counseling and coordinating nutrition care while providing diagnoses, assessment, and plan.

## 2019-05-10 NOTE — PROVIDER CONTACT NOTE (OTHER) - ASSESSMENT
EKG completed and in chart, chest Xray ordered and completed. As comfort measure pt was offered supplemental O2 and 2LNC provided to pt. Pt states she feels more comfortable.

## 2019-05-10 NOTE — PROGRESS NOTE ADULT - SUBJECTIVE AND OBJECTIVE BOX
SUBJECTIVE: Pt seen and examined at bedside by team this morning. Continues to be tachycardic, asymptomatic. No flatus or BM. NGT w/ minimal output. Pain well controlled. Malodorous drainage from wound, maroon colored.    Vital Signs Last 24 Hrs  T(C): 37.4 (10 May 2019 05:48), Max: 37.7 (09 May 2019 18:29)  T(F): 99.4 (10 May 2019 05:48), Max: 99.9 (09 May 2019 18:29)  HR: 133 (10 May 2019 05:48) (125 - 133)  BP: 119/76 (10 May 2019 05:48) (105/67 - 134/84)  RR: 28 (10 May 2019 05:48) (16 - 28)  SpO2: 99% (10 May 2019 05:48) (96% - 100%)      General Appearance: Appears well, NAD  Neck: Supple  Chest: Equal expansion bilaterally, equal breath sounds  CV: Pulse regular presently  Abdomen: obese, soft, appropriately tender, nondistended, serous foul smelling output from the incision   Extremities: Grossly symmetric, SCD's in place     I&O's Summary    09 May 2019 07:  -  10 May 2019 07:00  --------------------------------------------------------  IN: 1579.6 mL / OUT: 3300 mL / NET: -1720.4 mL      I&O's Detail    09 May 2019 07:  -  10 May 2019 07:00  --------------------------------------------------------  IN:    fat emulsion (Fish Oil and Plant Based) 20% Infusion: 189.6 mL    IV PiggyBack: 400 mL    TPN (Total Parenteral Nutrition): 990 mL  Total IN: 1579.6 mL    OUT:    Drain: 1100 mL    Nasoenteral Tube: 100 mL    Voided: 2100 mL  Total OUT: 3300 mL    Total NET: -1720.4 mL          MEDICATIONS  (STANDING):  chlorhexidine 4% Liquid 1 Application(s) Topical <User Schedule>  enoxaparin Injectable 70 milliGRAM(s) SubCutaneous two times a day  fat emulsion (Fish Oil and Plant Based) 20% Infusion 15.8 mL/Hr (15.8 mL/Hr) IV Continuous <Continuous>  fat emulsion (Fish Oil and Plant Based) 20% Infusion 15.8 mL/Hr (15.8 mL/Hr) IV Continuous <Continuous>  gabapentin 100 milliGRAM(s) Oral three times a day  HYDROmorphone PCA (1 mG/mL) 30 milliLiter(s) PCA Continuous PCA Continuous  methimazole 5 milliGRAM(s) Oral daily  pantoprazole  Injectable 40 milliGRAM(s) IV Push daily  Parenteral Nutrition - Adult 1 Each (83 mL/Hr) TPN Continuous <Continuous>  Parenteral Nutrition - Adult 1 Each (83 mL/Hr) TPN Continuous <Continuous>    MEDICATIONS  (PRN):  HYDROmorphone PCA (1 mG/mL) Rescue Clinician Bolus 0.5 milliGRAM(s) IV Push every 15 minutes PRN for Pain Scale GREATER THAN 6  naloxone Injectable 0.1 milliGRAM(s) IV Push every 3 minutes PRN For ANY of the following changes in patient status:  A. RR LESS THAN 10 breaths per minute, B. Oxygen saturation LESS THAN 90%, C. Sedation score of 6  ondansetron Injectable 4 milliGRAM(s) IV Push every 6 hours PRN Nausea      LABS:                        8.1    14.69 )-----------( 124      ( 10 May 2019 05:53 )             24.5     05-10    138  |  101  |  15  ----------------------------<  95  3.2<L>   |  24  |  0.43<L>    Ca    7.5<L>      10 May 2019 05:53  Phos  3.2     05-10  Mg     2.0     05-10        Urinalysis Basic - ( 08 May 2019 18:30 )    Color: YELLOW / Appearance: Lt TURBID / S.016 / pH: 6.5  Gluc: NEGATIVE / Ketone: NEGATIVE  / Bili: NEGATIVE / Urobili: TRACE   Blood: NEGATIVE / Protein: 20 / Nitrite: NEGATIVE   Leuk Esterase: NEGATIVE / RBC: 0-2 / WBC 0-2   Sq Epi: FEW / Non Sq Epi: x / Bacteria: NEGATIVE        RADIOLOGY & ADDITIONAL STUDIES:

## 2019-05-10 NOTE — PROVIDER CONTACT NOTE (OTHER) - ASSESSMENT
Red blood noted coming from lower incision as well as clots, dressing strikethrough noted 1 hour after previously changing it. Red blood noted and clots coming from lower incision, dressing strikethrough noted 1 hour after previously changing it. Trickling down abdomen and collecting in primafit as well.

## 2019-05-10 NOTE — PROVIDER CONTACT NOTE (OTHER) - ACTION/TREATMENT ORDERED:
MD aware, after finishing rounds MD will visit with patient MD aware, okay to change dressing and leave at bedside. after finishing rounds MD will visit with patient

## 2019-05-11 ENCOUNTER — RESULT REVIEW (OUTPATIENT)
Age: 54
End: 2019-05-11

## 2019-05-11 LAB
ALBUMIN SERPL ELPH-MCNC: 2.2 G/DL — LOW (ref 3.3–5)
ALP SERPL-CCNC: 100 U/L — SIGNIFICANT CHANGE UP (ref 40–120)
ALT FLD-CCNC: 13 U/L — SIGNIFICANT CHANGE UP (ref 4–33)
ANION GAP SERPL CALC-SCNC: 10 MMO/L — SIGNIFICANT CHANGE UP (ref 7–14)
ANION GAP SERPL CALC-SCNC: 10 MMO/L — SIGNIFICANT CHANGE UP (ref 7–14)
ANION GAP SERPL CALC-SCNC: 11 MMO/L — SIGNIFICANT CHANGE UP (ref 7–14)
APTT BLD: 37.1 SEC — HIGH (ref 27.5–36.3)
APTT BLD: 49.8 SEC — HIGH (ref 27.5–36.3)
AST SERPL-CCNC: 21 U/L — SIGNIFICANT CHANGE UP (ref 4–32)
BASE EXCESS BLDA CALC-SCNC: -2.2 MMOL/L — SIGNIFICANT CHANGE UP
BASE EXCESS BLDA CALC-SCNC: -2.8 MMOL/L — SIGNIFICANT CHANGE UP
BILIRUB SERPL-MCNC: 3.3 MG/DL — HIGH (ref 0.2–1.2)
BLD GP AB SCN SERPL QL: NEGATIVE — SIGNIFICANT CHANGE UP
BUN SERPL-MCNC: 14 MG/DL — SIGNIFICANT CHANGE UP (ref 7–23)
BUN SERPL-MCNC: 15 MG/DL — SIGNIFICANT CHANGE UP (ref 7–23)
BUN SERPL-MCNC: 8 MG/DL — SIGNIFICANT CHANGE UP (ref 7–23)
CA-I BLD-SCNC: 0.95 MMOL/L — LOW (ref 1.03–1.23)
CA-I BLDA-SCNC: 0.95 MMOL/L — LOW (ref 1.15–1.29)
CA-I BLDA-SCNC: 1.14 MMOL/L — LOW (ref 1.15–1.29)
CALCIUM SERPL-MCNC: 6.9 MG/DL — LOW (ref 8.4–10.5)
CALCIUM SERPL-MCNC: 6.9 MG/DL — LOW (ref 8.4–10.5)
CALCIUM SERPL-MCNC: 7.5 MG/DL — LOW (ref 8.4–10.5)
CHLORIDE SERPL-SCNC: 101 MMOL/L — SIGNIFICANT CHANGE UP (ref 98–107)
CHLORIDE SERPL-SCNC: 96 MMOL/L — LOW (ref 98–107)
CHLORIDE SERPL-SCNC: 98 MMOL/L — SIGNIFICANT CHANGE UP (ref 98–107)
CO2 SERPL-SCNC: 21 MMOL/L — LOW (ref 22–31)
CO2 SERPL-SCNC: 22 MMOL/L — SIGNIFICANT CHANGE UP (ref 22–31)
CO2 SERPL-SCNC: 23 MMOL/L — SIGNIFICANT CHANGE UP (ref 22–31)
CREAT SERPL-MCNC: 0.36 MG/DL — LOW (ref 0.5–1.3)
CREAT SERPL-MCNC: 0.44 MG/DL — LOW (ref 0.5–1.3)
CREAT SERPL-MCNC: 0.46 MG/DL — LOW (ref 0.5–1.3)
GLUCOSE BLDA-MCNC: 72 MG/DL — SIGNIFICANT CHANGE UP (ref 70–99)
GLUCOSE BLDA-MCNC: 81 MG/DL — SIGNIFICANT CHANGE UP (ref 70–99)
GLUCOSE BLDC GLUCOMTR-MCNC: 102 MG/DL — HIGH (ref 70–99)
GLUCOSE BLDC GLUCOMTR-MCNC: 74 MG/DL — SIGNIFICANT CHANGE UP (ref 70–99)
GLUCOSE BLDC GLUCOMTR-MCNC: 79 MG/DL — SIGNIFICANT CHANGE UP (ref 70–99)
GLUCOSE SERPL-MCNC: 101 MG/DL — HIGH (ref 70–99)
GLUCOSE SERPL-MCNC: 86 MG/DL — SIGNIFICANT CHANGE UP (ref 70–99)
GLUCOSE SERPL-MCNC: 90 MG/DL — SIGNIFICANT CHANGE UP (ref 70–99)
GRAM STN WND: SIGNIFICANT CHANGE UP
HCO3 BLDA-SCNC: 22 MMOL/L — SIGNIFICANT CHANGE UP (ref 22–26)
HCO3 BLDA-SCNC: 23 MMOL/L — SIGNIFICANT CHANGE UP (ref 22–26)
HCT VFR BLD CALC: 25 % — LOW (ref 34.5–45)
HCT VFR BLD CALC: 25.2 % — LOW (ref 34.5–45)
HCT VFR BLD CALC: 33.2 % — LOW (ref 34.5–45)
HCT VFR BLDA CALC: 25.1 % — LOW (ref 34.5–46.5)
HCT VFR BLDA CALC: 34.7 % — SIGNIFICANT CHANGE UP (ref 34.5–46.5)
HGB BLD-MCNC: 10.9 G/DL — LOW (ref 11.5–15.5)
HGB BLD-MCNC: 8.3 G/DL — LOW (ref 11.5–15.5)
HGB BLD-MCNC: 8.3 G/DL — LOW (ref 11.5–15.5)
HGB BLDA-MCNC: 11.2 G/DL — LOW (ref 11.5–15.5)
HGB BLDA-MCNC: 8.1 G/DL — LOW (ref 11.5–15.5)
INR BLD: 1.25 — HIGH (ref 0.88–1.17)
INR BLD: 1.43 — HIGH (ref 0.88–1.17)
LACTATE BLDA-SCNC: 2.6 MMOL/L — HIGH (ref 0.5–2)
LACTATE SERPL-SCNC: 2.4 MMOL/L — HIGH (ref 0.5–2)
MAGNESIUM SERPL-MCNC: 1.6 MG/DL — SIGNIFICANT CHANGE UP (ref 1.6–2.6)
MAGNESIUM SERPL-MCNC: 2 MG/DL — SIGNIFICANT CHANGE UP (ref 1.6–2.6)
MANUAL SMEAR VERIFICATION: SIGNIFICANT CHANGE UP
MCHC RBC-ENTMCNC: 26.7 PG — LOW (ref 27–34)
MCHC RBC-ENTMCNC: 26.8 PG — LOW (ref 27–34)
MCHC RBC-ENTMCNC: 27 PG — SIGNIFICANT CHANGE UP (ref 27–34)
MCHC RBC-ENTMCNC: 32.8 % — SIGNIFICANT CHANGE UP (ref 32–36)
MCHC RBC-ENTMCNC: 32.9 % — SIGNIFICANT CHANGE UP (ref 32–36)
MCHC RBC-ENTMCNC: 33.2 % — SIGNIFICANT CHANGE UP (ref 32–36)
MCV RBC AUTO: 80.4 FL — SIGNIFICANT CHANGE UP (ref 80–100)
MCV RBC AUTO: 81.3 FL — SIGNIFICANT CHANGE UP (ref 80–100)
MCV RBC AUTO: 82.4 FL — SIGNIFICANT CHANGE UP (ref 80–100)
NRBC # FLD: 0 K/UL — SIGNIFICANT CHANGE UP (ref 0–0)
NRBC # FLD: 0 K/UL — SIGNIFICANT CHANGE UP (ref 0–0)
NRBC # FLD: 0.03 K/UL — SIGNIFICANT CHANGE UP (ref 0–0)
PCO2 BLDA: 30 MMHG — LOW (ref 32–48)
PCO2 BLDA: 42 MMHG — SIGNIFICANT CHANGE UP (ref 32–48)
PH BLDA: 7.34 PH — LOW (ref 7.35–7.45)
PH BLDA: 7.46 PH — HIGH (ref 7.35–7.45)
PHOSPHATE SERPL-MCNC: 2.7 MG/DL — SIGNIFICANT CHANGE UP (ref 2.5–4.5)
PHOSPHATE SERPL-MCNC: 3 MG/DL — SIGNIFICANT CHANGE UP (ref 2.5–4.5)
PLATELET # BLD AUTO: 105 K/UL — LOW (ref 150–400)
PLATELET # BLD AUTO: 132 K/UL — LOW (ref 150–400)
PLATELET # BLD AUTO: SIGNIFICANT CHANGE UP K/UL (ref 150–400)
PLATELET CLUMP BLD QL SMEAR: SIGNIFICANT CHANGE UP
PLATELET COUNT - ESTIMATE: SIGNIFICANT CHANGE UP
PMV BLD: 11.4 FL — SIGNIFICANT CHANGE UP (ref 7–13)
PMV BLD: 11.8 FL — SIGNIFICANT CHANGE UP (ref 7–13)
PMV BLD: 11.9 FL — SIGNIFICANT CHANGE UP (ref 7–13)
PO2 BLDA: 159 MMHG — HIGH (ref 83–108)
PO2 BLDA: 244 MMHG — HIGH (ref 83–108)
POTASSIUM BLDA-SCNC: 2.9 MMOL/L — LOW (ref 3.4–4.5)
POTASSIUM BLDA-SCNC: 3.3 MMOL/L — LOW (ref 3.4–4.5)
POTASSIUM SERPL-MCNC: 3.4 MMOL/L — LOW (ref 3.5–5.3)
POTASSIUM SERPL-MCNC: 3.5 MMOL/L — SIGNIFICANT CHANGE UP (ref 3.5–5.3)
POTASSIUM SERPL-MCNC: 3.6 MMOL/L — SIGNIFICANT CHANGE UP (ref 3.5–5.3)
POTASSIUM SERPL-SCNC: 3.4 MMOL/L — LOW (ref 3.5–5.3)
POTASSIUM SERPL-SCNC: 3.5 MMOL/L — SIGNIFICANT CHANGE UP (ref 3.5–5.3)
POTASSIUM SERPL-SCNC: 3.6 MMOL/L — SIGNIFICANT CHANGE UP (ref 3.5–5.3)
PROT SERPL-MCNC: 4.5 G/DL — LOW (ref 6–8.3)
PROTHROM AB SERPL-ACNC: 14.4 SEC — HIGH (ref 9.8–13.1)
PROTHROM AB SERPL-ACNC: 16 SEC — HIGH (ref 9.8–13.1)
RBC # BLD: 3.1 M/UL — LOW (ref 3.8–5.2)
RBC # BLD: 3.11 M/UL — LOW (ref 3.8–5.2)
RBC # BLD: 4.03 M/UL — SIGNIFICANT CHANGE UP (ref 3.8–5.2)
RBC # FLD: 14.8 % — HIGH (ref 10.3–14.5)
RBC # FLD: 14.8 % — HIGH (ref 10.3–14.5)
RBC # FLD: 14.9 % — HIGH (ref 10.3–14.5)
RH IG SCN BLD-IMP: POSITIVE — SIGNIFICANT CHANGE UP
SAO2 % BLDA: 95.8 % — SIGNIFICANT CHANGE UP (ref 95–99)
SAO2 % BLDA: 97.8 % — SIGNIFICANT CHANGE UP (ref 95–99)
SODIUM BLDA-SCNC: 130 MMOL/L — LOW (ref 136–146)
SODIUM BLDA-SCNC: 131 MMOL/L — LOW (ref 136–146)
SODIUM SERPL-SCNC: 129 MMOL/L — LOW (ref 135–145)
SODIUM SERPL-SCNC: 130 MMOL/L — LOW (ref 135–145)
SODIUM SERPL-SCNC: 133 MMOL/L — LOW (ref 135–145)
SPECIMEN SOURCE: SIGNIFICANT CHANGE UP
TSH RECEP AB FLD-ACNC: 0.64 IU/L — SIGNIFICANT CHANGE UP (ref 0–1.75)
WBC # BLD: 11.96 K/UL — HIGH (ref 3.8–10.5)
WBC # BLD: 12.66 K/UL — HIGH (ref 3.8–10.5)
WBC # BLD: 12.84 K/UL — HIGH (ref 3.8–10.5)
WBC # FLD AUTO: 11.96 K/UL — HIGH (ref 3.8–10.5)
WBC # FLD AUTO: 12.66 K/UL — HIGH (ref 3.8–10.5)
WBC # FLD AUTO: 12.84 K/UL — HIGH (ref 3.8–10.5)

## 2019-05-11 PROCEDURE — 88305 TISSUE EXAM BY PATHOLOGIST: CPT | Mod: 26

## 2019-05-11 PROCEDURE — 44310 ILEOSTOMY/JEJUNOSTOMY: CPT | Mod: 78

## 2019-05-11 PROCEDURE — 49084 PERITONEAL LAVAGE: CPT | Mod: 78

## 2019-05-11 PROCEDURE — 97605 NEG PRS WND THER DME<=50SQCM: CPT | Mod: 78

## 2019-05-11 RX ORDER — ACETAMINOPHEN 500 MG
1000 TABLET ORAL ONCE
Refills: 0 | Status: COMPLETED | OUTPATIENT
Start: 2019-05-12 | End: 2019-05-12

## 2019-05-11 RX ORDER — FENTANYL CITRATE 50 UG/ML
0.5 INJECTION INTRAVENOUS
Qty: 2500 | Refills: 0 | Status: DISCONTINUED | OUTPATIENT
Start: 2019-05-11 | End: 2019-05-12

## 2019-05-11 RX ORDER — POTASSIUM CHLORIDE 20 MEQ
10 PACKET (EA) ORAL
Refills: 0 | Status: COMPLETED | OUTPATIENT
Start: 2019-05-11 | End: 2019-05-11

## 2019-05-11 RX ORDER — SODIUM CHLORIDE 9 MG/ML
1000 INJECTION, SOLUTION INTRAVENOUS ONCE
Refills: 0 | Status: COMPLETED | OUTPATIENT
Start: 2019-05-11 | End: 2019-05-12

## 2019-05-11 RX ORDER — METOPROLOL TARTRATE 50 MG
5 TABLET ORAL EVERY 6 HOURS
Refills: 0 | Status: DISCONTINUED | OUTPATIENT
Start: 2019-05-11 | End: 2019-05-11

## 2019-05-11 RX ORDER — ACETAMINOPHEN 500 MG
1000 TABLET ORAL ONCE
Refills: 0 | Status: DISCONTINUED | OUTPATIENT
Start: 2019-05-12 | End: 2019-05-12

## 2019-05-11 RX ORDER — CALCIUM GLUCONATE 100 MG/ML
1 VIAL (ML) INTRAVENOUS
Refills: 0 | Status: COMPLETED | OUTPATIENT
Start: 2019-05-11 | End: 2019-05-11

## 2019-05-11 RX ORDER — SODIUM CHLORIDE 9 MG/ML
1000 INJECTION, SOLUTION INTRAVENOUS
Refills: 0 | Status: DISCONTINUED | OUTPATIENT
Start: 2019-05-11 | End: 2019-05-12

## 2019-05-11 RX ORDER — POTASSIUM CHLORIDE 20 MEQ
10 PACKET (EA) ORAL
Refills: 0 | Status: COMPLETED | OUTPATIENT
Start: 2019-05-11 | End: 2019-05-12

## 2019-05-11 RX ORDER — SODIUM CHLORIDE 9 MG/ML
1000 INJECTION, SOLUTION INTRAVENOUS ONCE
Refills: 0 | Status: COMPLETED | OUTPATIENT
Start: 2019-05-11 | End: 2019-05-11

## 2019-05-11 RX ORDER — CALCIUM GLUCONATE 100 MG/ML
2 VIAL (ML) INTRAVENOUS ONCE
Refills: 0 | Status: DISCONTINUED | OUTPATIENT
Start: 2019-05-11 | End: 2019-05-11

## 2019-05-11 RX ORDER — ACETAMINOPHEN 500 MG
1000 TABLET ORAL ONCE
Refills: 0 | Status: DISCONTINUED | OUTPATIENT
Start: 2019-05-11 | End: 2019-05-12

## 2019-05-11 RX ADMIN — FENTANYL CITRATE 3.4 MICROGRAM(S)/KG/HR: 50 INJECTION INTRAVENOUS at 22:35

## 2019-05-11 RX ADMIN — Medication 63.75 MILLIMOLE(S): at 16:09

## 2019-05-11 RX ADMIN — GABAPENTIN 100 MILLIGRAM(S): 400 CAPSULE ORAL at 13:12

## 2019-05-11 RX ADMIN — HYDROMORPHONE HYDROCHLORIDE 30 MILLILITER(S): 2 INJECTION INTRAMUSCULAR; INTRAVENOUS; SUBCUTANEOUS at 08:10

## 2019-05-11 RX ADMIN — PIPERACILLIN AND TAZOBACTAM 25 GRAM(S): 4; .5 INJECTION, POWDER, LYOPHILIZED, FOR SOLUTION INTRAVENOUS at 22:35

## 2019-05-11 RX ADMIN — HYDROMORPHONE HYDROCHLORIDE 0.5 MILLIGRAM(S): 2 INJECTION INTRAMUSCULAR; INTRAVENOUS; SUBCUTANEOUS at 02:06

## 2019-05-11 RX ADMIN — Medication 100 MILLIEQUIVALENT(S): at 12:28

## 2019-05-11 RX ADMIN — PIPERACILLIN AND TAZOBACTAM 25 GRAM(S): 4; .5 INJECTION, POWDER, LYOPHILIZED, FOR SOLUTION INTRAVENOUS at 06:13

## 2019-05-11 RX ADMIN — Medication 100 GRAM(S): at 12:29

## 2019-05-11 RX ADMIN — PANTOPRAZOLE SODIUM 40 MILLIGRAM(S): 20 TABLET, DELAYED RELEASE ORAL at 13:12

## 2019-05-11 RX ADMIN — Medication 100 MILLIEQUIVALENT(S): at 13:39

## 2019-05-11 RX ADMIN — GABAPENTIN 100 MILLIGRAM(S): 400 CAPSULE ORAL at 06:13

## 2019-05-11 RX ADMIN — ENOXAPARIN SODIUM 70 MILLIGRAM(S): 100 INJECTION SUBCUTANEOUS at 06:13

## 2019-05-11 RX ADMIN — CHLORHEXIDINE GLUCONATE 1 APPLICATION(S): 213 SOLUTION TOPICAL at 06:15

## 2019-05-11 RX ADMIN — Medication 100 GRAM(S): at 13:44

## 2019-05-11 RX ADMIN — SODIUM CHLORIDE 100 MILLILITER(S): 9 INJECTION, SOLUTION INTRAVENOUS at 22:35

## 2019-05-11 RX ADMIN — SODIUM CHLORIDE 1000 MILLILITER(S): 9 INJECTION, SOLUTION INTRAVENOUS at 02:00

## 2019-05-11 RX ADMIN — Medication 100 MILLIEQUIVALENT(S): at 12:59

## 2019-05-11 RX ADMIN — PIPERACILLIN AND TAZOBACTAM 25 GRAM(S): 4; .5 INJECTION, POWDER, LYOPHILIZED, FOR SOLUTION INTRAVENOUS at 13:13

## 2019-05-11 NOTE — PROGRESS NOTE ADULT - ATTENDING COMMENTS
I have   reviewed pertinent labs and imaging, and discussed the case with colleagues, residents, and physician assistants on B Team rounds.    Plan  to OR for washout of wound pending OR availability         The Acute Care Surgery (B Team) Attending Group Practice:  Dr. Myles Prieto, Dr. Leonardo Celeste, Dr. Imtiaz Skinner, Dr. Callie Finch, Dr. Bonifacio White    urgent issues - spectra [id number] or [phone]  nonurgent issues - [phone]  patient appointments or afterhours - [phone]

## 2019-05-11 NOTE — BRIEF OPERATIVE NOTE - NSICDXBRIEFPROCEDURE_GEN_ALL_CORE_FT
PROCEDURES:  Small bowel resection with anastomosis 02-May-2019 17:24:53  Richard Driscoll
PROCEDURES:  Exploratory laparotomy 11-May-2019 22:08:46 with drainage of anastomotic leak & diverting ileostomy creation Jose Jose

## 2019-05-11 NOTE — PROGRESS NOTE ADULT - ASSESSMENT
53y female with recurrent SBO s/p diagnostic laparoscopy, exploratory laparotomy, lysis of adhesions, small bowel resection, rigid sigmoidoscopy on 5/2. Patient has been persistently tachy, HDS. CT abd/pel on 5/5 w/o evidence of anastomotic leak; CTPA negative for PE. CT A/P, CTA chest on 5/10 with dilated small bowel loops and possible ongoing obstruction, moderate subq air within the right abdominal wall, new patchy airspace opacity within the anterior right apex of the lung, no PE. C/b hypotension and persistent tachycardia, wound opened at bedside on 5/10-5/11 overnight.    - RTOR today for abdominal wound washout.  - C/w NPO. Plan for PICC replacement on Mon 5/13, then re-start TPN afterward.  - pain control: PCA and IV Tylenol.  - F/u Endo recs; continuing with methimazole.  - Continue therapeutic lovenox.  - continue to monitor GIF.  - Monitor wound - opened at bedside, plan for OR washout.  - trend CBC.  - PT/OOB.    B surgery  [id number]

## 2019-05-11 NOTE — CHART NOTE - NSCHARTNOTEFT_GEN_A_CORE
PRE OPERATIVE NOTE    Pre-op Diagnosis: Wound infection  Procedure: Abdominal wound washout, possible ex-lap, possible SBR, possible ostomy  Surgeon: Stef                          8.3     )-----------( --       ( 11 May 2019 05:50 )             25.2     05-11    130<L>  |  98  |  14  ----------------------------<  90  3.6   |  22  |  0.44<L>    Ca    6.9<L>      11 May 2019 05:50  Phos  2.7       Mg     2.0             PT/INR - ( 11 May 2019 02:00 )   PT: 16.0 SEC;   INR: 1.43          PTT - ( 11 May 2019 02:00 )  PTT:49.8 SEC  CAPILLARY BLOOD GLUCOSE      POCT Blood Glucose.: 102 mg/dL (11 May 2019 05:49)      Type & Screen:   CTA chest: 5/10  CT A/P: 5/10  EK/5    A/P: 53y Female planned for above procedure  NPO, except medications  IVF  Pain control  Consent in chart      ****Discussed procedure with brother, Titus, who can be reached at [phone]. Dr. Finch also discussed pending procedure with pt's brother via phone. Consent obtained from patient, consent in chart, brother also on board for operation.

## 2019-05-11 NOTE — BRIEF OPERATIVE NOTE - NSICDXBRIEFPREOP_GEN_ALL_CORE_FT
PRE-OP DIAGNOSIS:  Small bowel obstruction 02-May-2019 17:25:03  Richard Driscoll
PRE-OP DIAGNOSIS:  Wound infection 11-May-2019 22:10:18  Jose Jose

## 2019-05-11 NOTE — CHART NOTE - NSCHARTNOTEFT_GEN_A_CORE
Called for hypotension to 70s systolic. Patient seen at bedside with senior resident. Patient asymptomatic, denying new or worsening pain, no nausea/vomiting. Vitals reviewed - persistent tachycardia to high 120s, hypotension to systolic of 70s-80s. UOP adequate but borderline. Abdomen mild to moderately distended with focal tenderness in the epigastrium and right hemiabdomen. Midline incision draining feculent foul smelling fluid, pouched by nursing in the superior portion of the wound. Stat 1L bolus of LR given, SBP to low 100s after roughly 500cc in. Labs sent. Midline staples removed and wound explored at bedside. Drainage of dark foul smelling fluid. Wound packed with guaze. Tentative plan for OR exploration and washout this morning.    B team surgery  [id number]

## 2019-05-11 NOTE — PROVIDER CONTACT NOTE (OTHER) - NAME OF MD/NP/PA/DO NOTIFIED:
Brandon Dailey- Surg B
Brandon Dailey- Surg B
Dr. Jesus Bella
Dr. Martínez
Dustin Casanova MD
Dustin Casanova MD
Eli Leigh
FABIENNE MADRID MD
FABIENNE MADRID MD
FABIENNE MADRID MD (B team [id number])
GEORGETTE KHALIL MD
JIGNESH MEJIA
JOB, JORGE LOUIS (B TEAM [id number])
Jagdish Dailey MD [id number]
Job, Mackenzie LOUIS
Jose Coon
Jose Coon
Joshua Martínez
Joshua Martínez MD
Lisa
Lisa
Lu LOUIS
Lu Mohamud
MISSY Gordon
Martínez
Martínez
Morris Peterson
Oneida Leigh
RAVEN SWEENEY MD (B team [id number])
RAVEN SWEENEY MD (B team [id number])
Shaggy Garcia (B Team)
Tere Saleh MD
Tri Leigh RPA
B team surgery Heidi
Dr. Jesus eBlla

## 2019-05-11 NOTE — CHART NOTE - NSCHARTNOTEFT_GEN_A_CORE
As written in pre-op note, discussed pending abdominal wound washout operation with pt's brother, Titus Lynn. ****He can be reached at [phone].***     Dr. Finch also discussed pending procedure with pt's brother via phone. Consent obtained from patient, consent in chart, brother also on board for operation.    If other members of the care team need to contact patient's brother, please use the above number. He would like to be updated on the patient's progress and care.    B team surgery  [id number]

## 2019-05-11 NOTE — BRIEF OPERATIVE NOTE - NSICDXBRIEFPOSTOP_GEN_ALL_CORE_FT
POST-OP DIAGNOSIS:  Small bowel obstruction 02-May-2019 17:25:20  Richard Driscoll
POST-OP DIAGNOSIS:  Dehiscence of fascia 11-May-2019 22:11:04  Jose Jose  Wound infection 11-May-2019 22:10:43  Jose Jose

## 2019-05-11 NOTE — PROVIDER CONTACT NOTE (OTHER) - DATE AND TIME:
01-May-2019 03:07
01-May-2019 12:36
02-May-2019 07:54
04-May-2019 23:10
05-May-2019 01:18
05-May-2019 22:21
06-May-2019 03:07
07-May-2019 13:55
07-May-2019 22:01
08-May-2019 04:01
08-May-2019 23:03
09-May-2019 10:00
09-May-2019 20:47
10-May-2019 01:35
10-May-2019 02:40
10-May-2019 07:00
10-May-2019 11:11
10-May-2019 16:00
10-May-2019 16:00
10-May-2019 22:00
11-May-2019 12:00
17-Apr-2019 23:56
18-Apr-2019 23:55
19-Apr-2019 20:15
20-Apr-2019 05:00
20-Apr-2019 08:44
20-Apr-2019 21:29
21-Apr-2019 12:54
25-Apr-2019 18:00
26-Apr-2019 23:05
27-Apr-2019 02:40
29-Apr-2019 22:37
30-Apr-2019 18:44
22-Apr-2019 13:17
11-May-2019 13:20

## 2019-05-11 NOTE — PROGRESS NOTE ADULT - SUBJECTIVE AND OBJECTIVE BOX
Anesthesia Pain Management Service    SUBJECTIVE: Patient is doing well with IV PCA and no significant problems reported.    Pain Scale Score	At rest: ___ 	With Activity: ___ 	[X ] Refer to charted pain scores    THERAPY:    [ ] IV PCA Morphine		[ ] 5 mg/mL	[ ] 1 mg/mL  [X ] IV PCA Hydromorphone	[ ] 5 mg/mL	[X ] 1 mg/mL  [ ] IV PCA Fentanyl		[ ] 50 micrograms/mL    Demand dose __0.2_ lockout __6_ (minutes) Continuous Rate _0__ Total: ___9.2  Daily      MEDICATIONS  (STANDING):  acetaminophen  IVPB .. 1000 milliGRAM(s) IV Intermittent once  chlorhexidine 4% Liquid 1 Application(s) Topical <User Schedule>  dextrose 5% + sodium chloride 0.45% with potassium chloride 20 mEq/L 1000 milliLiter(s) (100 mL/Hr) IV Continuous <Continuous>  enoxaparin Injectable 70 milliGRAM(s) SubCutaneous two times a day  gabapentin 100 milliGRAM(s) Oral three times a day  HYDROmorphone PCA (1 mG/mL) 30 milliLiter(s) PCA Continuous PCA Continuous  methimazole 5 milliGRAM(s) Oral daily  pantoprazole  Injectable 40 milliGRAM(s) IV Push daily  piperacillin/tazobactam IVPB. 3.375 Gram(s) IV Intermittent every 8 hours    MEDICATIONS  (PRN):  HYDROmorphone PCA (1 mG/mL) Rescue Clinician Bolus 0.5 milliGRAM(s) IV Push every 15 minutes PRN for Pain Scale GREATER THAN 6  naloxone Injectable 0.1 milliGRAM(s) IV Push every 3 minutes PRN For ANY of the following changes in patient status:  A. RR LESS THAN 10 breaths per minute, B. Oxygen saturation LESS THAN 90%, C. Sedation score of 6  ondansetron Injectable 4 milliGRAM(s) IV Push every 6 hours PRN Nausea      OBJECTIVE:    Sedation Score:	[ X] Alert	[ ] Drowsy 	[ ] Arousable	[ ] Asleep	[ ] Unresponsive    Side Effects:	[X ] None	[ ] Nausea	[ ] Vomiting	[ ] Pruritus  		[ ] Other:    Vital Signs Last 24 Hrs  T(C): 36.3 (11 May 2019 05:55), Max: 37.3 (10 May 2019 13:51)  T(F): 97.4 (11 May 2019 05:55), Max: 99.2 (10 May 2019 13:51)  HR: 125 (11 May 2019 05:55) (124 - 138)  BP: 113/75 (11 May 2019 05:55) (75/53 - 119/79)  BP(mean): --  RR: 23 (11 May 2019 05:55) (22 - 28)  SpO2: 100% (11 May 2019 05:55) (100% - 100%)    ASSESSMENT/ PLAN    Therapy to  be:	[ X] Continue   [ ] Discontinued   [ ] Change to prn Analgesics    Documentation and Verification of current medications:   [X] Done	[ ] Not done, not elligible    Comments:

## 2019-05-11 NOTE — PROVIDER CONTACT NOTE (OTHER) - ASSESSMENT
A&O x4. PICC in place, NPO. IVF infusing. Mid abdominal drain pouch in place with dark brown output. Voids

## 2019-05-11 NOTE — PROVIDER CONTACT NOTE (OTHER) - RECOMMENDATIONS
Notify B team
Discussed with MD
Breakthrough meds?
Discussed with JACKIE
Discussed with JACKIE
Discussed with team, Will use both lumens of PICC IV medications other than TPN.
IV tylenol, continue to monitor
Increase po fluids, ambulate
MD notified and made aware
MD notified and made aware.
MD notified.
MD to assess pt
MD to assess pt
MD to see pt
MD to see pt
Notification, enema
Order fluids??
Order fluids??
PA notified and made aware. Zofran?
Ultrasound, Elevate RUE, RLE on pillows.
clarify if metoprolol needs to be held??
come assess pt
make MD aware
make Pt NPO; IV pain meds/zofran
md notify
md notify
one time dose of zofran, continue to monitor
Do you want to continue with PO contrast?

## 2019-05-11 NOTE — PROVIDER CONTACT NOTE (OTHER) - BACKGROUND
Patient S/P LAR
Admitted with SBO and rectosigmoid stricture. hx of colon CA
GI hemorrhage, DVT, Cervical CA, Colon Neoplasm, Hyperthyroidism, SBO
Patient S/P  SBR, rectosigmoid stricture
Patient S/P LAR
Patient admitted for GI hemorrhage.
Patient admitted for GI hemorrhage. Patient had DVT in right lower extremity.
Patient admitted for GI hemorrhage. Patient had DVT in right lower extremity.
Patient admitted for SBO. NGT placed. Very little residual, NGT removed, clear diet to regular. Patient had been tolerating po, + ambulation and voids. HX of DVT, cervical CA.
Patient admitted for gastrointestinal hemorrhage.
Patient admitted for gastrointestinal hemorrhage.
Patient has history of SBO, CA with mets to liver and cervix. Patient initially had NGT in place with NPO, then NGT d/c'd with eventual return to regular diet. Constipation a frequent issue.
Pt admitted for sbo
Pt admitted for sbo
S/P Ex LAP, DAVID, SBR, sigmoidoscopy
S/P Ex-LAP, DAVID, SBR with distal ileum re-anastamosis
admitted with SBO and rectosigmoid stricture. hx of colon CA
ex lap, vicky, sbr, sigmoidoscopy
ex lap, vicky, sbr, sigmoidoscopy
pt admitted with SBO
s/o SBR
s/p DAVID SBR and distal ileum anastamosis
s/p DAVID SBR and distal ileum anastamosis
s/p DAVID, SBR with distal ileum reanastamosis
s/p SBR and rectosigmoid stricture
s/p SBR and rectosigmoid stricture
s/p SBR rectosigmoid stricture 5/2
s/p SBR rectosigmoid stricture 5/2
s/p ex lap, vicky, SBR and rectosigmoid stricture
status post SBR and rectosigmoid stricture
Pt adm for SBO.   PMH DVT  Cervical CA

## 2019-05-11 NOTE — PROGRESS NOTE ADULT - SUBJECTIVE AND OBJECTIVE BOX
Surgery Progress Note    SUBJECTIVE: Pt seen and examined at bedside. See chart note for overnight events - briefly, pt persistently tachycardic, newly hypotensive, wound opened at bedside, BP responded to 1L fluid bolus, plan to RTOR today for washout of abdominal wound.    Vital Signs Last 24 Hrs  T(C): 37.4 (11 May 2019 13:18), Max: 37.4 (11 May 2019 13:18)  T(F): 99.3 (11 May 2019 13:18), Max: 99.3 (11 May 2019 13:18)  HR: 124 (11 May 2019 13:18) (119 - 135)  BP: 104/63 (11 May 2019 13:18) (75/53 - 120/77)  BP(mean): --  RR: 24 (11 May 2019 13:18) (22 - 28)  SpO2: 99% (11 May 2019 13:18) (99% - 100%)    Physical Exam:  General Appearance: in NAD  Respiratory: on O2 NC  CV: Pulse regularly present  Abdomen: obese, soft, +tender, nondistended, +serous foul smelling output from the incision - wound open at the top and packed    LABS:                        8.3    11.96 )-----------( --       ( 11 May 2019 05:50 )             25.2     05-11    130<L>  |  98  |  14  ----------------------------<  90  3.6   |  22  |  0.44<L>    Ca    6.9<L>      11 May 2019 05:50  Phos  2.7     05-11  Mg     2.0     05-11      PT/INR - ( 11 May 2019 02:00 )   PT: 16.0 SEC;   INR: 1.43          PTT - ( 11 May 2019 02:00 )  PTT:49.8 SEC      INs and OUTs:    05-10-19 @ 07:01  -  05-11-19 @ 07:00  --------------------------------------------------------  IN: 1998 mL / OUT: 2050 mL / NET: -52 mL    05-11-19 @ 07:01  -  05-11-19 @ 15:33  --------------------------------------------------------  IN: 850 mL / OUT: 450 mL / NET: 400 mL

## 2019-05-11 NOTE — PROGRESS NOTE ADULT - REASON FOR ADMISSION
SBO

## 2019-05-11 NOTE — PROVIDER CONTACT NOTE (OTHER) - ASSESSMENT
A&O x4. PICC in place, NPO. IVF infusing. Mid abdominal drain pouch in place with dark brown output.

## 2019-05-11 NOTE — PRE-OP CHECKLIST - RESPIRATORY RATE (BREATHS/MIN)
Dr. Miguel covering for Dr. Dai    Patient's chart was reviewed. Appropriate to refill requested medication: Tiotropium.    Horacio Miguel MD  Medical Resident, PGY-2  Pager [phone]        
16
18

## 2019-05-11 NOTE — PROVIDER CONTACT NOTE (OTHER) - REASON
BP 75/53
, RR 23, O2 Sat 95, T 99.6
, RR 32, O2 Sat 95, T 99.2
Clots/ fresh red blood oozing from bottom of abdominal incision
Midline incision with moderate serous drainage
N/V
PICC line
Pain
Patient c/o having right leg pain
Patient c/o severe abdominal pain
Patient complaining of pain
Patient had 1 episode of emesis
Patient had 1 episode of emesis
Patient is tachycardic with rectal pain
Patient vomiting stool
Patient vomiting stool after tap water enema x1
Pt CT oral contrast
Pt c/o pain, but not due for medication
Pt reporting increase swelling numbness RLE and increase swelling RUE
Pt tachycardic
Pt with n/v
Tachycardia
Tachycardia, hypotension
Tachycardia, tachypnea
band neutrophil level 21.8%
hr 117
incision site oozing
pt c/o of n/v
pt hr 125
pt remains tachypneic and is c/o feeling uncomfortable
pt tachypneic during initial assessment, RR 26, O2 Sat 96% on RA
Pt vomit x1
Repeat BP

## 2019-05-12 LAB
ANION GAP SERPL CALC-SCNC: 12 MMO/L — SIGNIFICANT CHANGE UP (ref 7–14)
ANION GAP SERPL CALC-SCNC: 17 MMO/L — HIGH (ref 7–14)
APTT BLD: 34.9 SEC — SIGNIFICANT CHANGE UP (ref 27.5–36.3)
APTT BLD: 38.5 SEC — HIGH (ref 27.5–36.3)
BASE EXCESS BLDA CALC-SCNC: -11 MMOL/L — SIGNIFICANT CHANGE UP
BASE EXCESS BLDA CALC-SCNC: -3.4 MMOL/L — SIGNIFICANT CHANGE UP
BLOOD GAS ARTERIAL - FIO2: 50 — SIGNIFICANT CHANGE UP
BUN SERPL-MCNC: 8 MG/DL — SIGNIFICANT CHANGE UP (ref 7–23)
BUN SERPL-MCNC: 9 MG/DL — SIGNIFICANT CHANGE UP (ref 7–23)
CA-I BLDA-SCNC: 0.97 MMOL/L — LOW (ref 1.15–1.29)
CA-I BLDA-SCNC: 1.02 MMOL/L — LOW (ref 1.15–1.29)
CALCIUM SERPL-MCNC: 7 MG/DL — LOW (ref 8.4–10.5)
CALCIUM SERPL-MCNC: 7 MG/DL — LOW (ref 8.4–10.5)
CHLORIDE SERPL-SCNC: 102 MMOL/L — SIGNIFICANT CHANGE UP (ref 98–107)
CHLORIDE SERPL-SCNC: 103 MMOL/L — SIGNIFICANT CHANGE UP (ref 98–107)
CO2 SERPL-SCNC: 11 MMOL/L — LOW (ref 22–31)
CO2 SERPL-SCNC: 17 MMOL/L — LOW (ref 22–31)
CREAT SERPL-MCNC: 0.35 MG/DL — LOW (ref 0.5–1.3)
CREAT SERPL-MCNC: 0.46 MG/DL — LOW (ref 0.5–1.3)
GLUCOSE BLDA-MCNC: 106 MG/DL — HIGH (ref 70–99)
GLUCOSE BLDA-MCNC: 94 MG/DL — SIGNIFICANT CHANGE UP (ref 70–99)
GLUCOSE SERPL-MCNC: 103 MG/DL — HIGH (ref 70–99)
GLUCOSE SERPL-MCNC: 93 MG/DL — SIGNIFICANT CHANGE UP (ref 70–99)
HCO3 BLDA-SCNC: 17 MMOL/L — LOW (ref 22–26)
HCO3 BLDA-SCNC: 23 MMOL/L — SIGNIFICANT CHANGE UP (ref 22–26)
HCT VFR BLD CALC: 33.5 % — LOW (ref 34.5–45)
HCT VFR BLD CALC: 33.8 % — LOW (ref 34.5–45)
HCT VFR BLDA CALC: 33.1 % — LOW (ref 34.5–46.5)
HCT VFR BLDA CALC: 35.3 % — SIGNIFICANT CHANGE UP (ref 34.5–46.5)
HGB BLD-MCNC: 11.2 G/DL — LOW (ref 11.5–15.5)
HGB BLD-MCNC: 11.3 G/DL — LOW (ref 11.5–15.5)
HGB BLDA-MCNC: 10.7 G/DL — LOW (ref 11.5–15.5)
HGB BLDA-MCNC: 11.5 G/DL — SIGNIFICANT CHANGE UP (ref 11.5–15.5)
INR BLD: 1.33 — HIGH (ref 0.88–1.17)
INR BLD: 1.6 — HIGH (ref 0.88–1.17)
LACTATE BLDA-SCNC: 3.2 MMOL/L — HIGH (ref 0.5–2)
LACTATE BLDA-SCNC: 7 MMOL/L — CRITICAL HIGH (ref 0.5–2)
MAGNESIUM SERPL-MCNC: 1.6 MG/DL — SIGNIFICANT CHANGE UP (ref 1.6–2.6)
MAGNESIUM SERPL-MCNC: 1.7 MG/DL — SIGNIFICANT CHANGE UP (ref 1.6–2.6)
MCHC RBC-ENTMCNC: 27.1 PG — SIGNIFICANT CHANGE UP (ref 27–34)
MCHC RBC-ENTMCNC: 27.2 PG — SIGNIFICANT CHANGE UP (ref 27–34)
MCHC RBC-ENTMCNC: 33.4 % — SIGNIFICANT CHANGE UP (ref 32–36)
MCHC RBC-ENTMCNC: 33.4 % — SIGNIFICANT CHANGE UP (ref 32–36)
MCV RBC AUTO: 80.9 FL — SIGNIFICANT CHANGE UP (ref 80–100)
MCV RBC AUTO: 81.4 FL — SIGNIFICANT CHANGE UP (ref 80–100)
NRBC # FLD: 0.07 K/UL — SIGNIFICANT CHANGE UP (ref 0–0)
NRBC # FLD: 0.13 K/UL — SIGNIFICANT CHANGE UP (ref 0–0)
PCO2 BLDA: 16 MMHG — LOW (ref 32–48)
PCO2 BLDA: 24 MMHG — LOW (ref 32–48)
PH BLDA: 7.5 PH — HIGH (ref 7.35–7.45)
PH BLDA: 7.51 PH — HIGH (ref 7.35–7.45)
PHOSPHATE SERPL-MCNC: 2.7 MG/DL — SIGNIFICANT CHANGE UP (ref 2.5–4.5)
PHOSPHATE SERPL-MCNC: 2.8 MG/DL — SIGNIFICANT CHANGE UP (ref 2.5–4.5)
PLATELET # BLD AUTO: 108 K/UL — LOW (ref 150–400)
PLATELET # BLD AUTO: 112 K/UL — LOW (ref 150–400)
PMV BLD: 11.2 FL — SIGNIFICANT CHANGE UP (ref 7–13)
PMV BLD: 11.6 FL — SIGNIFICANT CHANGE UP (ref 7–13)
PO2 BLDA: 160 MMHG — HIGH (ref 83–108)
PO2 BLDA: 166 MMHG — HIGH (ref 83–108)
POTASSIUM BLDA-SCNC: 3.6 MMOL/L — SIGNIFICANT CHANGE UP (ref 3.4–4.5)
POTASSIUM BLDA-SCNC: 4.4 MMOL/L — SIGNIFICANT CHANGE UP (ref 3.4–4.5)
POTASSIUM SERPL-MCNC: 3.9 MMOL/L — SIGNIFICANT CHANGE UP (ref 3.5–5.3)
POTASSIUM SERPL-MCNC: 5 MMOL/L — SIGNIFICANT CHANGE UP (ref 3.5–5.3)
POTASSIUM SERPL-SCNC: 3.9 MMOL/L — SIGNIFICANT CHANGE UP (ref 3.5–5.3)
POTASSIUM SERPL-SCNC: 5 MMOL/L — SIGNIFICANT CHANGE UP (ref 3.5–5.3)
PROTHROM AB SERPL-ACNC: 15.3 SEC — HIGH (ref 9.8–13.1)
PROTHROM AB SERPL-ACNC: 18 SEC — HIGH (ref 9.8–13.1)
RBC # BLD: 4.14 M/UL — SIGNIFICANT CHANGE UP (ref 3.8–5.2)
RBC # BLD: 4.15 M/UL — SIGNIFICANT CHANGE UP (ref 3.8–5.2)
RBC # FLD: 14.6 % — HIGH (ref 10.3–14.5)
RBC # FLD: 14.8 % — HIGH (ref 10.3–14.5)
SAO2 % BLDA: 96.5 % — SIGNIFICANT CHANGE UP (ref 95–99)
SAO2 % BLDA: 98.8 % — SIGNIFICANT CHANGE UP (ref 95–99)
SODIUM BLDA-SCNC: 127 MMOL/L — LOW (ref 136–146)
SODIUM BLDA-SCNC: 127 MMOL/L — LOW (ref 136–146)
SODIUM SERPL-SCNC: 131 MMOL/L — LOW (ref 135–145)
SODIUM SERPL-SCNC: 131 MMOL/L — LOW (ref 135–145)
TROPONIN T, HIGH SENSITIVITY: 350 NG/L — CRITICAL HIGH (ref ?–14)
TROPONIN T, HIGH SENSITIVITY: 519 NG/L — CRITICAL HIGH (ref ?–14)
WBC # BLD: 17.12 K/UL — HIGH (ref 3.8–10.5)
WBC # BLD: 20.78 K/UL — HIGH (ref 3.8–10.5)
WBC # FLD AUTO: 17.12 K/UL — HIGH (ref 3.8–10.5)
WBC # FLD AUTO: 20.78 K/UL — HIGH (ref 3.8–10.5)

## 2019-05-12 PROCEDURE — 93010 ELECTROCARDIOGRAM REPORT: CPT | Mod: 76

## 2019-05-12 PROCEDURE — 71045 X-RAY EXAM CHEST 1 VIEW: CPT | Mod: 26

## 2019-05-12 RX ORDER — MICAFUNGIN SODIUM 100 MG/1
100 INJECTION, POWDER, LYOPHILIZED, FOR SOLUTION INTRAVENOUS EVERY 24 HOURS
Refills: 0 | Status: CANCELLED | OUTPATIENT
Start: 2019-05-13 | End: 2019-05-12

## 2019-05-12 RX ORDER — HYDROCORTISONE 20 MG
100 TABLET ORAL ONCE
Refills: 0 | Status: COMPLETED | OUTPATIENT
Start: 2019-05-12 | End: 2019-05-12

## 2019-05-12 RX ORDER — HEPARIN SODIUM 5000 [USP'U]/ML
5500 INJECTION INTRAVENOUS; SUBCUTANEOUS EVERY 6 HOURS
Refills: 0 | Status: DISCONTINUED | OUTPATIENT
Start: 2019-05-12 | End: 2019-05-12

## 2019-05-12 RX ORDER — VASOPRESSIN 20 [USP'U]/ML
0.04 INJECTION INTRAVENOUS
Qty: 50 | Refills: 0 | Status: DISCONTINUED | OUTPATIENT
Start: 2019-05-12 | End: 2019-05-12

## 2019-05-12 RX ORDER — ALBUMIN HUMAN 25 %
50 VIAL (ML) INTRAVENOUS ONCE
Refills: 0 | Status: COMPLETED | OUTPATIENT
Start: 2019-05-12 | End: 2019-05-12

## 2019-05-12 RX ORDER — MICAFUNGIN SODIUM 100 MG/1
INJECTION, POWDER, LYOPHILIZED, FOR SOLUTION INTRAVENOUS
Refills: 0 | Status: DISCONTINUED | OUTPATIENT
Start: 2019-05-12 | End: 2019-05-12

## 2019-05-12 RX ORDER — SODIUM CHLORIDE 9 MG/ML
1000 INJECTION, SOLUTION INTRAVENOUS
Refills: 0 | Status: DISCONTINUED | OUTPATIENT
Start: 2019-05-12 | End: 2019-05-12

## 2019-05-12 RX ORDER — PHENYLEPHRINE HYDROCHLORIDE 10 MG/ML
0.2 INJECTION INTRAVENOUS
Qty: 160 | Refills: 0 | Status: DISCONTINUED | OUTPATIENT
Start: 2019-05-12 | End: 2019-05-12

## 2019-05-12 RX ORDER — HEPARIN SODIUM 5000 [USP'U]/ML
2500 INJECTION INTRAVENOUS; SUBCUTANEOUS EVERY 6 HOURS
Refills: 0 | Status: DISCONTINUED | OUTPATIENT
Start: 2019-05-12 | End: 2019-05-12

## 2019-05-12 RX ORDER — HEPARIN SODIUM 5000 [USP'U]/ML
1200 INJECTION INTRAVENOUS; SUBCUTANEOUS
Qty: 25000 | Refills: 0 | Status: DISCONTINUED | OUTPATIENT
Start: 2019-05-12 | End: 2019-05-12

## 2019-05-12 RX ORDER — PHENYLEPHRINE HYDROCHLORIDE 10 MG/ML
0.1 INJECTION INTRAVENOUS
Qty: 40 | Refills: 0 | Status: DISCONTINUED | OUTPATIENT
Start: 2019-05-12 | End: 2019-05-12

## 2019-05-12 RX ORDER — MICAFUNGIN SODIUM 100 MG/1
100 INJECTION, POWDER, LYOPHILIZED, FOR SOLUTION INTRAVENOUS ONCE
Refills: 0 | Status: COMPLETED | OUTPATIENT
Start: 2019-05-12 | End: 2019-05-12

## 2019-05-12 RX ORDER — VANCOMYCIN HCL 1 G
1000 VIAL (EA) INTRAVENOUS ONCE
Refills: 0 | Status: COMPLETED | OUTPATIENT
Start: 2019-05-12 | End: 2019-05-12

## 2019-05-12 RX ORDER — NOREPINEPHRINE BITARTRATE/D5W 8 MG/250ML
0.05 PLASTIC BAG, INJECTION (ML) INTRAVENOUS
Qty: 16 | Refills: 0 | Status: DISCONTINUED | OUTPATIENT
Start: 2019-05-12 | End: 2019-05-12

## 2019-05-12 RX ADMIN — MICAFUNGIN SODIUM 105 MILLIGRAM(S): 100 INJECTION, POWDER, LYOPHILIZED, FOR SOLUTION INTRAVENOUS at 06:42

## 2019-05-12 RX ADMIN — VASOPRESSIN 2.4 UNIT(S)/MIN: 20 INJECTION INTRAVENOUS at 03:48

## 2019-05-12 RX ADMIN — SODIUM CHLORIDE 100 MILLILITER(S): 9 INJECTION, SOLUTION INTRAVENOUS at 03:48

## 2019-05-12 RX ADMIN — Medication 400 MILLIGRAM(S): at 06:04

## 2019-05-12 RX ADMIN — Medication 400 MILLIGRAM(S): at 01:54

## 2019-05-12 RX ADMIN — Medication 100 MILLIGRAM(S): at 06:49

## 2019-05-12 RX ADMIN — Medication 3.19 MICROGRAM(S)/KG/MIN: at 04:54

## 2019-05-12 RX ADMIN — Medication 33.33 MILLILITER(S): at 04:52

## 2019-05-12 RX ADMIN — Medication 100 MILLIEQUIVALENT(S): at 04:36

## 2019-05-12 RX ADMIN — Medication 100 MILLIEQUIVALENT(S): at 01:55

## 2019-05-12 RX ADMIN — SODIUM CHLORIDE 1000 MILLILITER(S): 9 INJECTION, SOLUTION INTRAVENOUS at 00:00

## 2019-05-12 RX ADMIN — Medication 2 MILLIGRAM(S): at 04:51

## 2019-05-12 RX ADMIN — Medication 100 MILLIEQUIVALENT(S): at 00:21

## 2019-05-12 RX ADMIN — PHENYLEPHRINE HYDROCHLORIDE 2.55 MICROGRAM(S)/KG/MIN: 10 INJECTION INTRAVENOUS at 03:44

## 2019-05-12 RX ADMIN — Medication 100 MILLIGRAM(S): at 04:38

## 2019-05-12 RX ADMIN — Medication 1000 MILLIGRAM(S): at 04:37

## 2019-05-12 RX ADMIN — Medication 250 MILLIGRAM(S): at 06:02

## 2019-05-12 RX ADMIN — PIPERACILLIN AND TAZOBACTAM 25 GRAM(S): 4; .5 INJECTION, POWDER, LYOPHILIZED, FOR SOLUTION INTRAVENOUS at 06:04

## 2019-05-12 RX ADMIN — HEPARIN SODIUM 1200 UNIT(S)/HR: 5000 INJECTION INTRAVENOUS; SUBCUTANEOUS at 03:44

## 2019-05-12 NOTE — CONSULT NOTE ADULT - CONSULT REQUESTED DATE/TIME
01-May-2019 11:40
02-May-2019 09:00
08-May-2019 14:21
12-May-2019 00:56
24-Apr-2019
30-Apr-2019 09:53
18-Apr-2019 09:52
18-Apr-2019 16:04

## 2019-05-12 NOTE — CONSULT NOTE ADULT - ATTENDING COMMENTS
53y Female with SBO planned for OR today for diagnostic lap, possible DAVID, possible bowel resection, possible ostomy.    Nutrition support evaluation requested for initiation of TPN/lipids in view of prolonged NPO.  Caloric Intake  [ x ] <50% of nutrition needs >= 5 days    Generalized Edema  Severe Edema    Metabolic Requirements:  The patient will require:  25kilocalories / kg / day  Diagnosis:    [ x ] Severe protein calorie malnutrition in acute illness/ injury  Severe protein calorie malnutrition in acute illness/ injury; secondary to poor appetite, weight loss >5% in 1 month and/or >7.5% in 3 months, caloric Intake <50% of nutrition needs >= 5 days, temporal wasting, severe loss of muscle mass/atrophy and loss of body fat stores.     Plan:  OR today    will follow up with primary team post op   I have seen and examined the patient, reviewed the laboratory and radiologic data and agree with the history, physical assessment and plan.  I reviewed and discussed with all consultants, house staff and PA's.  The note is edited where appropriate. The Nutrition Support Team (NST) discusses on an ongoing basis with the primary team and all consultants, House staff and PA's to have a permanent risk benefit analyses on all decisions.    I spent  45  minutes in total encounter; more than 50% of the visit counseling and coordinating nutrition care while providing diagnoses, assessment, and plan.
Pt  prior to rounds. See code sheet for details
53 year old female S/P resection radiation and chemotherapy for rectal cancer. Prior history of SBO. Now admitted with abdominal pain, constipation. CT consistent with SBO but pt also has a thickened rectal anastomosis and a BE shows a long tight distal stricture in the rectosigmoid junction.    Plan: Repeat CT scan to determine status of the SBO. Will discuss possible stent with surgery.
R fem DVT in the setting of metastatic colon CA.   no signs of phlegmasia  A/C life long  pt. is not candidate for lysis 2/2 metastatic CA  LE elevation and compression for swelling
Pt seen with Fellow.  Agree with above plan.

## 2019-05-12 NOTE — DISCHARGE NOTE FOR THE EXPIRED PATIENT - HOSPITAL COURSE
53F w/hx of colorectal CA s/p LAR (2017, Dr. Armstrong) with mets to liver and cervix, s/p chemotherapy (FOLFOX, then FOLFIRI, last dose Jan 2019) and radiation, p/w 1-day h/o R-sided abdominal pain associated with nausea and several episodes of nausea. She denies associated fever or chills, dysuria, CP, SOB. Last flatus and last BM 1 day PTA. In the ED, she was tachycardic to 120s, normotensive, afebrile. WBC 12.84, lactate 2.1. CT A/P demonstrates high-grade SBO with transition point in right hemiabdomen. The patient was treated at [facility] in Summer 2018 for SBO, which was managed nonoperatively.    Of note, patient was recently diagnosed with DVT of RLE for which she is taking Eliquis. She is followed by Dr. Kiran of heme/onc. Most recent PET CT in Feb 2019 showed no recurrence/spread of disease. Last chemo January 2019.   NGT placed in ED.  Pt admitted to general surgery service with SBO and managed nonoperatively; unclear if adhesive vs. 2/2 recurrent/peritoneal disease (no progression on recent PET in Feb 2019, however new external iliac and inguinal LAD on most recent scans); malignant SBO protocol started with Reglan, Octreotide, Decadron.  Heme/onc consulted.  Vascular consulted for DVT, not a candidate for thrombolysis, continue with ac.  Pt started passing flatus, NGT clamp trial performed, NGT removed and diet restarted and advanced as tolerated.  4/20-4/21 pt with multiple episodes of emesis, pt backed down to clears, AXR performed showing dilated loops of small bowel.  CTabd/pelvis repeated: IMPRESSION: Small bowel obstruction with transition point in the right hemipelvis as on prior abdominal CTs. Interval increase in small bowel distention. No pneumatosis or interloop ascites.  Pt continued on mSBO protocol and passing flatus, clears restarted.   4/24: Palliative care consulted.  Pt continued to pass flatus, diet advanced as tolerated.  4/26: Pt continued to complain of right leg pain, vascular reconsulted and repeat duplex performed to assess DVT: IMPRESSION: Nonocclusive acute deep venous thrombosis in the right common femoral vein. No extension into the right femoral, popliteal, or calf veins.    Pt continued to have intermittent N/V with reg diet, hypaque enema performed to evaluate for distal obstruction/possible need for palliative bypass on 4/29: IMPRESSION:  Stricture within the rectosigmoid junction, corresponding to the previously described area of narrowing of the rectosigmoid colon from CT abdomen pelvis were 4/16/2019.  4/30: GI consulted for colonoscopy  patient was scheduled for flexible sigmoidoscopy with biopsies of stricture, but developed fecal vomiting after enema so procedure was canceled.      Internal Medicine consulted for OR optimization. Pt preopped  and went to OR with Dr. Skinner for: Procedure: Diagnostic laparoscopy, exploratory laparotomy, lysis of adhesions, small bowel resection, rigid sigmoidoscopy.  Findings: A diagnostic laparoscopy was done. No obvious metastases were noted. Small bowel causing the obstruction was adherent to the pelvis. Case was then converted to an exploratory laparotomy. Adhesions were taken down, and the small bowel was freed from the pelvis. Small bowel, causing the obstruction, was then resected. Previous rectosigmoid stricture was seen. A rigid sigmoidoscopy was limited by presence of copious stool. A small bowel anastomosis of the small bowel to the distal ileum was then performed. Fascia was closed and skin was approximated with staples.  Pt NPO/NGT Pain managed initially with PCA pump.  Therapeutic lovenox restarted on POD#1.  PT consulted.  Pt still awaiting return of GI function, PICC placed and TPN consulted and initiated.  Pt tachycardic (EKG: sinus tach), fluid resuscitated without improvement.  CTA chest performed to r/o PE; CT abd/pelvis: IMPRESSION: Long segment small bowel thickening with moderate free fluid and free air.  No evidence of pulmonary embolus. Heterogeneity of the endometrium and cervix may represent post radiation change or neoplastic involvement.  IV Zosyn started for enteritis seen on CT.   5/8 endocrinology consulted for possible thyroid cause of tachycardia, rec: continuing methimazole, do not think the thyroid is the a major factor in the patient's tachycardia.  5/9: Pt started passing flatus, NGT clamp trial.  PRBC transfused for H/H 8/26. Drainage from wound: likely liquified hematoma from likely a result of right sided myofascial flap bleeding/full dose AC.  Continue pouch of upper pole of wound.      5/11:Pt hypotension to SBP 70s with persistent tachycardia to 120s.. Patient asymptomatic, denying new or worsening pain, no nausea/vomiting. UOP adequate but borderline. Abdomen mild to moderately distended with focal tenderness in the epigastrium and right hemiabdomen. Midline incision draining feculent foul smelling fluid, pouched by nursing in the superior portion of the wound. 1L bolus of LR given, with good SBP response. Midline staples removed and wound explored at bedside. Drainage of dark foul smelling fluid. Wound packed with guaze. Pt preopped for RTOR for abdominal wound washout.  Pt went to the OR with Dr. Finch on 5/11.  Intraop findings: Exploration of wound infection of subcutaneous tissue; fascial dehiscence of upper midline fascia. Exploratory laparotomy performed. Anastomotic leak noted at the small bowel-small bowel anastomosis. Due to feculent peritonitis decision made no place a 19Fr Eugene drain in the pelvis to drain any enteric leakage from the anastomotic leak & create a diverting loop ileostomy in the RLQ. Patient noted to have very friable fascia to the lateral border of the rectus muscle therefore loop ileostomy brought through skin/subcutaneous tissue. Abthera vac left in place to allow for interval re-assessment of the fascia & rule out further necrosis of the abdominal wall.     Pt remained intubated post procedure and SICU consulted. Pt became hypotensive in SICU started on leelee gtt with increasing requirements, RIJ central line placed for increasing pressor requirement support.  Aggressive fluid resuscitation held given patient showed b-lines b/l on POCUS and plump IVC. Escalated to levophed. Etiology for hypotension/tachycardia likely 2/2 septic shock given febrile with abd as likely source. Less likely hemorrhagic/dehydration given stable H&H and fluid status based on POCUS. Also less likely cardiogenic given patient is in warm shock and has no hx of cardiac dz. Also unlikely 2/2 PE given patient also has no obvious R ventricular dilation/evidence of RV strain.  Lactate rising, troponins increasing.      At 7am on 5/12 pt went into wide complex arrythmia into PEA; CPR initiated and ROSC after 2 min.  At 0717 pt went into asystole, CPR initiated and coded for 30minutes without ROSC, time of death called at 0747.

## 2019-05-12 NOTE — CONSULT NOTE ADULT - SUBJECTIVE AND OBJECTIVE BOX
SICU Consultation Note  =====================================================  HPI: 53y Female  HPI:  53F w/hx of colorectal CA s/p LAR (2017, Dr. Armstrong) with mets to liver and cervix, s/p chemotherapy (FOLFOX, then FOLFIRI, last dose Jan 2019) and radiation, initially p/w 1-day h/o R-sided abdominal pain associated with nausea and several episodes of nausea. She denies associated fever or chills, dysuria, CP, SOB. Last flatus and last BM 1 day PTA. In the ED, she was tachycardic to 120s, normotensive, afebrile. WBC 12.84, lactate 2.1. CT A/P demonstrates high-grade SBO with transition point in right hemiabdomen. The patient was treated at [facility] in Summer 2018 for SBO, which was managed nonoperatively.    Of note, patient was recently diagnosed with DVT of RLE for which she is taking Eliquis. She is followed by Dr. Kiran of heme/onc. Most recent PET CT in Feb 2019 showed no recurrence/spread of disease. Last chemo January 2019. (17 Apr 2019 01:42)    Patient had RTOR on 5/11 for abd pain, leaking/dehiscing wound. Intra op found to have anastomotic leak noted at the small bowel-small bowel anastomosis and large feculent peritonitis. Patient had abd washout and 19Fr Eugene drain in the pelvis to drain any enteric leakage from the anastomotic leak & create a diverting loop ileostomy in the RLQ. Patient noted to have very friable fascia to the lateral border of the rectus muscle therefore loop ileostomy brought through skin/subcutaneous tissue. Abthera vac left in place to allow for interval re-assessment of the fascia & rule out further necrosis of the abdominal wall. Pt brought to SICU intubated.      Surgery Information  OR time:      EBL: 50      IV Fluids:  1L albumin, 4L LR, 500NS, 2prbcs, 1G Ca     Blood Products:   UOP:          PAST MEDICAL & SURGICAL HISTORY:  DVT, lower extremity: RLE, dx&#x27;d April 2019  SBO (small bowel obstruction)  Hemorrhoid  Cervical cancer  Colon neoplasm: s/p LAR  Hyperthyroidism  S/P colon resection  H/O exploratory laparotomy: 1/6/17, with LAR    Home Meds: Home Medications:  bisacodyl 10 mg rectal suppository: 1 suppository(ies) rectal once (19 Apr 2019 09:19)  Eliquis 5 mg oral tablet: 1 tab(s) orally 2 times a day (17 Apr 2019 01:56)  LORazepam 0.5 mg oral tablet: 1 tab(s) orally 3 times a day, As Needed (17 Apr 2019 01:56)  methIMAzole 5 mg oral tablet:  orally once a day (17 Apr 2019 01:56)  oxycodone-acetaminophen 5 mg-325 mg oral tablet: 2 tab(s) orally every 6 hours, As needed, Severe Pain (7 - 10) (19 Apr 2019 09:19)  prochlorperazine 10 mg oral tablet: 1 tab(s) orally 3 times a day, As Needed (17 Apr 2019 01:56)    Allergies: Allergies    No Known Allergies    Intolerances      Soc:   Advanced Directives: Presumed Full Code     ROS:    REVIEW OF SYSTEMS    [ ] A ten-point review of systems was otherwise negative except as noted.  [x] Due to intubation, subjective information were not able to be obtained from the patient. History was obtained, to the extent possible, from review of the chart and collateral sources of information.      CURRENT MEDICATIONS:   --------------------------------------------------------------------------------------  Neurologic Medications  acetaminophen  IVPB .. 1000 milliGRAM(s) IV Intermittent once  acetaminophen  IVPB .. 1000 milliGRAM(s) IV Intermittent once  acetaminophen  IVPB .. 1000 milliGRAM(s) IV Intermittent once  acetaminophen  IVPB .. 1000 milliGRAM(s) IV Intermittent once  acetaminophen  IVPB .. 1000 milliGRAM(s) IV Intermittent once  fentaNYL   Infusion. 0.5 MICROgram(s)/kG/Hr IV Continuous <Continuous>  gabapentin 100 milliGRAM(s) Oral three times a day  ondansetron Injectable 4 milliGRAM(s) IV Push every 6 hours PRN Nausea    Respiratory Medications    Cardiovascular Medications  phenylephrine    Infusion 0.1 MICROgram(s)/kG/Min IV Continuous <Continuous>    Gastrointestinal Medications  dextrose 5% + lactated ringers. 1000 milliLiter(s) IV Continuous <Continuous>  pantoprazole  Injectable 40 milliGRAM(s) IV Push daily  potassium chloride  10 mEq/100 mL IVPB 10 milliEquivalent(s) IV Intermittent every 1 hour    Genitourinary Medications    Hematologic/Oncologic Medications    Antimicrobial/Immunologic Medications  piperacillin/tazobactam IVPB. 3.375 Gram(s) IV Intermittent every 8 hours    Endocrine/Metabolic Medications  methimazole 5 milliGRAM(s) Oral daily    Topical/Other Medications  chlorhexidine 4% Liquid 1 Application(s) Topical <User Schedule>  naloxone Injectable 0.1 milliGRAM(s) IV Push every 3 minutes PRN For ANY of the following changes in patient status:  A. RR LESS THAN 10 breaths per minute, B. Oxygen saturation LESS THAN 90%, C. Sedation score of 6    --------------------------------------------------------------------------------------    VITAL SIGNS, INS/OUTS (last 24 hours):  --------------------------------------------------------------------------------------  ICU Vital Signs Last 24 Hrs  T(C): 38.1 (11 May 2019 21:25), Max: 38.1 (11 May 2019 21:25)  T(F): 100.5 (11 May 2019 21:25), Max: 100.5 (11 May 2019 21:25)  HR: 116 (12 May 2019 00:00) (100 - 128)  BP: 104/63 (11 May 2019 16:10) (75/53 - 120/77)  BP(mean): --  ABP: 91/56 (12 May 2019 00:00) (90/57 - 162/92)  ABP(mean): 70 (12 May 2019 00:00) (70 - 122)  RR: 17 (12 May 2019 00:00) (12 - 28)  SpO2: 100% (12 May 2019 00:00) (99% - 100%)    I&O's Summary    10 May 2019 07:01  -  11 May 2019 07:00  --------------------------------------------------------  IN: 1998 mL / OUT: 2050 mL / NET: -52 mL    11 May 2019 07:01  -  12 May 2019 00:56  --------------------------------------------------------  IN: 2956.8 mL / OUT: 1125 mL / NET: 1831.8 mL      --------------------------------------------------------------------------------------    EXAM:  General/Neuro Intubated, sedated. arousable, follows commands  RASS: 0--1  GCS: 11T  Exam: Normal, NAD, alert, no focal deficits. PERRLA  ***    Respiratory  Exam: Lungs clear to auscultation, Normal expansion/effort.  ***  [] Tracheostomy   [x] Intubated  Mechanical Ventilation: Mode: AC/ CMV (Assist Control/ Continuous Mandatory Ventilation)  RR (machine): 12  TV (machine): 500  FiO2: 40  PEEP: 5  MAP: 9  PIP: 35      Cardiovascular  Exam: S1, S2.  Regular rate and rhythm.  Peripheral edema  Cardiac Rhythm: Normal Sinus Rhythm  brisk cap refill  ECHO:   Bedside pocus - no evidence of RHS. NO pericardial effusion    GI  Exam: Abdomen soft, slightly distended. abthera wound vac in place with slight leakage around the wound.   NGT tube in place.  Colostomy / Ileostomy.   Current Diet:  NPO***      Tubes/Lines/Drains  ***  [x] Peripheral IV  [] Central Venous Line     	[] R	[] L	[] IJ	[] Fem	[] SC        Type:	    Date Placed:   [x] Arterial Line		[] R	[] L	[] Fem	[] Rad	[] Ax	Date Placed:   [x] PICC:         	[] Midline		[] Mediport           [] Urinary Catheter		Date Placed:     Extremities  Exam: Extremities warm, pink, well-perfused.        Derm:  Exam: Good skin turgor, no skin breakdown.      :   Exam: Manriquez catheter in place.     LABS  --------------------------------------------------------------------------------------  Labs:  CAPILLARY BLOOD GLUCOSE      POCT Blood Glucose.: 74 mg/dL (11 May 2019 23:21)  POCT Blood Glucose.: 79 mg/dL (11 May 2019 21:50)  POCT Blood Glucose.: 102 mg/dL (11 May 2019 05:49)                          10.9   12.84 )-----------( 105      ( 11 May 2019 22:00 )             33.2         05-11    133<L>  |  101  |  8   ----------------------------<  86  3.4<L>   |  21<L>  |  0.36<L>      eGFR if Non : 125 mL/min (05-11-19 @ 22:20)      LFTs:             4.5  | 3.3  | 21       ------------------[100     ( 11 May 2019 22:20 )  2.2  | x    | 13          Lipase:x      Amylase:x         Blood Gas Arterial - Lactate: 2.6 mmol/L (05-11-19 @ 22:00)  Lactate, Blood: 2.4 mmol/L (05-11-19 @ 02:00)    ABG - ( 11 May 2019 22:00 )  pH: 7.34  /  pCO2: 42    /  pO2: 159   / HCO3: 22    / Base Excess: -2.8  /  SaO2: 97.8            ABG - ( 11 May 2019 20:07 )  pH: 7.46  /  pCO2: 30    /  pO2: 244   / HCO3: 23    / Base Excess: -2.2  /  SaO2: 95.8              Coags:     14.4   ----< 1.25    ( 11 May 2019 22:00 )     37.1                    Culture - Surg Site Aerob/Anaer w/Gm St (collected 11 May 2019 20:54)  Source: ABDOMINAL FLUID        --------------------------------------------------------------------------------------    OTHER LABS    IMAGING RESULTS

## 2019-05-12 NOTE — CHART NOTE - NSCHARTNOTEFT_GEN_A_CORE
Seen post-op;  Patient with increasing pressor requirements in SICU  Respiratory support stable  On zosyn currently, OR gram stain showed gram positive rods  Abthera wound vac intact, minimal SS output  ELSA drain x1 with SS output  ileostomy pink/viable  soft abdomen    Team will evaluate pt and discuss abx regimen  Plan to RTOR Sunday 5/12 for evaluation of fascia and possible closure    A SURGERY  [id number]

## 2019-05-12 NOTE — CONSULT NOTE ADULT - PROVIDER SPECIALTY LIST ADULT
Endocrinology
Gastroenterology
Internal Medicine
Nutrition Support
Palliative Care
SICU
Heme/Onc
Vascular Surgery

## 2019-05-12 NOTE — CHART NOTE - NSCHARTNOTEFT_GEN_A_CORE
Patient's blood pressure soft. Increased leelee and decision to start vaso. Placed R IJ central line given poor access otherwise. Patient required more pressor support. Aggressive fluid resuscitation held given patient showed b-lines b/l on POCUS and plump IVC. Escalated to levophed. Etiology for hypotension/tachycardia likely 2/2 septic shock given febrile with abd as likely source. Less likely hemorrhagic/dehydration given stable H&H and fluid status based on POCUS. Also less likely cardiogenic given patient is in warm shock and has no hx of cardiac dz. Also unlikely 2/2 PE given patient also has no obvious R ventricular dilation/evidence of RV strain. Patient's blood pressure soft. Increased leelee and decision to start vaso. Placed R IJ central line given poor access otherwise. Patient required more pressor support. Aggressive fluid resuscitation held given patient showed b-lines b/l on POCUS and plump IVC. Escalated to levophed. Etiology for hypotension/tachycardia likely 2/2 septic shock given febrile with abd as likely source. Less likely hemorrhagic/dehydration given stable H&H and fluid status based on POCUS. Also less likely cardiogenic given patient is in warm shock and has no hx of cardiac dz. Also unlikely 2/2 PE given patient also has no obvious R ventricular dilation/evidence of RV strain.      Attending Addendum:  I was available during these procedures. Likely septic shock vs poor cardiac compensation for the metabolic demand.   ACLS initiated. See code sheet for details     Bonifacio White MD  Acute Care Surgery

## 2019-05-12 NOTE — CONSULT NOTE ADULT - CONSULT REASON
Hemodynamic monitoring
Hyperthyroid
Pre-Op
parenteral nutrition
stricture
symptom management, pain
met rectal cancer
DVT

## 2019-05-12 NOTE — CONSULT NOTE ADULT - ASSESSMENT
ASSESSMENT:  53y Female with mets colon ca c/b multiple sbos with small bowel anastomotic leak and feculent peritonitis s/p washout. Patient has friable fascia and ostomy approximated to skin 5/11. Plans to return to the OR for further washout and fascia closure.    PLAN:   Neurologic:   Sedated/intubated Fentanyl/precedex. Titrate to RAss 0 to -2    Respiratory:   Intubated on AC, 500/12/40/5    Cardiovascular:   Tachycardic, hypotensive on [place]  Unclear etiology of hemodynamic instability. POCUS shows no evidence of RHS, thus PE less likely. Possibly multifactorial 2/2 hemorrhagic and septic shock.  Patient had short run of vtach - also possibly cardiogenic shock (possibly demand ischemia). Will get Echo    Gastrointestinal/Nutrition:   NPO for now until surg plan established. Maint fluids D5LR    Renal/Genitourinary:   Strict Is and Os - making adequate UOP    Hematologic:  Hx of RLE DVT. Will restart AC once hgb stabilizes s/p surg     Infectious Disease:   On zosyn for intraabd infection  Cultures growing G+ rods. Will follow up with sensitivity    Lines/Tubes:  L pelvic britton drain, abthera vac. LUE PICC, R radial karma    Endocrine:  No active issues    Disposition:   Sicu  --------------------------------------------------------------------------------------    Critical Care Diagnoses:

## 2019-05-13 LAB — SPECIMEN SOURCE: SIGNIFICANT CHANGE UP

## 2019-05-14 LAB
GRAM STN WND: SIGNIFICANT CHANGE UP
METHOD TYPE: SIGNIFICANT CHANGE UP
ORGANISM # SPEC MICROSCOPIC CNT: SIGNIFICANT CHANGE UP

## 2019-05-14 NOTE — BRIEF OPERATIVE NOTE - OPERATION/FINDINGS
"FLASHES / FLOATERS / POSTERIOR VITREOUS DETACHMENT    Call the clinic if you have any further changes in symptoms.  Including:  Increased numbers of floaters or flashing lights, dimness or darkness that moves through or stays constant in your vision, or any pain in the eye (s).            DRY EYES:  Use Over The Counter artificial tears as needed for dry eye symptoms.  Some common brands include:  Systane, Optive, and Refresh.  These drops can be used as frequently as desired, but may be most helpful use during long periods of concentrated work.  For example, reading / working at the computer.  Avoid drops that "get redness out", as these contain medication that may further irritate the eyes.    ALLERGY EYES / SYMPTOMS:    Over the counter medications include--Zaditor and Alaway  Use as directed 1-2 drops daily for symptoms of itching / watering eyes.  These drops will not help for dry eye or exposure symptoms.      " Exploration of wound infection of subcutaneous tissue; fascial dehiscence of upper midline fascia. Exploratory laparotomy performed. Anastomotic leak noted at the small bowel-small bowel anastomosis. Due to feculent peritonitis decision made no place a 19Fr Eugene drain in the pelvis to drain any enteric leakage from the anastomotic leak & create a diverting loop ileostomy in the RLQ. Patient noted to have very friable fascia to the lateral border of the rectus muscle therefore loop ileostomy brought through skin/subcutaneous tissue. Abthera vac left in place to allow for interval re-assessment of the fascia & rule out further necrosis of the abdominal wall. Pt brought to SICU intubated

## 2019-05-15 LAB
ORGANISM # SPEC MICROSCOPIC CNT: SIGNIFICANT CHANGE UP
ORGANISM # SPEC MICROSCOPIC CNT: SIGNIFICANT CHANGE UP

## 2019-05-16 LAB
METHOD TYPE: SIGNIFICANT CHANGE UP
ORGANISM # SPEC MICROSCOPIC CNT: SIGNIFICANT CHANGE UP

## 2019-05-17 LAB
-  AMIKACIN: SIGNIFICANT CHANGE UP
-  AMIKACIN: SIGNIFICANT CHANGE UP
-  AMPICILLIN/SULBACTAM: SIGNIFICANT CHANGE UP
-  AMPICILLIN/SULBACTAM: SIGNIFICANT CHANGE UP
-  AMPICILLIN: SIGNIFICANT CHANGE UP
-  AZTREONAM: SIGNIFICANT CHANGE UP
-  AZTREONAM: SIGNIFICANT CHANGE UP
-  CEFAZOLIN: SIGNIFICANT CHANGE UP
-  CEFAZOLIN: SIGNIFICANT CHANGE UP
-  CEFEPIME: SIGNIFICANT CHANGE UP
-  CEFEPIME: SIGNIFICANT CHANGE UP
-  CEFOXITIN: SIGNIFICANT CHANGE UP
-  CEFOXITIN: SIGNIFICANT CHANGE UP
-  CEFTAZIDIME: SIGNIFICANT CHANGE UP
-  CEFTAZIDIME: SIGNIFICANT CHANGE UP
-  CEFTRIAXONE: SIGNIFICANT CHANGE UP
-  CEFTRIAXONE: SIGNIFICANT CHANGE UP
-  CIPROFLOXACIN: SIGNIFICANT CHANGE UP
-  ERTAPENEM: SIGNIFICANT CHANGE UP
-  ERTAPENEM: SIGNIFICANT CHANGE UP
-  GENTAMICIN: SIGNIFICANT CHANGE UP
-  GENTAMICIN: SIGNIFICANT CHANGE UP
-  IMIPENEM: SIGNIFICANT CHANGE UP
-  IMIPENEM: SIGNIFICANT CHANGE UP
-  LEVOFLOXACIN: SIGNIFICANT CHANGE UP
-  LEVOFLOXACIN: SIGNIFICANT CHANGE UP
-  MEROPENEM: SIGNIFICANT CHANGE UP
-  MEROPENEM: SIGNIFICANT CHANGE UP
-  PIPERACILLIN/TAZOBACTAM: SIGNIFICANT CHANGE UP
-  PIPERACILLIN/TAZOBACTAM: SIGNIFICANT CHANGE UP
-  TETRACYCLINE: SIGNIFICANT CHANGE UP
-  TIGECYCLINE: SIGNIFICANT CHANGE UP
-  TIGECYCLINE: SIGNIFICANT CHANGE UP
-  TOBRAMYCIN: SIGNIFICANT CHANGE UP
-  TOBRAMYCIN: SIGNIFICANT CHANGE UP
-  TRIMETHOPRIM/SULFAMETHOXAZOLE: SIGNIFICANT CHANGE UP
-  TRIMETHOPRIM/SULFAMETHOXAZOLE: SIGNIFICANT CHANGE UP
-  VANCOMYCIN: SIGNIFICANT CHANGE UP
CULTURE - SURGICAL SITE: SIGNIFICANT CHANGE UP
METHOD TYPE: SIGNIFICANT CHANGE UP

## 2019-06-18 LAB — FUNGUS SPEC QL CULT: SIGNIFICANT CHANGE UP

## 2019-06-20 NOTE — PROGRESS NOTE ADULT - PROVIDER SPECIALTY LIST ADULT
Surgery Excisional Biopsy Additional Text (Leave Blank If You Do Not Want): The margin was drawn around the clinically apparent lesion. An elliptical shape was then drawn on the skin incorporating the lesion and margins.  Incisions were then made along these lines to the appropriate tissue plane and the lesion was extirpated.

## 2019-06-30 NOTE — ED PROVIDER NOTE - ATTENDING CONTRIBUTION TO CARE
I performed a face to face bedside interview with patient regarding history of present illness, review of symptoms and past medical history. I completed an independent physical exam.  I have discussed patient's plan of care.   I agree with note as stated above, having amended the EMR as needed to reflect my findings. I have discussed the assessment and plan of care.  This includes during the time I functioned as the attending physician for this patient.  Attending Contribution to Care: agree with plan of resident. pt p/w severe lower abd pain, episodes of n/v, sbo on ct. ng placed in ed. pt stable for admission. hd stable.
No

## 2019-10-15 NOTE — CONSULT NOTE ADULT - CONSULT REQUESTED DATE/TIME
10-Oct-2018 09:01 Render Post-Care Instructions In Note?: yes Number Of Freeze-Thaw Cycles: 2 freeze-thaw cycles Detail Level: Simple Consent: The patient's consent was obtained including but not limited to risks of crusting, scabbing, blistering, scarring, darker or lighter pigmentary change, recurrence, incomplete removal and infection. Render Note In Bullet Format When Appropriate: No Duration Of Freeze Thaw-Cycle (Seconds): 2 Post-Care Instructions: I reviewed with the patient in detail post-care instructions. Patient is to wear sunprotection, and avoid picking at any of the treated lesions. Pt may apply Vaseline to crusted or scabbing areas.

## 2020-01-02 NOTE — ED PROVIDER NOTE - NS ED MD DISPO ISOLATION TYPES
CT abdomen pelvis w con



INDICATION:

Severe RLQ Pain.



TECHNIQUE:

All CT scans at this location are performed using CT dose reduction for ALARA by means of automated e
xposure control. 



COMPARISON:

None available.



FINDINGS:

There is extensive artifact from patient motion, obscuring considerable detail.



Liver and gallbladder are grossly negative. Spleen is not well seen. Pancreas is also grossly negativ
e, and abdominal aorta is normal in size.



Bowel in the midabdomen is completely obscured.



Pelvis



No obvious free fluid or inflammatory change. Appendix cannot be identified, partly due to extensive 
artifact and partly due to the patient's lack of intra-abdominal fat.



IMPRESSION:

1. Suboptimal exam because of respiratory/peristalsis motion artifact, as well as the patient's total
 lack of intra-abdominal fat.

2. I see no gross abnormalities, but the appendix is not identified. 



Signer Name: Eduardo Licona MD 

Signed: 1/2/2020 4:46 AM

 Workstation Name: [id number][id number] None

## 2020-04-09 NOTE — PROGRESS NOTE ADULT - PROVIDER SPECIALTY LIST ADULT
Nutrition Support Minoxidil Counseling: Minoxidil is a topical medication which can increase blood flow where it is applied. It is uncertain how this medication increases hair growth. Side effects are uncommon and include stinging and allergic reactions. Include Pregnancy/Lactation Warning?: No Doxepin Pregnancy And Lactation Text: This medication is Pregnancy Category C and it isn't known if it is safe during pregnancy. It is also excreted in breast milk and breast feeding isn't recommended. Prednisone Pregnancy And Lactation Text: This medication is Pregnancy Category C and it isn't know if it is safe during pregnancy. This medication is excreted in breast milk. Xolair Pregnancy And Lactation Text: This medication is Pregnancy Category B and is considered safe during pregnancy. This medication is excreted in breast milk. Cimetidine Pregnancy And Lactation Text: This medication is Pregnancy Category B and is considered safe during pregnancy. It is also excreted in breast milk and breast feeding isn't recommended. Imiquimod Counseling:  I discussed with the patient the risks of imiquimod including but not limited to erythema, scaling, itching, weeping, crusting, and pain.  Patient understands that the inflammatory response to imiquimod is variable from person to person and was educated regarded proper titration schedule.  If flu-like symptoms develop, patient knows to discontinue the medication and contact us. Hydroquinone Pregnancy And Lactation Text: This medication has not been assigned a Pregnancy Risk Category but animal studies failed to show danger with the topical medication. It is unknown if the medication is excreted in breast milk. Topical Retinoid Pregnancy And Lactation Text: This medication is Pregnancy Category C. It is unknown if this medication is excreted in breast milk. Solaraze Pregnancy And Lactation Text: This medication is Pregnancy Category B and is considered safe. There is some data to suggest avoiding during the third trimester. It is unknown if this medication is excreted in breast milk. Itraconazole Pregnancy And Lactation Text: This medication is Pregnancy Category C and it isn't know if it is safe during pregnancy. It is also excreted in breast milk. Solaraze Counseling:  I discussed with the patient the risks of Solaraze including but not limited to erythema, scaling, itching, weeping, crusting, and pain. Drysol Pregnancy And Lactation Text: This medication is considered safe during pregnancy and breast feeding. Elidel Counseling: Patient may experience a mild burning sensation during topical application. Elidel is not approved in children less than 2 years of age. There have been case reports of hematologic and skin malignancies in patients using topical calcineurin inhibitors although causality is questionable. High Dose Vitamin A Pregnancy And Lactation Text: High dose vitamin A therapy is contraindicated during pregnancy and breast feeding. Bactrim Pregnancy And Lactation Text: This medication is Pregnancy Category D and is known to cause fetal risk.  It is also excreted in breast milk. Topical Sulfur Applications Counseling: Topical Sulfur Counseling: Patient counseled that this medication may cause skin irritation or allergic reactions.  In the event of skin irritation, the patient was advised to reduce the amount of the drug applied or use it less frequently.   The patient verbalized understanding of the proper use and possible adverse effects of topical sulfur application.  All of the patient's questions and concerns were addressed. Methotrexate Pregnancy And Lactation Text: This medication is Pregnancy Category X and is known to cause fetal harm. This medication is excreted in breast milk. Rifampin Counseling: I discussed with the patient the risks of rifampin including but not limited to liver damage, kidney damage, red-orange body fluids, nausea/vomiting and severe allergy. Hydroxychloroquine Pregnancy And Lactation Text: This medication has been shown to cause fetal harm but it isn't assigned a Pregnancy Risk Category. There are small amounts excreted in breast milk. Carac Pregnancy And Lactation Text: This medication is Pregnancy Category X and contraindicated in pregnancy and in women who may become pregnant. It is unknown if this medication is excreted in breast milk. Albendazole Counseling:  I discussed with the patient the risks of albendazole including but not limited to cytopenia, kidney damage, nausea/vomiting and severe allergy.  The patient understands that this medication is being used in an off-label manner. Ilumya Counseling: I discussed with the patient the risks of tildrakizumab including but not limited to immunosuppression, malignancy, posterior leukoencephalopathy syndrome, and serious infections.  The patient understands that monitoring is required including a PPD at baseline and must alert us or the primary physician if symptoms of infection or other concerning signs are noted. Protopic Pregnancy And Lactation Text: This medication is Pregnancy Category C. It is unknown if this medication is excreted in breast milk when applied topically. Eucrisa Counseling: Patient may experience a mild burning sensation during topical application. Eucrisa is not approved in children less than 2 years of age. Valtrex Counseling: I discussed with the patient the risks of valacyclovir including but not limited to kidney damage, nausea, vomiting and severe allergy.  The patient understands that if the infection seems to be worsening or is not improving, they are to call. Zyclara Counseling:  I discussed with the patient the risks of imiquimod including but not limited to erythema, scaling, itching, weeping, crusting, and pain.  Patient understands that the inflammatory response to imiquimod is variable from person to person and was educated regarded proper titration schedule.  If flu-like symptoms develop, patient knows to discontinue the medication and contact us. Arava Pregnancy And Lactation Text: This medication is Pregnancy Category X and is absolutely contraindicated during pregnancy. It is unknown if it is excreted in breast milk. Dapsone Pregnancy And Lactation Text: This medication is Pregnancy Category C and is not considered safe during pregnancy or breast feeding. Hydroquinone Counseling:  Patient advised that medication may result in skin irritation, lightening (hypopigmentation), dryness, and burning.  In the event of skin irritation, the patient was advised to reduce the amount of the drug applied or use it less frequently.  Rarely, spots that are treated with hydroquinone can become darker (pseudoochronosis).  Should this occur, patient instructed to stop medication and call the office. The patient verbalized understanding of the proper use and possible adverse effects of hydroquinone.  All of the patient's questions and concerns were addressed. Topical Retinoid counseling:  Patient advised to apply a pea-sized amount only at bedtime and wait 30 minutes after washing their face before applying.  If too drying, patient may add a non-comedogenic moisturizer. The patient verbalized understanding of the proper use and possible adverse effects of retinoids.  All of the patient's questions and concerns were addressed. Arava Counseling:  Patient counseled regarding adverse effects of Arava including but not limited to nausea, vomiting, abnormalities in liver function tests. Patients may develop mouth sores, rash, diarrhea, and abnormalities in blood counts. The patient understands that monitoring is required including LFTs and blood counts.  There is a rare possibility of scarring of the liver and lung problems that can occur when taking methotrexate. Persistent nausea, loss of appetite, pale stools, dark urine, cough, and shortness of breath should be reported immediately. Patient advised to discontinue Arava treatment and consult with a physician prior to attempting conception. The patient will have to undergo a treatment to eliminate Arava from the body prior to conception. Metronidazole Counseling:  I discussed with the patient the risks of metronidazole including but not limited to seizures, nausea/vomiting, a metallic taste in the mouth, nausea/vomiting and severe allergy. Cyclosporine Counseling:  I discussed with the patient the risks of cyclosporine including but not limited to hypertension, gingival hyperplasia,myelosuppression, immunosuppression, liver damage, kidney damage, neurotoxicity, lymphoma, and serious infections. The patient understands that monitoring is required including baseline blood pressure, CBC, CMP, lipid panel and uric acid, and then 1-2 times monthly CMP and blood pressure. Itraconazole Counseling:  I discussed with the patient the risks of itraconazole including but not limited to liver damage, nausea/vomiting, neuropathy, and severe allergy.  The patient understands that this medication is best absorbed when taken with acidic beverages such as non-diet cola or ginger ale.  The patient understands that monitoring is required including baseline LFTs and repeat LFTs at intervals.  The patient understands that they are to contact us or the primary physician if concerning signs are noted. Drysol Counseling:  I discussed with the patient the risks of drysol/aluminum chloride including but not limited to skin rash, itching, irritation, burning. Skyrizi Counseling: I discussed with the patient the risks of risankizumab-rzaa including but not limited to immunosuppression, and serious infections.  The patient understands that monitoring is required including a PPD at baseline and must alert us or the primary physician if symptoms of infection or other concerning signs are noted. Cyclophosphamide Counseling:  I discussed with the patient the risks of cyclophosphamide including but not limited to hair loss, hormonal abnormalities, decreased fertility, abdominal pain, diarrhea, nausea and vomiting, bone marrow suppression and infection. The patient understands that monitoring is required while taking this medication. Benzoyl Peroxide Pregnancy And Lactation Text: This medication is Pregnancy Category C. It is unknown if benzoyl peroxide is excreted in breast milk. Colchicine Counseling:  Patient counseled regarding adverse effects including but not limited to stomach upset (nausea, vomiting, stomach pain, or diarrhea).  Patient instructed to limit alcohol consumption while taking this medication.  Colchicine may reduce blood counts especially with prolonged use.  The patient understands that monitoring of kidney function and blood counts may be required, especially at baseline. The patient verbalized understanding of the proper use and possible adverse effects of colchicine.  All of the patient's questions and concerns were addressed. Skyrizi Pregnancy And Lactation Text: The risk during pregnancy and breastfeeding is uncertain with this medication. Enbrel Counseling:  I discussed with the patient the risks of etanercept including but not limited to myelosuppression, immunosuppression, autoimmune hepatitis, demyelinating diseases, lymphoma, and infections.  The patient understands that monitoring is required including a PPD at baseline and must alert us or the primary physician if symptoms of infection or other concerning signs are noted. Doxycycline Counseling:  Patient counseled regarding possible photosensitivity and increased risk for sunburn.  Patient instructed to avoid sunlight, if possible.  When exposed to sunlight, patients should wear protective clothing, sunglasses, and sunscreen.  The patient was instructed to call the office immediately if the following severe adverse effects occur:  hearing changes, easy bruising/bleeding, severe headache, or vision changes.  The patient verbalized understanding of the proper use and possible adverse effects of doxycycline.  All of the patient's questions and concerns were addressed. Rituxan Counseling:  I discussed with the patient the risks of Rituxan infusions. Side effects can include infusion reactions, severe drug rashes including mucocutaneous reactions, reactivation of latent hepatitis and other infections and rarely progressive multifocal leukoencephalopathy.  All of the patient's questions and concerns were addressed. Stelara Pregnancy And Lactation Text: This medication is Pregnancy Category B and is considered safe during pregnancy. It is unknown if this medication is excreted in breast milk. Birth Control Pills Counseling: Birth Control Pill Counseling: I discussed with the patient the potential side effects of OCPs including but not limited to increased risk of stroke, heart attack, thrombophlebitis, deep venous thrombosis, hepatic adenomas, breast changes, GI upset, headaches, and depression.  The patient verbalized understanding of the proper use and possible adverse effects of OCPs. All of the patient's questions and concerns were addressed. Isotretinoin Pregnancy And Lactation Text: This medication is Pregnancy Category X and is considered extremely dangerous during pregnancy. It is unknown if it is excreted in breast milk. Hydroxychloroquine Counseling:  I discussed with the patient that a baseline ophthalmologic exam is needed at the start of therapy and every year thereafter while on therapy. A CBC may also be warranted for monitoring.  The side effects of this medication were discussed with the patient, including but not limited to agranulocytosis, aplastic anemia, seizures, rashes, retinopathy, and liver toxicity. Patient instructed to call the office should any adverse effect occur.  The patient verbalized understanding of the proper use and possible adverse effects of Plaquenil.  All the patient's questions and concerns were addressed. Glycopyrrolate Pregnancy And Lactation Text: This medication is Pregnancy Category B and is considered safe during pregnancy. It is unknown if it is excreted breast milk. Cellcept Counseling:  I discussed with the patient the risks of mycophenolate mofetil including but not limited to infection/immunosuppression, GI upset, hypokalemia, hypercholesterolemia, bone marrow suppression, lymphoproliferative disorders, malignancy, GI ulceration/bleed/perforation, colitis, interstitial lung disease, kidney failure, progressive multifocal leukoencephalopathy, and birth defects.  The patient understands that monitoring is required including a baseline creatinine and regular CBC testing. In addition, patient must alert us immediately if symptoms of infection or other concerning signs are noted. Stelara Counseling:  I discussed with the patient the risks of ustekinumab including but not limited to immunosuppression, malignancy, posterior leukoencephalopathy syndrome, and serious infections.  The patient understands that monitoring is required including a PPD at baseline and must alert us or the primary physician if symptoms of infection or other concerning signs are noted. Cosentyx Counseling:  I discussed with the patient the risks of Cosentyx including but not limited to worsening of Crohn's disease, immunosuppression, allergic reactions and infections.  The patient understands that monitoring is required including a PPD at baseline and must alert us or the primary physician if symptoms of infection or other concerning signs are noted. Ketoconazole Pregnancy And Lactation Text: This medication is Pregnancy Category C and it isn't know if it is safe during pregnancy. It is also excreted in breast milk and breast feeding isn't recommended. Cephalexin Pregnancy And Lactation Text: This medication is Pregnancy Category B and considered safe during pregnancy.  It is also excreted in breast milk but can be used safely for shorter doses. Nsaids Pregnancy And Lactation Text: These medications are considered safe up to 30 weeks gestation. It is excreted in breast milk. Ivermectin Pregnancy And Lactation Text: This medication is Pregnancy Category C and it isn't known if it is safe during pregnancy. It is also excreted in breast milk. Erythromycin Counseling:  I discussed with the patient the risks of erythromycin including but not limited to GI upset, allergic reaction, drug rash, diarrhea, increase in liver enzymes, and yeast infections. Bactrim Counseling:  I discussed with the patient the risks of sulfa antibiotics including but not limited to GI upset, allergic reaction, drug rash, diarrhea, dizziness, photosensitivity, and yeast infections.  Rarely, more serious reactions can occur including but not limited to aplastic anemia, agranulocytosis, methemoglobinemia, blood dyscrasias, liver or kidney failure, lung infiltrates or desquamative/blistering drug rashes. Topical Sulfur Applications Pregnancy And Lactation Text: This medication is Pregnancy Category C and has an unknown safety profile during pregnancy. It is unknown if this topical medication is excreted in breast milk. Siliq Counseling:  I discussed with the patient the risks of Siliq including but not limited to new or worsening depression, suicidal thoughts and behavior, immunosuppression, malignancy, posterior leukoencephalopathy syndrome, and serious infections.  The patient understands that monitoring is required including a PPD at baseline and must alert us or the primary physician if symptoms of infection or other concerning signs are noted. There is also a special program designed to monitor depression which is required with Siliq. Griseofulvin Pregnancy And Lactation Text: This medication is Pregnancy Category X and is known to cause serious birth defects. It is unknown if this medication is excreted in breast milk but breast feeding should be avoided. Cephalexin Counseling: I counseled the patient regarding use of cephalexin as an antibiotic for prophylactic and/or therapeutic purposes. Cephalexin (commonly prescribed under brand name Keflex) is a cephalosporin antibiotic which is active against numerous classes of bacteria, including most skin bacteria. Side effects may include nausea, diarrhea, gastrointestinal upset, rash, hives, yeast infections, and in rare cases, hepatitis, kidney disease, seizures, fever, confusion, neurologic symptoms, and others. Patients with severe allergies to penicillin medications are cautioned that there is about a 10% incidence of cross-reactivity with cephalosporins. When possible, patients with penicillin allergies should use alternatives to cephalosporins for antibiotic therapy. Doxycycline Pregnancy And Lactation Text: This medication is Pregnancy Category D and not consider safe during pregnancy. It is also excreted in breast milk but is considered safe for shorter treatment courses. Dupixent Counseling: I discussed with the patient the risks of dupilumab including but not limited to eye infection and irritation, cold sores, injection site reactions, worsening of asthma, allergic reactions and increased risk of parasitic infection.  Live vaccines should be avoided while taking dupilumab. Dupilumab will also interact with certain medications such as warfarin and cyclosporine. The patient understands that monitoring is required and they must alert us or the primary physician if symptoms of infection or other concerning signs are noted. Thalidomide Counseling: I discussed with the patient the risks of thalidomide including but not limited to birth defects, anxiety, weakness, chest pain, dizziness, cough and severe allergy. Tazorac Pregnancy And Lactation Text: This medication is not safe during pregnancy. It is unknown if this medication is excreted in breast milk. Infliximab Counseling:  I discussed with the patient the risks of infliximab including but not limited to myelosuppression, immunosuppression, autoimmune hepatitis, demyelinating diseases, lymphoma, and serious infections.  The patient understands that monitoring is required including a PPD at baseline and must alert us or the primary physician if symptoms of infection or other concerning signs are noted. Terbinafine Counseling: Patient counseling regarding adverse effects of terbinafine including but not limited to headache, diarrhea, rash, upset stomach, liver function test abnormalities, itching, taste/smell disturbance, nausea, abdominal pain, and flatulence.  There is a rare possibility of liver failure that can occur when taking terbinafine.  The patient understands that a baseline LFT and kidney function test may be required. The patient verbalized understanding of the proper use and possible adverse effects of terbinafine.  All of the patient's questions and concerns were addressed. Clindamycin Pregnancy And Lactation Text: This medication can be used in pregnancy if certain situations. Clindamycin is also present in breast milk. Bexarotene Pregnancy And Lactation Text: This medication is Pregnancy Category X and should not be given to women who are pregnant or may become pregnant. This medication should not be used if you are breast feeding. Otezla Counseling: The side effects of Otezla were discussed with the patient, including but not limited to worsening or new depression, weight loss, diarrhea, nausea, upper respiratory tract infection, and headache. Patient instructed to call the office should any adverse effect occur.  The patient verbalized understanding of the proper use and possible adverse effects of Otezla.  All the patient's questions and concerns were addressed. Bexarotene Counseling:  I discussed with the patient the risks of bexarotene including but not limited to hair loss, dry lips/skin/eyes, liver abnormalities, hyperlipidemia, pancreatitis, depression/suicidal ideation, photosensitivity, drug rash/allergic reactions, hypothyroidism, anemia, leukopenia, infection, cataracts, and teratogenicity.  Patient understands that they will need regular blood tests to check lipid profile, liver function tests, white blood cell count, thyroid function tests and pregnancy test if applicable. Quinolones Counseling:  I discussed with the patient the risks of fluoroquinolones including but not limited to GI upset, allergic reaction, drug rash, diarrhea, dizziness, photosensitivity, yeast infections, liver function test abnormalities, tendonitis/tendon rupture. Acitretin Pregnancy And Lactation Text: This medication is Pregnancy Category X and should not be given to women who are pregnant or may become pregnant in the future. This medication is excreted in breast milk. Griseofulvin Counseling:  I discussed with the patient the risks of griseofulvin including but not limited to photosensitivity, cytopenia, liver damage, nausea/vomiting and severe allergy.  The patient understands that this medication is best absorbed when taken with a fatty meal (e.g., ice cream or french fries). Picato Counseling:  I discussed with the patient the risks of Picato including but not limited to erythema, scaling, itching, weeping, crusting, and pain. Protopic Counseling: Patient may experience a mild burning sensation during topical application. Protopic is not approved in children less than 2 years of age. There have been case reports of hematologic and skin malignancies in patients using topical calcineurin inhibitors although causality is questionable. Clofazimine Counseling:  I discussed with the patient the risks of clofazimine including but not limited to skin and eye pigmentation, liver damage, nausea/vomiting, gastrointestinal bleeding and allergy. Cyclophosphamide Pregnancy And Lactation Text: This medication is Pregnancy Category D and it isn't considered safe during pregnancy. This medication is excreted in breast milk. Azithromycin Pregnancy And Lactation Text: This medication is considered safe during pregnancy and is also secreted in breast milk. Acitretin Counseling:  I discussed with the patient the risks of acitretin including but not limited to hair loss, dry lips/skin/eyes, liver damage, hyperlipidemia, depression/suicidal ideation, photosensitivity.  Serious rare side effects can include but are not limited to pancreatitis, pseudotumor cerebri, bony changes, clot formation/stroke/heart attack.  Patient understands that alcohol is contraindicated since it can result in liver toxicity and significantly prolong the elimination of the drug by many years. Tetracycline Pregnancy And Lactation Text: This medication is Pregnancy Category D and not consider safe during pregnancy. It is also excreted in breast milk. Oxybutynin Counseling:  I discussed with the patient the risks of oxybutynin including but not limited to skin rash, drowsiness, dry mouth, difficulty urinating, and blurred vision. Azithromycin Counseling:  I discussed with the patient the risks of azithromycin including but not limited to GI upset, allergic reaction, drug rash, diarrhea, and yeast infections. Nsaids Counseling: NSAID Counseling: I discussed with the patient that NSAIDs should be taken with food. Prolonged use of NSAIDs can result in the development of stomach ulcers.  Patient advised to stop taking NSAIDs if abdominal pain occurs.  The patient verbalized understanding of the proper use and possible adverse effects of NSAIDs.  All of the patient's questions and concerns were addressed. Dupixent Pregnancy And Lactation Text: This medication likely crosses the placenta but the risk for the fetus is uncertain. This medication is excreted in breast milk. Tremfya Counseling: I discussed with the patient the risks of guselkumab including but not limited to immunosuppression, serious infections, worsening of inflammatory bowel disease and drug reactions.  The patient understands that monitoring is required including a PPD at baseline and must alert us or the primary physician if symptoms of infection or other concerning signs are noted. Methotrexate Counseling:  Patient counseled regarding adverse effects of methotrexate including but not limited to nausea, vomiting, abnormalities in liver function tests. Patients may develop mouth sores, rash, diarrhea, and abnormalities in blood counts. The patient understands that monitoring is required including LFT's and blood counts.  There is a rare possibility of scarring of the liver and lung problems that can occur when taking methotrexate. Persistent nausea, loss of appetite, pale stools, dark urine, cough, and shortness of breath should be reported immediately. Patient advised to discontinue methotrexate treatment at least three months before attempting to become pregnant.  I discussed the need for folate supplements while taking methotrexate.  These supplements can decrease side effects during methotrexate treatment. The patient verbalized understanding of the proper use and possible adverse effects of methotrexate.  All of the patient's questions and concerns were addressed. Erivedge Counseling- I discussed with the patient the risks of Erivedge including but not limited to nausea, vomiting, diarrhea, constipation, weight loss, changes in the sense of taste, decreased appetite, muscle spasms, and hair loss.  The patient verbalized understanding of the proper use and possible adverse effects of Erivedge.  All of the patient's questions and concerns were addressed. Prednisone Counseling:  I discussed with the patient the risks of prolonged use of prednisone including but not limited to weight gain, insomnia, osteoporosis, mood changes, diabetes, susceptibility to infection, glaucoma and high blood pressure.  In cases where prednisone use is prolonged, patients should be monitored with blood pressure checks, serum glucose levels and an eye exam.  Additionally, the patient may need to be placed on GI prophylaxis, PCP prophylaxis, and calcium and vitamin D supplementation and/or a bisphosphonate.  The patient verbalized understanding of the proper use and the possible adverse effects of prednisone.  All of the patient's questions and concerns were addressed. Carac Counseling:  I discussed with the patient the risks of Carac including but not limited to erythema, scaling, itching, weeping, crusting, and pain. Tetracycline Counseling: Patient counseled regarding possible photosensitivity and increased risk for sunburn.  Patient instructed to avoid sunlight, if possible.  When exposed to sunlight, patients should wear protective clothing, sunglasses, and sunscreen.  The patient was instructed to call the office immediately if the following severe adverse effects occur:  hearing changes, easy bruising/bleeding, severe headache, or vision changes.  The patient verbalized understanding of the proper use and possible adverse effects of tetracycline.  All of the patient's questions and concerns were addressed. Patient understands to avoid pregnancy while on therapy due to potential birth defects. Minocycline Counseling: Patient advised regarding possible photosensitivity and discoloration of the teeth, skin, lips, tongue and gums.  Patient instructed to avoid sunlight, if possible.  When exposed to sunlight, patients should wear protective clothing, sunglasses, and sunscreen.  The patient was instructed to call the office immediately if the following severe adverse effects occur:  hearing changes, easy bruising/bleeding, severe headache, or vision changes.  The patient verbalized understanding of the proper use and possible adverse effects of minocycline.  All of the patient's questions and concerns were addressed. Xolair Counseling:  Patient informed of potential adverse effects including but not limited to fever, muscle aches, rash and allergic reactions.  The patient verbalized understanding of the proper use and possible adverse effects of Xolair.  All of the patient's questions and concerns were addressed. Cimzia Counseling:  I discussed with the patient the risks of Cimzia including but not limited to immunosuppression, allergic reactions and infections.  The patient understands that monitoring is required including a PPD at baseline and must alert us or the primary physician if symptoms of infection or other concerning signs are noted. Birth Control Pills Pregnancy And Lactation Text: This medication should be avoided if pregnant and for the first 30 days post-partum. Detail Level: Detailed SSKI Counseling:  I discussed with the patient the risks of SSKI including but not limited to thyroid abnormalities, metallic taste, GI upset, fever, headache, acne, arthralgias, paraesthesias, lymphadenopathy, easy bleeding, arrhythmias, and allergic reaction. Rituxan Pregnancy And Lactation Text: This medication is Pregnancy Category C and it isn't know if it is safe during pregnancy. It is unknown if this medication is excreted in breast milk but similar antibodies are known to be excreted. Clofazimine Pregnancy And Lactation Text: This medication is Pregnancy Category C and isn't considered safe during pregnancy. It is excreted in breast milk. Spironolactone Counseling: Patient advised regarding risks of diarrhea, abdominal pain, hyperkalemia, birth defects (for female patients), liver toxicity and renal toxicity. The patient may need blood work to monitor liver and kidney function and potassium levels while on therapy. The patient verbalized understanding of the proper use and possible adverse effects of spironolactone.  All of the patient's questions and concerns were addressed. Valtrex Pregnancy And Lactation Text: this medication is Pregnancy Category B and is considered safe during pregnancy. This medication is not directly found in breast milk but it's metabolite acyclovir is present. Cimzia Pregnancy And Lactation Text: This medication crosses the placenta but can be considered safe in certain situations. Cimzia may be excreted in breast milk. Metronidazole Pregnancy And Lactation Text: This medication is Pregnancy Category B and considered safe during pregnancy.  It is also excreted in breast milk. Otezla Pregnancy And Lactation Text: This medication is Pregnancy Category C and it isn't known if it is safe during pregnancy. It is unknown if it is excreted in breast milk. Spironolactone Pregnancy And Lactation Text: This medication can cause feminization of the male fetus and should be avoided during pregnancy. The active metabolite is also found in breast milk. Clindamycin Counseling: I counseled the patient regarding use of clindamycin as an antibiotic for prophylactic and/or therapeutic purposes. Clindamycin is active against numerous classes of bacteria, including skin bacteria. Side effects may include nausea, diarrhea, gastrointestinal upset, rash, hives, yeast infections, and in rare cases, colitis. Ivermectin Counseling:  Patient instructed to take medication on an empty stomach with a full glass of water.  Patient informed of potential adverse effects including but not limited to nausea, diarrhea, dizziness, itching, and swelling of the extremities or lymph nodes.  The patient verbalized understanding of the proper use and possible adverse effects of ivermectin.  All of the patient's questions and concerns were addressed. Xeljanz Counseling: I discussed with the patient the risks of Xeljanz therapy including increased risk of infection, liver issues, headache, diarrhea, or cold symptoms. Live vaccines should be avoided. They were instructed to call if they have any problems. Benzoyl Peroxide Counseling: Patient counseled that medicine may cause skin irritation and bleach clothing.  In the event of skin irritation, the patient was advised to reduce the amount of the drug applied or use it less frequently.   The patient verbalized understanding of the proper use and possible adverse effects of benzoyl peroxide.  All of the patient's questions and concerns were addressed. Topical Clindamycin Pregnancy And Lactation Text: This medication is Pregnancy Category B and is considered safe during pregnancy. It is unknown if it is excreted in breast milk. Hydroxyzine Pregnancy And Lactation Text: This medication is not safe during pregnancy and should not be taken. It is also excreted in breast milk and breast feeding isn't recommended. Erythromycin Pregnancy And Lactation Text: This medication is Pregnancy Category B and is considered safe during pregnancy. It is also excreted in breast milk. Topical Clindamycin Counseling: Patient counseled that this medication may cause skin irritation or allergic reactions.  In the event of skin irritation, the patient was advised to reduce the amount of the drug applied or use it less frequently.   The patient verbalized understanding of the proper use and possible adverse effects of clindamycin.  All of the patient's questions and concerns were addressed. Cellcept Pregnancy And Lactation Text: This medication is Pregnancy Category D and isn't considered safe during pregnancy. It is unknown if this medication is excreted in breast milk. Xelvesnaz Pregnancy And Lactation Text: This medication is Pregnancy Category D and is not considered safe during pregnancy.  The risk during breast feeding is also uncertain. Hydroxyzine Counseling: Patient advised that the medication is sedating and not to drive a car after taking this medication.  Patient informed of potential adverse effects including but not limited to dry mouth, urinary retention, and blurry vision.  The patient verbalized understanding of the proper use and possible adverse effects of hydroxyzine.  All of the patient's questions and concerns were addressed. Isotretinoin Counseling: Patient should get monthly blood tests, not donate blood, not drive at night if vision affected, not share medication, and not undergo elective surgery for 6 months after tx completed. Side effects reviewed, pt to contact office should one occur. Cimetidine Counseling:  I discussed with the patient the risks of Cimetidine including but not limited to gynecomastia, headache, diarrhea, nausea, drowsiness, arrhythmias, pancreatitis, skin rashes, psychosis, bone marrow suppression and kidney toxicity. Azathioprine Counseling:  I discussed with the patient the risks of azathioprine including but not limited to myelosuppression, immunosuppression, hepatotoxicity, lymphoma, and infections.  The patient understands that monitoring is required including baseline LFTs, Creatinine, possible TPMP genotyping and weekly CBCs for the first month and then every 2 weeks thereafter.  The patient verbalized understanding of the proper use and possible adverse effects of azathioprine.  All of the patient's questions and concerns were addressed. Fluconazole Counseling:  Patient counseled regarding adverse effects of fluconazole including but not limited to headache, diarrhea, nausea, upset stomach, liver function test abnormalities, taste disturbance, and stomach pain.  There is a rare possibility of liver failure that can occur when taking fluconazole.  The patient understands that monitoring of LFTs and kidney function test may be required, especially at baseline. The patient verbalized understanding of the proper use and possible adverse effects of fluconazole.  All of the patient's questions and concerns were addressed. Simponi Counseling:  I discussed with the patient the risks of golimumab including but not limited to myelosuppression, immunosuppression, autoimmune hepatitis, demyelinating diseases, lymphoma, and serious infections.  The patient understands that monitoring is required including a PPD at baseline and must alert us or the primary physician if symptoms of infection or other concerning signs are noted. Rifampin Pregnancy And Lactation Text: This medication is Pregnancy Category C and it isn't know if it is safe during pregnancy. It is also excreted in breast milk and should not be used if you are breast feeding. 5-Fu Counseling: 5-Fluorouracil Counseling:  I discussed with the patient the risks of 5-fluorouracil including but not limited to erythema, scaling, itching, weeping, crusting, and pain. Doxepin Counseling:  Patient advised that the medication is sedating and not to drive a car after taking this medication. Patient informed of potential adverse effects including but not limited to dry mouth, urinary retention, and blurry vision.  The patient verbalized understanding of the proper use and possible adverse effects of doxepin.  All of the patient's questions and concerns were addressed. High Dose Vitamin A Counseling: Side effects reviewed, pt to contact office should one occur. Taltz Counseling: I discussed with the patient the risks of ixekizumab including but not limited to immunosuppression, serious infections, worsening of inflammatory bowel disease and drug reactions.  The patient understands that monitoring is required including a PPD at baseline and must alert us or the primary physician if symptoms of infection or other concerning signs are noted. Dapsone Counseling: I discussed with the patient the risks of dapsone including but not limited to hemolytic anemia, agranulocytosis, rashes, methemoglobinemia, kidney failure, peripheral neuropathy, headaches, GI upset, and liver toxicity.  Patients who start dapsone require monitoring including baseline LFTs and weekly CBCs for the first month, then every month thereafter.  The patient verbalized understanding of the proper use and possible adverse effects of dapsone.  All of the patient's questions and concerns were addressed. Sski Pregnancy And Lactation Text: This medication is Pregnancy Category D and isn't considered safe during pregnancy. It is excreted in breast milk. Tazorac Counseling:  Patient advised that medication is irritating and drying.  Patient may need to apply sparingly and wash off after an hour before eventually leaving it on overnight.  The patient verbalized understanding of the proper use and possible adverse effects of tazorac.  All of the patient's questions and concerns were addressed. Odomzo Counseling- I discussed with the patient the risks of Odomzo including but not limited to nausea, vomiting, diarrhea, constipation, weight loss, changes in the sense of taste, decreased appetite, muscle spasms, and hair loss.  The patient verbalized understanding of the proper use and possible adverse effects of Odomzo.  All of the patient's questions and concerns were addressed. Glycopyrrolate Counseling:  I discussed with the patient the risks of glycopyrrolate including but not limited to skin rash, drowsiness, dry mouth, difficulty urinating, and blurred vision. Gabapentin Counseling: I discussed with the patient the risks of gabapentin including but not limited to dizziness, somnolence, fatigue and ataxia. Ketoconazole Counseling:   Patient counseled regarding improving absorption with orange juice.  Adverse effects include but are not limited to breast enlargement, headache, diarrhea, nausea, upset stomach, liver function test abnormalities, taste disturbance, and stomach pain.  There is a rare possibility of liver failure that can occur when taking ketoconazole. The patient understands that monitoring of LFTs may be required, especially at baseline. The patient verbalized understanding of the proper use and possible adverse effects of ketoconazole.  All of the patient's questions and concerns were addressed. Humira Counseling:  I discussed with the patient the risks of adalimumab including but not limited to myelosuppression, immunosuppression, autoimmune hepatitis, demyelinating diseases, lymphoma, and serious infections.  The patient understands that monitoring is required including a PPD at baseline and must alert us or the primary physician if symptoms of infection or other concerning signs are noted.

## 2020-09-01 NOTE — ED ADULT TRIAGE NOTE - NS ED NURSE AMBULANCES
Gloria Mike, OTR/L [facility]

## 2021-12-27 NOTE — PRE-OP CHECKLIST - SPO2 (%)
I have personally verified, reviewed, released, signed, authenticated, authorized, confirmed,finalized, and approved the actions of the CMA.
[id number]
[id number]

## 2023-04-20 NOTE — ED PROVIDER NOTE - NS ED MD DISPO ISOLATION
I have reviewed and confirmed nurses' notes for patient's medications, allergies, medical history, and surgical history. None

## 2023-06-07 NOTE — ED ADULT NURSE NOTE - CAS EDP DISCH TYPE
Home Enbrel Counseling:  I discussed with the patient the risks of etanercept including but not limited to myelosuppression, immunosuppression, autoimmune hepatitis, demyelinating diseases, lymphoma, and infections.  The patient understands that monitoring is required including a PPD at baseline and must alert us or the primary physician if symptoms of infection or other concerning signs are noted.

## 2023-07-12 NOTE — H&P ADULT - PROBLEM SELECTOR PLAN 4
No no active issues.  s/p rectosigmoid resection of mass with anastomosis, and radiation completed 12/2017

## 2023-07-24 NOTE — PROGRESS NOTE ADULT - ATTENDING COMMENTS
BMP,Mg, EKG orders entered. As patient does not have follow up scheduled until 2024, will need updated labs and EKG for refills of Flecainide.   LM #1   I have  reviewed pertinent labs and imaging, and discussed the case with colleagues, residents, and physician assistants on B Team rounds.    Plan  continue a/c for dvt - per my discussion with vascular team, patient is not a candidate for lysis given her active cancer, so even if her thrombus has progressed there is no additional therapeutic intervention available, so no real utility in repeat duplex or ctv.   pain control - start home oxycodone  dispo planning        The Acute Care Surgery (B Team) Attending Group Practice:  Dr. Myles Prieto, Dr. Leonardo Celeste, Dr. Imtiaz Skinner, Dr. Callie Finch, Dr. Bonifacio White    urgent issues - spectra [phone] or [phone]  nonurgent issues - [phone]  patient appointments or afterhours - [phone]

## 2025-04-14 NOTE — PROVIDER CONTACT NOTE (OTHER) - SITUATION
39-year-old woman with a history of hypertension, acid reflux, with a complaint of URI.  The patient is currently pregnant.    History of present illness and pertinent review of systems:  The patient has a one-week history.  Last week the patient felt feverish, had chills, and noted sweats.  The patient is 17 weeks pregnant.  On the 1st day of her URI she had a headache which resolved.  The past 2 days she has had an earache primarily on the left.  The patient has nasal congestion without rhinorrhea.  She has a postnasal drip.  She has no loss of sense of taste or smell.  There was no dental pain or facial swelling.  The patient has a sore throat.  This resolved 2 days ago.  She has symptoms of laryngitis.  She is not aware of any swollen glands.  The patient has a cough which is nonproductive.  She has no pleurisy or hemoptysis.  Last Thursday she message her obstetrician who prescribed a Z-Juan.  Prior to that she had tried nasal flushes, Zyrtec, Flonase, and a cough syrup that caused her to be hyperactive.  The cough medicine contain dextromethorphan  She believes the cough medication was Delsym.  She has had no diarrhea, rash, and fatigue.  She also noted myalgia.      Problem list, medication list, and allergies have been reviewed.  The patient has had reactions to topical acne medications but the majority of her listed allergies are intolerances.  She has no true drug allergies.      Review of systems:    Physical Exam  Vitals reviewed.   Constitutional:       General: She is not in acute distress.     Appearance: She is not ill-appearing, toxic-appearing or diaphoretic.   HENT:      Head: Atraumatic.      Right Ear: Tympanic membrane, ear canal and external ear normal.      Left Ear: Tympanic membrane, ear canal and external ear normal.      Nose: Congestion present. No rhinorrhea.      Mouth/Throat:      Mouth: Mucous membranes are moist.      Pharynx: Oropharynx is clear. Posterior oropharyngeal erythema  present. No oropharyngeal exudate.   Eyes:      General: No scleral icterus.     Conjunctiva/sclera: Conjunctivae normal.   Neck:      Vascular: No carotid bruit.   Cardiovascular:      Rate and Rhythm: Normal rate and regular rhythm.      Pulses: Normal pulses.      Heart sounds: Normal heart sounds. No murmur heard.     No gallop.   Pulmonary:      Effort: Pulmonary effort is normal. No respiratory distress.      Breath sounds: Normal breath sounds. No wheezing, rhonchi or rales.   Abdominal:      General: Bowel sounds are normal.      Palpations: Abdomen is soft.      Tenderness: There is no abdominal tenderness.      Comments: There was no hepatosplenomegaly by percussion.   Musculoskeletal:         General: No tenderness.      Cervical back: Neck supple. No tenderness.      Right lower leg: No edema.      Left lower leg: No edema.   Lymphadenopathy:      Cervical: No cervical adenopathy.   Skin:     Coloration: Skin is not jaundiced or pale.   Neurological:      General: No focal deficit present.      Mental Status: She is alert and oriented to person, place, and time.   Psychiatric:         Mood and Affect: Mood normal.         Behavior: Behavior normal.     Assessment/plan:   URI:  The patient is physical exam is negative particularly she has a negative lung exam and ears nose throat exam except for congestion.  There was nothing on exam to further extend her antibiotic.  However given her pregnancy and often the prolonged course in pregnant women associated with congestion prednisone may be helpful for 5-7 days.  That decision can be made with the obstetrician though a prescription was given.  The patient should not take anything with dextromethorphan as it is possible she had an asthma adverse side effect.    Plan:  Reviewed above with patient  Prednisone 20 mg daily for 5-7 days if okay with Ob   Zyrtec 10 mg at bedtime   Saline nasal spray 4 squirts every 2 hours in each nostril  Plain Robitussin for  cough   Honey 1-2 tbsp directly off the spoon also for cough and throat irritation   Halls cough drops  Hydration   Sleep with head elevated   band neutrophil level 21.8%

## 2025-05-05 NOTE — DISCHARGE NOTE ADULT - PATIENT PORTAL LINK FT
3 (mild pain) You can access the [facility], offered by [facility], by registering with the following website: [email or web address]

## 2025-05-29 NOTE — ED PROVIDER NOTE - GASTROINTESTINAL [-], MLM
All belongings, necessary paperwork and emtala form given to [facility]. PT in no acute distress. VSS at discharge. Pt out of ed on [facility] to [place].   no diarrhea